# Patient Record
Sex: MALE | Race: WHITE | NOT HISPANIC OR LATINO | Employment: OTHER | URBAN - METROPOLITAN AREA
[De-identification: names, ages, dates, MRNs, and addresses within clinical notes are randomized per-mention and may not be internally consistent; named-entity substitution may affect disease eponyms.]

---

## 2017-11-02 ENCOUNTER — TRANSFERRED RECORDS (OUTPATIENT)
Dept: HEALTH INFORMATION MANAGEMENT | Facility: CLINIC | Age: 67
End: 2017-11-02

## 2023-11-09 ENCOUNTER — MEDICAL CORRESPONDENCE (OUTPATIENT)
Dept: HEALTH INFORMATION MANAGEMENT | Facility: CLINIC | Age: 73
End: 2023-11-09

## 2023-12-14 ENCOUNTER — TRANSFERRED RECORDS (OUTPATIENT)
Dept: HEALTH INFORMATION MANAGEMENT | Facility: CLINIC | Age: 73
End: 2023-12-14

## 2024-01-25 ENCOUNTER — REFERRAL (OUTPATIENT)
Dept: TRANSPLANT | Facility: CLINIC | Age: 74
End: 2024-01-25

## 2024-01-25 DIAGNOSIS — Z87.891 HISTORY OF TOBACCO USE: ICD-10-CM

## 2024-01-25 DIAGNOSIS — E11.9 DIABETES MELLITUS, TYPE 2 (H): ICD-10-CM

## 2024-01-25 DIAGNOSIS — Z01.818 PRE-TRANSPLANT EVALUATION FOR KIDNEY TRANSPLANT: ICD-10-CM

## 2024-01-25 DIAGNOSIS — Z76.82 ORGAN TRANSPLANT CANDIDATE: ICD-10-CM

## 2024-01-25 DIAGNOSIS — N18.5 CHRONIC KIDNEY DISEASE, STAGE 5, KIDNEY FAILURE (H): Primary | ICD-10-CM

## 2024-01-25 DIAGNOSIS — E78.5 HYPERLIPIDEMIA: ICD-10-CM

## 2024-01-25 DIAGNOSIS — I10 ESSENTIAL HYPERTENSION: ICD-10-CM

## 2024-01-25 DIAGNOSIS — N18.6 END STAGE RENAL DISEASE (H): ICD-10-CM

## 2024-01-25 NOTE — LETTER
2/2/24        Peter Carpio  [address] [address]          Dear Peter,    Thank you for your interest in the Transplant Center at [facility]. We look forward to being a part of your care team and assisting you through the transplant process.    As we discussed, your transplant coordinator is Tiffany Urbina (Kidney).  You may call your coordinator at any time with questions or concerns.  Your first scheduled call will be on 2/13/24 between 1:00 and 3:00.  If this needs to change, call [phone].    Please complete the following.    Fill out and return the enclosed forms  Authorization for Electronic Communication  Authorization to Discuss Protected Health Information  Authorization for Release of Protected Health Information  Authorization for Care Everywhere Release of Information    Sign up for:  AIMt, access to your electronic medical record (see enclosed pamphlet)  [email or web address], a transplant education website                                  My Transplant Place     You can use these tools to learn more about your transplant, communicate with your care team, and track your medical details      Sincerely,      Solid Organ Transplant  [facility]    cc: Care Team

## 2024-01-25 NOTE — LETTER
Peter Carpio  [address] [address]                February 13, 2024    Sanjuana Peter,     It was a pleasure to speak with you over the phone today in preparation of your pre-kidney transplant evaluation. I am sending this email to review the pre-transplant information we covered. I will also put this same content in a letter and send it to your My Chart for your convenience.     A  from our Office will send your schedule in your My Chart for your pre-kidney transplant evaluation on 5/16/24     Your appointments will be at the:  [facility]  [address] [place] [id number]  Please go to the third floor to check in by 7:30 am.      For parking options, please park in the open lot across the street from the front door of our Clinic.  Otherwise, enter the Clinic and Surgery Center /arrival plaza from [facility] and attendants can assist you based on your needs.  parking is available for those with limited mobility Monday-Friday from 7:00 am to 5:00 pm. Please bring one to two family members or friends to your appointment day to help listen to the patient education and to help ask questions that are important to you. You can eat and drink normally on this day. There is a coffee shop on street level for you to purchase food at. Also, please take all your prescribed medications as ordered on this day. Upon completion of your appointments, I will compile the outcomes and have your results reviewed at the Transplant Team Selection Committee on 5/22/24. This is a medical review meeting only and so you will not be asked to attend. I will call you within a few days after this meeting to inform you of the outcomes and to assist in making arrangements for completion of your evaluation. I will also send you a summary letter after our telephone conversation.     You will receive an email from our Transplant Office which contains a Receipt of  Information consent and patient educational materials. Please read/ electronically sign the Receipt of Information consent as well as read the educational materials prior to your evaluation appointments on 5/16/24     Please complete your pre kidney transplant education on the transplant education   [email or web address]           Please complete viewing of these videos prior to your evaluation appointments as this will give you a good knowledge base to then speak with the providers.      Additional transplant resources are as follows:  www.unos.org. UNOS, or United Network of Organ Sharing, is the national organization in our country that maintains all of the organ wait lists as well as is responsible for the rules and regulations for organ allocation. I recommend looking at the Transplant Living section as this area has content created for patients.     www.srtr.org SRTR, or the Scientific Registry for Transplant Recipients is a national data base that all Transplant Centers report their success and failure rate to for all organ types twice per year. The results are public knowledge and do provide a good perspective of organ transplant.     If the transplant providers tell you at your appointments that you should start to have live donors register with our Program to initiate their evaluations, please provide this registration website: [email or web address]   The donor will receive a detailed email response back with information and next steps specific to their situation. Donors can also call our Office and ask to speak with a live donor coordinator in the event of questions at [phone].     Please let me know of any questions or concerns,  Tiffany Urbina RN  Pre-Kidney/Pancreas Transplant Coordinator  Email: [email or web address]  Direct Phone Number: [phone]

## 2024-01-25 NOTE — LETTER
Peter Javierselenasherie  [address] [address]                February 2, 2024                                                                                          MEDICAL RECORDS REQUEST    [facility] Kidney, Kidney Pancreas Transplant Program Records Request                      [facility]    Thank you for referring your patient to the [facility] Kidney, Kidney Pancreas Program, in order to process the referral we will need the following information;    Demographics  2728 form   Immunizations records  Providers Progress notes, (last 3 note)      Please call our office at [phone] if you have any questions or concerns.                Please fax all paper records to [phone] within 3-5 business days.      Thank you,   The SOT Referral Intake Team     [facility]  Solid Organ Transplant Office  [address]  [place] [address]

## 2024-02-02 VITALS — WEIGHT: 185 LBS | HEIGHT: 66 IN | BODY MASS INDEX: 29.73 KG/M2

## 2024-02-02 NOTE — TELEPHONE ENCOUNTER
PCP: Yared Greene MD  Referring Organization: [facility]  Referring Provider: Smith Browning NP  Referring Diagnosis: CKD, stage 5    GFR/Date: 9 (12/29/23)    Is patient under the age of 65? no  Is patient diabetic? yes  Is patient on insulin? yes  Was patient offered a pancreas transplant referral? no    Previous transplants: no  Heart: no  Cancer: large intestine about 8 years ago ([facility])  Biopsy: no    Is patient in a group home/assisted living? no  Does patient have a guardian? No    Patient's sister would like to be a donor. She is 72 years of age.    Referral intake process completed.  Patient is aware that after financial approval is received, medical records will be requested.   Patient confirmed for a callback from transplant coordinator on 2/13/24. (within 2 weeks)  Tentative evaluation date 4/30/24 slot 1 (within 4 weeks) if appointment is virtual, does patient have capabilities of setting this up? N/A    Confirmed coordinator will discuss evaluation process in more detail at the time of their call.   Patient is aware of the need to arrange age appropriate cancer screening, vaccinations, and dental care.  Reminded patient to complete questionnaire, complete medical records release, and review packet prior to evaluation visit .  Assessed patient for special needs (ie-wheelchair, assistance, guardian, and ):  None needed.   Patient instructed to call [phone] with questions.     Patient gave verbal consent during intake call to obtain medical records and documents outside of [facility]:  Yes

## 2024-02-11 ENCOUNTER — HEALTH MAINTENANCE LETTER (OUTPATIENT)
Age: 74
End: 2024-02-11

## 2024-02-12 PROBLEM — N18.4 CHRONIC KIDNEY DISEASE, STAGE 4, SEVERELY DECREASED GFR (H): Status: ACTIVE | Noted: 2024-02-12

## 2024-02-12 PROBLEM — E11.21 DIABETIC NEPHROPATHY (H): Status: ACTIVE | Noted: 2024-02-12

## 2024-02-12 PROBLEM — I25.10 CORONARY ARTERY DISEASE: Status: ACTIVE | Noted: 2024-02-12

## 2024-02-12 PROBLEM — E11.9 DIABETES MELLITUS, TYPE 2 (H): Status: ACTIVE | Noted: 2024-02-12

## 2024-02-12 PROBLEM — I10 HYPERTENSION: Status: ACTIVE | Noted: 2024-02-12

## 2024-02-12 PROBLEM — M10.9 GOUT: Status: ACTIVE | Noted: 2024-02-12

## 2024-02-12 PROBLEM — N25.81 SECONDARY RENAL HYPERPARATHYROIDISM (H): Status: ACTIVE | Noted: 2024-02-12

## 2024-02-12 PROBLEM — N40.0 HYPERTROPHY OF PROSTATE WITHOUT URINARY OBSTRUCTION: Status: ACTIVE | Noted: 2024-02-12

## 2024-02-13 NOTE — TELEPHONE ENCOUNTER
Reviewed pt's chart for pre-kidney transplant evaluation planning. Coordinator first call on 2/13/24. PKE STD on 5/16/24 slot 2.      services required: no. Is pt able to attend virtual education class? 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 Pt is on dialysis, started on January 2024. Pt is a type 2 diabetic, on 64 units/average of insulin per day.     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 Does not follow with local cardiologist.  Lung hx: none. 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 Saw Dr. Pham cancer specialist place out of [facility]. Will be having a hernia repair on 2/28 at [facility]. Pt is a former smoker, quit about 15 years ago and smoked for 15 years 1.5ppd, does rarely consume alcohol, and does not use recreational drugs. Does not have current infection, no non-healing wounds, or active cancer.    Health maintenance items: BMI 30.  Colonoscopy: UTD but due May of 2024.  Dental: overdue. Vaccinations: UTD. Pt is independent w/ ADLs.  Pt lives in [place] w/ spouse Jo. Good support following transplant. Pt has a sister interested in a  living donor.     Imaging Available: CT abdomen/pelvis done at [facility] 1/30/24 pushed to PACS    Contacted patient and introduced myself as their Transplant Coordinator, also introduced the role of the Transplant Coordinator in the transplant process.  Explained the purpose of this call including reviewing next steps and answering questions.      Confirmed Referring Provider, Smith Browning NP; Dialysis Center, [facility]; and Primary Care  Physician, Yared Greene MD. Explained to the patient of the importance of continued communication with referring providers and primary care physicians.      Reviewed components of transplant evaluation process including necessary appointments, tests, and procedures.  Instructed pt to bring 1-2 people with them to eval and to eat and drink and normally on eval day    Answered questions for patient regarding evaluation, provided my name and contact information and requested they call/message with any additional questions or concerns.  Informed patient they will receive a letter with information discussed in referral call. Determined that patient would like information regarding transplant by:       Mail, Email, Company Data [employer], and/or Phone Call.  Encouraged the use of 1-4 Allt.    Informed pt about transplant educational website, asked to watch pre-kidney or sign up for education class prior to evaluation. Link for educational videos were provided.  Additional informational web sites about transplant were discussed. Links provided to www.unos.org and www.srtr.org in letter sent to patient.  Pt expressed good understanding of all and were in good agreement with the plan.    Confirmed STD 5/16/24. Informed pt they will hear from scheduling to arrange appointment times for evaluation day. As well as, receive an email from our Office prior to eval with a Receipt of Info and educational materials - instructed to read materials and sign consent prior to eval. Smartset orders entered.

## 2024-02-27 ENCOUNTER — DOCUMENTATION ONLY (OUTPATIENT)
Dept: TRANSPLANT | Facility: CLINIC | Age: 74
End: 2024-02-27
Payer: COMMERCIAL

## 2024-03-28 ENCOUNTER — TRANSFERRED RECORDS (OUTPATIENT)
Dept: HEALTH INFORMATION MANAGEMENT | Facility: CLINIC | Age: 74
End: 2024-03-28

## 2024-04-21 ENCOUNTER — HEALTH MAINTENANCE LETTER (OUTPATIENT)
Age: 74
End: 2024-04-21

## 2024-04-26 ENCOUNTER — DOCUMENTATION ONLY (OUTPATIENT)
Dept: TRANSPLANT | Facility: CLINIC | Age: 74
End: 2024-04-26
Payer: COMMERCIAL

## 2024-05-08 ENCOUNTER — TELEPHONE (OUTPATIENT)
Dept: TRANSPLANT | Facility: CLINIC | Age: 74
End: 2024-05-08
Payer: COMMERCIAL

## 2024-05-08 NOTE — TELEPHONE ENCOUNTER
VM message left reminding patient of upcoming PKE appointments at [facility] on 5/16/24 starting at 0730. Instructed patient may eat breakfast and take regularly scheduled medications.  Patient encouraged to bring family member/support person along to appointments if possible  Contact information given for any further questions/concerns/need to reschedule.

## 2024-05-14 NOTE — PROGRESS NOTES
[facility] SOLID ORGAN TRANSPLANT  OUTPATIENT MNT: KIDNEY TRANSPLANT EVALUATION    Current BMI: 29.6 (HT 66 in,  lbs/83 kg)  BMI guideline for kidney transplant up to a BMI of 40 / per surgeon discretion     Frailty Assessment-- Not Frail (2/5 points)- low , low activity   Handgrip Strength: 22    Reference:  Score of 0-2 = Not Frail  Score of 3-5 = Frail      TIME SPENT: 15 minutes  VISIT TYPE: Initial   REFERRING PHYSICIAN: Reece   PT ACCOMPANIED BY: his wife, Jo     History of previous txp: none   Dialysis: [facility] 1/2024-->PD a few weeks ago     NUTRITION ASSESSMENT  H/o DM II- checks BG via Freestyle [place] 3   Humulin 70/30-  takes 32 units BID   Notices PD has increased BG, but [facility]  A1c 6.5 (2/20/24)     - Meal prep & grocery shopping: pt's wife   - Previous RD education: yes  - Appetite: typically good appetite except for 5 teeth pulled lately  - Food allergies/intolerances: no  - Issues chewing or swallowing: no  - N/V/D/C: no  - Food access concerns: no    Vitamins, Supplements, Pertinent Meds: preservision, vit D, renvela (takes 1x/day instead of with every meal)  Herbal Medicines/Supplements: none   Protein Supplements: occasional use -protein drinks (30 g protein)    Weight hx // fluid retention:   - no GERI  - weight stable     PHYSICAL ACTIVITY   No routine activity; no physical limitations  ADLs go ok     DIET RECALL  Breakfast Frozen BF sausage w/ sausage & egg    Lunch [place] or used to have an apple before teeth were pulled    Dinner Fish/chicken with salad; tacos     Snacks No additional    Beverages 32-40 oz/day fluid restriction: 1 Diet 7up, orange juice (8 oz/day), water, 2 cups milk/week, coffee    Alcohol 3 drinks/week (wine)   Dining out 1x/week      LABS  5/3/24 K 4.3 Phos 6.0     NUTRITION DIAGNOSIS   No nutrition diagnosis identified at this time     NUTRITION INTERVENTION   Nutrition education provided:  Discussed  sodium intake (low sodium foods and drinks, seasoning food without salt and tips for low sodium diet).  Reviewed wnl K levels. Discussed elevated Phos levels; recommend taking binders more often to help lower this level. Reviewed anticipated increase in BG, yet pt already following for management of this.     Reviewed post txp diet guidelines in brief (will review in further detail post txp):  (1) Review of proper food safety measures d/t immunosuppressant therapy post-op and increased risk for food-borne illness    (2) Avoid the following post txp d/t risk for rejection, unknown effects on the organs, and/or potential interactions with immunosuppressants:  - Herbal, Chinese, holistic, chiropractic, natural, alternative medicines and supplements  - Detoxes and cleanses  - Weight loss pills  - Protein powders or other products with extracts or herbs (ie green tea extract)    (3) Med regimen and possible side effects    Patient Understanding: Pt verbalized understanding of education provided.  Expected Engagement: Good  Follow-Up Plans: PRN     NUTRITION GOALS   No nutrition goals identified at this time     Mariluz Hopkins, RD, LD, CCTD

## 2024-05-15 LAB
ABO/RH(D): NORMAL
ANTIBODY SCREEN: NEGATIVE
SPECIMEN EXPIRATION DATE: NORMAL

## 2024-05-16 ENCOUNTER — ANCILLARY PROCEDURE (OUTPATIENT)
Dept: GENERAL RADIOLOGY | Facility: CLINIC | Age: 74
End: 2024-05-16
Attending: NURSE PRACTITIONER
Payer: COMMERCIAL

## 2024-05-16 ENCOUNTER — ANCILLARY PROCEDURE (OUTPATIENT)
Dept: CARDIOLOGY | Facility: CLINIC | Age: 74
End: 2024-05-16
Attending: NURSE PRACTITIONER
Payer: COMMERCIAL

## 2024-05-16 ENCOUNTER — APPOINTMENT (OUTPATIENT)
Dept: TRANSPLANT | Facility: CLINIC | Age: 74
End: 2024-05-16
Attending: NURSE PRACTITIONER
Payer: COMMERCIAL

## 2024-05-16 ENCOUNTER — LAB (OUTPATIENT)
Dept: LAB | Facility: CLINIC | Age: 74
End: 2024-05-16
Attending: NURSE PRACTITIONER
Payer: COMMERCIAL

## 2024-05-16 VITALS
BODY MASS INDEX: 29.49 KG/M2 | DIASTOLIC BLOOD PRESSURE: 74 MMHG | HEIGHT: 66 IN | HEART RATE: 62 BPM | WEIGHT: 183.5 LBS | SYSTOLIC BLOOD PRESSURE: 127 MMHG | OXYGEN SATURATION: 97 %

## 2024-05-16 DIAGNOSIS — Z12.5 ENCOUNTER FOR SCREENING FOR MALIGNANT NEOPLASM OF PROSTATE: ICD-10-CM

## 2024-05-16 DIAGNOSIS — Z76.82 ORGAN TRANSPLANT CANDIDATE: Primary | ICD-10-CM

## 2024-05-16 DIAGNOSIS — E11.9 DIABETES MELLITUS, TYPE 2 (H): ICD-10-CM

## 2024-05-16 DIAGNOSIS — N18.5 CHRONIC KIDNEY DISEASE, STAGE 5, KIDNEY FAILURE (H): ICD-10-CM

## 2024-05-16 DIAGNOSIS — N18.6 END STAGE RENAL DISEASE (H): ICD-10-CM

## 2024-05-16 DIAGNOSIS — I25.10 CARDIOVASCULAR DISEASE: ICD-10-CM

## 2024-05-16 DIAGNOSIS — I10 ESSENTIAL HYPERTENSION: ICD-10-CM

## 2024-05-16 DIAGNOSIS — Z76.82 ORGAN TRANSPLANT CANDIDATE: ICD-10-CM

## 2024-05-16 DIAGNOSIS — E78.5 HYPERLIPIDEMIA: ICD-10-CM

## 2024-05-16 DIAGNOSIS — Z01.818 PRE-TRANSPLANT EVALUATION FOR KIDNEY TRANSPLANT: ICD-10-CM

## 2024-05-16 DIAGNOSIS — Z01.818 PRE-TRANSPLANT EVALUATION FOR KIDNEY TRANSPLANT: Primary | ICD-10-CM

## 2024-05-16 DIAGNOSIS — Z87.891 HISTORY OF TOBACCO USE: ICD-10-CM

## 2024-05-16 LAB
A1 AG RBC QL: POSITIVE
ABO/RH(D): NORMAL
ALBUMIN SERPL BCG-MCNC: 4.1 G/DL (ref 3.5–5.2)
ALBUMIN UR-MCNC: 200 MG/DL
ALP SERPL-CCNC: 58 U/L (ref 40–150)
ALT SERPL W P-5'-P-CCNC: 9 U/L (ref 0–70)
ANION GAP SERPL CALCULATED.3IONS-SCNC: 18 MMOL/L (ref 7–15)
ANTIBODY TITER IGM SCREEN: NEGATIVE
APPEARANCE UR: ABNORMAL
APTT PPP: 26 SECONDS (ref 22–38)
AST SERPL W P-5'-P-CCNC: 8 U/L (ref 0–45)
ATRIAL RATE - MUSE: 56 BPM
B IGG TITR SERPL: 8 {TITER}
B IGM TITR SERPL: 4 {TITER}
BASOPHILS # BLD AUTO: 0.1 10E3/UL (ref 0–0.2)
BASOPHILS NFR BLD AUTO: 1 %
BILIRUB SERPL-MCNC: 0.2 MG/DL
BILIRUB UR QL STRIP: NEGATIVE
BUN SERPL-MCNC: 56.5 MG/DL (ref 8–23)
CALCIUM SERPL-MCNC: 9.4 MG/DL (ref 8.8–10.2)
CHLORIDE SERPL-SCNC: 97 MMOL/L (ref 98–107)
COLOR UR AUTO: YELLOW
CREAT SERPL-MCNC: 6.42 MG/DL (ref 0.67–1.17)
DEPRECATED HCO3 PLAS-SCNC: 26 MMOL/L (ref 22–29)
DIASTOLIC BLOOD PRESSURE - MUSE: NORMAL MMHG
EGFRCR SERPLBLD CKD-EPI 2021: 8 ML/MIN/1.73M2
EOSINOPHIL # BLD AUTO: 0.3 10E3/UL (ref 0–0.7)
EOSINOPHIL NFR BLD AUTO: 4 %
ERYTHROCYTE [DISTWIDTH] IN BLOOD BY AUTOMATED COUNT: 13.9 % (ref 10–15)
FACTOR 2 INTERPRETATION: NORMAL
FACTOR V INTERPRETATION: NORMAL
GLUCOSE SERPL-MCNC: 232 MG/DL (ref 70–99)
GLUCOSE UR STRIP-MCNC: 300 MG/DL
HCT VFR BLD AUTO: 38 % (ref 40–53)
HGB BLD-MCNC: 12.3 G/DL (ref 13.3–17.7)
HGB UR QL STRIP: ABNORMAL
IMM GRANULOCYTES # BLD: 0.1 10E3/UL
IMM GRANULOCYTES NFR BLD: 1 %
INR PPP: 1.08 (ref 0.85–1.15)
INTERPRETATION ECG - MUSE: NORMAL
KETONES UR STRIP-MCNC: NEGATIVE MG/DL
LAB DIRECTOR COMMENTS: NORMAL
LAB DIRECTOR DISCLAIMER: NORMAL
LAB DIRECTOR INTERPRETATION: NORMAL
LAB DIRECTOR METHODOLOGY: NORMAL
LAB DIRECTOR RESULTS: NORMAL
LEUKOCYTE ESTERASE UR QL STRIP: ABNORMAL
LVEF ECHO: NORMAL
LYMPHOCYTES # BLD AUTO: 1.3 10E3/UL (ref 0.8–5.3)
LYMPHOCYTES NFR BLD AUTO: 17 %
MCH RBC QN AUTO: 31.5 PG (ref 26.5–33)
MCHC RBC AUTO-ENTMCNC: 32.4 G/DL (ref 31.5–36.5)
MCV RBC AUTO: 97 FL (ref 78–100)
MONOCYTES # BLD AUTO: 0.7 10E3/UL (ref 0–1.3)
MONOCYTES NFR BLD AUTO: 10 %
NEUTROPHILS # BLD AUTO: 5.1 10E3/UL (ref 1.6–8.3)
NEUTROPHILS NFR BLD AUTO: 67 %
NITRATE UR QL: NEGATIVE
NRBC # BLD AUTO: 0 10E3/UL
NRBC BLD AUTO-RTO: 0 /100
P AXIS - MUSE: 59 DEGREES
PH UR STRIP: 6 [PH] (ref 5–7)
PLATELET # BLD AUTO: 195 10E3/UL (ref 150–450)
POTASSIUM SERPL-SCNC: 4 MMOL/L (ref 3.4–5.3)
PR INTERVAL - MUSE: 194 MS
PROT SERPL-MCNC: 7 G/DL (ref 6.4–8.3)
PSA SERPL DL<=0.01 NG/ML-MCNC: 2.73 NG/ML (ref 0–6.5)
QRS DURATION - MUSE: 86 MS
QT - MUSE: 432 MS
QTC - MUSE: 416 MS
R AXIS - MUSE: 1 DEGREES
RBC # BLD AUTO: 3.91 10E6/UL (ref 4.4–5.9)
RBC URINE: 21 /HPF
SODIUM SERPL-SCNC: 141 MMOL/L (ref 135–145)
SP GR UR STRIP: 1.02 (ref 1–1.03)
SPECIMEN DESCRIPTION: NORMAL
SPECIMEN EXPIRATION DATE: NORMAL
SYSTOLIC BLOOD PRESSURE - MUSE: NORMAL MMHG
T AXIS - MUSE: 26 DEGREES
T PALLIDUM AB SER QL: NONREACTIVE
UROBILINOGEN UR STRIP-MCNC: NORMAL MG/DL
VENTRICULAR RATE- MUSE: 56 BPM
WBC # BLD AUTO: 7.5 10E3/UL (ref 4–11)
WBC CLUMPS #/AREA URNS HPF: PRESENT /HPF
WBC URINE: >182 /HPF

## 2024-05-16 PROCEDURE — 86850 RBC ANTIBODY SCREEN: CPT | Performed by: INTERNAL MEDICINE

## 2024-05-16 PROCEDURE — 80053 COMPREHEN METABOLIC PANEL: CPT | Performed by: PATHOLOGY

## 2024-05-16 PROCEDURE — 99205 OFFICE O/P NEW HI 60 MIN: CPT | Performed by: INTERNAL MEDICINE

## 2024-05-16 PROCEDURE — 87086 URINE CULTURE/COLONY COUNT: CPT | Performed by: INTERNAL MEDICINE

## 2024-05-16 PROCEDURE — 86832 HLA CLASS I HIGH DEFIN QUAL: CPT | Performed by: INTERNAL MEDICINE

## 2024-05-16 PROCEDURE — 36415 COLL VENOUS BLD VENIPUNCTURE: CPT | Performed by: PATHOLOGY

## 2024-05-16 PROCEDURE — 86787 VARICELLA-ZOSTER ANTIBODY: CPT | Performed by: INTERNAL MEDICINE

## 2024-05-16 PROCEDURE — 93000 ELECTROCARDIOGRAM COMPLETE: CPT | Performed by: INTERNAL MEDICINE

## 2024-05-16 PROCEDURE — 86665 EPSTEIN-BARR CAPSID VCA: CPT | Performed by: INTERNAL MEDICINE

## 2024-05-16 PROCEDURE — 99000 SPECIMEN HANDLING OFFICE-LAB: CPT | Performed by: PATHOLOGY

## 2024-05-16 PROCEDURE — 86803 HEPATITIS C AB TEST: CPT | Performed by: INTERNAL MEDICINE

## 2024-05-16 PROCEDURE — G0103 PSA SCREENING: HCPCS | Performed by: PATHOLOGY

## 2024-05-16 PROCEDURE — 86886 COOMBS TEST INDIRECT TITER: CPT | Performed by: INTERNAL MEDICINE

## 2024-05-16 PROCEDURE — 86900 BLOOD TYPING SEROLOGIC ABO: CPT | Performed by: INTERNAL MEDICINE

## 2024-05-16 PROCEDURE — 71046 X-RAY EXAM CHEST 2 VIEWS: CPT | Mod: GC | Performed by: STUDENT IN AN ORGANIZED HEALTH CARE EDUCATION/TRAINING PROGRAM

## 2024-05-16 PROCEDURE — 81240 F2 GENE: CPT | Performed by: INTERNAL MEDICINE

## 2024-05-16 PROCEDURE — 86905 BLOOD TYPING RBC ANTIGENS: CPT | Performed by: INTERNAL MEDICINE

## 2024-05-16 PROCEDURE — 93306 TTE W/DOPPLER COMPLETE: CPT | Mod: GC | Performed by: INTERNAL MEDICINE

## 2024-05-16 PROCEDURE — 86704 HEP B CORE ANTIBODY TOTAL: CPT | Performed by: INTERNAL MEDICINE

## 2024-05-16 PROCEDURE — 86706 HEP B SURFACE ANTIBODY: CPT | Performed by: INTERNAL MEDICINE

## 2024-05-16 PROCEDURE — 99204 OFFICE O/P NEW MOD 45 MIN: CPT | Performed by: SURGERY

## 2024-05-16 PROCEDURE — 87186 SC STD MICRODIL/AGAR DIL: CPT | Performed by: INTERNAL MEDICINE

## 2024-05-16 PROCEDURE — 85610 PROTHROMBIN TIME: CPT | Performed by: PATHOLOGY

## 2024-05-16 PROCEDURE — G0452 MOLECULAR PATHOLOGY INTERPR: HCPCS | Mod: 26 | Performed by: PATHOLOGY

## 2024-05-16 PROCEDURE — 85025 COMPLETE CBC W/AUTO DIFF WBC: CPT | Performed by: PATHOLOGY

## 2024-05-16 PROCEDURE — 86833 HLA CLASS II HIGH DEFIN QUAL: CPT | Performed by: INTERNAL MEDICINE

## 2024-05-16 PROCEDURE — 81378 HLA I & II TYPING HR: CPT | Performed by: INTERNAL MEDICINE

## 2024-05-16 PROCEDURE — 85670 THROMBIN TIME PLASMA: CPT | Performed by: INTERNAL MEDICINE

## 2024-05-16 PROCEDURE — 86644 CMV ANTIBODY: CPT | Performed by: INTERNAL MEDICINE

## 2024-05-16 PROCEDURE — 86780 TREPONEMA PALLIDUM: CPT | Performed by: INTERNAL MEDICINE

## 2024-05-16 PROCEDURE — 81001 URINALYSIS AUTO W/SCOPE: CPT | Performed by: PATHOLOGY

## 2024-05-16 PROCEDURE — G0463 HOSPITAL OUTPT CLINIC VISIT: HCPCS | Performed by: SURGERY

## 2024-05-16 PROCEDURE — 85613 RUSSELL VIPER VENOM DILUTED: CPT | Performed by: INTERNAL MEDICINE

## 2024-05-16 PROCEDURE — 87340 HEPATITIS B SURFACE AG IA: CPT | Performed by: INTERNAL MEDICINE

## 2024-05-16 PROCEDURE — 86481 TB AG RESPONSE T-CELL SUSP: CPT | Performed by: INTERNAL MEDICINE

## 2024-05-16 PROCEDURE — 86147 CARDIOLIPIN ANTIBODY EA IG: CPT | Performed by: INTERNAL MEDICINE

## 2024-05-16 PROCEDURE — 85730 THROMBOPLASTIN TIME PARTIAL: CPT | Performed by: PATHOLOGY

## 2024-05-16 PROCEDURE — 85390 FIBRINOLYSINS SCREEN I&R: CPT | Mod: 26 | Performed by: PATHOLOGY

## 2024-05-16 RX ORDER — ATORVASTATIN CALCIUM 20 MG/1
20 TABLET, FILM COATED ORAL DAILY
COMMUNITY
Start: 2023-07-02

## 2024-05-16 RX ORDER — INSULIN HUMAN 100 [IU]/ML
INJECTION, SUSPENSION SUBCUTANEOUS
COMMUNITY
Start: 2024-01-15

## 2024-05-16 RX ORDER — CHLORTHALIDONE 25 MG/1
25 TABLET ORAL
COMMUNITY
Start: 2023-07-25 | End: 2024-07-24

## 2024-05-16 RX ORDER — GABAPENTIN 100 MG/1
100 CAPSULE ORAL
COMMUNITY
Start: 2023-04-25

## 2024-05-16 RX ORDER — VITS A,C,E/LUTEIN/MINERALS 300MCG-200
1 TABLET ORAL DAILY
COMMUNITY

## 2024-05-16 RX ORDER — VITAMIN B COMPLEX
25 TABLET ORAL
COMMUNITY

## 2024-05-16 RX ORDER — ATENOLOL 50 MG/1
1 TABLET ORAL DAILY
COMMUNITY
Start: 2023-06-29

## 2024-05-16 RX ORDER — VIT A/VIT C/VIT E/ZINC/COPPER 4296-226
1 CAPSULE ORAL DAILY
COMMUNITY

## 2024-05-16 RX ORDER — ALLOPURINOL 100 MG/1
100 TABLET ORAL DAILY
COMMUNITY
Start: 2024-03-22

## 2024-05-16 RX ORDER — ASPIRIN 325 MG
325 TABLET ORAL
COMMUNITY

## 2024-05-16 RX ORDER — ISOSORBIDE MONONITRATE 30 MG/1
1 TABLET, EXTENDED RELEASE ORAL DAILY
COMMUNITY
Start: 2023-06-22

## 2024-05-16 RX ORDER — FLASH GLUCOSE SENSOR
KIT MISCELLANEOUS
COMMUNITY
Start: 2024-05-06

## 2024-05-16 RX ORDER — CALCIUM ACETATE 667 MG/1
667 CAPSULE ORAL
COMMUNITY
Start: 2022-12-31

## 2024-05-16 RX ORDER — FUROSEMIDE 20 MG
1 TABLET ORAL DAILY
COMMUNITY
Start: 2023-07-03

## 2024-05-16 RX ORDER — LIDOCAINE/PRILOCAINE 2.5 %-2.5%
CREAM (GRAM) TOPICAL
COMMUNITY
Start: 2024-01-07

## 2024-05-16 RX ORDER — FAMOTIDINE 20 MG/1
TABLET, FILM COATED ORAL
COMMUNITY
Start: 2023-04-17

## 2024-05-16 RX ORDER — GENTAMICIN SULFATE 3 MG/ML
SOLUTION/ DROPS OPHTHALMIC
COMMUNITY
Start: 2024-02-22

## 2024-05-16 NOTE — PROGRESS NOTES
TRANSPLANT NEPHROLOGY RECIPIENT EVALUATION NOTE    Assessment and Plan:  # Kidney Transplant Evaluation: Patient is a good candidate overall. Benefits of a living donor transplant were discussed.    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 But noted to have CKD since 2009 with creatinine of 1.9. likely he had slow CKD progression requiring to initiate dialysis in jan 2024.  Started with hemodialysis for 6 weeks and later had hernia repair then started on PD, now on PD since April 2024. Tolerated PD well. No known infections yet. Makes little urine and issues noted with voiding.    # DM Type 2: Per pt, he recall, it was diagnosed some time in 2010's may be in 2014 or 15. Was started with insulin because of burden of disease. Was taking only 16 units BID but now at 32 units of  Humulin mix 70/30 twice daily. No hypoglycemic unawareness endorsed.    # Cardiac Risk: yes, with given HTN, DM, CKD and remote smoking history. Have had NSEMI in 2021, but seems like he never had angiogram because of CKD and medically managed. In 2021 underwent stress ECHO with no inducible ischemia and normal EF. Repeat ECHO today without significant abnormality and normal EF.    # PAD Screening: Will obtain updated imaging for Transplant Surgery to review    # Gout : On allopurinol 100 mg daily. No recent gout episodes.     # H/o Smoking : 20 years was smoking 1 pack per day , quit 25 years ago.    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  Saw Dr. Pham cancer specialist place out of [facility] Undergoing surveillance colonoscopy. Last colonoscopy in march 2024 with out any new lesions. Recommended to repeat colonoscopy in 3-5 years.    # Health Maintenance: Colonoscopy: Up to date, last done in march 2024, repeat in 3-5 years and Dental: Up to date, couple broken teeth and currently being taking care of.    Recommendations   -  Need coronary angiogram   - Will get CT chest with given xray abnormalities with h/o smoking    Evaluation:  Pteer Carpio was seen in consultation at the request of Dr. Wali Newell for evaluation as a potential kidney transplant recipient.    Reason for Visit:  Peter Carpio is a 74 year old male with ESKD from diabetes mellitus type 2, who presents for kidney transplant evaluation.    History of Present Illness:  Pt presented with her wife for transplant evaluation and have her sister for living donor evaluation.  PMH significant for HTN, DM, CKD now progressed to ESKD and started on HD now on PD since April 2024. H/o carcinoid tumor now s/p resection and no recurrence. Remote history of smoking. Pt endorsed that he does not have any limitations for walking but endorsed having exertional dyspnea if he have groceries or some weights he need to carry. No associated chest pain or LE pain. Denied any other significant medical issues at this time.         Kidney Disease Hx:        Kidney Disease Dx: Diabetic nephropathy       Biopsy Proven: No         On Dialysis: Yes, Date initiated: Jan 2024, Dialysis Type: PD;, and Dialysis unit: [facility]       Primary Nephrologist: Dr. Gonzalez       H/o Kidney Stones: Yes; one time 40 years ago.        H/o Recurrent/Frequent UTI: No         Diabetic Hx: Type 2        Diagnosis Date: mid 2015's       Medication History: started on insulin because of disease burden and remained on insulin. Was started with insulin of 8 units BID now taking 32 units BID.        Diabetic Control: Controlled (HbA1c <7%)   Last HbA1c: 6.5% in feb 2024       Hypoglycemic Unawareness: No       End-Organ Damage due to DM: Peripheral neuropathy and Cardiovascular disease          Cardiac/Vascular Disease Risk Factors:        Cardiac Risk Factors: Diabetes, Hypertension, CKD, and Age (Male > 55, Female > 65)       Known CAD: Yes; NSTEMI in 2021 but never had angiogram, stress test at  that time was negative.       Known PAD/Caludication Symptoms: No       Known Heart Failure: No       Arrhythmia: No       Pulmonary Hypertension: No       Valvular Disease: No       Other: None         Viral Serology Status       CMV IgG Antibody: Unknown       EBV IgG Antibody: Unknown         Volume Status/Weight:        Volume status: Euvolemic       Weight:  Acceptable BMI       BMI: There is no height or weight on file to calculate BMI.         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 No chemoradiation at that time. Saw Dr. Pham cancer specialist place out [facility].    Other Significant Medical Issues: None    Possible Higher Risk Donor Option Preferences:   Donor with a high Kidney Donor Profile Index (KDPI) score: Not discussed    Allergy Testing Questions:   Medication that caused a reaction Patient doesn't remember     Antibiotics used that didn't give an allergic reaction?  Penicillin (Amoxicillin, Amoxicillin with clavulanic acid, Dicloxacillin)    COVID Vaccination Up To Date: Yes    Potential Living Kidney Donors: Yes    Review of Systems:  A comprehensive review of systems was obtained and negative, except as noted in the HPI or PMH.    Past Medical History:   Medical record was reviewed and PMH was discussed with patient and noted below.  No past medical history on file.    Past Social History:   No past surgical history on file.  Personal history of bleeding or anesthesia problems: No    Family History:  No family history on file.    Personal History:   Social History     Socioeconomic History    Marital status:       Spouse name: Not on file    Number of children: Not on file    Years of education: Not on file    Highest education level: Not on file   Occupational History    Not on file   Tobacco Use    Smoking status: Former     Current packs/day: 0.00     Types: Cigarettes     Quit date:      Years since quittin.3    Smokeless tobacco: Never   Substance and Sexual Activity    Alcohol use: Yes     Comment: rare    Drug use: Never    Sexual activity: Not on file   Other Topics Concern    Not on file   Social History Narrative    Not on file     Social Determinants of Health     Financial Resource Strain: High Risk (2022)    Received from [facility], [facility]    Financial Resource Strain     Difficulty of Paying Living Expenses: Not on file     Difficulty of Paying Living Expenses: Not on file   Food Insecurity: Not on file   Transportation Needs: Not on file   Physical Activity: Not on file   Stress: Not on file   Social Connections: Unknown (2022)    Received from [facility], [facility]    Social Connections     Frequency of Communication with Friends and Family: Not on file   Interpersonal Safety: Not on file   Housing Stability: Not on file       Allergies:  No Known Allergies    Medications:  Current Outpatient Medications   Medication Sig Dispense Refill    allopurinol (ZYLOPRIM) 100 MG tablet Take 100 mg by mouth daily      aspirin (ASA) 325 MG tablet Take 325 mg by mouth      atenolol (TENORMIN) 50 MG tablet Take 1 tablet by mouth daily      atorvastatin (LIPITOR) 20 MG tablet Take 20 mg by mouth daily      calcium acetate (PHOSLO) 667 MG CAPS capsule Take 667 mg by mouth      chlorthalidone (HYGROTON) 25 MG tablet Take 25 mg by mouth      Continuous Glucose Sensor (FREESTYLE ANTONIO 14 DAY SENSOR) MISC       famotidine (PEPCID) 20 MG tablet       furosemide (LASIX)  20 MG tablet Take 1 tablet by mouth daily      gabapentin (NEURONTIN) 100 MG capsule Take 100 mg by mouth      gentamicin (GARAMYCIN) 0.3 % ophthalmic solution       HUMULIN MIX 70/30 KWIKPEN (70-30) 100 UNIT/ML susp 30 IN AM, 32 IN PM      isosorbide mononitrate (IMDUR) 30 MG 24 hr tablet Take 1 tablet by mouth daily      lidocaine-prilocaine (EMLA) 2.5-2.5 % external cream Apply to skin.      Multiple Vitamins-Minerals (PRESERVISION AREDS) CAPS Take 1 capsule by mouth daily      multivitamin (OCUVITE) TABS tablet Take 1 tablet by mouth daily      Vitamin D3 (VITAMIN D-1000 MAX ST) 25 mcg (1000 units) tablet Take 25 mcg by mouth       No current facility-administered medications for this visit.       Vitals:  There were no vitals taken for this visit.    Exam:  GENERAL APPEARANCE: alert and no distress  EYES: eyes grossly normal to inspection  HENT: normal cephalic/atraumatic  RESP: lungs clear to auscultation - no rales, rhonchi or wheezes  CV: regular rhythm, normal rate, no rub, no murmur  EDEMA: no LE edema bilaterally  ABDOMEN: soft, nondistended, nontender, bowel sounds normal  MS: extremities normal - no gross deformities noted, no evidence of inflammation in joints, no muscle tenderness  SKIN: no rash  NEURO: mentation intact and speech normal  PSYCH: mentation appears normal and affect normal/bright  DIALYSIS ACCESS:  Peritoneal dialysis catheter    Results:   No results found for this or any previous visit (from the past 336 hour(s)).

## 2024-05-16 NOTE — PROGRESS NOTES
Transplant Surgery Consult Note     Medical record number: [id number]  YOB: 1950,   Consult requested  for evaluation of kidney transplant candidacy.    Assessment and Recommendations:Mr. Carpio appears to be a good candidate for kidney transplantation and has a good understanding of the risks and benefits of this approach to the management of renal failure. The following issues should be addressed prior to finalizing his transplant candidacy:     Transplant order: Mr. Carpio has End stage renal failure due to diabetes mellitus type 2 whose condition is not expected to resolve, is expected to progress, and is expected to continue to develop related comorbid conditions.  Recommend he be considered as a candidate for kidney alone.  Cardiology consult for cardiac risk stratification to be ordered: Yes- will need a cath  CT abdomen and pelvis without contrast to be ordered for assessment of vascular targets: Yes  Transplant listing labs ordered to include HLA, ABOx2, Cr, etc.  Dietician consult ordered: Yes  Social work consult ordered: Yes  Imaging reports reviewed:  n/a  Radiology images reviewed:n/a  Recipient suitable to move forward with work up of living donors:  Yes      The majority of our visit was spent in counselling, discussing the medical and surgical risks of kidney transplantation. We discussed approximate wait time and how that is influenced by issues such as blood type and sensitization (PRA) and access to a living donor. I contrasted potential waiting time for living vs  donor kidneys from  normal (0-85%) or higher (%) kidney donor profile index (KDPI) donors and their associated outcomes. I would not recommend this individual to consider kidneys from high KDPI donors. The reason for this decision is best summarized as: multiple potential living donors- if no LD options could give consideration to carefully selected DDKT including high KDPI in goal of transplanting faster.  Potential surgical complications of kidney transplantation include bleeding, superficial or deep wound complications (infection, hernia, lymphocele), ureteral anastomotic failure (leak or stenosis), graft thrombosis, need for reoperation and other issues such as cardiac complications, pneumonia, deep venous thrombosis, pulmonary embolism, post transplant diabetes and death. The potential for recurrent disease or need for retransplantation was also addressed. We discussed the possible need for ureteral stent (and subsequent removal), and the utility of protocol biopsy and laboratory studies to evaluate for rejection or recurrent disease. We discussed the risk of graft rejection, our center's average graft and patient survival rates, immunosuppression protocols, as well as the potential opportunity to participate in clinical trials.  We also discussed the average length of stay, recovery process, and posttransplant lab and monitoring protocol.  I emphasized the need for strict immunosuppression medication adherence and the potential for complications of immunosuppression such as skin cancer or lymphoma, as well as a very low but not zero risk of donor-derived disease transmission risks (infection, cancer). Mr. Carpio asked good questions and his candidacy will be reviewed at our Multidisciplinary Selection Committee. Thank you for the opportunity to participate in Mr. Carpio's care.      Total time: 45 minutes  Counselling time: 40 minutes      Wali Newell MD    ---------------------------------------------------------------------------------------------------    HPI: Mr. Carpio has End stage renal failure due to diabetes mellitus type 2. He does have IDDM without significant triopathy or lower extremity wound/ulcer issues    The patient is on dialysis.    Has potential kidney donors:  Yes .  Interested in participation in paired exchange if a donor is willing: Yes     The patient has the following  pertinent history:       No    Yes  Dialysis:    []      [] via:       Blood Transfusion                  []      []  Number of units:   Most recently:  Pregnancy:    []      [] Number:       Previous Transplant:  []      [] Details:    Cancer    []      [] Comment:   Kidney stones   []      [] Comment:      Recurrent infections  []      []  Type:                  Bladder dysfunction  []      [] Cause:    Claudication   []      [] Distance:    Previous Amputation  []      [] Cause:     Chronic anticoagulation  []      [] Indication:   Sikh  []      []      No past medical history on file.  No past surgical history on file.  No family history on file.  Social History     Socioeconomic History    Marital status:      Spouse name: Not on file    Number of children: Not on file    Years of education: Not on file    Highest education level: Not on file   Occupational History    Not on file   Tobacco Use    Smoking status: Former     Current packs/day: 0.00     Types: Cigarettes     Quit date:      Years since quittin.3    Smokeless tobacco: Never   Substance and Sexual Activity    Alcohol use: Yes     Comment: rare    Drug use: Never    Sexual activity: Not on file   Other Topics Concern    Not on file   Social History Narrative    Not on file     Social Determinants of Health     Financial Resource Strain: High Risk (2022)    Received from [facility], [facility]    Financial Resource Strain     Difficulty of Paying Living Expenses: Not on file     Difficulty of Paying Living Expenses: Not on file   Food Insecurity: Not on file   Transportation Needs: Not on file   Physical Activity: Not on file   Stress: Not on file   Social Connections: Unknown (2022)    Received from [facility], [facility]    Social Connections     Frequency of Communication with  Friends and Family: Not on file   Interpersonal Safety: Not on file   Housing Stability: Not on file       ROS:   CONSTITUTIONAL:  No fevers or chills  EYES: negative for icterus  ENT:  negative for hearing loss, tinnitus and sore throat  RESPIRATORY:  negative for cough, sputum, dyspnea  CARDIOVASCULAR:  negative for chest pain  negative for lower extremity wounds/ulcers  GASTROINTESTINAL:  negative for nausea, vomiting, diarrhea or constipation  GENITOURINARY:  negative for incontinence, dysuria, bladder emptying problems  HEME:  No easy bruising  INTEGUMENT:  negative for rash and pruritus  NEURO:  Negative for headache, seizure disorder  Allergies:   No Known Allergies  Medications:  Prescription Medications as of 5/16/2024         Rx Number Disp Refills Start End Last Dispensed Date Next Fill Date Owning Pharmacy    allopurinol (ZYLOPRIM) 100 MG tablet  -- -- 3/22/2024 --       Sig: Take 100 mg by mouth daily    Class: Historical    Route: Oral    aspirin (ASA) 325 MG tablet  -- --  --       Sig: Take 325 mg by mouth    Class: Historical    Route: Oral    atenolol (TENORMIN) 50 MG tablet  -- -- 6/29/2023 --       Sig: Take 1 tablet by mouth daily    Class: Historical    Route: Oral    atorvastatin (LIPITOR) 20 MG tablet  -- -- 7/2/2023 --       Sig: Take 20 mg by mouth daily    Class: Historical    Route: Oral    calcium acetate (PHOSLO) 667 MG CAPS capsule  -- -- 12/31/2022 --       Sig: Take 667 mg by mouth    Class: Historical    Route: Oral    chlorthalidone (HYGROTON) 25 MG tablet  -- -- 7/25/2023 7/24/2024       Sig: Take 25 mg by mouth    Class: Historical    Route: Oral    Continuous Glucose Sensor (FREESTYLE ANTONIO 14 DAY SENSOR) [facility]  -- -- 5/6/2024 --       Class: Historical    famotidine (PEPCID) 20 MG tablet  -- -- 4/17/2023 --       Class: Historical    furosemide (LASIX) 20 MG tablet  -- -- 7/3/2023 --       Sig: Take 1 tablet by mouth daily    Class: Historical    Route: Oral    gabapentin  "(NEURONTIN) 100 MG capsule  -- -- 2023 --       Sig: Take 100 mg by mouth    Class: Historical    Route: Oral    gentamicin (GARAMYCIN) 0.3 % ophthalmic solution  -- -- 2024 --       Class: Historical    HUMULIN MIX 70/30 KWIKPEN (70-30) 100 UNIT/ML susp  -- -- 1/15/2024 --       Si IN AM, 32 IN PM    Class: Historical    isosorbide mononitrate (IMDUR) 30 MG 24 hr tablet  -- -- 2023 --       Sig: Take 1 tablet by mouth daily    Class: Historical    Route: Oral    lidocaine-prilocaine (EMLA) 2.5-2.5 % external cream  -- -- 2024 --       Sig: Apply to skin.    Class: Historical    Route: Topical    Multiple Vitamins-Minerals (PRESERVISION AREDS) CAPS  -- --  --       Sig: Take 1 capsule by mouth daily    Class: Historical    Route: Oral    multivitamin (OCUVITE) TABS tablet  -- --  --       Sig: Take 1 tablet by mouth daily    Class: Historical    Route: Oral    Vitamin D3 (VITAMIN D-1000 MAX ST) 25 mcg (1000 units) tablet  -- --  --       Sig: Take 25 mcg by mouth    Class: Historical    Route: Oral          Exam:     /74   Pulse 62   Ht 1.676 m (5' 6\")   Wt 83.2 kg (183 lb 8 oz)   SpO2 97%   BMI 29.62 kg/m    Appearance: in no apparent distress.   Skin: normal, warm, dry  Eyes:  no redness or discharge.  Sclera anicteric  Head and Neck: Normal, no rashes or jaundice  Respiratory: easy respirations, no audible wheezing.  Abdomen: soft, slightly rounded but no overhanging pannus, no hernia or incision  Extremities: femoral 2+/2+, Edema, trace  Neuro: without deficit, cranial nerves intact   Psychiatric: Normal mood and affect    Diagnostics:   Recent Results (from the past 672 hour(s))   EKG 12-lead, tracing only [EKG1]    Collection Time: 24 12:46 PM   Result Value Ref Range    Systolic Blood Pressure  mmHg    Diastolic Blood Pressure  mmHg    Ventricular Rate 56 BPM    Atrial Rate 56 BPM    MA Interval 194 ms    QRS Duration 86 ms     ms    QTc 416 ms    P Axis 59 " "degrees    R AXIS 1 degrees    T Axis 26 degrees    Interpretation ECG       Sinus bradycardia  Otherwise normal ECG  No previous ECGs available     CBC with platelets and differential    Collection Time: 05/16/24  1:26 PM   Result Value Ref Range    WBC Count 7.5 4.0 - 11.0 10e3/uL    RBC Count 3.91 (L) 4.40 - 5.90 10e6/uL    Hemoglobin 12.3 (L) 13.3 - 17.7 g/dL    Hematocrit 38.0 (L) 40.0 - 53.0 %    MCV 97 78 - 100 fL    MCH 31.5 26.5 - 33.0 pg    MCHC 32.4 31.5 - 36.5 g/dL    RDW 13.9 10.0 - 15.0 %    Platelet Count 195 150 - 450 10e3/uL    % Neutrophils 67 %    % Lymphocytes 17 %    % Monocytes 10 %    % Eosinophils 4 %    % Basophils 1 %    % Immature Granulocytes 1 %    NRBCs per 100 WBC 0 <1 /100    Absolute Neutrophils 5.1 1.6 - 8.3 10e3/uL    Absolute Lymphocytes 1.3 0.8 - 5.3 10e3/uL    Absolute Monocytes 0.7 0.0 - 1.3 10e3/uL    Absolute Eosinophils 0.3 0.0 - 0.7 10e3/uL    Absolute Basophils 0.1 0.0 - 0.2 10e3/uL    Absolute Immature Granulocytes 0.1 <=0.4 10e3/uL    Absolute NRBCs 0.0 10e3/uL     No results found for: \"CPRA\"  "

## 2024-05-16 NOTE — LETTER
2024         RE: Peter Carpio  [address] [facility] [address]        Dear Colleague,    Thank you for referring your patient, Peter Carpio, to the [facility]. Please see a copy of my visit note below.    Transplant Surgery Consult Note     Medical record number: [id number]  YOB: 1950,   Consult requested  for evaluation of kidney transplant candidacy.    Assessment and Recommendations:Mr. Carpio appears to be a good candidate for kidney transplantation and has a good understanding of the risks and benefits of this approach to the management of renal failure. The following issues should be addressed prior to finalizing his transplant candidacy:     Transplant order: Mr. Carpio has End stage renal failure due to diabetes mellitus type 2 whose condition is not expected to resolve, is expected to progress, and is expected to continue to develop related comorbid conditions.  Recommend he be considered as a candidate for kidney alone.  Cardiology consult for cardiac risk stratification to be ordered: Yes- will need a cath  CT abdomen and pelvis without contrast to be ordered for assessment of vascular targets: Yes  Transplant listing labs ordered to include HLA, ABOx2, Cr, etc.  Dietician consult ordered: Yes  Social work consult ordered: Yes  Imaging reports reviewed:  n/a  Radiology images reviewed:n/a  Recipient suitable to move forward with work up of living donors:  Yes      The majority of our visit was spent in counselling, discussing the medical and surgical risks of kidney transplantation. We discussed approximate wait time and how that is influenced by issues such as blood type and sensitization (PRA) and access to a living donor. I contrasted potential waiting time for living vs  donor kidneys from  normal (0-85%) or higher (%) kidney donor profile index (KDPI) donors and their associated outcomes. I would not recommend  this individual to consider kidneys from high KDPI donors. The reason for this decision is best summarized as: multiple potential living donors- if no LD options could give consideration to carefully selected DDKT including high KDPI in goal of transplanting faster. Potential surgical complications of kidney transplantation include bleeding, superficial or deep wound complications (infection, hernia, lymphocele), ureteral anastomotic failure (leak or stenosis), graft thrombosis, need for reoperation and other issues such as cardiac complications, pneumonia, deep venous thrombosis, pulmonary embolism, post transplant diabetes and death. The potential for recurrent disease or need for retransplantation was also addressed. We discussed the possible need for ureteral stent (and subsequent removal), and the utility of protocol biopsy and laboratory studies to evaluate for rejection or recurrent disease. We discussed the risk of graft rejection, our center's average graft and patient survival rates, immunosuppression protocols, as well as the potential opportunity to participate in clinical trials.  We also discussed the average length of stay, recovery process, and posttransplant lab and monitoring protocol.  I emphasized the need for strict immunosuppression medication adherence and the potential for complications of immunosuppression such as skin cancer or lymphoma, as well as a very low but not zero risk of donor-derived disease transmission risks (infection, cancer). Mr. Carpio asked good questions and his candidacy will be reviewed at our Multidisciplinary Selection Committee. Thank you for the opportunity to participate in Mr. Carpio's care.      Total time: 45 minutes  Counselling time: 40 minutes      Wali Newell MD    ---------------------------------------------------------------------------------------------------    HPI: Mr. Carpio has End stage renal failure due to diabetes mellitus type 2. He  does have IDDM without significant triopathy or lower extremity wound/ulcer issues    The patient is on dialysis.    Has potential kidney donors:  Yes .  Interested in participation in paired exchange if a donor is willing: Yes     The patient has the following pertinent history:       No    Yes  Dialysis:    []      [] via:       Blood Transfusion                  []      []  Number of units:   Most recently:  Pregnancy:    []      [] Number:       Previous Transplant:  []      [] Details:    Cancer    []      [] Comment:   Kidney stones   []      [] Comment:      Recurrent infections  []      []  Type:                  Bladder dysfunction  []      [] Cause:    Claudication   []      [] Distance:    Previous Amputation  []      [] Cause:     Chronic anticoagulation  []      [] Indication:   Yazidi  []      []      No past medical history on file.  No past surgical history on file.  No family history on file.  Social History     Socioeconomic History     Marital status:      Spouse name: Not on file     Number of children: Not on file     Years of education: Not on file     Highest education level: Not on file   Occupational History     Not on file   Tobacco Use     Smoking status: Former     Current packs/day: 0.00     Types: Cigarettes     Quit date:      Years since quittin.3     Smokeless tobacco: Never   Substance and Sexual Activity     Alcohol use: Yes     Comment: rare     Drug use: Never     Sexual activity: Not on file   Other Topics Concern     Not on file   Social History Narrative     Not on file     Social Determinants of Health     Financial Resource Strain: High Risk (2022)    Received from [facility], [facility]    Financial Resource Strain      Difficulty of Paying Living Expenses: Not on file      Difficulty of Paying Living Expenses: Not on file   Food Insecurity: Not on file   Transportation Needs:  Not on file   Physical Activity: Not on file   Stress: Not on file   Social Connections: Unknown (1/1/2022)    Received [facility], [facility]    Social Connections      Frequency of Communication with Friends and Family: Not on file   Interpersonal Safety: Not on file   Housing Stability: Not on file       ROS:   CONSTITUTIONAL:  No fevers or chills  EYES: negative for icterus  ENT:  negative for hearing loss, tinnitus and sore throat  RESPIRATORY:  negative for cough, sputum, dyspnea  CARDIOVASCULAR:  negative for chest pain  negative for lower extremity wounds/ulcers  GASTROINTESTINAL:  negative for nausea, vomiting, diarrhea or constipation  GENITOURINARY:  negative for incontinence, dysuria, bladder emptying problems  HEME:  No easy bruising  INTEGUMENT:  negative for rash and pruritus  NEURO:  Negative for headache, seizure disorder  Allergies:   No Known Allergies  Medications:  Prescription Medications as of 5/16/2024         Rx Number Disp Refills Start End Last Dispensed Date Next Fill Date Owning Pharmacy    allopurinol (ZYLOPRIM) 100 MG tablet  -- -- 3/22/2024 --       Sig: Take 100 mg by mouth daily    Class: Historical    Route: Oral    aspirin (ASA) 325 MG tablet  -- --  --       Sig: Take 325 mg by mouth    Class: Historical    Route: Oral    atenolol (TENORMIN) 50 MG tablet  -- -- 6/29/2023 --       Sig: Take 1 tablet by mouth daily    Class: Historical    Route: Oral    atorvastatin (LIPITOR) 20 MG tablet  -- -- 7/2/2023 --       Sig: Take 20 mg by mouth daily    Class: Historical    Route: Oral    calcium acetate (PHOSLO) 667 MG CAPS capsule  -- -- 12/31/2022 --       Sig: Take 667 mg by mouth    Class: Historical    Route: Oral    chlorthalidone (HYGROTON) 25 MG tablet  -- -- 7/25/2023 7/24/2024       Sig: Take 25 mg by mouth    Class: Historical    Route: Oral    Continuous Glucose Sensor (FREESTYLE ANTONIO 14 DAY SENSOR)  "MISC  -- -- 2024 --       Class: Historical    famotidine (PEPCID) 20 MG tablet  -- -- 2023 --       Class: Historical    furosemide (LASIX) 20 MG tablet  -- -- 7/3/2023 --       Sig: Take 1 tablet by mouth daily    Class: Historical    Route: Oral    gabapentin (NEURONTIN) 100 MG capsule  -- -- 2023 --       Sig: Take 100 mg by mouth    Class: Historical    Route: Oral    gentamicin (GARAMYCIN) 0.3 % ophthalmic solution  -- -- 2024 --       Class: Historical    HUMULIN MIX 70/30 KWIKPEN (70-30) 100 UNIT/ML susp  -- -- 1/15/2024 --       Si IN AM, 32 IN PM    Class: Historical    isosorbide mononitrate (IMDUR) 30 MG 24 hr tablet  -- -- 2023 --       Sig: Take 1 tablet by mouth daily    Class: Historical    Route: Oral    lidocaine-prilocaine (EMLA) 2.5-2.5 % external cream  -- -- 2024 --       Sig: Apply to skin.    Class: Historical    Route: Topical    Multiple Vitamins-Minerals (PRESERVISION AREDS) CAPS  -- --  --       Sig: Take 1 capsule by mouth daily    Class: Historical    Route: Oral    multivitamin (OCUVITE) TABS tablet  -- --  --       Sig: Take 1 tablet by mouth daily    Class: Historical    Route: Oral    Vitamin D3 (VITAMIN D-1000 MAX ST) 25 mcg (1000 units) tablet  -- --  --       Sig: Take 25 mcg by mouth    Class: Historical    Route: Oral          Exam:     /74   Pulse 62   Ht 1.676 m (5' 6\")   Wt 83.2 kg (183 lb 8 oz)   SpO2 97%   BMI 29.62 kg/m    Appearance: in no apparent distress.   Skin: normal, warm, dry  Eyes:  no redness or discharge.  Sclera anicteric  Head and Neck: Normal, no rashes or jaundice  Respiratory: easy respirations, no audible wheezing.  Abdomen: soft, slightly rounded but no overhanging pannus, no hernia or incision  Extremities: femoral 2+/2+, Edema, trace  Neuro: without deficit, cranial nerves intact   Psychiatric: Normal mood and affect    Diagnostics:   Recent Results (from the past 672 hour(s))   EKG 12-lead, tracing only " "[EKG1]    Collection Time: 05/16/24 12:46 PM   Result Value Ref Range    Systolic Blood Pressure  mmHg    Diastolic Blood Pressure  mmHg    Ventricular Rate 56 BPM    Atrial Rate 56 BPM    NY Interval 194 ms    QRS Duration 86 ms     ms    QTc 416 ms    P Axis 59 degrees    R AXIS 1 degrees    T Axis 26 degrees    Interpretation ECG       Sinus bradycardia  Otherwise normal ECG  No previous ECGs available     CBC with platelets and differential    Collection Time: 05/16/24  1:26 PM   Result Value Ref Range    WBC Count 7.5 4.0 - 11.0 10e3/uL    RBC Count 3.91 (L) 4.40 - 5.90 10e6/uL    Hemoglobin 12.3 (L) 13.3 - 17.7 g/dL    Hematocrit 38.0 (L) 40.0 - 53.0 %    MCV 97 78 - 100 fL    MCH 31.5 26.5 - 33.0 pg    MCHC 32.4 31.5 - 36.5 g/dL    RDW 13.9 10.0 - 15.0 %    Platelet Count 195 150 - 450 10e3/uL    % Neutrophils 67 %    % Lymphocytes 17 %    % Monocytes 10 %    % Eosinophils 4 %    % Basophils 1 %    % Immature Granulocytes 1 %    NRBCs per 100 WBC 0 <1 /100    Absolute Neutrophils 5.1 1.6 - 8.3 10e3/uL    Absolute Lymphocytes 1.3 0.8 - 5.3 10e3/uL    Absolute Monocytes 0.7 0.0 - 1.3 10e3/uL    Absolute Eosinophils 0.3 0.0 - 0.7 10e3/uL    Absolute Basophils 0.1 0.0 - 0.2 10e3/uL    Absolute Immature Granulocytes 0.1 <=0.4 10e3/uL    Absolute NRBCs 0.0 10e3/uL     No results found for: \"CPRA\"      Again, thank you for allowing me to participate in the care of your patient.        Sincerely,        Wali Newell MD  "

## 2024-05-16 NOTE — PROGRESS NOTES
Psychosocial Assessment  Patient Name/ Age: Peter Carpio 74 year old   Medical Record #: [id number]  Duration of Interview:     40 min  Process:   Face-to-Face Interview                (counseling < 50%)   Present at Appointment: Jo        : GIOVANNA Carranza, [facility] Date:  May 16, 2024        Type of transplant: Kidney    Donor type:   Alexis identified his sister Shantelle as a potential donor.   Cadaver and brother/sister   Prior Transplants:    No Status of Transplant:       Current Living Situation    Location:   [address] [place], [place] [address]  With Whom: lives with their spouse Jo       Family/ Social Support:    Alexis identified his wife Jo has his primary support system. Alexis also identified his son (Naldo, age 46, lives in [place]) and his daughter (Juana, age 48, lives in [place], [place]).  Available, helpful   Committed relationship:  Alexis and Jo are .    Stable/supportive   Other supports:   Alexis identified his friend Lonny Dhillon ([place], [place]) and Mahrenetta ([place], [place]) as supports in his life.    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    At this time, patient can afford medication costs:  Yes  Private Insurance and Medicare       Medical Status    Current mode of treatment for ESRD Dialysis   Complications Alexis is a Type 2 Diabetic. Neuropathy       Behavioral    Tobacco Use No Chemical Dependency No    Alexis reports to drinking 2-3x/week     Psychiatric Impairment No  Alexis reports to experience some periods of lower energy/motivation though notes it is tied to fatigue from dialysis rather than mental health.    Reading ability Good  Education Level: High School Recent Legal History  No      Coping Style/Strategies:   Alexis reports to focusing on what he can and cannot control when he feels stressed.      Ability to Adhere to Complex Medical Regime: Yes     Adherence History: Alexis reports that he attends all medical appointments, takes his medication as his dr tells him to, etc.         Education  _X_ Medicare  _X_ Rehabilitation  _X_ Donor issues  _X_ Community resources  _X_ Post discharge housing  _X_ Financial resources  _X_ Medical insurance options  _X_ Psych adjustment  _X_ Family adjustment  _X_ Health Care Directive - Yes, Will Provide   Psychosocial Risks of Transplant Reviewed and Discussed:  _X_ Increased stress related to emotional,            family, social, employment or financial           situation  _X_ Affect on work and/or disability benefits  _X_ Affect on future life insurance  _X_ Transplant outcome expectations may           not be met  _X_ Mental Health Risks: anxiety,           depression, PTSD, guilt, grief and           chronic fatigue     Notable Items:   Family expressed some question on in home support for Alexis post transplant. Jo verbalized plan to look into in-home care giving specific to what his insurance would accept.      Final Evaluation/Assessment   Patient seemed to process information well. Appeared well informed, motivated and able to follow post transplant requirements. Behavior was appropriate during interview. Has adequate income and insurance coverage. Adequate social support. No major contraindications noted for transplant.  At this time patient appears to understand the risks and benefits of transplant.      Recommendation  Acceptable    Selection Criteria Met:  Plan for support Yes   Chemical Dependence Yes   Smoking Yes   Mental Health Yes   Adequate Finances Yes    Signature: GIOVANNA Carranza, LGSW   Title: Clinical

## 2024-05-16 NOTE — LETTER
5/16/2024         RE: Peter Carpio  [address] [address]        Dear Colleague,    Thank you for referring your patient, Peter Carpio, to the [facility] TRANSPLANT [facility]. Please see a copy of my visit note below.    TRANSPLANT NEPHROLOGY RECIPIENT EVALUATION NOTE    Assessment and Plan:  # Kidney Transplant Evaluation: Patient is a good candidate overall. Benefits of a living donor transplant were discussed.    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 But noted to have CKD since 2009 with creatinine of 1.9. likely he had slow CKD progression requiring to initiate dialysis in jan 2024.  Started with hemodialysis for 6 weeks and later had hernia repair then started on PD, now on PD since April 2024. Tolerated PD well. No known infections yet. Makes little urine and issues noted with voiding.    # DM Type 2: Per pt, he recall, it was diagnosed some time in 2010's may be in 2014 or 15. Was started with insulin because of burden of disease. Was taking only 16 units BID but now at 32 units of  Humulin mix 70/30 twice daily. No hypoglycemic unawareness endorsed.    # Cardiac Risk: yes, with given HTN, DM, CKD and remote smoking history. Have had NSEMI in 2021, but seems like he never had angiogram because of CKD and medically managed. In 2021 underwent stress ECHO with no inducible ischemia and normal EF. Repeat ECHO today without significant abnormality and normal EF.    # PAD Screening: Will obtain updated imaging for Transplant Surgery to review     . Saw Dr. Pham cancer specialist place out of [facility]. Will be having a hernia repair on 2/28 at [facility]. Pt is a former smoker, quit about 15 years ago and smoked for 15 years 1.5ppd, does rarely consume alcohol, and does not use recreational drugs. Does not have current infection, no non-healing wounds, or active cancer.    # Gout : On allopurinol 100 mg daily. No recent  gout episodes.     # H/o Smoking : 20 years was smoking 1 pack per day , quit 25 years ago.    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  Saw Dr. Pham cancer specialist place out of [facility] Undergoing surveillance colonoscopy. Last colonoscopy in march 2024 with out any new lesions. Recommended to repeat colonoscopy in 3-5 years.    # Health Maintenance: Colonoscopy: Up to date, last done in march 2024, repeat in 3-5 years and Dental: Up to date, couple broken teeth and currently being taking care of.    Recommendations   - Need coronary angiogram   - Will get CT chest with given xray abnormalities with h/o smoking    Evaluation:  Peter Carpio was seen in consultation at the request of Dr. Wali Newell for evaluation as a potential kidney transplant recipient.    Reason for Visit:  Peter Carpio is a 74 year old male with ESKD from diabetes mellitus type 2, who presents for kidney transplant evaluation.    History of Present Illness:  Pt presented with her wife for transplant evaluation and have her sister for living donor evaluation.  PMH significant for HTN, DM, CKD now progressed to ESKD and started on HD now on PD since April 2024. H/o carcinoid tumor now s/p resection and no recurrence. Remote history of smoking. Pt endorsed that he does not have any limitations for walking but endorsed having exertional dyspnea if he have groceries or some weights he need to carry. No associated chest pain or LE pain. Denied any other significant medical issues at this time.         Kidney Disease Hx:        Kidney Disease Dx: Diabetic nephropathy       Biopsy Proven: No         On Dialysis: Yes, Date initiated: Jan 2024, Dialysis Type: PD;, and Dialysis unit: [facility]       Primary Nephrologist: Dr. Gonzalez       H/o Kidney Stones: Yes; one time 40 years ago.        H/o Recurrent/Frequent UTI: No         Diabetic Hx: Type 2         Diagnosis Date: mid 2015's       Medication History: started on insulin because of disease burden and remained on insulin. Was started with insulin of 8 units BID now taking 32 units BID.        Diabetic Control: Controlled (HbA1c <7%)   Last HbA1c: 6.5% in feb 2024       Hypoglycemic Unawareness: No       End-Organ Damage due to DM: Peripheral neuropathy and Cardiovascular disease          Cardiac/Vascular Disease Risk Factors:        Cardiac Risk Factors: Diabetes, Hypertension, CKD, and Age (Male > 55, Female > 65)       Known CAD: Yes; NSTEMI in 2021 but never had angiogram, stress test at that time was negative.       Known PAD/Caludication Symptoms: No       Known Heart Failure: No       Arrhythmia: No       Pulmonary Hypertension: No       Valvular Disease: No       Other: None         Viral Serology Status       CMV IgG Antibody: Unknown       EBV IgG Antibody: Unknown         Volume Status/Weight:        Volume status: Euvolemic       Weight:  Acceptable BMI       BMI: There is no height or weight on file to calculate BMI.         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 No chemoradiation at that time. Saw Dr. Pham cancer specialist place out [facility].    Other Significant Medical Issues: None    Possible Higher Risk Donor Option Preferences:   Donor with a high Kidney Donor Profile Index (KDPI) score: Not discussed    Allergy Testing Questions:   Medication that caused a reaction Patient doesn't remember     Antibiotics used that didn't give an allergic reaction?  Penicillin (Amoxicillin, Amoxicillin with clavulanic acid,  Dicloxacillin)    COVID Vaccination Up To Date: Yes    Potential Living Kidney Donors: Yes    Review of Systems:  A comprehensive review of systems was obtained and negative, except as noted in the HPI or PMH.    Past Medical History:   Medical record was reviewed and PMH was discussed with patient and noted below.  No past medical history on file.    Past Social History:   No past surgical history on file.  Personal history of bleeding or anesthesia problems: No    Family History:  No family history on file.    Personal History:   Social History     Socioeconomic History     Marital status:      Spouse name: Not on file     Number of children: Not on file     Years of education: Not on file     Highest education level: Not on file   Occupational History     Not on file   Tobacco Use     Smoking status: Former     Current packs/day: 0.00     Types: Cigarettes     Quit date:      Years since quittin.3     Smokeless tobacco: Never   Substance and Sexual Activity     Alcohol use: Yes     Comment: rare     Drug use: Never     Sexual activity: Not on file   Other Topics Concern     Not on file   Social History Narrative     Not on file     Social Determinants of Health     Financial Resource Strain: High Risk (2022)    [facility]    Financial Resource Strain      Difficulty of Paying Living Expenses: Not on file      Difficulty of Paying Living Expenses: Not on file   Food Insecurity: Not on file   Transportation Needs: Not on file   Physical Activity: Not on file   Stress: Not on file   Social Connections: Unknown (2022)    [facility], [facility]    Social Connections      Frequency of Communication with Friends and Family: Not on file   Interpersonal Safety: Not on file   Housing Stability: Not on file       Allergies:  No  Known Allergies    Medications:  Current Outpatient Medications   Medication Sig Dispense Refill     allopurinol (ZYLOPRIM) 100 MG tablet Take 100 mg by mouth daily       aspirin (ASA) 325 MG tablet Take 325 mg by mouth       atenolol (TENORMIN) 50 MG tablet Take 1 tablet by mouth daily       atorvastatin (LIPITOR) 20 MG tablet Take 20 mg by mouth daily       calcium acetate (PHOSLO) 667 MG CAPS capsule Take 667 mg by mouth       chlorthalidone (HYGROTON) 25 MG tablet Take 25 mg by mouth       Continuous Glucose Sensor (FREESTYLE ANTONIO 14 DAY SENSOR) MISC        famotidine (PEPCID) 20 MG tablet        furosemide (LASIX) 20 MG tablet Take 1 tablet by mouth daily       gabapentin (NEURONTIN) 100 MG capsule Take 100 mg by mouth       gentamicin (GARAMYCIN) 0.3 % ophthalmic solution        HUMULIN MIX 70/30 KWIKPEN (70-30) 100 UNIT/ML susp 30 IN AM, 32 IN PM       isosorbide mononitrate (IMDUR) 30 MG 24 hr tablet Take 1 tablet by mouth daily       lidocaine-prilocaine (EMLA) 2.5-2.5 % external cream Apply to skin.       Multiple Vitamins-Minerals (PRESERVISION AREDS) CAPS Take 1 capsule by mouth daily       multivitamin (OCUVITE) TABS tablet Take 1 tablet by mouth daily       Vitamin D3 (VITAMIN D-1000 MAX ST) 25 mcg (1000 units) tablet Take 25 mcg by mouth       No current facility-administered medications for this visit.       Vitals:  There were no vitals taken for this visit.    Exam:  GENERAL APPEARANCE: alert and no distress  EYES: eyes grossly normal to inspection  HENT: normal cephalic/atraumatic  RESP: lungs clear to auscultation - no rales, rhonchi or wheezes  CV: regular rhythm, normal rate, no rub, no murmur  EDEMA: no LE edema bilaterally  ABDOMEN: soft, nondistended, nontender, bowel sounds normal  MS: extremities normal - no gross deformities noted, no evidence of inflammation in joints, no muscle tenderness  SKIN: no rash  NEURO: mentation intact and speech normal  PSYCH: mentation appears normal and  affect normal/bright  DIALYSIS ACCESS:  Peritoneal dialysis catheter    Results:   No results found for this or any previous visit (from the past 336 hour(s)).               Again, thank you for allowing me to participate in the care of your patient.        Sincerely,        Giovana Blackmon MD

## 2024-05-17 LAB
CMV IGG SERPL IA-ACNC: <0.2 U/ML
CMV IGG SERPL IA-ACNC: NORMAL
DRVVT CONFIRM NORMALIZED RATIO: 1.11
DRVVT SCREEN MIX RATIO: 1.02
DRVVT SCREEN RATIO: 1.15
EBV VCA IGG SER IA-ACNC: >750 U/ML
EBV VCA IGG SER IA-ACNC: POSITIVE
HBV CORE AB SERPL QL IA: NONREACTIVE
HBV SURFACE AB SERPL IA-ACNC: 177 M[IU]/ML
HBV SURFACE AB SERPL IA-ACNC: REACTIVE M[IU]/ML
HBV SURFACE AG SERPL QL IA: NONREACTIVE
HCV AB SERPL QL IA: NONREACTIVE
HIV 1+2 AB+HIV1 P24 AG SERPL QL IA: NONREACTIVE
LA PPP-IMP: NEGATIVE
LUPUS INTERPRETATION: ABNORMAL
PATH REPORT.COMMENTS IMP SPEC: ABNORMAL
PTT RATIO: 0.95
THROMBIN TIME: 18.4 SECONDS (ref 13–19)
VZV IGG SER QL IA: 2863 INDEX
VZV IGG SER QL IA: POSITIVE

## 2024-05-18 LAB
CARDIOLIPIN IGG SER IA-ACNC: 8.4 GPL-U/ML
CARDIOLIPIN IGG SER IA-ACNC: NEGATIVE
CARDIOLIPIN IGM SER IA-ACNC: 4 MPL-U/ML
CARDIOLIPIN IGM SER IA-ACNC: NEGATIVE
GAMMA INTERFERON BACKGROUND BLD IA-ACNC: 0 IU/ML
M TB IFN-G BLD-IMP: NEGATIVE
M TB IFN-G CD4+ BCKGRND COR BLD-ACNC: 10 IU/ML
MITOGEN IGNF BCKGRD COR BLD-ACNC: 0 IU/ML
MITOGEN IGNF BCKGRD COR BLD-ACNC: 0 IU/ML
QUANTIFERON MITOGEN: 10 IU/ML
QUANTIFERON NIL TUBE: 0 IU/ML
QUANTIFERON TB1 TUBE: 0 IU/ML
QUANTIFERON TB2 TUBE: 0

## 2024-05-19 LAB
BACTERIA UR CULT: ABNORMAL
BACTERIA UR CULT: ABNORMAL

## 2024-05-21 LAB
A*: NORMAL
A*LOCUS SEROLOGIC EQUIVALENT: 2
A*LOCUS: NORMAL
A*SEROLOGIC EQUIVALENT: 24
ABTEST METHOD: NORMAL
B*: NORMAL
B*LOCUS SEROLOGIC EQUIVALENT: 62
B*LOCUS: NORMAL
B*SEROLOGIC EQUIVALENT: 55
BW-1: NORMAL
C*LOCUS SEROLOGIC EQUIVALENT: 9
C*LOCUS: NORMAL
DPA1*: NORMAL
DPB1*: NORMAL
DQA1*: NORMAL
DQA1*LOCUS: NORMAL
DQB1*: NORMAL
DQB1*LOCUS SEROLOGIC EQUIVALENT: 2
DQB1*LOCUS: NORMAL
DQB1*SEROLOGIC EQUIVALENT: 8
DRB1*: NORMAL
DRB1*LOCUS SEROLOGIC EQUIVALENT: 17
DRB1*LOCUS: NORMAL
DRB1*SEROLOGIC EQUIVALENT: 13
DRB3*: NORMAL
DRB3*LOCUS SEROLOGIC EQUIVALENT: 52
DRB3*LOCUS: NORMAL
DRB3*SEROLOGIC EQUIVALENT: 52
DRSSO TEST METHOD: NORMAL

## 2024-05-22 ENCOUNTER — COMMITTEE REVIEW (OUTPATIENT)
Dept: TRANSPLANT | Facility: CLINIC | Age: 74
End: 2024-05-22
Payer: COMMERCIAL

## 2024-05-22 NOTE — COMMITTEE REVIEW
Kidney/Pancreas Committee Review Note     Evaluation Date: 2024  Committee Review Date: 2024    Organ being evaluated for: Kidney    Transplant Phase: Evaluation  Transplant Status: Active    Transplant Coordinator: Tiffany Urbina  Transplant Surgeon:        Referring Physician: Smith Browning    Primary Diagnosis: Diabetic Nephropathy  Secondary Diagnosis: Type 2 Diabetes Mellitus    Committee Review Members:  Nephrology Jerome Patel MD, Giovana Blackmon MD   Nurse Yulissa Jauregui, APRN CNP, Zee Petty, RN, Stephania Alatorre, APRN CNP, Ari Crouch, APRN CNP, Letitia Garza, NP, FERNY RAY, JANICE   Nutrition Mariluz Hopkins, RD   Pharmacist Lidia Mclean, [facility]    - Clinical Nancy Manjarrez, MSW, Mars Paz, MSW, Dheeraj Jimenez, MSW   Transplant LAINE MEDEROS, RN, Tiffany Urbina, RN, Leilani Barnett, RN, Zee Petty, RN, Isabella Childers, RN, Aditi Riggs, RN, Letitia Browning, RN, Violette Thomas, RN, Marina Nobles, RN, Barbara Mullne, JANICE, Marija Plata, RN   Transplant Surgery Leobardo Lloyd MD       Transplant Eligibility: Irreversible chronic kidney disease treated w/dialysis or expected need for dialysis    Committee Review Decision: Needs Re-presentation    Relative Contraindications: None    Absolute Contraindications: None     Committee Chair Leobardo Lloyd MD verbally attested to the committee's decision.    Committee Discussion Details: Reviewed pt's medical status and evaluation results to date with multidisciplinary committee. The committee determined the patient was a good candidant for kidney. The committee believes a living donor would be best, but would accept a  donor for the patient at this time due to his functional status. However, he needs to have functional status assessed annually and if has a decline, may determine patient to be living donor only.     Recommended the following evaluation items:  Cardiology Risk assessment  Coronary  Angiogram  Iliac US  Low dose CT chest  Repeat UA due to patient being asymptomatic but abnormal UA  CT of Abdomen/Pelvis to be reviewed at imaging review  C-Peptide drawn  Updated Colonoscopy (Due 05/19/204)  KDPI Consent signed  Dental Clearance      Patient will be called and summary letter will be sent.

## 2024-05-23 ENCOUNTER — TELEPHONE (OUTPATIENT)
Dept: TRANSPLANT | Facility: CLINIC | Age: 74
End: 2024-05-23
Payer: COMMERCIAL

## 2024-05-23 DIAGNOSIS — Z01.818 PRE-TRANSPLANT EVALUATION FOR KIDNEY TRANSPLANT: ICD-10-CM

## 2024-05-23 DIAGNOSIS — E11.9 DIABETES MELLITUS, TYPE 2 (H): ICD-10-CM

## 2024-05-23 DIAGNOSIS — N18.6 END STAGE RENAL DISEASE (H): ICD-10-CM

## 2024-05-23 DIAGNOSIS — I25.10 CARDIOVASCULAR DISEASE: ICD-10-CM

## 2024-05-23 DIAGNOSIS — Z76.82 ORGAN TRANSPLANT CANDIDATE: Primary | ICD-10-CM

## 2024-05-23 DIAGNOSIS — I10 ESSENTIAL HYPERTENSION: ICD-10-CM

## 2024-05-23 DIAGNOSIS — Z87.891 HISTORY OF TOBACCO USE: ICD-10-CM

## 2024-05-23 DIAGNOSIS — E78.5 HYPERLIPIDEMIA: ICD-10-CM

## 2024-05-23 NOTE — LETTER
05/23/24        Peter Carpio  [address] [address]        Dear Peter,    It was a pleasure to see you recently for consideration of kidney transplantation. Your pre-transplant evaluation results were reviewed at our Multidisciplinary Selection Committee. The committee is requesting the following items are completed before determining your candidacy:    Cardiologist appointment for assessment of your heart status and for a recommendation to proceed with a kidney transplant surgery. Our schedulers will reach out to you to schedule.  Coronary Angiogram to assess for any narrowed or blocked blood vessels in your heart. Our schedulers will call you to schedule.   Aorto Iliac Artery Ultrasound to assess for any narrowed areas or blockages. Our schedulers will reach out to you to schedule.  Low dose CT chest for lung cancer screening. Our schedulers will reach out to you to schedule.  CT of Abdomen/Pelvis from 1/30/2024 will be reviewed by a transplant surgeon at our imaging review meeting. You do not need to do anything for this item.   C-Peptide lab drawn. Our schedulers will reach out to you to schedule.  KDPI Consent signed, form will be emailed to you and you can sign via [employer]. This form was discussed at your pre-kidney evaluation appointments with our transplant surgeon, Dr. Newell. Please call if you have any additional questions prior to signing the form.   Dental Clearance, please let me know once you complete upcoming dental work and we will reach out to your dental office to send a dental clearance letter.   Complete the virtual pre-kidney transplant class. This is held on Monday at 8:00 am or Thursday at 9:30 a.m.. Please let me know what day will work best for you and I will schedule you for class.               For any questions, please contact the Transplant Office at [phone].  My direct phone number:       Sincerely,  Marija Plata RN Pre-Kidney Transplant  Coordinator    Solid Organ Transplant  [facility], [facility]    CC: Dr. Samir Gonzalez, Dr. Yared Greene

## 2024-05-23 NOTE — TELEPHONE ENCOUNTER
Spoke with patient regarding the results of the selection committee meeting. Went over all of the additional items the transplant team is requesting prior to considering the patient for active status. Asked him if he was having any symptoms of a UTI and he said not currently. Notified patient we will need to repeat a UA due to the previous results. Patient said he was not taking his flomax but started taking again within the last week and has improved urine flow and denies any current symptoms. Instructed the patient to reach out to his PCP if he develops any symptoms concerning of a UTI. Patient notified me he recently had his colonoscopy done at [facility] with Dr. Muniz, I will obtain a copy of the results. Patient also said he is completing all dental work within the next 3-4 weeks and will let me know when his dental work is up-to-date. I provided patient with my direct number and send the patient a letter containing all tasks required following the selection committee meeting. Patient had no further questions.    Smartset orders placed.

## 2024-05-24 LAB
PROTOCOL CUTOFF: NORMAL
SA 1  COMMENTS: NORMAL
SA 1 CELL: NORMAL
SA 1 TEST METHOD: NORMAL
SA 2 CELL: NORMAL
SA 2 COMMENTS: NORMAL
SA 2 TEST METHOD: NORMAL
SA1 HI RISK ABY: NORMAL
SA1 MOD RISK ABY: NORMAL
SA2 HI RISK ABY: NORMAL
SA2 MOD RISK ABY: NORMAL
UNACCEPTABLE ANTIGENS: NORMAL
UNOS CPRA: 0

## 2024-06-03 NOTE — PATIENT INSTRUCTIONS
You were seen in the cardiology clinic by: Yulissa Jauregui CNP    Plan:     Medication Changes:   - None today    Labs/Tests Needed:   - You will be contacted to schedule your coronary angiogram. This will look for evidence of blockages in your heart.     Follow Up Visit:   - Follow-up with me 2 weeks after your coronary angiogram.     Additional Instructions:   - Monitor your blood pressures at home. Please keep a log of the readings and bring to your future nephrology or cardiology appointments. Your bleed pressure goal is < 130/80. It is important to achieve adequate blood pressure control before transplant.   - Focus on a low sodium diet. You should aim to consume <2,000mg of sodium daily.   - I encourage you to achieve 150 minutes/week of moderate intensity exercise if able. If this is not achievable right away, you can gradually work yourself up to this starting with low intensity exercises (i.e. walking or swimming) a couple days a week.      Important Numbers:     Cardiology   Telephone Number     To schedule an appointment or leave a message for your care team:   [phone]      Press #1   To reach the billing department: [phone]      Press # 3     To obtain copies of your medical records: [phone]      Press # 4   To reach the on-call cardiologist for after hours or on weekends: [phone]     Option #4, and ask to speak to the [facility]     Cardiovascular Clinic:   [address] [place] [address] [phone]      Thank you for trusting us with your health care needs!

## 2024-06-03 NOTE — PROGRESS NOTES
[facility]   Cardiology Service  History and Physical      Peter Carpio MRN# [id number]   YOB: 1950 Age: 74 year old       HPI:   Peter Carpio is a 74 year old year old male with a medical history significant for ESRD 2/2 diabetic nephropathy (on PD since 4/2024), DM2, obesity, NSTEMI (2010), CAD, HTN, and HLD. He presents today for cardiovascular risk assessment as part of pre-kidney transplant evaluation.     He is a former smoker, quit 2003. He does not use alcohol or drugs. His activity level is sedentary. He denies symptoms of chest pain, dizziness, lightheadedness, palpitations, orthopnea, PND, or edema. If he carries something heavy up the stairs he becomes really fatigued. When not carrying something he can use the stairs without issue. He says he can walk a couple miles before having to stop and rest. He does not have family history of heart disease.       Cardiovascular Risk Factor Profile:  coronary artery disease, hypertension, diabetes, female age >55 , smoker/previous smoker, and sedentary lifestyle     Current Cardiovascular Symptoms:  -Dyspnea on exertion    ACC HF Stage:  Stage B    NYHA Class:  Class II    Functional Capacity:  Performs 4 METS exercise without symptoms (e.g., light housework, stairs, 4 mph walk, 7 mph bike, slow step dance)      No past medical history on file.    No past surgical history on file.    Current Outpatient Medications   Medication Sig Dispense Refill    allopurinol (ZYLOPRIM) 100 MG tablet Take 100 mg by mouth daily      aspirin (ASA) 325 MG tablet Take 325 mg by mouth      atenolol (TENORMIN) 50 MG tablet Take 1 tablet by mouth daily      atorvastatin (LIPITOR) 20 MG tablet Take 20 mg by mouth daily      calcium acetate (PHOSLO) 667 MG CAPS capsule Take 667 mg by mouth      chlorthalidone (HYGROTON) 25 MG tablet Take 25 mg by mouth      Continuous Glucose Sensor (FREESTYLE ANTONIO 14 DAY SENSOR) MISC        famotidine (PEPCID) 20 MG tablet       furosemide (LASIX) 20 MG tablet Take 1 tablet by mouth daily      gabapentin (NEURONTIN) 100 MG capsule Take 100 mg by mouth      gentamicin (GARAMYCIN) 0.3 % ophthalmic solution       HUMULIN MIX 70/30 KWIKPEN (70-30) 100 UNIT/ML susp 30 IN AM, 32 IN PM      isosorbide mononitrate (IMDUR) 30 MG 24 hr tablet Take 1 tablet by mouth daily      lidocaine-prilocaine (EMLA) 2.5-2.5 % external cream Apply to skin.      Multiple Vitamins-Minerals (PRESERVISION AREDS) CAPS Take 1 capsule by mouth daily      multivitamin (OCUVITE) TABS tablet Take 1 tablet by mouth daily      Vitamin D3 (VITAMIN D-1000 MAX ST) 25 mcg (1000 units) tablet Take 25 mcg by mouth       No current facility-administered medications for this visit.       No family history on file.    Social History     Tobacco Use    Smoking status: Former     Current packs/day: 0.00     Types: Cigarettes     Quit date:      Years since quittin.4    Smokeless tobacco: Never   Substance Use Topics    Alcohol use: Yes     Comment: rare       No Known Allergies      Review Of Systems:   CONSTITUTIONAL: No report of fevers or chills  RESPIRATORY: No cough, wheezing, SOB, or hemoptysis  CARDIOVASCULAR: see HPI  MUSCULO-SKELETAL: No joint pain or swelling, no muscle pain  NEURO: No paresthesias, syncope, pre-syncope, lightheadness, dizziness or vertigo  ENDOCRINE: No temperature intolerance, no skin/hair changes  PSYCHIATRIC: No change in mood, feeling down/anxious, no change in sleep or appetite  GI: No melena or hematochezia, no change in bowel habits  : No hematuria or dysuria, no hesitancy, dribbling or incontinence. Still making urine  HEME: No easy bruising or bleeding, no history of anemia, no history of blood clots  SKIN: No rashes or sores, no unusual itching      Physical Examination:  Vitals: /64   Pulse 60   Wt 83.2 kg (183 lb 8 oz)   SpO2 97%   BMI 29.62 kg/m    BMI= Body mass index is 29.62  kg/m .    GENERAL APPEARANCE: healthy, alert and no distress  HEENT: no icterus, no xanthelasmas, normal pupil size and reaction, normal palate, mucosa moist  NECK: JVP not elevated , brisk carotid upstroke bilaterally  CHEST: lungs clear to auscultation without rales, rhonchi or wheezes, no use of accessory muscles, no retractions  CARDIOVASCULAR: regular rhythm, normal S1 and S2, no S3 or S4 and no murmur, click or rub.  ABDOMEN: soft, non tender, without hepatosplenomegaly, no masses palpable, bowel sounds normal  EXTREMITIES: warm, trace LE edema, DP/PT pulses 2+ bilaterally, no clubbing or cyanosis   NEURO: alert and oriented to person/place/time, normal speech, gait and affect  SKIN: no ecchymoses, no rashes      Laboratory:  Last Comprehensive Metabolic Panel:  Lab Results   Component Value Date     2024    POTASSIUM 4.0 2024    CHLORIDE 97 (L) 2024    CO2 26 2024    ANIONGAP 18 (H) 2024     (H) 2024    BUN 56.5 (H) 2024    CR 6.42 (H) 2024    GFRESTIMATED 8 (L) 2024    GINA 9.4 2024       Last CBC:  CBC RESULTS:   Recent Labs   Lab Test 24  1326   WBC 7.5   RBC 3.91*   HGB 12.3*   HCT 38.0*   MCV 97   MCH 31.5   MCHC 32.4   RDW 13.9          24 EK/16/24  Echocardiogram:  Left ventricular function is normal.The ejection fraction is 55-60%. No  regional wall motion abnormalities are seen.  Global right ventricular function is normal.  No significant valvular abnormalities present.  Small inferior vena cava size consistent with hypovolemia.  No pericardial effusion is present.    21 Stress Test:  1. Myocardial perfusion imaging is NORMAL.   2. The stress ECG was negative for ischemia   3. Overall left ventricular systolic function was normal without wall   motion abnormalities. The Left Ventricle Ejection Fraction was visually   estimated to be 70% (>55% is considered normal).   4. There are no prior studies  available for comparison.   5. Calcium Score Total: 97.9       LMain: 11.0       LAD: 86.9       LCX:   0.0       RCA: 0.0       GURROLA: 43    (Reference norms based on patient   ages of 45-84: Minimal Calcium: 1-10; Mild Calcium: ; Moderate   Calcium: 101-400; High Calcium: 401 and above).   6. Please see radiology report for non-cardiac findings.       Assessment and Plan:     # Coronary Artery Disease   # Hyperlipidemia   History of NSTEMI January 2022; stress test showing very small defect. Intervention deferred due to patient's renal function and not being on dialysis. Most recent lipid panel 4/6/23 with total cholesterol 135, HDL 40, and LDL 35; he is on statin therapy. He denies chest pain, dizziness, lightheadedness, orthopnea, edema, or PND. He endorses shortness of breath if he is carrying something up the stairs and he has to stop and rest. Feels he can walk a mile without stopping. His risk factors for CAD include hypertension, diabetes, male age > 45 , previous smoker, and sedentary lifestyle.   - Continue Lipitor 20mg q HS   - Continue Aspirin 81mg daily   - Continue Imdur 30mg daily   - Due to history of prior NSTEMI without invasive evaluation or intervention plan for ischemic evaluation with coronary angiogram   - 2 week follow up with me after angiogram; phone or virtual ok     # Hypertension  Blood pressure in office today 112/64. Blood pressures on home average 110's/60's.   - Continue Chlorthalidone 25mg daily   - Continue Atenolol 50mg daily   - Monitor BP's at home. Keep a log of readings and bring to follow up appointments  - BP goal < 130/80    # Diabetes Mellitus Type 2  Most recent A1c per chart review 6.5% on 2/20/24. Diagnosed ~ 10 years ago. Home regimen includes 34 units of Humulin mix 70/30 BID. He has also been using another 20 units before bedtime. Blood sugars at home average  primarily. He denies hypoglycemic episodes.   - Educated on importance of diabetic control in  preventing progression of heart and kidney disease  - Continue current regimen   - Diabetic education referral     # Overweight (BMI 29)   Per patient statement weight has been stable over the past year. Has lost 30 pounds over the last 3 years.  - Educated patient on the importance of healthy diet and exercise in reducing risk for heart disease   - Encouraged 150 minutes/week of moderate intensity exercise   - Encouraged healthy weight loss; discussed Mediterranean diet     # ESRD on PD   He is currently on PD since April of 2024. He admits to being compliant with all medications and consistent with his PD runs.   - Additional follow-up / testing per SOT team  - Continue Lasix 20mg daily       CAD Risk Level:  High Risk: > 3 risk factors, OR diabetic kidney disease, OR DM > 10 years, known CAD or PAD, prior PCI or CABG, on dialysis 5 years or more       RCRI Score:   - High risk surgery (>5% cardiac complication risk) = 1 point   - Serum Creatinine >2.0 mg/dl = 1 point        I have reviewed and updated the patient's Past Medical History, Social History, Family History and Medication List.       ALEKSANDR Shoemaker, CNP  [facility] Cardiology      Review of prior external note(s) from - cardiology, care everywhere, SOT, nephrology  Review of the result(s) of each unique test - EKG, echocardiogram  Ordering of each unique test  Prescription drug management- medication reconciliation     35 minutes spent by me on the date of the encounter doing chart review, review of outside records, review of test results, patient visit, and documentation       ALEKSANDR Valdez CNP on 7/3/2024 at 9:51 AM

## 2024-06-27 ENCOUNTER — TELEPHONE (OUTPATIENT)
Dept: TRANSPLANT | Facility: CLINIC | Age: 74
End: 2024-06-27
Payer: COMMERCIAL

## 2024-06-27 NOTE — TELEPHONE ENCOUNTER
Returned Peter's phone call and instructed him not to eat, drink, chew gum or smoke for 8 hours prior to his iliac US. He understood and had no further questions.

## 2024-06-27 NOTE — TELEPHONE ENCOUNTER
Please fareed Green,  He has upcoming appt Wed., 07/03/2024 was wondering if he needs to do any prep or hold medications.

## 2024-07-03 ENCOUNTER — ANCILLARY PROCEDURE (OUTPATIENT)
Dept: CT IMAGING | Facility: CLINIC | Age: 74
End: 2024-07-03
Attending: NURSE PRACTITIONER
Payer: COMMERCIAL

## 2024-07-03 ENCOUNTER — ANCILLARY PROCEDURE (OUTPATIENT)
Dept: ULTRASOUND IMAGING | Facility: CLINIC | Age: 74
End: 2024-07-03
Attending: NURSE PRACTITIONER
Payer: COMMERCIAL

## 2024-07-03 ENCOUNTER — MYC MEDICAL ADVICE (OUTPATIENT)
Dept: CARDIOLOGY | Facility: CLINIC | Age: 74
End: 2024-07-03

## 2024-07-03 ENCOUNTER — TELEPHONE (OUTPATIENT)
Dept: TRANSPLANT | Facility: CLINIC | Age: 74
End: 2024-07-03

## 2024-07-03 ENCOUNTER — OFFICE VISIT (OUTPATIENT)
Dept: TRANSPLANT | Facility: CLINIC | Age: 74
End: 2024-07-03
Attending: NURSE PRACTITIONER
Payer: COMMERCIAL

## 2024-07-03 ENCOUNTER — LAB (OUTPATIENT)
Dept: LAB | Facility: CLINIC | Age: 74
End: 2024-07-03
Attending: NURSE PRACTITIONER
Payer: COMMERCIAL

## 2024-07-03 VITALS
DIASTOLIC BLOOD PRESSURE: 64 MMHG | BODY MASS INDEX: 29.62 KG/M2 | OXYGEN SATURATION: 97 % | WEIGHT: 183.5 LBS | SYSTOLIC BLOOD PRESSURE: 112 MMHG | HEART RATE: 60 BPM

## 2024-07-03 DIAGNOSIS — Z87.891 HISTORY OF TOBACCO USE: ICD-10-CM

## 2024-07-03 DIAGNOSIS — N18.6 END STAGE RENAL DISEASE (H): ICD-10-CM

## 2024-07-03 DIAGNOSIS — Z01.818 PRE-TRANSPLANT EVALUATION FOR KIDNEY TRANSPLANT: ICD-10-CM

## 2024-07-03 DIAGNOSIS — Z76.82 ORGAN TRANSPLANT CANDIDATE: ICD-10-CM

## 2024-07-03 DIAGNOSIS — I25.2 HX OF NON-ST ELEVATION MYOCARDIAL INFARCTION (NSTEMI): ICD-10-CM

## 2024-07-03 DIAGNOSIS — I10 ESSENTIAL HYPERTENSION: ICD-10-CM

## 2024-07-03 DIAGNOSIS — E11.9 DIABETES MELLITUS, TYPE 2 (H): ICD-10-CM

## 2024-07-03 DIAGNOSIS — I25.10 CARDIOVASCULAR DISEASE: ICD-10-CM

## 2024-07-03 DIAGNOSIS — Z76.82 ORGAN TRANSPLANT CANDIDATE: Primary | ICD-10-CM

## 2024-07-03 DIAGNOSIS — Z79.4 TYPE 2 DIABETES MELLITUS WITH DIABETIC NEPHROPATHY, WITH LONG-TERM CURRENT USE OF INSULIN (H): ICD-10-CM

## 2024-07-03 DIAGNOSIS — E78.5 HYPERLIPIDEMIA: ICD-10-CM

## 2024-07-03 DIAGNOSIS — E11.21 TYPE 2 DIABETES MELLITUS WITH DIABETIC NEPHROPATHY, WITH LONG-TERM CURRENT USE OF INSULIN (H): ICD-10-CM

## 2024-07-03 LAB
ALBUMIN UR-MCNC: 30 MG/DL
APPEARANCE UR: ABNORMAL
BILIRUB UR QL STRIP: NEGATIVE
C PEPTIDE SERPL-MCNC: 18.8 NG/ML (ref 0.9–6.9)
COLOR UR AUTO: YELLOW
GLUCOSE UR STRIP-MCNC: 100 MG/DL
HGB UR QL STRIP: ABNORMAL
KETONES UR STRIP-MCNC: NEGATIVE MG/DL
LEUKOCYTE ESTERASE UR QL STRIP: ABNORMAL
NITRATE UR QL: NEGATIVE
PH UR STRIP: 6 [PH] (ref 5–7)
RBC URINE: 12 /HPF
SP GR UR STRIP: 1.01 (ref 1–1.03)
UROBILINOGEN UR STRIP-MCNC: NORMAL MG/DL
WBC CLUMPS #/AREA URNS HPF: PRESENT /HPF
WBC URINE: >182 /HPF

## 2024-07-03 PROCEDURE — 99203 OFFICE O/P NEW LOW 30 MIN: CPT | Mod: 25

## 2024-07-03 PROCEDURE — 87086 URINE CULTURE/COLONY COUNT: CPT | Performed by: NURSE PRACTITIONER

## 2024-07-03 PROCEDURE — 93978 VASCULAR STUDY: CPT | Performed by: RADIOLOGY

## 2024-07-03 PROCEDURE — 36415 COLL VENOUS BLD VENIPUNCTURE: CPT | Performed by: PATHOLOGY

## 2024-07-03 PROCEDURE — 99000 SPECIMEN HANDLING OFFICE-LAB: CPT | Performed by: PATHOLOGY

## 2024-07-03 PROCEDURE — 81001 URINALYSIS AUTO W/SCOPE: CPT | Performed by: PATHOLOGY

## 2024-07-03 PROCEDURE — G0463 HOSPITAL OUTPT CLINIC VISIT: HCPCS

## 2024-07-03 PROCEDURE — 84681 ASSAY OF C-PEPTIDE: CPT | Performed by: NURSE PRACTITIONER

## 2024-07-03 PROCEDURE — 71250 CT THORAX DX C-: CPT | Mod: GC | Performed by: RADIOLOGY

## 2024-07-03 ASSESSMENT — PAIN SCALES - GENERAL: PAINLEVEL: NO PAIN (0)

## 2024-07-03 NOTE — TELEPHONE ENCOUNTER
Called Peter in regards to his sister being interested in donating. Peter asked me to call his sister, Michaelle to provide her the information.    I left a message for Michaelle with the link to register to be a donor and how the process works. A [employer] message with the link was also sent to the patient.

## 2024-07-03 NOTE — LETTER
7/3/2024      Peter Carpio  [address] [place] [address]      Dear Colleague,    Thank you for referring your patient, Peter Carpio, to the [facility]. Please see a copy of my visit note below.          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 He presents today for cardiovascular risk assessment as part of pre-kidney transplant evaluation.     He is a former smoker, quit 2003. He does not use alcohol or drugs. His activity level is sedentary. He denies symptoms of chest pain, dizziness, lightheadedness, palpitations, orthopnea, PND, or edema. If he carries something heavy up the stairs he becomes really fatigued. When not carrying something he can use the stairs without issue. He says he can walk a couple miles before having to stop and rest. He does not have family history of heart disease.       Cardiovascular Risk Factor Profile:  coronary artery disease, hypertension, diabetes, female age >55 , smoker/previous smoker, and sedentary lifestyle     Current Cardiovascular Symptoms:  -Dyspnea on exertion    ACC HF Stage:  Stage B    NYHA Class:  Class II    Functional Capacity:  Performs 4 METS exercise without symptoms (e.g., light housework, stairs, 4 mph walk, 7 mph bike, slow step dance)      No past medical history on file.    No past surgical history on file.    Current Outpatient Medications   Medication Sig Dispense Refill     allopurinol (ZYLOPRIM) 100 MG tablet Take 100 mg by mouth daily       aspirin (ASA) 325 MG tablet Take 325 mg by mouth       atenolol (TENORMIN) 50 MG tablet Take 1 tablet by mouth daily       atorvastatin (LIPITOR)  20 MG tablet Take 20 mg by mouth daily       calcium acetate (PHOSLO) 667 MG CAPS capsule Take 667 mg by mouth       chlorthalidone (HYGROTON) 25 MG tablet Take 25 mg by mouth       Continuous Glucose Sensor (FREESTYLE ANTONIO 14 DAY SENSOR) MISC        famotidine (PEPCID) 20 MG tablet        furosemide (LASIX) 20 MG tablet Take 1 tablet by mouth daily       gabapentin (NEURONTIN) 100 MG capsule Take 100 mg by mouth       gentamicin (GARAMYCIN) 0.3 % ophthalmic solution        HUMULIN MIX 70/30 KWIKPEN (70-30) 100 UNIT/ML susp 30 IN AM, 32 IN PM       isosorbide mononitrate (IMDUR) 30 MG 24 hr tablet Take 1 tablet by mouth daily       lidocaine-prilocaine (EMLA) 2.5-2.5 % external cream Apply to skin.       Multiple Vitamins-Minerals (PRESERVISION AREDS) CAPS Take 1 capsule by mouth daily       multivitamin (OCUVITE) TABS tablet Take 1 tablet by mouth daily       Vitamin D3 (VITAMIN D-1000 MAX ST) 25 mcg (1000 units) tablet Take 25 mcg by mouth       No current facility-administered medications for this visit.       No family history on file.    Social History     Tobacco Use     Smoking status: Former     Current packs/day: 0.00     Types: Cigarettes     Quit date:      Years since quittin.4     Smokeless tobacco: Never   Substance Use Topics     Alcohol use: Yes     Comment: rare       No Known Allergies      Review Of Systems:   CONSTITUTIONAL: No report of fevers or chills  RESPIRATORY: No cough, wheezing, SOB, or hemoptysis  CARDIOVASCULAR: see HPI  MUSCULO-SKELETAL: No joint pain or swelling, no muscle pain  NEURO: No paresthesias, syncope, pre-syncope, lightheadness, dizziness or vertigo  ENDOCRINE: No temperature intolerance, no skin/hair changes  PSYCHIATRIC: No change in mood, feeling down/anxious, no change in sleep or appetite  GI: No melena or hematochezia, no change in bowel habits  : No hematuria or dysuria, no hesitancy, dribbling or incontinence. Still making urine  HEME: No easy bruising  or bleeding, no history of anemia, no history of blood clots  SKIN: No rashes or sores, no unusual itching      Physical Examination:  Vitals: /64   Pulse 60   Wt 83.2 kg (183 lb 8 oz)   SpO2 97%   BMI 29.62 kg/m    BMI= Body mass index is 29.62 kg/m .    GENERAL APPEARANCE: healthy, alert and no distress  HEENT: no icterus, no xanthelasmas, normal pupil size and reaction, normal palate, mucosa moist  NECK: JVP not elevated , brisk carotid upstroke bilaterally  CHEST: lungs clear to auscultation without rales, rhonchi or wheezes, no use of accessory muscles, no retractions  CARDIOVASCULAR: regular rhythm, normal S1 and S2, no S3 or S4 and no murmur, click or rub.  ABDOMEN: soft, non tender, without hepatosplenomegaly, no masses palpable, bowel sounds normal  EXTREMITIES: warm, trace LE edema, DP/PT pulses 2+ bilaterally, no clubbing or cyanosis   NEURO: alert and oriented to person/place/time, normal speech, gait and affect  SKIN: no ecchymoses, no rashes      Laboratory:  Last Comprehensive Metabolic Panel:  Lab Results   Component Value Date     2024    POTASSIUM 4.0 2024    CHLORIDE 97 (L) 2024    CO2 26 2024    ANIONGAP 18 (H) 2024     (H) 2024    BUN 56.5 (H) 2024    CR 6.42 (H) 2024    GFRESTIMATED 8 (L) 2024    GINA 9.4 2024       Last CBC:  CBC RESULTS:   Recent Labs   Lab Test 24  1326   WBC 7.5   RBC 3.91*   HGB 12.3*   HCT 38.0*   MCV 97   MCH 31.5   MCHC 32.4   RDW 13.9          24 EK/16/24  Echocardiogram:  Left ventricular function is normal.The ejection fraction is 55-60%. No  regional wall motion abnormalities are seen.  Global right ventricular function is normal.  No significant valvular abnormalities present.  Small inferior vena cava size consistent with hypovolemia.  No pericardial effusion is present.    21 Stress Test:  1. Myocardial perfusion imaging is NORMAL.   2. The stress ECG  was negative for ischemia   3. Overall left ventricular systolic function was normal without wall   motion abnormalities. The Left Ventricle Ejection Fraction was visually   estimated to be 70% (>55% is considered normal).   4. There are no prior studies available for comparison.   5. Calcium Score Total: 97.9       LMain: 11.0       LAD: 86.9       LCX:   0.0       RCA: 0.0       GURROLA: 43    (Reference norms based on patient   ages of 45-84: Minimal Calcium: 1-10; Mild Calcium: ; Moderate   Calcium: 101-400; High Calcium: 401 and above).   6. Please see radiology report for non-cardiac findings.       Assessment and Plan:     # Coronary Artery Disease   # Hyperlipidemia   History of NSTEMI January 2022; stress test showing very small defect. Intervention deferred due to patient's renal function and not being on dialysis. Most recent lipid panel 4/6/23 with total cholesterol 135, HDL 40, and LDL 35; he is on statin therapy. He denies chest pain, dizziness, lightheadedness, orthopnea, edema, or PND. He endorses shortness of breath if he is carrying something up the stairs and he has to stop and rest. Feels he can walk a mile without stopping. His risk factors for CAD include hypertension, diabetes, male age > 45 , previous smoker, and sedentary lifestyle.   - Continue Lipitor 20mg q HS   - Continue Aspirin 81mg daily   - Continue Imdur 30mg daily   - Due to history of prior NSTEMI without invasive evaluation or intervention plan for ischemic evaluation with coronary angiogram   - 2 week follow up with me after angiogram; phone or virtual ok     # Hypertension  Blood pressure in office today 112/64. Blood pressures on home average 110's/60's.   - Continue Chlorthalidone 25mg daily   - Continue Atenolol 50mg daily   - Monitor BP's at home. Keep a log of readings and bring to follow up appointments  - BP goal < 130/80    # Diabetes Mellitus Type 2  Most recent A1c per chart review 6.5% on 2/20/24. Diagnosed ~ 10  years ago. Home regimen includes 34 units of Humulin mix 70/30 BID. He has also been using another 20 units before bedtime. Blood sugars at home average  primarily. He denies hypoglycemic episodes.   - Educated on importance of diabetic control in preventing progression of heart and kidney disease  - Continue current regimen   - Diabetic education referral     # Overweight (BMI 29)   Per patient statement weight has been stable over the past year. Has lost 30 pounds over the last 3 years.  - Educated patient on the importance of healthy diet and exercise in reducing risk for heart disease   - Encouraged 150 minutes/week of moderate intensity exercise   - Encouraged healthy weight loss; discussed Mediterranean diet     # ESRD on PD   He is currently on PD since April of 2024. He admits to being compliant with all medications and consistent with his PD runs.   - Additional follow-up / testing per SOT team  - Continue Lasix 20mg daily       CAD Risk Level:  High Risk: > 3 risk factors, OR diabetic kidney disease, OR DM > 10 years, known CAD or PAD, prior PCI or CABG, on dialysis 5 years or more       RCRI Score:   - High risk surgery (>5% cardiac complication risk) = 1 point   - Serum Creatinine >2.0 mg/dl = 1 point        I have reviewed and updated the patient's Past Medical History, Social History, Family History and Medication List.       ALEKSANDR Shoemaker, CNP  [facility] Cardiology      Review of prior external note(s) from - cardiology, care everywhere, SOT, nephrology  Review of the result(s) of each unique test - EKG, echocardiogram  Ordering of each unique test  Prescription drug management- medication reconciliation     35 minutes spent by me on the date of the encounter doing chart review, review of outside records, review of test results, patient visit, and documentation       ALEKSANDR Valdez CNP on 7/3/2024 at 9:51 AM        Again, thank you for allowing me to participate in the care of  your patient.        Sincerely,        ALEKSANDR Valdez CNP

## 2024-07-04 LAB — BACTERIA UR CULT: NORMAL

## 2024-07-08 ENCOUNTER — TELEPHONE (OUTPATIENT)
Dept: TRANSPLANT | Facility: CLINIC | Age: 74
End: 2024-07-08
Payer: COMMERCIAL

## 2024-07-08 ENCOUNTER — TELEPHONE (OUTPATIENT)
Dept: CARDIOLOGY | Facility: CLINIC | Age: 74
End: 2024-07-08
Payer: COMMERCIAL

## 2024-07-08 DIAGNOSIS — N18.6 END STAGE RENAL DISEASE (H): ICD-10-CM

## 2024-07-08 DIAGNOSIS — I10 ESSENTIAL HYPERTENSION: ICD-10-CM

## 2024-07-08 DIAGNOSIS — Z79.4 TYPE 2 DIABETES MELLITUS WITH DIABETIC NEPHROPATHY, WITH LONG-TERM CURRENT USE OF INSULIN (H): ICD-10-CM

## 2024-07-08 DIAGNOSIS — E11.21 TYPE 2 DIABETES MELLITUS WITH DIABETIC NEPHROPATHY, WITH LONG-TERM CURRENT USE OF INSULIN (H): ICD-10-CM

## 2024-07-08 DIAGNOSIS — Z76.82 ORGAN TRANSPLANT CANDIDATE: Primary | ICD-10-CM

## 2024-07-08 NOTE — TELEPHONE ENCOUNTER
Called Peter to go over angiogram instructions.     Pre-procedure instructions - Coronary Angiogram  Patient Education    Your arrival time is 07/11 at 11:30.  Location is [address] Waiting Room  Please plan on being at the hospital all day.  At any time, emergencies and/or urgent cases may come up which could delay the start of your procedure.    Pre-procedure instructions - Coronary Angiogram  Shower in the evening before or the morning of the procedure  No solid food for 8 hours prior and nothing to drink 2 hours prior to arrival time  You can take your morning medications (except for diabetic and blood thinners) with sips of water.  Take 325 mg of Aspirin the night before your procedure and 81mg the morning of procedure.  You will need to arrange a ride to drop you off and , as you will be unable to drive home. Prior to discharge you may be required to lay flat for approximately 2-6 hours in the recovery unit to ensure proper clotting of the artery. Please note: You cannot take an Uber/Taxi/etc unless you are accompanied by someone.              Diabetic Medication Instructions  Hold oral diabetic medication in morning of your procedure and for 48 hours after IV contrast is given  Typical instructions for insulin diabetic medication holding are below. However, please reach out to your Primary Care Provider or Endocrinologist for specific instructions  DO NOT take any oral diabetic medication, short-acting diabetes medications/insulin, humalog or regular insulin the morning of your test  Take   dose of long-acting insulin (Lantus, Levemir) the day of your test  Remember to bring your glucometer and insulin with you to take after your test if needed  GLP-1 Agonists Instructions  DO NOT take injectable GLP-1 agonists semaglutide (Ozempic, Wegovy), dulaglutide (Trulicity), exenatide ER (Bydureon), tirzepatide  (Mounjaro), or oral semaglutide (Rybelsus) for 7 days prior your procedure  Hold once daily injectable GLP-1 agonists exenatide (Byetta), liraglutide (Saxenda, Victoza), lixisenatide (Soligua) the day before and day of your procedure                  Anticoagulation Medication Instructions   NA

## 2024-07-08 NOTE — TELEPHONE ENCOUNTER
Health Call Center    Phone Message    May a detailed message be left on voicemail: yes     Reason for Call: Appointment Intake    Patient has concerns about CT with contrast that is scheduled for 08/07/2024.  Per his nephrologist, he should avoid having contrast dye due to kidney problems.  Please call patient to discuss.  Thank you!    Action Taken: Message routed to:  [facility] TY BENSON    Travel Screening: Not Applicable     Date of Service:

## 2024-07-08 NOTE — TELEPHONE ENCOUNTER
Called Peter to let him know the schedulers will be reaching out to him to schedule a 2 week follow-up after his coronary angiogram. Additionally, that the transplant team is requesting a CT abdomen w/ contrast to follow-up his CT chest from 7/3/24. He had no further questions.

## 2024-07-09 NOTE — TELEPHONE ENCOUNTER
Returned patient's call regarding CT with contrast. I let him know I did send his nephrologist, Dr. Gonzalez a message and called his office to confirm he is okay with him having the abdominal CT with contrast. I told Min I will call him back once I hear back from his nephrologist for a final plan. He had no further questions.

## 2024-07-10 ENCOUNTER — TEAM CONFERENCE (OUTPATIENT)
Dept: TRANSPLANT | Facility: CLINIC | Age: 74
End: 2024-07-10
Payer: COMMERCIAL

## 2024-07-10 NOTE — TELEPHONE ENCOUNTER
Image Review Meeting    ATTENDEES: Danielle Mcgraw    IMAGES REVIEWED: Chest CT (7/3/24), CT Abd/Pelvis (1/30/2024)    DECISION: Vessels suitable for transplant.     INCIDENTALS: Yes: Right renal cyst and right hepatic lobe hypodensity. Pt needs a CT abdomen with contrast, this is scheduled 8/7/24.

## 2024-07-10 NOTE — PROGRESS NOTES
[facility]   Cardiology Service  History and Physical      Peter Carpio MRN# [id number]   YOB: 1950 Age: 74 year old       HPI:   Peter Carpio is a 74 year old year old male with a medical history significant for ESRD 2/2 diabetic nephropathy (on PD since 4/2024), DM2, obesity, NSTEMI (2010), CAD, HTN, and HLD. He presents today for cardiovascular risk assessment as part of pre-kidney transplant evaluation.      He is a former smoker, quit 2003. He does not use alcohol or drugs. His activity level is sedentary. He denies symptoms of chest pain, dizziness, lightheadedness, palpitations, orthopnea, PND, or edema. If he carries something heavy up the stairs he becomes really fatigued. When not carrying something he can use the stairs without issue. He says he can walk a couple miles before having to stop and rest. He does not have family history of heart disease.    He most recently underwent coronary angiogram with Dr. Lamb on 7/17/24. This revealed pLAD to mLAD 70% stenosis and mLAD 40% stenosis. He is now s/p PCI with EDWIN x 1 to LAD. The procedure was uncomplicated. His access site is soft, flat, non-tender but ecchymotic.    Cardiovascular Risk Factor Profile:  coronary artery disease , hypertension , diabetes, male age >50, previous smoker, and sedentary lifestyle     Current Cardiovascular Symptoms:  dyspnea    ACC HF Stage:  Stage B    NYHA Class:  Class II    Functional Capacity:  Performs 4 METS exercise without symptoms (e.g., light housework, stairs, 4 mph walk, 7 mph bike, slow step dance)        No past medical history on file.    No past surgical history on file.    Current Outpatient Medications   Medication Sig Dispense Refill    allopurinol (ZYLOPRIM) 100 MG tablet Take 100 mg by mouth daily      aspirin (ASA) 325 MG tablet Take 325 mg by mouth      atenolol (TENORMIN) 50 MG tablet Take 1 tablet by mouth daily      atorvastatin (LIPITOR)  20 MG tablet Take 20 mg by mouth daily      calcium acetate (PHOSLO) 667 MG CAPS capsule Take 667 mg by mouth      chlorthalidone (HYGROTON) 25 MG tablet Take 25 mg by mouth      Continuous Glucose Sensor (FREESTYLE ANTONIO 14 DAY SENSOR) MISC       famotidine (PEPCID) 20 MG tablet       furosemide (LASIX) 20 MG tablet Take 1 tablet by mouth daily      gabapentin (NEURONTIN) 100 MG capsule Take 100 mg by mouth      gentamicin (GARAMYCIN) 0.3 % ophthalmic solution       HUMULIN MIX 70/30 KWIKPEN (70-30) 100 UNIT/ML susp 30 IN AM, 32 IN PM      isosorbide mononitrate (IMDUR) 30 MG 24 hr tablet Take 1 tablet by mouth daily      lidocaine-prilocaine (EMLA) 2.5-2.5 % external cream Apply to skin.      Multiple Vitamins-Minerals (PRESERVISION AREDS) CAPS Take 1 capsule by mouth daily      multivitamin (OCUVITE) TABS tablet Take 1 tablet by mouth daily      Vitamin D3 (VITAMIN D-1000 MAX ST) 25 mcg (1000 units) tablet Take 25 mcg by mouth       No current facility-administered medications for this visit.       No family history on file.    Social History     Tobacco Use    Smoking status: Former     Current packs/day: 0.00     Types: Cigarettes     Quit date:      Years since quittin.5    Smokeless tobacco: Never   Substance Use Topics    Alcohol use: Yes     Comment: rare       No Known Allergies      Review Of Systems:   CONSTITUTIONAL: No report of fevers or chills  RESPIRATORY: No cough, wheezing, SOB, or hemoptysis  CARDIOVASCULAR: see HPI  MUSCULO-SKELETAL: No joint pain or swelling, no muscle pain  NEURO: No paresthesias, syncope, pre-syncope, lightheadness, dizziness or vertigo  ENDOCRINE: No temperature intolerance, no skin/hair changes  PSYCHIATRIC: No change in mood, feeling down/anxious, no change in sleep or appetite  GI: No melena or hematochezia, no change in bowel habits  : No hematuria or dysuria, no hesitancy, dribbling or incontinence.   HEME: No easy bruising or bleeding, no history of anemia,  no history of blood clots  SKIN: No rashes or sores, no unusual itching      Physical Examination:  Vitals: /71   Pulse 69   Wt 83.2 kg (183 lb 6.8 oz)   SpO2 97%   BMI 29.61 kg/m    BMI= Body mass index is 29.61 kg/m .    GENERAL APPEARANCE: healthy, alert and no distress  HEENT: no icterus, no xanthelasmas, normal pupil size and reaction, normal palate, mucosa moist  NECK: JVP not elevated , brisk carotid upstroke bilaterally  CHEST: lungs clear to auscultation without rales, rhonchi or wheezes, no use of accessory muscles, no retractions  CARDIOVASCULAR: regular rhythm, normal S1 and S2, no S3 or S4 and no murmur, click or rub.  ABDOMEN: soft, non tender, without hepatosplenomegaly, no masses palpable, bowel sounds normal  EXTREMITIES: warm,  no LE edema, DP/PT pulses 2+ bilaterally, no clubbing or cyanosis   NEURO: alert and oriented to person/place/time, normal speech, gait and affect  SKIN: no ecchymoses, no rashes      Laboratory:  Last Comprehensive Metabolic Panel:  Lab Results   Component Value Date     2024    POTASSIUM 4.0 2024    CHLORIDE 97 (L) 2024    CO2 26 2024    ANIONGAP 18 (H) 2024     (H) 2024    BUN 56.5 (H) 2024    CR 6.42 (H) 2024    GFRESTIMATED 8 (L) 2024    GINA 9.4 2024       Last CBC:  CBC RESULTS:   Recent Labs   Lab Test 24  1326   WBC 7.5   RBC 3.91*   HGB 12.3*   HCT 38.0*   MCV 97   MCH 31.5   MCHC 32.4   RDW 13.9          24 EK/16/24 Echocardiogram:  Left ventricular function is normal.The ejection fraction is 55-60%. No  regional wall motion abnormalities are seen.  Global right ventricular function is normal.  No significant valvular abnormalities present.  Small inferior vena cava size consistent with hypovolemia.  No pericardial effusion is present.     There is no prior study for direct comparison.     24 Coronary Angiogram:    Prox LAD to Mid LAD lesion is 70%  stenosed.    Mid LAD lesion is 40% stenosed.     - LAD 80% ulcerative stenosis s/p PCI with EDWIN.        Assessment and Plan:     # Coronary Artery Disease   # s/p EDWIN x 1 to LAD (7/17/24)  # Hyperlipidemia   History of NSTEMI January 2022; stress test showing very small defect. Intervention deferred due to patient's renal function and not being on dialysis. Most recent lipid panel 4/6/23 with total cholesterol 135, HDL 40, and LDL 35; he is on statin therapy. He underwent coronary angiogram on 7/17/24 which revealed proximal to mLAD 70% stenosis; he is now s/p PCI x 1 to LAD.  He denies chest pain, dizziness, lightheadedness, orthopnea, edema, or PND. He continues to endorse shortness of rest when he carries heavy items, but also with minimal exertion. This is new since his procedure; likely 2/2 to Brilinta.   - Continue Lipitor 20mg q HS   - Continue Aspirin 81mg daily   - Continue Imdur 30mg daily   - Stop Brilinta due to shortness of breath; transition to Plavix with 300mg loading dose then 75mg daily thereafter; OK to discontinue after 3 months as needed for transplant    - Continue outpatient cardiac rehab  - No additional cardiac testing needed at this time prior to transplant  - Follow-up with cardiology in 12-18 months; sooner with symptoms      # Hypertension  Blood pressure in office today 116/71. Blood pressures on home average 110's/60's.   - Continue Chlorthalidone 25mg daily   - Continue Atenolol 50mg daily   - Monitor BP's at home. Keep a log of readings and bring to follow up appointments  - BP goal < 130/80     # Diabetes Mellitus Type 2  Most recent A1c per chart review 6.5% on 2/20/24. Diagnosed ~ 10 years ago. Home regimen includes 32 units of Humulin mix 70/30 BID. He uses Lantus at night. Previously 20 at bedtime but now down to 14. Blood sugars at home average  primarily. Currently having hypoglycemic episodes and decreased insulin requirements.   - Recommend follow up with PCP for  insulin management due to decreased insulin requirements and new hypoglycemic episodes       # ESRD on PD   He is currently on PD since April of 2024. He admits to being compliant with all medications and consistent with his PD runs.   - Additional follow-up / testing per SOT team  - Continue Lasix 20mg daily         CAD Risk Level:  High Risk: > 3 risk factors, OR diabetic kidney disease, OR DM > 10 years, known CAD or PAD, prior PCI or CABG, on dialysis 5 years or more         RCRI Score:   - High risk surgery (>5% cardiac complication risk) = 1 point   - Serum Creatinine >2.0 mg/dl = 1 point           I have reviewed and updated the patient's Past Medical History, Social History, Family History and Medication List.           ALEKSANDR Shoemaker, CNP  [facility] Cardiology      Review of prior external note(s) from - Care everywhere, SOT, nephrology  Review of the result(s) of each unique test - angiogram, echocardiogram, EKG  Ordering of each unique test  Prescription drug management- medication reconciliation       20 minutes spent by me on the date of the encounter doing chart review, review of outside records, review of test results, patient visit, and documentation       ALEKSANDR Valdez CNP on 8/7/2024 at 10:43 AM

## 2024-07-16 ENCOUNTER — TELEPHONE (OUTPATIENT)
Dept: TRANSPLANT | Facility: CLINIC | Age: 74
End: 2024-07-16
Payer: COMMERCIAL

## 2024-07-16 NOTE — TELEPHONE ENCOUNTER
JANICE Avina called from Dr. Gonzalez' office responding to my message to inform them the transplant team is requesting pt to have a CT abdomen w/ contrast to further evaluate liver lesion and renal cyst.     Per Kailyn, Dr. Gonzalez would prefer patient not to receive contrast as he is currently doing PD and is worried he may lose more renal function and need to do HD. However, Kailyn said if it is needed for patient to receive a kidney transplant, he may have contrast with CT. I will reach out to the transplant team to confirm patient will need CT w/ contrast and then update the patient.

## 2024-07-17 ENCOUNTER — TELEPHONE (OUTPATIENT)
Dept: CARDIOLOGY | Facility: CLINIC | Age: 74
End: 2024-07-17

## 2024-07-17 ENCOUNTER — APPOINTMENT (OUTPATIENT)
Dept: LAB | Facility: CLINIC | Age: 74
End: 2024-07-17
Attending: INTERNAL MEDICINE
Payer: COMMERCIAL

## 2024-07-17 ENCOUNTER — APPOINTMENT (OUTPATIENT)
Dept: MEDSURG UNIT | Facility: CLINIC | Age: 74
End: 2024-07-17
Attending: INTERNAL MEDICINE
Payer: COMMERCIAL

## 2024-07-17 ENCOUNTER — HOSPITAL ENCOUNTER (OUTPATIENT)
Facility: CLINIC | Age: 74
Discharge: HOME | End: 2024-07-17
Attending: INTERNAL MEDICINE | Admitting: INTERNAL MEDICINE
Payer: COMMERCIAL

## 2024-07-17 VITALS
HEIGHT: 66 IN | DIASTOLIC BLOOD PRESSURE: 64 MMHG | OXYGEN SATURATION: 100 % | TEMPERATURE: 97.5 F | BODY MASS INDEX: 28.34 KG/M2 | HEART RATE: 61 BPM | RESPIRATION RATE: 16 BRPM | SYSTOLIC BLOOD PRESSURE: 101 MMHG | WEIGHT: 176.37 LBS

## 2024-07-17 DIAGNOSIS — Z98.890 STATUS POST CORONARY ANGIOGRAM: ICD-10-CM

## 2024-07-17 DIAGNOSIS — I10 HYPERTENSION: ICD-10-CM

## 2024-07-17 DIAGNOSIS — N18.4 CHRONIC KIDNEY DISEASE, STAGE 4, SEVERELY DECREASED GFR (H): ICD-10-CM

## 2024-07-17 DIAGNOSIS — I25.10 CARDIOVASCULAR DISEASE: ICD-10-CM

## 2024-07-17 DIAGNOSIS — Z01.818 PRE-TRANSPLANT EVALUATION FOR KIDNEY TRANSPLANT: ICD-10-CM

## 2024-07-17 DIAGNOSIS — E11.9 DIABETES MELLITUS, TYPE 2 (H): ICD-10-CM

## 2024-07-17 DIAGNOSIS — I25.10 CORONARY ARTERY DISEASE: ICD-10-CM

## 2024-07-17 LAB
ACT BLD: 194 SECONDS (ref 74–150)
ACT BLD: 203 SECONDS (ref 74–150)
ACT BLD: 316 SECONDS (ref 74–150)
ANION GAP SERPL CALCULATED.3IONS-SCNC: 19 MMOL/L (ref 7–15)
APTT PPP: 25 SECONDS (ref 22–38)
BUN SERPL-MCNC: 65.3 MG/DL (ref 8–23)
CALCIUM SERPL-MCNC: 9.8 MG/DL (ref 8.8–10.4)
CHLORIDE SERPL-SCNC: 98 MMOL/L (ref 98–107)
CREAT SERPL-MCNC: 7.23 MG/DL (ref 0.67–1.17)
EGFRCR SERPLBLD CKD-EPI 2021: 7 ML/MIN/1.73M2
ERYTHROCYTE [DISTWIDTH] IN BLOOD BY AUTOMATED COUNT: 13.5 % (ref 10–15)
GLUCOSE BLDC GLUCOMTR-MCNC: 86 MG/DL (ref 70–99)
GLUCOSE BLDC GLUCOMTR-MCNC: 91 MG/DL (ref 70–99)
GLUCOSE SERPL-MCNC: 62 MG/DL (ref 70–99)
HCO3 SERPL-SCNC: 26 MMOL/L (ref 22–29)
HCT VFR BLD AUTO: 38.9 % (ref 40–53)
HGB BLD-MCNC: 13.1 G/DL (ref 13.3–17.7)
INR PPP: 1.01 (ref 0.85–1.15)
MCH RBC QN AUTO: 32.8 PG (ref 26.5–33)
MCHC RBC AUTO-ENTMCNC: 33.7 G/DL (ref 31.5–36.5)
MCV RBC AUTO: 97 FL (ref 78–100)
PLATELET # BLD AUTO: 194 10E3/UL (ref 150–450)
POTASSIUM SERPL-SCNC: 3.7 MMOL/L (ref 3.4–5.3)
RBC # BLD AUTO: 4 10E6/UL (ref 4.4–5.9)
SODIUM SERPL-SCNC: 143 MMOL/L (ref 135–145)
WBC # BLD AUTO: 9.6 10E3/UL (ref 4–11)

## 2024-07-17 PROCEDURE — 250N000011 HC RX IP 250 OP 636: Performed by: INTERNAL MEDICINE

## 2024-07-17 PROCEDURE — 99153 MOD SED SAME PHYS/QHP EA: CPT | Performed by: INTERNAL MEDICINE

## 2024-07-17 PROCEDURE — C1874 STENT, COATED/COV W/DEL SYS: HCPCS | Performed by: INTERNAL MEDICINE

## 2024-07-17 PROCEDURE — 92928 PRQ TCAT PLMT NTRAC ST 1 LES: CPT | Mod: LD | Performed by: INTERNAL MEDICINE

## 2024-07-17 PROCEDURE — 272N000001 HC OR GENERAL SUPPLY STERILE: Performed by: INTERNAL MEDICINE

## 2024-07-17 PROCEDURE — 99152 MOD SED SAME PHYS/QHP 5/>YRS: CPT | Mod: GC | Performed by: INTERNAL MEDICINE

## 2024-07-17 PROCEDURE — 258N000003 HC RX IP 258 OP 636

## 2024-07-17 PROCEDURE — 999N000134 HC STATISTIC PP CARE STAGE 3

## 2024-07-17 PROCEDURE — 80048 BASIC METABOLIC PNL TOTAL CA: CPT

## 2024-07-17 PROCEDURE — 93005 ELECTROCARDIOGRAM TRACING: CPT

## 2024-07-17 PROCEDURE — C1769 GUIDE WIRE: HCPCS | Performed by: INTERNAL MEDICINE

## 2024-07-17 PROCEDURE — 85027 COMPLETE CBC AUTOMATED: CPT

## 2024-07-17 PROCEDURE — 999N000142 HC STATISTIC PROCEDURE PREP ONLY

## 2024-07-17 PROCEDURE — 36415 COLL VENOUS BLD VENIPUNCTURE: CPT

## 2024-07-17 PROCEDURE — 82962 GLUCOSE BLOOD TEST: CPT

## 2024-07-17 PROCEDURE — 85347 COAGULATION TIME ACTIVATED: CPT

## 2024-07-17 PROCEDURE — 85610 PROTHROMBIN TIME: CPT

## 2024-07-17 PROCEDURE — 250N000013 HC RX MED GY IP 250 OP 250 PS 637

## 2024-07-17 PROCEDURE — C1894 INTRO/SHEATH, NON-LASER: HCPCS | Performed by: INTERNAL MEDICINE

## 2024-07-17 PROCEDURE — 93454 CORONARY ARTERY ANGIO S&I: CPT | Mod: 26 | Performed by: INTERNAL MEDICINE

## 2024-07-17 PROCEDURE — 250N000013 HC RX MED GY IP 250 OP 250 PS 637: Performed by: INTERNAL MEDICINE

## 2024-07-17 PROCEDURE — 93454 CORONARY ARTERY ANGIO S&I: CPT | Performed by: INTERNAL MEDICINE

## 2024-07-17 PROCEDURE — C1887 CATHETER, GUIDING: HCPCS | Performed by: INTERNAL MEDICINE

## 2024-07-17 PROCEDURE — 999N000054 HC STATISTIC EKG NON-CHARGEABLE

## 2024-07-17 PROCEDURE — 85730 THROMBOPLASTIN TIME PARTIAL: CPT

## 2024-07-17 PROCEDURE — 250N000009 HC RX 250: Performed by: INTERNAL MEDICINE

## 2024-07-17 PROCEDURE — C9600 PERC DRUG-EL COR STENT SING: HCPCS | Performed by: INTERNAL MEDICINE

## 2024-07-17 PROCEDURE — 93010 ELECTROCARDIOGRAM REPORT: CPT | Mod: XU | Performed by: INTERNAL MEDICINE

## 2024-07-17 PROCEDURE — 99152 MOD SED SAME PHYS/QHP 5/>YRS: CPT | Performed by: INTERNAL MEDICINE

## 2024-07-17 PROCEDURE — C1760 CLOSURE DEV, VASC: HCPCS | Performed by: INTERNAL MEDICINE

## 2024-07-17 DEVICE — STENT CORONARY DES SYNERGY XD MR US 3.00X20MM H7493941820300: Type: IMPLANTABLE DEVICE | Status: FUNCTIONAL

## 2024-07-17 DEVICE — CLOSURE ANGIOSEAL 6FR 610130: Type: IMPLANTABLE DEVICE | Status: FUNCTIONAL

## 2024-07-17 RX ORDER — ONDANSETRON 4 MG/1
4 TABLET, ORALLY DISINTEGRATING ORAL EVERY 6 HOURS PRN
Status: DISCONTINUED | OUTPATIENT
Start: 2024-07-17 | End: 2024-07-17 | Stop reason: HOSPADM

## 2024-07-17 RX ORDER — ACETAMINOPHEN 325 MG/1
650 TABLET ORAL EVERY 4 HOURS PRN
Status: DISCONTINUED | OUTPATIENT
Start: 2024-07-17 | End: 2024-07-17 | Stop reason: HOSPADM

## 2024-07-17 RX ORDER — NITROGLYCERIN 0.4 MG/1
0.4 TABLET SUBLINGUAL EVERY 5 MIN PRN
Status: DISCONTINUED | OUTPATIENT
Start: 2024-07-17 | End: 2024-07-17 | Stop reason: HOSPADM

## 2024-07-17 RX ORDER — POTASSIUM CHLORIDE 750 MG/1
40 TABLET, EXTENDED RELEASE ORAL
Status: DISCONTINUED | OUTPATIENT
Start: 2024-07-17 | End: 2024-07-17 | Stop reason: HOSPADM

## 2024-07-17 RX ORDER — FLUMAZENIL 0.1 MG/ML
0.2 INJECTION, SOLUTION INTRAVENOUS
Status: DISCONTINUED | OUTPATIENT
Start: 2024-07-17 | End: 2024-07-17 | Stop reason: HOSPADM

## 2024-07-17 RX ORDER — NOREPINEPHRINE BITARTRATE 0.06 MG/ML
INJECTION, SOLUTION INTRAVENOUS CONTINUOUS PRN
Status: DISCONTINUED | OUTPATIENT
Start: 2024-07-17 | End: 2024-07-17 | Stop reason: HOSPADM

## 2024-07-17 RX ORDER — NALOXONE HYDROCHLORIDE 0.4 MG/ML
0.4 INJECTION, SOLUTION INTRAMUSCULAR; INTRAVENOUS; SUBCUTANEOUS
Status: DISCONTINUED | OUTPATIENT
Start: 2024-07-17 | End: 2024-07-17 | Stop reason: HOSPADM

## 2024-07-17 RX ORDER — POTASSIUM CHLORIDE 750 MG/1
20 TABLET, EXTENDED RELEASE ORAL
Status: COMPLETED | OUTPATIENT
Start: 2024-07-17 | End: 2024-07-17

## 2024-07-17 RX ORDER — ASPIRIN 81 MG/1
243 TABLET, CHEWABLE ORAL ONCE
Status: COMPLETED | OUTPATIENT
Start: 2024-07-17 | End: 2024-07-17

## 2024-07-17 RX ORDER — NALOXONE HYDROCHLORIDE 0.4 MG/ML
0.2 INJECTION, SOLUTION INTRAMUSCULAR; INTRAVENOUS; SUBCUTANEOUS
Status: DISCONTINUED | OUTPATIENT
Start: 2024-07-17 | End: 2024-07-17 | Stop reason: HOSPADM

## 2024-07-17 RX ORDER — HYDRALAZINE HYDROCHLORIDE 20 MG/ML
10 INJECTION INTRAMUSCULAR; INTRAVENOUS EVERY 4 HOURS PRN
Status: DISCONTINUED | OUTPATIENT
Start: 2024-07-17 | End: 2024-07-17 | Stop reason: HOSPADM

## 2024-07-17 RX ORDER — FENTANYL CITRATE-0.9 % NACL/PF 10 MCG/ML
PLASTIC BAG, INJECTION (ML) INTRAVENOUS
Status: DISCONTINUED | OUTPATIENT
Start: 2024-07-17 | End: 2024-07-17 | Stop reason: HOSPADM

## 2024-07-17 RX ORDER — LIDOCAINE 40 MG/G
CREAM TOPICAL
Status: DISCONTINUED | OUTPATIENT
Start: 2024-07-17 | End: 2024-07-17 | Stop reason: HOSPADM

## 2024-07-17 RX ORDER — HEPARIN SODIUM 1000 [USP'U]/ML
INJECTION, SOLUTION INTRAVENOUS; SUBCUTANEOUS
Status: DISCONTINUED | OUTPATIENT
Start: 2024-07-17 | End: 2024-07-17 | Stop reason: HOSPADM

## 2024-07-17 RX ORDER — ASPIRIN 325 MG
325 TABLET ORAL ONCE
Status: COMPLETED | OUTPATIENT
Start: 2024-07-17 | End: 2024-07-17

## 2024-07-17 RX ORDER — FENTANYL CITRATE 50 UG/ML
INJECTION, SOLUTION INTRAMUSCULAR; INTRAVENOUS
Status: DISCONTINUED | OUTPATIENT
Start: 2024-07-17 | End: 2024-07-17 | Stop reason: HOSPADM

## 2024-07-17 RX ORDER — METOPROLOL TARTRATE 1 MG/ML
5 INJECTION, SOLUTION INTRAVENOUS
Status: DISCONTINUED | OUTPATIENT
Start: 2024-07-17 | End: 2024-07-17 | Stop reason: HOSPADM

## 2024-07-17 RX ORDER — SODIUM CHLORIDE 9 MG/ML
INJECTION, SOLUTION INTRAVENOUS CONTINUOUS
Status: DISCONTINUED | OUTPATIENT
Start: 2024-07-17 | End: 2024-07-17 | Stop reason: HOSPADM

## 2024-07-17 RX ORDER — ONDANSETRON 2 MG/ML
4 INJECTION INTRAMUSCULAR; INTRAVENOUS EVERY 6 HOURS PRN
Status: DISCONTINUED | OUTPATIENT
Start: 2024-07-17 | End: 2024-07-17 | Stop reason: HOSPADM

## 2024-07-17 RX ORDER — IOPAMIDOL 755 MG/ML
INJECTION, SOLUTION INTRAVASCULAR
Status: DISCONTINUED | OUTPATIENT
Start: 2024-07-17 | End: 2024-07-17 | Stop reason: HOSPADM

## 2024-07-17 RX ADMIN — POTASSIUM CHLORIDE 20 MEQ: 750 TABLET, EXTENDED RELEASE ORAL at 09:42

## 2024-07-17 RX ADMIN — SODIUM CHLORIDE: 9 INJECTION, SOLUTION INTRAVENOUS at 09:34

## 2024-07-17 RX ADMIN — ASPIRIN 81 MG CHEWABLE TABLET 243 MG: 81 TABLET CHEWABLE at 09:25

## 2024-07-17 ASSESSMENT — ACTIVITIES OF DAILY LIVING (ADL)
ADLS_ACUITY_SCORE: 35

## 2024-07-17 NOTE — DISCHARGE INSTRUCTIONS
If you have any questions or concerns regarding your procedure site please call [phone] at anytime and ask for Cardiology Fellow on call.  They are available 24 hours a day.  You may also contact the Cardiology Clinic after hours number at [phone].    Going Home after a Coronary Angiogram with Stent Placement  ______________________________________________  After you go home:  Have an adult stay with you for 24 hours.  Drink plenty of fluids. You may eat your normal diet.  Please see med list for new medication ticagrelor (brilinta). It is very important that you do not miss any doses of this medication.  You are to resume all home medications including anticoagulation therapy unless otherwise advised by your primary cardiologist or nurse coordinator.  For 24 hours:  Relax and take it easy.  Do NOT smoke.  Do NOT make any important or legal decisions.  Do NOT drive or operate machines at home or at work.  Do NOT drink alcohol.  You may shower tomorrow. Do NOT take a bath, or use a hot tub or pool for at least 3 days.  Remove the dressings tomorrow. If there is minor oozing, apply another Band-aid and remove it after 12 hours. Never leave a wet dressing in place.    Care of groin site  It is normal to have a small bruise or lump at the site.  Do not scrub the site. Do not use lotion or powder near the puncture site for 3 days.    For the first 2 days: Do not stoop or squat. When you cough, sneeze or move your bowels, hold your hand over the puncture site and press gently.  Do not lift more than 10 pounds for at least 3 to 5 days.  For 2 days, do NOT have sex or do any heavy exercise.    If you start bleeding from the site in your groin, lie down flat and press firmly  on the site. Call your doctor as soon as you can.    Call 911 right away if you have bleeding that is heavy or does not stop.    Call your doctor if:  You have a large or growing hard lump around the site.  The site is red, swollen, hot or  tender.  Blood or fluid is draining from the site.  You have chills or a fever greater than 101 F (38 C).  Your leg or arm feels numb or cool.  You have hives, a rash or unusual itching.                  [facility] [place]:  [phone] (7 days a week)                                                       Telephone Numbers [phone] Monday-Friday 8:00 am to 4:30 pm    [phone] After 4:30 pm Monday-Friday, Weekends & Holidays  Ask for [facility] Cardiology Fellow on call. Someone is on call 24 hours/day   [facility] toll free number [phone] Monday-Friday 8:00 am to 4:30 pm   [facility] Emergency Dept [phone]

## 2024-07-17 NOTE — PROGRESS NOTES
D/I/A: Pt roomed on 3C in bay 33.  Arrived via litter and accompanied by cath lab RN On/Off: On monitor.  VSSA.  Rhythm upon arrival Sinus rhythm on monitor.  Denies pain or sob.  Reviewed activity restrictions and when to notify RN, ie-changes to breathing or increased chest pressure or chest pain.  CCL access:  right groin site, site cdi pt on bedrest until 1315.  P: Continue to monitor status.  Discharge to home once meeting criteria.

## 2024-07-17 NOTE — PRE-PROCEDURE
Consenting/Education for Cardiology Procedure: coronary angiogram with possible intervention     Patient understands we would like to perform the listed procedure(s) due to work up for renal transplant     The patient understands the following:      The procedure was described to the patient in detail.     Moderate sedation is planned for this procedure. Patient understands risks and complications of the procedure which include but are not limited to bruising/swelling around the incision site, infection, bleeding, allergic reaction to local anesthetic, air embolism, arterial puncture, stroke, heart attack, need for emergency heart surgery, death.        Patient verbalized understanding of risks and benefits and has elected to proceed with the procedure or procedures listed above.    Emily BRANDON CNP  Cardiology ICU  Send message Securely via LoveThis with the Vocera Web Console

## 2024-07-17 NOTE — Clinical Note
The first balloon was inserted into the left anterior descending and middle left anterior descending.Max pressure = 11 tessa. Total duration = 4 seconds.     Max pressure = 11 tessa. Total duration = 2 seconds.

## 2024-07-17 NOTE — TELEPHONE ENCOUNTER
Called by patient to discuss access site bleeding. Patient had a diagnostic coronary angiogram earlier today. No intervention. Access was Rt femoral 4Fr. No closure device. Patient completed the required post procedure monitoring and discharge home. Patient noted that he stood up and had pain in the groin and small bulge in his R groin. He laid down and held pressure and the pain resolved, bleeding regressed, and pain resolved. His bulge resolved as well. I advised him to monitor his symptoms and lay flat as much as possible and to avoid bending over, picking thing up, etc. I advised him to come to the ED if the bulge worsens/returns, any pain, numbness, tingling in the leg, or worsening bleeding. The patient preferred to remain home and monitor for these symptoms. All questions answered and the patient and wife on the phone acknowledged understanding of these recommendations.     Gal Duncan, PGY-6  Cardiovascular Disease Fellow

## 2024-07-17 NOTE — Clinical Note
Stent deployed in the middle left anterior descending. Max pressure = 11 tessa. Total duration = 6 seconds.

## 2024-07-17 NOTE — PRE-PROCEDURE
GENERAL PRE-PROCEDURE:   Procedure:  Coronary angiogram with possible intervention  Date/Time:  7/17/2024 9:18 AM    Written consent obtained?: Yes    Risks and benefits: Risks, benefits and alternatives were discussed    DC Plan: Appropriate discharge home plan in place for patients who are going home after procedure   Consent given by:  Patient  Patient states understanding of procedure being performed: Yes    Patient's understanding of procedure matches consent: Yes    Procedure consent matches procedure scheduled: Yes    Expected level of sedation:  Moderate  Appropriately NPO:  Yes  ASA Class:  3  Mallampati  :  Grade 3- soft palate visible, posterior pharyngeal wall not visible  Lungs:  Lungs clear with good breath sounds bilaterally  Heart:  Normal heart sounds and rate  History & Physical reviewed:  History and physical reviewed and no updates needed  Statement of review:  I have reviewed the lab findings, diagnostic data, medications, and the plan for sedation

## 2024-07-18 ENCOUNTER — TELEPHONE (OUTPATIENT)
Dept: TRANSPLANT | Facility: CLINIC | Age: 74
End: 2024-07-18
Payer: COMMERCIAL

## 2024-07-18 ENCOUNTER — TELEPHONE (OUTPATIENT)
Dept: CARDIOLOGY | Facility: CLINIC | Age: 74
End: 2024-07-18
Payer: COMMERCIAL

## 2024-07-18 LAB
ATRIAL RATE - MUSE: 59 BPM
ATRIAL RATE - MUSE: 60 BPM
DIASTOLIC BLOOD PRESSURE - MUSE: NORMAL MMHG
DIASTOLIC BLOOD PRESSURE - MUSE: NORMAL MMHG
INTERPRETATION ECG - MUSE: NORMAL
INTERPRETATION ECG - MUSE: NORMAL
P AXIS - MUSE: 25 DEGREES
P AXIS - MUSE: 47 DEGREES
PR INTERVAL - MUSE: 196 MS
PR INTERVAL - MUSE: 198 MS
QRS DURATION - MUSE: 86 MS
QRS DURATION - MUSE: 88 MS
QT - MUSE: 416 MS
QT - MUSE: 444 MS
QTC - MUSE: 411 MS
QTC - MUSE: 444 MS
R AXIS - MUSE: -17 DEGREES
R AXIS - MUSE: -18 DEGREES
SYSTOLIC BLOOD PRESSURE - MUSE: NORMAL MMHG
SYSTOLIC BLOOD PRESSURE - MUSE: NORMAL MMHG
T AXIS - MUSE: 20 DEGREES
T AXIS - MUSE: 27 DEGREES
VENTRICULAR RATE- MUSE: 59 BPM
VENTRICULAR RATE- MUSE: 60 BPM

## 2024-07-18 NOTE — TELEPHONE ENCOUNTER
Spoke to Peter regarding upcoming CT abdomen w/ contrast. I let him know I did reach out to Dr. Gonzalez and his nurse returned my message and did say they would prefer him not to have contrast. However, if needed for transplant then it is ok to proceed. I did reach out to the transplant team again to confirm contrast is needed and they do need the CT to be with contrast. Patient verbalized good understanding and will call me with any other questions/updates.    Patient did verbalize having some hypotension with systolic BP in the 90s. He had a coronary angiogram yesterday. He said he is no longer lightheaded and feeling better. He is not bleeding and does not have swelling at groin site. I advised him to go to the emergency room if he develops hypotension again or is symptomatic. He understood and said he has been checking his BP and will go to the ED or call his doctor if BP is low.

## 2024-07-18 NOTE — TELEPHONE ENCOUNTER
Post-Angiogram Discharge Call    How are you feeling since you got home? Do you understand your results?       Prox LAD to Mid LAD lesion is 80% stenosed.     - LAD 80% ulcerative stenosis s/p PCI with EDWIN.  Yes - Results discussed    How is your groin/wrist/incision site? Can you please describe it?  Right femoral site, flat and dry now    Do you have any questions regarding your restrictions and when to resume normal activity?  Yes - AVS reviewed    Do you have the anti-platelet medication you were prescribed?  Yes - Medication prescribed and patient is taking it, Brilinta and ASA    Any questions about the other medications you were prescribed?  No - each medication change reviewed, including purpose and side effects    Has cardiac rehab reached out to you?  No - If you have not heard from the scheduling office within 2 business days, please call [phone] for all locations, with the exception of [place], please call [phone] and [facility], please call [phone]    Do you have any other questions about the discharge instructions?  No    Has a follow up appointment been made within 1-2 weeks?  YES - With Yulissa Jauregui NP on August / 07 / 2024

## 2024-07-30 ENCOUNTER — VIRTUAL VISIT (OUTPATIENT)
Dept: EDUCATION SERVICES | Facility: CLINIC | Age: 74
End: 2024-07-30
Payer: COMMERCIAL

## 2024-07-30 DIAGNOSIS — E11.9 DIABETES MELLITUS, TYPE 2 (H): Primary | ICD-10-CM

## 2024-07-30 PROCEDURE — G0108 DIAB MANAGE TRN  PER INDIV: HCPCS | Mod: 93 | Performed by: DIETITIAN, REGISTERED

## 2024-07-30 NOTE — PATIENT INSTRUCTIONS
Glucose monitoring   -continue to use the Christiano 3   contact the  at [phone]  (7 days a week 8am-8pm ET excluding holidays) or [email or web address]-en/[email or web address]/sensor-support-form-questions.[email or web address] IF the sensor does not last the full 14 days  Turn your low glucose alarm back on    -fingerstick monitoring   You can get a free Arkray/Glucocard meter at any [facility] pharmacy.  50 test strips will be $9-13 out of pocket   Complete a few fingerstick checks to confirm sensor readings in the setting of peritoneal dialysis   Other reasons to complete a fingerstick glucose check:    you feel different than the sensor indicates    you see this symbol:     2. Humulin 70/30 dosing   Remember to roll the insulin between your hands to mix before each dose.  It should look like skim milk when it has been mixed.   Take each dose at least 20 minutes before the meal.  30 minutes before the meal is better   A Humulin 70/30 pen is good at room temperature for 10 days (you will use it up before then)   Rotate, rotate, rotate your injection sites.  Avoid areas that are bruised or hardened    3. Keep something with you for treating a low blood sugar (70 mg/dL or below).  If your blood sugar is low, eat/drink ONE of the following:   -1/2 cup juice  -1/2 cup regular soda  -1 package fruit snacks  -4 glucose tablets  Then, wait 15 minutes and re-check blood sugar.  If it is not above 70 mg/dL, repeat the above.    4.  Follow up with Laura in person at [facility] on Oct 29th at 9:45 AM    5. Please call or send a [employer] message with any questions or concerns or low blood sugars.    Laura Mariee, MPH, RD, CDCES, LD  Diabetes Education Triage Line: [phone]  Diabetes Education Appointment [phone]

## 2024-07-30 NOTE — LETTER
7/30/2024         RE: Peter Carpio  [address] [facility] [address]        Dear Colleague,    Thank you for referring your patient, Peter Carpio, to the [facility]. Please see a copy of my visit note below.    Diabetes Self-Management Education & Support    Presents for: Individual review    Type of Service: Telephone Visit    Originating Location (Patient Location): Home  Distant Location (Provider Location): [place] [facility]  Mode of Communication:  Telephone    Telephone Visit Start Time:  7:36 AM  Telephone Visit End Time (telephone visit stop time): 8:38 AM    How would patient like to obtain AVS? MyChart      REPORTS:      Pt verbalized understanding of concepts discussed and recommendations provided today.       Continue education with the following diabetes management concepts: Monitoring, Taking Medication, Problem Solving, and Reducing Risks    ASSESSMENT:  Most recent CGM data as above which shows patient IS meeting glucose goals for:    -time in  mg/dL target of above 70%   -time above 180 mg/dL of less than 25%   -time above 250 mg/dL of less than 5%   -time below 70 mg/dL of less than 4%   -time below 54 mg/dL of less than 1%  Patient denies any questions/concerns at present. Referred to writer by cardiology. Despite patient's reported non-traditional insulin administration schedule: Humulin 70/30 as 32 units just before breakfast, 32 units after dinner, and 15-20 units before bed IF HS glucose is above 125-130 mg/dL; patient has great glycemic control with minimal hypoglycemia.  Patient reports needing Humulin 70/30 at HS to prevent further hypoglycemia from his peritoneal dialysis dialysate (dialyzes for 9 hrs and 20 min overnight every night).  Reviewed with patient the action of action of Humulin 70/30 and instructed patient to take before each breakfast and evening meals.  Reviewed appropriate injection sites and importance of avoiding  bruised areas.     Appears false sensor lows on 7/24-7/25 which appear to be due to a sensor issue.  Encouraged patient to seek replacement sensor from  so he does not run into future insurance issues.  Discussed when to complete fingerstick glucose checks. Does not have a glucometer.  Discussed likely least expensive option for obtaining one.  [facility]    Writer to follow up with referring provider regarding who will long term manage patient's insulin given he will likely require changes as dialysate changes and if/when he receives a kidney transplant.     PLAN  Glucose monitoring   -continue to use the Christiano 3   contact the  at [phone]  (7 days a week 8am-8pm ET excluding holidays) or [email or web address]/us-en/support/sensor-support-form-[email or web address] IF the sensor does not last the full 14 days  Turn your low glucose alarm back on    -fingerstick monitoring   You can get a free Arkray/Glucocard meter at any [place] pharmacy.  50 test strips will be $9-13 out of pocket   Complete a few fingerstick checks to confirm sensor readings in the setting of peritoneal dialysis   Other reasons to complete a fingerstick glucose check:    you feel different than the sensor indicates    you see this symbol:     2. Humulin 70/30 dosing   Remember to roll the insulin between your hands to mix before each dose.  It should look like skim milk when it has been mixed.   Take each dose at least 20 minutes before the meal.  30 minutes before the meal is better   A Humulin 70/30 pen is good at room temperature for 10 days (you will use it up before then)   Rotate, rotate, rotate your injection sites.  Avoid areas that are bruised or hardened    3. Keep something with you for treating a low blood sugar (70 mg/dL or below).  If your blood sugar is low, eat/drink ONE of the following:   -1/2 cup juice  -1/2 cup regular soda  -1 package  "fruit snacks  -4 glucose tablets  Then, wait 15 minutes and re-check blood sugar.  If it is not above 70 mg/dL, repeat the above.    4. Please call or send a [employer] message with any questions or concerns or low blood sugars.    Topics to cover at upcoming visits: Monitoring, Taking Medication, Problem Solving, and Reducing Risks    Follow-up: 10/29/24    See Care Plan for co-developed, patient-state behavior change goals.  AVS provided for patient today.    Education Materials Provided:  No new materials provided today      SUBJECTIVE/OBJECTIVE:  Presents for: Individual review  Accompanied by: Self, Spouse  Diabetes education in the past 24mo: No  Focus of Visit: Patient Unsure  Diabetes type: Type 2  Date of diagnosis: 2009  How confident are you filling out medical forms by yourself:: Not Assessed  Diabetes management related comments/concerns: started PD in 4/24  Transportation concerns: No  Difficulty affording diabetes medication?: No ([facility]  Difficulty affording diabetes testing supplies?: No ([facility] ([facility]  Other concerns:: Glasses  Cultural Influences/Ethnic Background:  Not  or     Diabetes Symptoms & Complications:  Diabetes Related Symptoms: Neuropathy  Weight trend: Stable  Complications assessed today?: Yes  Heart disease: Yes  Nephropathy: Yes  Peripheral neuropathy: Yes    Patient Problem List and Family Medical History reviewed for relevant medical history, current medical status, and diabetes risk factors.    Vitals:  There were no vitals taken for this visit.  Estimated body mass index is 28.47 kg/m  as calculated from the following:    Height as of 7/17/24: 1.676 m (5' 6\").    Weight as of 7/17/24: 80 kg (176 lb 5.9 oz).   Last 3 BP:   BP Readings from Last 3 Encounters:   07/17/24 101/64   07/03/24 112/64   05/16/24 127/74       History   Smoking Status     Former     Types: Cigarettes   Smokeless Tobacco     Never       Labs:  No results found for: " "\"A1C\", \"HEMOGLOBINA1\"  Lab Results   Component Value Date    GLC 86 07/17/2024    GLC 62 07/17/2024     No results found for: \"LDL\"  No results found for: \"HDL\"]  GFR Estimate   Date Value Ref Range Status   07/17/2024 7 (L) >60 mL/min/1.73m2 Final     Comment:     eGFR calculated using 2021 CKD-EPI equation.     No results found for: \"GFRESTBLACK\"  Lab Results   Component Value Date    CR 7.23 07/17/2024     No results found for: \"MICROALBUMIN\"    Healthy Eating:  Healthy Eating Assessed Today: Yes  Cultural/Church diet restrictions?: No  Do you have any food allergies or intolerances?: No  Who cooks/prepares meals for you?: Spouse  Who purchases food in  your home?: Spouse  How many times a week on average do you eat food made away from home (restaurant/take-out)?: 3  Breakfast: 8 AM: Joni Fabrizio egg/sausage croissant, 2 cups of coffee  Lunch: 12-1 PM: apple, occasionally sandwich  Dinner: last night = chicken, rice, vegetables from [employer], diet soda  Snacks: none  Beverages: Coffee, Diet soda, Juice, Alcohol (1 beer each of 2 days per week (Fri and Sat))  Has patient met with a dietitian in the past?: Yes    Being Active:  Being Active Assessed Today: Yes  Exercise:: Yes (PT 2x/week, walking every other day)    Monitoring:  Monitoring Assessed Today: Yes  Blood Glucose Meter: CGM (Christiano 3)                Taking Medications:  Diabetes Medication(s)       Insulin       HUMULIN MIX 70/30 KWIKPEN (70-30) 100 UNIT/ML susp 30 IN AM, 32 IN PM Patient reports taking 32 units before breakfast, 32 units after dinner, and 15-20 units before bed IF HS glucose above 125-130 mg/dL            Taking Medication Assessed Today: Yes  Current Treatments: Insulin Injections, Diet  Dose schedule: Pre-breakfast, At bedtime (after dinner)  Given by: Patient  Injection/Infusion sites: Abdomen  Problems taking diabetes medications regularly?: No  Diabetes medication side effects?: Yes    Problem Solving:  Problem Solving Assessed Today: " "Yes  Is the patient at risk for hypoglycemia?: Yes  Hypoglycemia Frequency:  (3x in past month)  Hypoglycemia Treatment: Juice, Glucose (tablets or gel)    Hypoglycemia Symptoms  Hypoglycemia: Dizziness/Lightheadedness (\"my belly will itch\")    Reducing Risks:  Reducing Risks Assessed Today: Yes  Diabetes Risks: Age over 45 years, Family History  CAD Risks: Diabetes Mellitus, Hypertension, Male sex  Has dilated eye exam at least once a year?: Yes  Sees dentist every 6 months?: No (most recently a couple months ago though was first in many years)  Feet checked by healthcare provider in the last year?: Yes    Healthy Coping:  Healthy Coping Assessed Today: Yes  Emotional response to diabetes: Ready to learn  Informal Support system:: Spouse  Stage of change: ACTION (Actively working towards change)  Patient Activation Measure Survey Score:      6/27/2024     2:13 PM   MARIAJOSE Score (Last Two)   MARIAJOSE Raw Score 33   Activation Score 65.8   MARIAJOSE Level 3         Care Plan and Education Provided:  Monitoring: CGM instruction: Patient was instructed on Freestyle Christiano System: low glucose alarm and Taking Medication: Action of prescribed medication(s) and When to take medication(s)    Laura Mariee, MPH, RD, CDCES, LD 7/30/2024      Time Spent: 62 minutes  Encounter Type: Individual    Any diabetes medication dose changes were made via the CDE Protocol per the patient's referring provider. A copy of this encounter was shared with the provider.        "

## 2024-07-30 NOTE — PROGRESS NOTES
Diabetes Self-Management Education & Support    Presents for: Individual review    Type of Service: Telephone Visit    Originating Location (Patient Location): Home  Distant Location (Provider Location): [place] [facility]  Mode of Communication:  Telephone    Telephone Visit Start Time:  7:36 AM  Telephone Visit End Time (telephone visit stop time): 8:38 AM    How would patient like to obtain AVS? Mimi      REPORTS:      Pt verbalized understanding of concepts discussed and recommendations provided today.       Continue education with the following diabetes management concepts: Monitoring, Taking Medication, Problem Solving, and Reducing Risks    ASSESSMENT:  Most recent CGM data as above which shows patient IS meeting glucose goals for:    -time in  mg/dL target of above 70%   -time above 180 mg/dL of less than 25%   -time above 250 mg/dL of less than 5%   -time below 70 mg/dL of less than 4%   -time below 54 mg/dL of less than 1%  Patient denies any questions/concerns at present. Referred to writer by cardiology. Despite patient's reported non-traditional insulin administration schedule: Humulin 70/30 as 32 units just before breakfast, 32 units after dinner, and 15-20 units before bed IF HS glucose is above 125-130 mg/dL; patient has great glycemic control with minimal hypoglycemia.  Patient reports needing Humulin 70/30 at HS to prevent further hypoglycemia from his peritoneal dialysis dialysate (dialyzes for 9 hrs and 20 min overnight every night).  Reviewed with patient the action of action of Humulin 70/30 and instructed patient to take before each breakfast and evening meals.  Reviewed appropriate injection sites and importance of avoiding bruised areas.     Appears false sensor lows on 7/24-7/25 which appear to be due to a sensor issue.  Encouraged patient to seek replacement sensor from  so he does not run into future insurance issues.  Discussed when to complete fingerstick glucose  checks. Does not have a glucometer.  Discussed likely least expensive option for obtaining one.  [facility]    Writer to follow up with referring provider regarding who will long term manage patient's insulin given he will likely require changes as dialysate changes and if/when he receives a kidney transplant.     PLAN  Glucose monitoring   -continue to use the Christiano 3   contact the  at [phone]  (7 days a week 8am-8pm ET excluding holidays) or [email or web address].abbott/us-en/support/sensor-support-form-questions.html IF the sensor does not last the full 14 days  Turn your low glucose alarm back on    -fingerstick monitoring   You can get a free Arkray/Glucocard meter at any [place] pharmacy.  50 test strips will be $9-13 out of pocket   Complete a few fingerstick checks to confirm sensor readings in the setting of peritoneal dialysis   Other reasons to complete a fingerstick glucose check:    you feel different than the sensor indicates    you see this symbol:     2. Humulin 70/30 dosing   Remember to roll the insulin between your hands to mix before each dose.  It should look like skim milk when it has been mixed.   Take each dose at least 20 minutes before the meal.  30 minutes before the meal is better   A Humulin 70/30 pen is good at room temperature for 10 days (you will use it up before then)   Rotate, rotate, rotate your injection sites.  Avoid areas that are bruised or hardened    3. Keep something with you for treating a low blood sugar (70 mg/dL or below).  If your blood sugar is low, eat/drink ONE of the following:   -1/2 cup juice  -1/2 cup regular soda  -1 package fruit snacks  -4 glucose tablets  Then, wait 15 minutes and re-check blood sugar.  If it is not above 70 mg/dL, repeat the above.    4. Please call or send a [employer] message with any questions or concerns or low blood sugars.    Topics to cover at upcoming visits:  "Monitoring, Taking Medication, Problem Solving, and Reducing Risks    Follow-up: 10/29/24    See Care Plan for co-developed, patient-state behavior change goals.  AVS provided for patient today.    Education Materials Provided:  No new materials provided today      SUBJECTIVE/OBJECTIVE:  Presents for: Individual review  Accompanied by: Self, Spouse  Diabetes education in the past 24mo: No  Focus of Visit: Patient Unsure  Diabetes type: Type 2  Date of diagnosis: 2009  How confident are you filling out medical forms by yourself:: Not Assessed  Diabetes management related comments/concerns: started PD in 4/24  Transportation concerns: No  Difficulty affording diabetes medication?: No ([facility]  Difficulty affording diabetes testing supplies?: No ([facility]  Other concerns:: Glasses  Cultural Influences/Ethnic Background:  Not  or     Diabetes Symptoms & Complications:  Diabetes Related Symptoms: Neuropathy  Weight trend: Stable  Complications assessed today?: Yes  Heart disease: Yes  Nephropathy: Yes  Peripheral neuropathy: Yes    Patient Problem List and Family Medical History reviewed for relevant medical history, current medical status, and diabetes risk factors.    Vitals:  There were no vitals taken for this visit.  Estimated body mass index is 28.47 kg/m  as calculated from the following:    Height as of 7/17/24: 1.676 m (5' 6\").    Weight as of 7/17/24: 80 kg (176 lb 5.9 oz).   Last 3 BP:   BP Readings from Last 3 Encounters:   07/17/24 101/64   07/03/24 112/64   05/16/24 127/74       History   Smoking Status    Former    Types: Cigarettes   Smokeless Tobacco    Never       Labs:  No results found for: \"A1C\", \"HEMOGLOBINA1\"  Lab Results   Component Value Date    GLC 86 07/17/2024    GLC 62 07/17/2024     No results found for: \"LDL\"  No results found for: \"HDL\"]  GFR Estimate   Date Value Ref Range Status   07/17/2024 7 (L) >60 mL/min/1.73m2 Final     Comment:     " "eGFR calculated using 2021 CKD-EPI equation.     No results found for: \"GFRESTBLACK\"  Lab Results   Component Value Date    CR 7.23 07/17/2024     No results found for: \"MICROALBUMIN\"    Healthy Eating:  Healthy Eating Assessed Today: Yes  Cultural/[employer] diet restrictions?: No  Do you have any food allergies or intolerances?: No  Who cooks/prepares meals for you?: Spouse  Who purchases food in  your home?: Spouse  How many times a week on average do you eat food made away from home (restaurant/take-out)?: 3  Breakfast: 8 AM: Joni Dinh egg/sausage croissant, 2 cups of coffee  Lunch: 12-1 PM: apple, occasionally sandwich  Dinner: last night = chicken, rice, vegetables from [employer], diet soda  Snacks: none  Beverages: Coffee, Diet soda, Juice, Alcohol (1 beer each of 2 days per week (Fri and Sat))  Has patient met with a dietitian in the past?: Yes    Being Active:  Being Active Assessed Today: Yes  Exercise:: Yes (PT 2x/week, walking every other day)    Monitoring:  Monitoring Assessed Today: Yes  Blood Glucose Meter: CGM (Christiano 3)                Taking Medications:  Diabetes Medication(s)       Insulin       HUMULIN MIX 70/30 KWIKPEN (70-30) 100 UNIT/ML susp 30 IN AM, 32 IN PM Patient reports taking 32 units before breakfast, 32 units after dinner, and 15-20 units before bed IF HS glucose above 125-130 mg/dL            Taking Medication Assessed Today: Yes  Current Treatments: Insulin Injections, Diet  Dose schedule: Pre-breakfast, At bedtime (after dinner)  Given by: Patient  Injection/Infusion sites: Abdomen  Problems taking diabetes medications regularly?: No  Diabetes medication side effects?: Yes    Problem Solving:  Problem Solving Assessed Today: Yes  Is the patient at risk for hypoglycemia?: Yes  Hypoglycemia Frequency:  (3x in past month)  Hypoglycemia Treatment: Juice, Glucose (tablets or gel)    Hypoglycemia Symptoms  Hypoglycemia: Dizziness/Lightheadedness (\"my belly will itch\")    Reducing " Risks:  Reducing Risks Assessed Today: Yes  Diabetes Risks: Age over 45 years, Family History  CAD Risks: Diabetes Mellitus, Hypertension, Male sex  Has dilated eye exam at least once a year?: Yes  Sees dentist every 6 months?: No (most recently a couple months ago though was first in many years)  Feet checked by healthcare provider in the last year?: Yes    Healthy Coping:  Healthy Coping Assessed Today: Yes  Emotional response to diabetes: Ready to learn  Informal Support system:: Spouse  Stage of change: ACTION (Actively working towards change)  Patient Activation Measure Survey Score:      6/27/2024     2:13 PM   MARIAJOSE Score (Last Two)   MARIAJOSE Raw Score 33   Activation Score 65.8   MARIAJOSE Level 3         Care Plan and Education Provided:  Monitoring: CGM instruction: Patient was instructed on Freestyle Christiano System: low glucose alarm and Taking Medication: Action of prescribed medication(s) and When to take medication(s)    Laura Mariee, MPH, RD, CDCES, LD 7/30/2024      Time Spent: 62 minutes  Encounter Type: Individual    Any diabetes medication dose changes were made via the CDE Protocol per the patient's referring provider. A copy of this encounter was shared with the provider.

## 2024-07-30 NOTE — Clinical Note
Anton Duenas,  I met with Peter today per your referral to diabetes education.  Are you planning to manage his insulin long term?  I ask because I am in need of an order for what he currently reports taking for insulin (it is different than what is on his medication list). I also anticipate he is going to need changes in insulin as his dialysate changes and if/when he has a kidney transplant.  I am also a little concerned about having so many providers from different systems involved. [facility], [facility]CP is with , and his nephrologist is with [facility]! I appreciate your thoughts and recommendations!  Please let me know what questions you have for me. Thank you,  Laura Mariee, MPH, RD, CDCES, LD 7/30/2024

## 2024-07-31 ENCOUNTER — TELEPHONE (OUTPATIENT)
Dept: CARDIOLOGY | Facility: CLINIC | Age: 74
End: 2024-07-31

## 2024-07-31 DIAGNOSIS — I25.118 CORONARY ARTERY DISEASE INVOLVING NATIVE CORONARY ARTERY OF NATIVE HEART WITH OTHER FORM OF ANGINA PECTORIS (H): Primary | ICD-10-CM

## 2024-07-31 NOTE — CONFIDENTIAL NOTE
S-(situation): patient calling in b/c his prescription for Brilinta is cost prohibitive through "[facility]. He is a vet and [facility]. He is asking that a paper script be written along with a note as to why Brilinta is being used. This [facility] then [facility] at [phone]     B-(background):    7/17/2024:   73 yo M hx of NSTEMIO and ESRD present with abnormal stress test for cath.    Prox LAD to Mid LAD lesion is 70% stenosed.    Mid LAD lesion is 40% stenosed.     - LAD 80% ulcerative stenosis s/p PCI with EDWIN.    A-(assessment): recent EDWIN placement    R-(recommendations): discuss with Yulissa Jauregui NP. Patient has follow up post cath visit on August 7.

## 2024-08-01 NOTE — TELEPHONE ENCOUNTER
OK for patient to transition to Plavix. He should finish whatever supply of Brilinta he has and the following day he should start Plavix and take 300mg (4 tablets) that day followed by just 75mg (1 tablet) thereafter. I have teed up the prescription I just need to know which pharmacy to send it too. Yulissa Jauregui, ALEKSANDR CNP on 8/1/2024 at 7:35 AM

## 2024-08-02 RX ORDER — CLOPIDOGREL BISULFATE 75 MG/1
TABLET ORAL
Qty: 90 TABLET | Refills: 1 | Status: SHIPPED | OUTPATIENT
Start: 2024-08-02

## 2024-08-02 NOTE — TELEPHONE ENCOUNTER
Spoke with Peter and he is agreeable to changing over to plavix. [facility]

## 2024-08-07 ENCOUNTER — OFFICE VISIT (OUTPATIENT)
Dept: TRANSPLANT | Facility: CLINIC | Age: 74
End: 2024-08-07
Attending: NURSE PRACTITIONER
Payer: COMMERCIAL

## 2024-08-07 ENCOUNTER — ANCILLARY PROCEDURE (OUTPATIENT)
Dept: CT IMAGING | Facility: CLINIC | Age: 74
End: 2024-08-07
Attending: NURSE PRACTITIONER
Payer: COMMERCIAL

## 2024-08-07 ENCOUNTER — ANCILLARY ORDERS (OUTPATIENT)
Dept: TRANSPLANT | Facility: CLINIC | Age: 74
End: 2024-08-07

## 2024-08-07 VITALS
WEIGHT: 183.42 LBS | OXYGEN SATURATION: 97 % | BODY MASS INDEX: 29.61 KG/M2 | DIASTOLIC BLOOD PRESSURE: 71 MMHG | HEART RATE: 69 BPM | SYSTOLIC BLOOD PRESSURE: 116 MMHG

## 2024-08-07 DIAGNOSIS — Z76.82 ORGAN TRANSPLANT CANDIDATE: Primary | ICD-10-CM

## 2024-08-07 DIAGNOSIS — E11.21 TYPE 2 DIABETES MELLITUS WITH DIABETIC NEPHROPATHY, WITH LONG-TERM CURRENT USE OF INSULIN (H): ICD-10-CM

## 2024-08-07 DIAGNOSIS — I10 ESSENTIAL HYPERTENSION: ICD-10-CM

## 2024-08-07 DIAGNOSIS — Z79.4 TYPE 2 DIABETES MELLITUS WITH DIABETIC NEPHROPATHY, WITH LONG-TERM CURRENT USE OF INSULIN (H): ICD-10-CM

## 2024-08-07 DIAGNOSIS — N18.6 END STAGE RENAL DISEASE (H): ICD-10-CM

## 2024-08-07 DIAGNOSIS — Z76.82 ORGAN TRANSPLANT CANDIDATE: ICD-10-CM

## 2024-08-07 PROCEDURE — 99213 OFFICE O/P EST LOW 20 MIN: CPT | Mod: 25

## 2024-08-07 PROCEDURE — G0463 HOSPITAL OUTPT CLINIC VISIT: HCPCS

## 2024-08-07 PROCEDURE — 74178 CT ABD&PLV WO CNTR FLWD CNTR: CPT | Performed by: RADIOLOGY

## 2024-08-07 RX ORDER — IOPAMIDOL 755 MG/ML
91 INJECTION, SOLUTION INTRAVASCULAR ONCE
Status: COMPLETED | OUTPATIENT
Start: 2024-08-07 | End: 2024-08-07

## 2024-08-07 RX ADMIN — IOPAMIDOL 91 ML: 755 INJECTION, SOLUTION INTRAVASCULAR at 11:16

## 2024-08-07 ASSESSMENT — PAIN SCALES - GENERAL: PAINLEVEL: NO PAIN (0)

## 2024-08-07 NOTE — PATIENT INSTRUCTIONS
You were seen in the cardiology clinic by: Yulissa Jauregui CNP    Plan:     Medication Changes:   - STOP Brilinta  - START Plavix --> take 300mg (4 tablets) for the first dose. Then take 75mg (one tablet) daily thereafter    Labs/Tests Needed:   - None    Follow Up Visit:   - Cardiology follow up in 12-18 months   - Follow-up with PCP for changes to insulin requirements     Additional Instructions:   - Monitor your blood pressures at home. Please keep a log of the readings and bring to your future nephrology or cardiology appointments. Your blood pressure goal is < 130/80. It is important to achieve adequate blood pressure control before transplant.   - Focus on a low sodium diet. You should aim to consume <2,000mg of sodium daily.   - I encourage you to achieve 150 minutes/week of moderate intensity exercise if able. If this is not achievable right away, you can gradually work yourself up to this starting with low intensity exercises (i.e. walking or swimming) a couple days a week.      Important Numbers:     Cardiology   Telephone Number     To schedule an appointment or leave a message for your care team:   [phone]      Press #1   To reach the billing department: [phone]      Press # 3     To obtain copies of your medical records: [phone]      Press # 4   To reach the on-call cardiologist for after hours or on weekends: [phone]     Option #4, and ask to speak to the      Cardiovascular Clinic:   [address] [place] [address]. [place] [address] [phone]      Thank you for trusting us with your health care needs!

## 2024-08-07 NOTE — DISCHARGE INSTRUCTIONS

## 2024-08-07 NOTE — LETTER
8/7/2024      Peter Carpio  [address] [address]      Dear Colleague,    Thank you for referring your patient, Peter Carpio, to the [facility]PL[facility]. Please see a copy of my visit note below.          [facility]   Cardiology Service  History and Physical      Peter Carpio MRN# [id number]   YOB: 1950 Age: 74 year old       HPI:   Peter Carpio is a 74 year old year old male with a medical history significant for ESRD 2/2 diabetic nephropathy (on PD since 4/2024), DM2, obesity, NSTEMI (2010), CAD, HTN, and HLD. He presents today for cardiovascular risk assessment as part of pre-kidney transplant evaluation.      He is a former smoker, quit 2003. He does not use alcohol or drugs. His activity level is sedentary. He denies symptoms of chest pain, dizziness, lightheadedness, palpitations, orthopnea, PND, or edema. If he carries something heavy up the stairs he becomes really fatigued. When not carrying something he can use the stairs without issue. He says he can walk a couple miles before having to stop and rest. He does not have family history of heart disease.    He most recently underwent coronary angiogram with Dr. Lamb on 7/17/24. This revealed pLAD to mLAD 70% stenosis and mLAD 40% stenosis. He is now s/p PCI with EDWIN x 1 to LAD. The procedure was uncomplicated. His access site is soft, flat, non-tender but ecchymotic.    Cardiovascular Risk Factor Profile:  coronary artery disease , hypertension , diabetes, male age >50, previous smoker, and sedentary lifestyle     Current Cardiovascular Symptoms:  dyspnea    ACC HF Stage:  Stage B    NYHA Class:  Class II    Functional Capacity:  Performs 4 METS exercise without symptoms (e.g., light housework, stairs, 4 mph walk, 7 mph bike, slow step dance)        No past medical history on file.    No past surgical history on file.    Current Outpatient Medications    Medication Sig Dispense Refill     allopurinol (ZYLOPRIM) 100 MG tablet Take 100 mg by mouth daily       aspirin (ASA) 325 MG tablet Take 325 mg by mouth       atenolol (TENORMIN) 50 MG tablet Take 1 tablet by mouth daily       atorvastatin (LIPITOR) 20 MG tablet Take 20 mg by mouth daily       calcium acetate (PHOSLO) 667 MG CAPS capsule Take 667 mg by mouth       chlorthalidone (HYGROTON) 25 MG tablet Take 25 mg by mouth       Continuous Glucose Sensor (FREESTYLE ANTONIO 14 DAY SENSOR) MISC        famotidine (PEPCID) 20 MG tablet        furosemide (LASIX) 20 MG tablet Take 1 tablet by mouth daily       gabapentin (NEURONTIN) 100 MG capsule Take 100 mg by mouth       gentamicin (GARAMYCIN) 0.3 % ophthalmic solution        HUMULIN MIX 70/30 KWIKPEN (70-30) 100 UNIT/ML susp 30 IN AM, 32 IN PM       isosorbide mononitrate (IMDUR) 30 MG 24 hr tablet Take 1 tablet by mouth daily       lidocaine-prilocaine (EMLA) 2.5-2.5 % external cream Apply to skin.       Multiple Vitamins-Minerals (PRESERVISION AREDS) CAPS Take 1 capsule by mouth daily       multivitamin (OCUVITE) TABS tablet Take 1 tablet by mouth daily       Vitamin D3 (VITAMIN D-1000 MAX ST) 25 mcg (1000 units) tablet Take 25 mcg by mouth       No current facility-administered medications for this visit.       No family history on file.    Social History     Tobacco Use     Smoking status: Former     Current packs/day: 0.00     Types: Cigarettes     Quit date:      Years since quittin.5     Smokeless tobacco: Never   Substance Use Topics     Alcohol use: Yes     Comment: rare       No Known Allergies      Review Of Systems:   CONSTITUTIONAL: No report of fevers or chills  RESPIRATORY: No cough, wheezing, SOB, or hemoptysis  CARDIOVASCULAR: see HPI  MUSCULO-SKELETAL: No joint pain or swelling, no muscle pain  NEURO: No paresthesias, syncope, pre-syncope, lightheadness, dizziness or vertigo  ENDOCRINE: No temperature intolerance, no skin/hair  changes  PSYCHIATRIC: No change in mood, feeling down/anxious, no change in sleep or appetite  GI: No melena or hematochezia, no change in bowel habits  : No hematuria or dysuria, no hesitancy, dribbling or incontinence.   HEME: No easy bruising or bleeding, no history of anemia, no history of blood clots  SKIN: No rashes or sores, no unusual itching      Physical Examination:  Vitals: /71   Pulse 69   Wt 83.2 kg (183 lb 6.8 oz)   SpO2 97%   BMI 29.61 kg/m    BMI= Body mass index is 29.61 kg/m .    GENERAL APPEARANCE: healthy, alert and no distress  HEENT: no icterus, no xanthelasmas, normal pupil size and reaction, normal palate, mucosa moist  NECK: JVP not elevated , brisk carotid upstroke bilaterally  CHEST: lungs clear to auscultation without rales, rhonchi or wheezes, no use of accessory muscles, no retractions  CARDIOVASCULAR: regular rhythm, normal S1 and S2, no S3 or S4 and no murmur, click or rub.  ABDOMEN: soft, non tender, without hepatosplenomegaly, no masses palpable, bowel sounds normal  EXTREMITIES: warm,  no LE edema, DP/PT pulses 2+ bilaterally, no clubbing or cyanosis   NEURO: alert and oriented to person/place/time, normal speech, gait and affect  SKIN: no ecchymoses, no rashes      Laboratory:  Last Comprehensive Metabolic Panel:  Lab Results   Component Value Date     2024    POTASSIUM 4.0 2024    CHLORIDE 97 (L) 2024    CO2 26 2024    ANIONGAP 18 (H) 2024     (H) 2024    BUN 56.5 (H) 2024    CR 6.42 (H) 2024    GFRESTIMATED 8 (L) 2024    GINA 9.4 2024       Last CBC:  CBC RESULTS:   Recent Labs   Lab Test 24  1326   WBC 7.5   RBC 3.91*   HGB 12.3*   HCT 38.0*   MCV 97   MCH 31.5   MCHC 32.4   RDW 13.9          24 EK/16/24 Echocardiogram:  Left ventricular function is normal.The ejection fraction is 55-60%. No  regional wall motion abnormalities are seen.  Global right ventricular  function is normal.  No significant valvular abnormalities present.  Small inferior vena cava size consistent with hypovolemia.  No pericardial effusion is present.     There is no prior study for direct comparison.     7/17/24 Coronary Angiogram:     Prox LAD to Mid LAD lesion is 70% stenosed.     Mid LAD lesion is 40% stenosed.     - LAD 80% ulcerative stenosis s/p PCI with EDWIN.        Assessment and Plan:     # Coronary Artery Disease   # s/p EDWIN x 1 to LAD (7/17/24)  # Hyperlipidemia   History of NSTEMI January 2022; stress test showing very small defect. Intervention deferred due to patient's renal function and not being on dialysis. Most recent lipid panel 4/6/23 with total cholesterol 135, HDL 40, and LDL 35; he is on statin therapy. He underwent coronary angiogram on 7/17/24 which revealed proximal to mLAD 70% stenosis; he is now s/p PCI x 1 to LAD.  He denies chest pain, dizziness, lightheadedness, orthopnea, edema, or PND. He continues to endorse shortness of rest when he carries heavy items, but also with minimal exertion. This is new since his procedure; likely 2/2 to Brilinta.   - Continue Lipitor 20mg q HS   - Continue Aspirin 81mg daily   - Continue Imdur 30mg daily   - Stop Brilinta due to shortness of breath; transition to Plavix with 300mg loading dose then 75mg daily thereafter; OK to discontinue after 3 months as needed for transplant    - Continue outpatient cardiac rehab  - No additional cardiac testing needed at this time prior to transplant  - Follow-up with cardiology in 12-18 months; sooner with symptoms      # Hypertension  Blood pressure in office today 116/71. Blood pressures on home average 110's/60's.   - Continue Chlorthalidone 25mg daily   - Continue Atenolol 50mg daily   - Monitor BP's at home. Keep a log of readings and bring to follow up appointments  - BP goal < 130/80     # Diabetes Mellitus Type 2  Most recent A1c per chart review 6.5% on 2/20/24. Diagnosed ~ 10 years ago. Home  regimen includes 32 units of Humulin mix 70/30 BID. He uses Lantus at night. Previously 20 at bedtime but now down to 14. Blood sugars at home average  primarily. Currently having hypoglycemic episodes and decreased insulin requirements.   - Recommend follow up with PCP for insulin management due to decreased insulin requirements and new hypoglycemic episodes       # ESRD on PD   He is currently on PD since April of 2024. He admits to being compliant with all medications and consistent with his PD runs.   - Additional follow-up / testing per SOT team  - Continue Lasix 20mg daily         CAD Risk Level:  High Risk: > 3 risk factors, OR diabetic kidney disease, OR DM > 10 years, known CAD or PAD, prior PCI or CABG, on dialysis 5 years or more         RCRI Score:   - High risk surgery (>5% cardiac complication risk) = 1 point   - Serum Creatinine >2.0 mg/dl = 1 point           I have reviewed and updated the patient's Past Medical History, Social History, Family History and Medication List.           ALEKSANDR Shoemaker, CNP  [facility]      Review of prior external note(s) from - Care everywhere, SOT, nephrology  Review of the result(s) of each unique test - angiogram, echocardiogram, EKG  Ordering of each unique test  Prescription drug management- medication reconciliation       20 minutes spent by me on the date of the encounter doing chart review, review of outside records, review of test results, patient visit, and documentation       ALEKSANDR Valdez CNP on 8/7/2024 at 10:43 AM      Again, thank you for allowing me to participate in the care of your patient.        Sincerely,        ALEKSANDR Vladez CNP

## 2024-08-14 ENCOUNTER — TELEPHONE (OUTPATIENT)
Dept: TRANSPLANT | Facility: CLINIC | Age: 74
End: 2024-08-14
Payer: COMMERCIAL

## 2024-08-14 DIAGNOSIS — N18.6 END STAGE RENAL DISEASE (H): ICD-10-CM

## 2024-08-14 DIAGNOSIS — I25.10 CARDIOVASCULAR DISEASE: ICD-10-CM

## 2024-08-14 DIAGNOSIS — I10 ESSENTIAL HYPERTENSION: ICD-10-CM

## 2024-08-14 DIAGNOSIS — Z79.4 TYPE 2 DIABETES MELLITUS WITH DIABETIC NEPHROPATHY, WITH LONG-TERM CURRENT USE OF INSULIN (H): ICD-10-CM

## 2024-08-14 DIAGNOSIS — E11.21 TYPE 2 DIABETES MELLITUS WITH DIABETIC NEPHROPATHY, WITH LONG-TERM CURRENT USE OF INSULIN (H): ICD-10-CM

## 2024-08-14 DIAGNOSIS — Z76.82 ORGAN TRANSPLANT CANDIDATE: Primary | ICD-10-CM

## 2024-08-14 NOTE — TELEPHONE ENCOUNTER
Called patient to review the outcome of the imaging review meeting. I explained he needs to have follow-up visits with urology, endocrinology and a fibrosis scan. Patient also let me know he saw a dentist at [facility] in [place], [place]. A dental clearance letter will be sent to his dental office.    I provided my direct number to patient if he has any additional questions or updates. He was in good agreement with the plan. Of note, he has not been able to switch to Plavix as [facility]. He is still taking Brillinta.

## 2024-08-14 NOTE — LETTER
[place] [address] [address] [address]            August 14, 2024      Dental Clearance Form       Patient Name: Peter Carpio    YOB: 1950    Dentist Name:  _______________________________________________     Dental Office: [facility]    Patient's Last Dental Exam: _____________________________________    ___________________________________________________________       I confirm that this patient has had a dental exam in the last 2 years and this patient does not have a medical condition that requires dental treatment.     Today's Date _______________________________________________     Dentist Signature _______________________________________________      Please fax completed form to out Transplant Office at [phone].     For any questions, please call Marija Plata RN; Pre-Kidney Transplant Coordinator at [phone].

## 2024-08-16 ENCOUNTER — TELEPHONE (OUTPATIENT)
Dept: GASTROENTEROLOGY | Facility: CLINIC | Age: 74
End: 2024-08-16
Payer: COMMERCIAL

## 2024-09-03 DIAGNOSIS — Z79.4 TYPE 2 DIABETES MELLITUS WITH DIABETIC NEPHROPATHY, WITH LONG-TERM CURRENT USE OF INSULIN (H): ICD-10-CM

## 2024-09-03 DIAGNOSIS — I25.10 CARDIOVASCULAR DISEASE: ICD-10-CM

## 2024-09-03 DIAGNOSIS — N18.6 END STAGE RENAL DISEASE (H): ICD-10-CM

## 2024-09-03 DIAGNOSIS — K76.0 FATTY LIVER: Primary | ICD-10-CM

## 2024-09-03 DIAGNOSIS — E11.21 TYPE 2 DIABETES MELLITUS WITH DIABETIC NEPHROPATHY, WITH LONG-TERM CURRENT USE OF INSULIN (H): ICD-10-CM

## 2024-09-03 DIAGNOSIS — Z76.82 ORGAN TRANSPLANT CANDIDATE: ICD-10-CM

## 2024-09-03 DIAGNOSIS — I10 ESSENTIAL HYPERTENSION: ICD-10-CM

## 2024-09-03 PROCEDURE — 91200 LIVER ELASTOGRAPHY: CPT | Mod: 26 | Performed by: PHYSICIAN ASSISTANT

## 2024-09-08 ENCOUNTER — HEALTH MAINTENANCE LETTER (OUTPATIENT)
Age: 74
End: 2024-09-08

## 2024-09-18 ENCOUNTER — TELEPHONE (OUTPATIENT)
Dept: TRANSPLANT | Facility: CLINIC | Age: 74
End: 2024-09-18
Payer: COMMERCIAL

## 2024-09-18 NOTE — TELEPHONE ENCOUNTER
Spoke with Peter after his Fibrosis Scan was reviewed and explained that he needs to stop drinking alcohol. Patient verbalized understanding and said he already rarely drinks beer but will stop drinking. I also told the patient he needs to follow-up with his PCP to let them know he has a fatty liver and may need to establish care with hepatology. Patient verbalized a good understanding and said he will let him PCP know and have them place a referral to hepatology if they recommend he establishes care with hepatology. He had no further questions at this time.

## 2024-10-01 ENCOUNTER — TRANSFERRED RECORDS (OUTPATIENT)
Dept: MULTI SPECIALTY CLINIC | Facility: CLINIC | Age: 74
End: 2024-10-01

## 2024-10-01 LAB — RETINOPATHY: NORMAL

## 2024-10-29 ENCOUNTER — ALLIED HEALTH/NURSE VISIT (OUTPATIENT)
Dept: EDUCATION SERVICES | Facility: CLINIC | Age: 74
End: 2024-10-29
Payer: COMMERCIAL

## 2024-10-29 DIAGNOSIS — E11.9 TYPE 2 DIABETES MELLITUS WITHOUT COMPLICATION, WITH LONG-TERM CURRENT USE OF INSULIN (H): Primary | ICD-10-CM

## 2024-10-29 DIAGNOSIS — Z79.4 TYPE 2 DIABETES MELLITUS WITHOUT COMPLICATION, WITH LONG-TERM CURRENT USE OF INSULIN (H): Primary | ICD-10-CM

## 2024-10-29 PROCEDURE — G0108 DIAB MANAGE TRN  PER INDIV: HCPCS | Performed by: DIETITIAN, REGISTERED

## 2024-10-29 NOTE — LETTER
10/29/2024         RE: Peter Carpio  [address] [address]        Dear Colleague,    Thank you for referring your patient, Peter Carpio, to the [facility]. Please see a copy of my visit note below.    Diabetes Self-Management Education & Support    Presents for: Follow-up    Type of Service: In Person Visit      REPORTS:      Pt verbalized understanding of concepts discussed and recommendations provided today.       Continue education with the following diabetes management concepts: Healthy Eating, Being Active, Taking Medication, Problem Solving, and Reducing Risks    ASSESSMENT:  Most recent CGM data as above which shows patient IS meeting glucose goals for:    -time in  mg/dL target of above 70%   -time above 180 mg/dL of less than 25%   -time above 250 mg/dL of less than 5%   -time below 70 mg/dL of less than 4%   -time below 54 mg/dL of less than 1%  Patient denies any questions/concerns at present. Referred to writer by cardiology.  Reports [facility] and with his primary who are monitoring his glucose.  Patient unable to identify which provider is managing insulin.  Does have appointment in December 2024 with [facility].  Despite patient's reported non-traditional insulin administration schedule: Humulin 70/30 as 32 units usually after breakfast, 34 units usually after dinner, and 10 units before bed IF HS glucose is above 175 mg/dL as well as sometimes an injection mid-day; patient has great glycemic control with minimal hypoglycemia.  Reports he does at times self-adjust doses based on glucoses.  Reviewed with patient the action of Humulin 70/30 and asked patient to work to take Humulin 70/30 before each breakfast and evening meals with no insulin mid-day.  He agreed to work on this.  Regarding food/meal planning, discussed the Plate method for overall healthy eating as patient reports no current  "guidelines/restrictions from Dr. Gonzalez (nephrology).  Identified opportunity to increase consumption of non-starchy vegetables which patient is not yet ready to address.  Patient reports opportunities to increase physical activity: treadmill and stationary bike at home as well as membership to [employer].  Together we looked into class opportunities at [employer] and identified the ones below as of interest to patient. Discussed may need to decrease AM insulin dose to prevent hypoglycemia on fitness class days.  Reviewed preferred treatment of hypoglycemia.     Continues on peritoneal dialysis.  Per patient, plan is that he will be listed for renal transplant after his appointment with urology in November.     PLAN  Glucose monitoring              -continue to use the Christiano 3   contact the  at [phone]  (7 days a week 8am-8pm ET excluding holidays) or [email or web address]-[email or web address]-[email or web address] IF the sensor does not last the full 14 days  Turn your low glucose alarm back on     -fingerstick monitoring              You can get a free Arkray/Glucocard meter at any [facility] pharmacy.  50 test strips will be $9-13 out of pocket              Complete a few fingerstick checks to confirm sensor readings in the setting of peritoneal dialysis              Other reasons to complete a fingerstick glucose check:                          you feel different than the sensor indicates                          you see this symbol:      2. Humulin 70/30 dosing              Remember to roll the insulin between your hands to mix before each dose.  It should look like skim milk when it has been mixed.              Take dose before breakfast and before supper.  Think: \"When the Plate Is Hot, Take the Shot\".  Ideally at least 20 minutes before the meal.  30 minutes before the meal is better   No insulin between breakfast and supper.               A Humulin 70/30 pen is good at room " temperature for 10 days (you will use it up before then)              Rotate, rotate, rotate your injection sites.  Avoid areas that are bruised or hardened     3. Keep something with you for treating a low blood sugar (70 mg/dL or below).  If your blood sugar is low, eat/drink ONE of the following:   -1/2 cup juice  -1/2 cup regular soda  -1 package fruit snacks  -4 glucose tablets  Then, wait 15 minutes and re-check blood sugar.  If it is not above 70 mg/dL, repeat the above.    4.  Set a goal related to physical activity.    We discussed checking out a class at [employer] in [place]:    [employer]: Mondays and Wednesdays from 10-10:30 AM   [employer]: Fridays: 9:45 -10:30 AM     5. Please call or send a TipTap message with any questions or concerns or low blood sugars.    Topics to cover at upcoming visits: Healthy Eating, Being Active, Taking Medication, Problem Solving, and Reducing Risks    Follow-up: prn per patient    See Care Plan for co-developed, patient-state behavior change goals.  AVS provided for patient today.    Education Materials Provided:  My Plate Planner      SUBJECTIVE/OBJECTIVE:  Presents for: Follow-up  Accompanied by: Self  Diabetes education in the past 24mo: Yes  Focus of Visit: Patient Unsure  Diabetes type: (Patient-Rptd) Type 2  Disease course: (Patient-Rptd) Stable  How confident are you filling out medical forms by yourself:: (Patient-Rptd) Extremely  Diabetes management related comments/concerns: (Patient-Rptd) no  Cultural Influences/Ethnic Background:  Not  or     Diabetes Symptoms & Complications:  Diabetes Related Symptoms: (Patient-Rptd) Neuropathy  Weight trend: (Patient-Rptd) Stable  Symptom course: (Patient-Rptd) Stable  Disease course: (Patient-Rptd) Stable  Complications assessed today?: Yes  Heart disease: Yes  Nephropathy: Yes  Peripheral neuropathy: Yes    Patient Problem List and Family Medical History reviewed for relevant medical history, current  "medical status, and diabetes risk factors.    Vitals:  There were no vitals taken for this visit.  Estimated body mass index is 29.61 kg/m  as calculated from the following:    Height as of 7/17/24: 1.676 m (5' 6\").    Weight as of 8/7/24: 83.2 kg (183 lb 6.8 oz).   Last 3 BP:   BP Readings from Last 3 Encounters:   08/07/24 116/71   07/17/24 101/64   07/03/24 112/64       History   Smoking Status     Former     Types: Cigarettes   Smokeless Tobacco     Never       Labs:  No results found for: \"A1C\", \"HEMOGLOBINA1\"  Lab Results   Component Value Date    GLC 86 07/17/2024    GLC 62 07/17/2024     No results found for: \"LDL\"  No results found for: \"HDL\"]  GFR Estimate   Date Value Ref Range Status   07/17/2024 7 (L) >60 mL/min/1.73m2 Final     Comment:     eGFR calculated using 2021 CKD-EPI equation.     No results found for: \"GFRESTBLACK\"  Lab Results   Component Value Date    CR 7.23 07/17/2024     No results found for: \"MICROALBUMIN\"    Healthy Eating:  Healthy Eating Assessed Today: Yes  Cultural/Zoroastrian diet restrictions?: (Patient-Rptd) No  Who cooks/prepares meals for you?: Spouse  How many times a week on average do you eat food made away from home (restaurant/take-out)?: (Patient-Rptd) 2  Meals include: (Patient-Rptd) Breakfast, Dinner, Afternoon Snack  Breakfast: Joni Fabrizio sausage, egg, cheese breakfast sandwich  Lunch: English muffin with butter (jelly if low BG), sometimes apple  Dinner: last night = spaghetti and meatballs; meal includes salad 5x/week, if still hungry may have apple or cherri  Snacks: AM: banana about 3x/week  Other: has juice if notices glucoses are dropping overnight  Beverages: (Patient-Rptd) Coffee, Juice, Diet soda  Has patient met with a dietitian in the past?: Yes    Being Active:  Being Active Assessed Today: Yes  Exercise:: Yes  Days per week of moderate to strenuous exercise (like a brisk walk): 3  On average, minutes per day of exercise at this level: 30  How intense was " your typical exercise? : Light (like stretching or slow walking)  Exercise Minutes per Week: 90  Barrier to exercise: (Patient-Rptd) None    Monitoring:  Monitoring Assessed Today: Yes  Blood Glucose Meter: CGM  Times checking blood sugar at home (number): (Patient-Rptd) 5+  Times checking blood sugar at home (per): (Patient-Rptd) Day                Taking Medications:  Diabetes Medication(s)       Insulin       HUMULIN MIX 70/30 KWIKPEN (70-30) 100 UNIT/ML susp 30 IN AM, 32 IN PM Reports taking 32 units/day (AM), 34 units/day (PM), and 10 units/day (HS) IF glucose above 175 mg/dL            Taking Medication Assessed Today: Yes  Current Treatments: (Patient-Rptd) Diet, Insulin Injections  Dose schedule:  (mostly post breakfast and post dinner meals, sometimes mid-day and sometimes HS)  Given by: Patient  Diabetes medication side effects?: Yes    Problem Solving:  Problem Solving Assessed Today: Yes  Is the patient at risk for hypoglycemia?: Yes  Hypoglycemia Frequency:  (3x in the past 2 weeks per CGM)  Hypoglycemia Treatment: Juice    Reducing Risks:  Reducing Risks Assessed Today: Yes  Diabetes Risks: Age over 45 years  CAD Risks: Diabetes Mellitus, Male sex, Hypertension  Has dilated eye exam at least once a year?: (Patient-Rptd) Yes  Sees dentist every 6 months?: (Patient-Rptd) Yes  Feet checked by healthcare provider in the last year?: (Patient-Rptd) Yes    Healthy Coping:  Healthy Coping Assessed Today: Yes  Emotional response to diabetes: Ready to learn  Informal Support system:: (Patient-Rptd) Family, Spouse  Stage of change: CONTEMPLATION (Considering change and yet undecided)  Patient Activation Measure Survey Score:      6/27/2024     2:13 PM   MARIAJOSE Score (Last Two)   MARIAJOSE Raw Score 33   Activation Score 65.8   MARIAJOSE Level 3         Care Plan and Education Provided:  Healthy Eating: Balanced meals and Plate planning method, Being Active: Finding a physical activity routine that works for you and Precautions  to take with exercise, Taking Medication: When to take medication(s), and Problem Solving: Low glucose - treatment     Laura Mariee, MPH, RD, CDCES, LD 10/29/2024    Time Spent: 60 minutes  Encounter Type: Individual    Any diabetes medication dose changes were made via the CDE Protocol per the patient's referring provider. A copy of this encounter was shared with the provider.

## 2024-10-29 NOTE — PROGRESS NOTES
Diabetes Self-Management Education & Support    Presents for: Follow-up    Type of Service: In Person Visit      REPORTS:      Pt verbalized understanding of concepts discussed and recommendations provided today.       Continue education with the following diabetes management concepts: Healthy Eating, Being Active, Taking Medication, Problem Solving, and Reducing Risks    ASSESSMENT:  Most recent CGM data as above which shows patient IS meeting glucose goals for:    -time in  mg/dL target of above 70%   -time above 180 mg/dL of less than 25%   -time above 250 mg/dL of less than 5%   -time below 70 mg/dL of less than 4%   -time below 54 mg/dL of less than 1%  Patient denies any questions/concerns at present. Referred to writer by cardiology.  Reports [facility] and with [facility] who are monitoring his glucose.  Patient unable to identify which provider is managing insulin.  Does have appointment in December 2024 with [facility].  Despite patient's reported non-traditional insulin administration schedule: Humulin 70/30 as 32 units usually after breakfast, 34 units usually after dinner, and 10 units before bed IF HS glucose is above 175 mg/dL as well as sometimes an injection mid-day; patient has great glycemic control with minimal hypoglycemia.  Reports he does at times self-adjust doses based on glucoses.  Reviewed with patient the action of Humulin 70/30 and asked patient to work to take Humulin 70/30 before each breakfast and evening meals with no insulin mid-day.  He agreed to work on this.  Regarding food/meal planning, discussed the Plate method for overall healthy eating as patient reports no current guidelines/restrictions from Dr. Gonzalez (nephrology).  Identified opportunity to increase consumption of non-starchy vegetables which patient is not yet ready to address.  Patient reports opportunities to increase physical activity: treadmill and stationary bike at home as  "well as membership to [employer].  Together we looked into class opportunities at [employer] and identified the ones below as of interest to patient. Discussed may need to decrease AM insulin dose to prevent hypoglycemia on fitness class days.  Reviewed preferred treatment of hypoglycemia.     Continues on peritoneal dialysis.  Per patient, plan is that he will be listed for renal transplant after his appointment with urology in November.     PLAN  Glucose monitoring              -continue to use the Christiano 3   contact the  at [phone]  (7 days a week 8am-8pm ET excluding holidays) or [email or web address]/us-en/support/sensor-support-form-questions.html IF the sensor does not last the full 14 days  Turn your low glucose alarm back on     -fingerstick monitoring              You can get a free Arkray/Glucocard meter at any [facility] pharmacy.  50 test strips will be $9-13 out of pocket              Complete a few fingerstick checks to confirm sensor readings in the setting of peritoneal dialysis              Other reasons to complete a fingerstick glucose check:                          you feel different than the sensor indicates                          you see this symbol:      2. Humulin 70/30 dosing              Remember to roll the insulin between your hands to mix before each dose.  It should look like skim milk when it has been mixed.              Take dose before breakfast and before supper.  Think: \"When the Plate Is Hot, Take the Shot\".  Ideally at least 20 minutes before the meal.  30 minutes before the meal is better   No insulin between breakfast and supper.               A Humulin 70/30 pen is good at room temperature for 10 days (you will use it up before then)              Rotate, rotate, rotate your injection sites.  Avoid areas that are bruised or hardened     3. Keep something with you for treating a low blood sugar (70 mg/dL or below).  If your blood sugar is low, " "eat/drink ONE of the following:   -1/2 cup juice  -1/2 cup regular soda  -1 package fruit snacks  -4 glucose tablets  Then, wait 15 minutes and re-check blood sugar.  If it is not above 70 mg/dL, repeat the above.    4.  Set a goal related to physical activity.    We discussed checking out a class at [employer] in [place]:    [place] [employer]: Mondays and Wednesdays from 10-10:30 AM   [employer]: Fridays: 9:45 -10:30 AM     5. Please call or send a Anita Margarita message with any questions or concerns or low blood sugars.    Topics to cover at upcoming visits: Healthy Eating, Being Active, Taking Medication, Problem Solving, and Reducing Risks    Follow-up: prn per patient    See Care Plan for co-developed, patient-state behavior change goals.  AVS provided for patient today.    Education Materials Provided:  My Plate Planner      SUBJECTIVE/OBJECTIVE:  Presents for: Follow-up  Accompanied by: Self  Diabetes education in the past 24mo: Yes  Focus of Visit: Patient Unsure  Diabetes type: (Patient-Rptd) Type 2  Disease course: (Patient-Rptd) Stable  How confident are you filling out medical forms by yourself:: (Patient-Rptd) Extremely  Diabetes management related comments/concerns: (Patient-Rptd) no  Cultural Influences/Ethnic Background:  Not  or     Diabetes Symptoms & Complications:  Diabetes Related Symptoms: (Patient-Rptd) Neuropathy  Weight trend: (Patient-Rptd) Stable  Symptom course: (Patient-Rptd) Stable  Disease course: (Patient-Rptd) Stable  Complications assessed today?: Yes  Heart disease: Yes  Nephropathy: Yes  Peripheral neuropathy: Yes    Patient Problem List and Family Medical History reviewed for relevant medical history, current medical status, and diabetes risk factors.    Vitals:  There were no vitals taken for this visit.  Estimated body mass index is 29.61 kg/m  as calculated from the following:    Height as of 7/17/24: 1.676 m (5' 6\").    Weight as of 8/7/24: 83.2 kg (183 lb 6.8 oz). " "  Last 3 BP:   BP Readings from Last 3 Encounters:   08/07/24 116/71   07/17/24 101/64   07/03/24 112/64       History   Smoking Status    Former    Types: Cigarettes   Smokeless Tobacco    Never       Labs:  No results found for: \"A1C\", \"HEMOGLOBINA1\"  Lab Results   Component Value Date    GLC 86 07/17/2024    GLC 62 07/17/2024     No results found for: \"LDL\"  No results found for: \"HDL\"]  GFR Estimate   Date Value Ref Range Status   07/17/2024 7 (L) >60 mL/min/1.73m2 Final     Comment:     eGFR calculated using 2021 CKD-EPI equation.     No results found for: \"GFRESTBLACK\"  Lab Results   Component Value Date    CR 7.23 07/17/2024     No results found for: \"MICROALBUMIN\"    Healthy Eating:  Healthy Eating Assessed Today: Yes  Cultural/Mormonism diet restrictions?: (Patient-Rptd) No  Who cooks/prepares meals for you?: Spouse  How many times a week on average do you eat food made away from home (restaurant/take-out)?: (Patient-Rptd) 2  Meals include: (Patient-Rptd) Breakfast, Dinner, Afternoon Snack  Breakfast: Joni Fabrizio sausage, egg, cheese breakfast sandwich  Lunch: English muffin with butter (jelly if low BG), sometimes apple  Dinner: last night = spaghetti and meatballs; meal includes salad 5x/week, if still hungry may have apple or cherri  Snacks: AM: banana about 3x/week  Other: has juice if notices glucoses are dropping overnight  Beverages: (Patient-Rptd) Coffee, Juice, Diet soda  Has patient met with a dietitian in the past?: Yes    Being Active:  Being Active Assessed Today: Yes  Exercise:: Yes  Days per week of moderate to strenuous exercise (like a brisk walk): 3  On average, minutes per day of exercise at this level: 30  How intense was your typical exercise? : Light (like stretching or slow walking)  Exercise Minutes per Week: 90  Barrier to exercise: (Patient-Rptd) None    Monitoring:  Monitoring Assessed Today: Yes  Blood Glucose Meter: CGM  Times checking blood sugar at home (number): " (Patient-Rptd) 5+  Times checking blood sugar at home (per): (Patient-Rptd) Day                Taking Medications:  Diabetes Medication(s)       Insulin       HUMULIN MIX 70/30 KWIKPEN (70-30) 100 UNIT/ML susp 30 IN AM, 32 IN PM Reports taking 32 units/day (AM), 34 units/day (PM), and 10 units/day (HS) IF glucose above 175 mg/dL            Taking Medication Assessed Today: Yes  Current Treatments: (Patient-Rptd) Diet, Insulin Injections  Dose schedule:  (mostly post breakfast and post dinner meals, sometimes mid-day and sometimes HS)  Given by: Patient  Diabetes medication side effects?: Yes    Problem Solving:  Problem Solving Assessed Today: Yes  Is the patient at risk for hypoglycemia?: Yes  Hypoglycemia Frequency:  (3x in the past 2 weeks per CGM)  Hypoglycemia Treatment: Juice    Reducing Risks:  Reducing Risks Assessed Today: Yes  Diabetes Risks: Age over 45 years  CAD Risks: Diabetes Mellitus, Male sex, Hypertension  Has dilated eye exam at least once a year?: (Patient-Rptd) Yes  Sees dentist every 6 months?: (Patient-Rptd) Yes  Feet checked by healthcare provider in the last year?: (Patient-Rptd) Yes    Healthy Coping:  Healthy Coping Assessed Today: Yes  Emotional response to diabetes: Ready to learn  Informal Support system:: (Patient-Rptd) Family, Spouse  Stage of change: CONTEMPLATION (Considering change and yet undecided)  Patient Activation Measure Survey Score:      6/27/2024     2:13 PM   MARIAJOSE Score (Last Two)   MARIAJOSE Raw Score 33   Activation Score 65.8   MARIAJOSE Level 3         Care Plan and Education Provided:  Healthy Eating: Balanced meals and Plate planning method, Being Active: Finding a physical activity routine that works for you and Precautions to take with exercise, Taking Medication: When to take medication(s), and Problem Solving: Low glucose - treatment     Laura Mariee, MPH, RD, CDCES, LD 10/29/2024    Time Spent: 60 minutes  Encounter Type: Individual    Any diabetes medication dose changes  were made via the CDE Protocol per the patient's referring provider. A copy of this encounter was shared with the provider.

## 2024-10-29 NOTE — PATIENT INSTRUCTIONS
"Glucose monitoring              -continue to use the Christiano 3   contact the  at [phone]  (7 days a week 8am-8pm ET excluding holidays) or [email or web address]/us-en/support/sensor-support-form-questions.html IF the sensor does not last the full 14 days  Turn your low glucose alarm back on     -fingerstick monitoring              You can get a free Arkray/Glucocard meter at any [facility] pharmacy.  50 test strips will be $9-13 out of pocket              Complete a few fingerstick checks to confirm sensor readings in the setting of peritoneal dialysis              Other reasons to complete a fingerstick glucose check:                          you feel different than the sensor indicates                          you see this symbol:      2. Humulin 70/30 dosing              Remember to roll the insulin between your hands to mix before each dose.  It should look like skim milk when it has been mixed.              Take dose before breakfast and before supper.  Think: \"When the Plate Is Hot, Take the Shot\".  Ideally at least 20 minutes before the meal.  30 minutes before the meal is better   No insulin between breakfast and supper.               A Humulin 70/30 pen is good at room temperature for 10 days (you will use it up before then)              Rotate, rotate, rotate your injection sites.  Avoid areas that are bruised or hardened     3. Keep something with you for treating a low blood sugar (70 mg/dL or below).  If your blood sugar is low, eat/drink ONE of the following:   -1/2 cup juice  -1/2 cup regular soda  -1 package fruit snacks  -4 glucose tablets  Then, wait 15 minutes and re-check blood sugar.  If it is not above 70 mg/dL, repeat the above.    4.  Set a goal related to physical activity.    We discussed checking out a class at [employer] in [place]:    [place] [employer]: Mondays and Wednesdays from 10-10:30 AM   [employer]: Fridays: 9:45 -10:30 AM     5. Please call or send a MyChart " message with any questions or concerns or low blood sugars.    Laura Mariee, MPH, RD, TRISH LD  Diabetes Education Triage Line: [phone]  Diabetes Education Appointment [phone]

## 2024-10-29 NOTE — Clinical Note
JAREK as patient has appt with you in December.  I do not have follow up with him scheduled at this time though happy to see him again!

## 2024-11-19 ENCOUNTER — OFFICE VISIT (OUTPATIENT)
Dept: UROLOGY | Facility: CLINIC | Age: 74
End: 2024-11-19
Attending: NURSE PRACTITIONER
Payer: COMMERCIAL

## 2024-11-19 DIAGNOSIS — N18.6 END STAGE RENAL DISEASE (H): ICD-10-CM

## 2024-11-19 DIAGNOSIS — I25.10 CARDIOVASCULAR DISEASE: ICD-10-CM

## 2024-11-19 DIAGNOSIS — Z76.82 ORGAN TRANSPLANT CANDIDATE: ICD-10-CM

## 2024-11-19 DIAGNOSIS — N40.1 BPH WITH OBSTRUCTION/LOWER URINARY TRACT SYMPTOMS: Primary | ICD-10-CM

## 2024-11-19 DIAGNOSIS — I10 ESSENTIAL HYPERTENSION: ICD-10-CM

## 2024-11-19 DIAGNOSIS — Z79.4 TYPE 2 DIABETES MELLITUS WITH DIABETIC NEPHROPATHY, WITH LONG-TERM CURRENT USE OF INSULIN (H): ICD-10-CM

## 2024-11-19 DIAGNOSIS — E11.21 TYPE 2 DIABETES MELLITUS WITH DIABETIC NEPHROPATHY, WITH LONG-TERM CURRENT USE OF INSULIN (H): ICD-10-CM

## 2024-11-19 DIAGNOSIS — N13.8 BPH WITH OBSTRUCTION/LOWER URINARY TRACT SYMPTOMS: Primary | ICD-10-CM

## 2024-11-19 RX ORDER — TAMSULOSIN HYDROCHLORIDE 0.4 MG/1
0.4 CAPSULE ORAL DAILY
COMMUNITY

## 2024-11-19 RX ORDER — FINASTERIDE 5 MG/1
5 TABLET, FILM COATED ORAL DAILY
Qty: 90 TABLET | Refills: 3 | Status: SHIPPED | OUTPATIENT
Start: 2024-11-19

## 2024-11-19 NOTE — NURSING NOTE
Peter Carpio's goals for this visit include:   Chief Complaint   Patient presents with    Consult     Transplant Clearance, referred by Ari Crouch APRN CNP in  [facility], per patient, records in epic - imaging in PACS       He requests these members of his care team be copied on today's visit information:     PCP: Yared Greene    Referring Provider:  ALEKSANDR Simeon CNP  [address] [place] [address]    There were no vitals taken for this visit.    Do you need any medication refills at today's visit?     Tiff Andre MA on 11/19/2024 at 2:32 PM

## 2024-11-19 NOTE — PROGRESS NOTES
Urology Consult History and Physical  JULES ESCALANTE   Name: Peter Carpio    MRN: [id number]   YOB: 1950       We were asked to see Peter Carpio at the request of transplant team for evaluation and treatment of BPH with LUTS.        Chief Complaint:   BPH with LUTS    History is obtained from the patient            History of Present Illness:   Peter Carpio is a 74 year old male who is being seen for evaluation of BPH with LUTS and possible kidney transplant recipient    Being worked up for kidney transplant    He has been on tamsulosin 0.4mg (Flomax)   On HD  Still makes 24-32 oz urine daily  No prior episodes of acute urinary retention  UTI in May with Enterococcus   He denies any significant LUTS and the Flomax is managing this well           Past Medical History:   No past medical history on file.         Past Surgical History:     Past Surgical History:   Procedure Laterality Date    CV CORONARY ANGIOGRAM N/A 2024    Procedure: Coronary Angiogram;  Surgeon: Duglas Lamb MD;  [facility]    CV PCI N/A 2024    Procedure: Percutaneous Coronary Intervention;  Surgeon: Duglas Lamb MD;  Location:  [facility]            Social History:     Social History     Tobacco Use    Smoking status: Former     Current packs/day: 0.00     Types: Cigarettes     Quit date:      Years since quittin.8    Smokeless tobacco: Never   Substance Use Topics    Alcohol use: Yes     Comment: rare       History   Smoking Status    Former    Types: Cigarettes   Smokeless Tobacco    Never            Family History:   No family history on file.           Allergies:   No Known Allergies         Medications:     Current Outpatient Medications   Medication Sig Dispense Refill    allopurinol (ZYLOPRIM) 100 MG tablet Take 100 mg by mouth daily      aspirin (ASA) 325 MG tablet Take 325 mg by mouth      atenolol (TENORMIN) 50 MG tablet Take 1 tablet by  mouth daily      atorvastatin (LIPITOR) 20 MG tablet Take 20 mg by mouth daily      calcium acetate (PHOSLO) 667 MG CAPS capsule Take 667 mg by mouth      clopidogrel (PLAVIX) 75 MG tablet For the first dose take 300mg (4 tablets) then take 75mg (1 tablet) daily thereafter. 90 tablet 1    Continuous Glucose Sensor (FREESTYLE ANTONIO 14 DAY SENSOR) MISC       famotidine (PEPCID) 20 MG tablet       furosemide (LASIX) 20 MG tablet Take 1 tablet by mouth daily      gabapentin (NEURONTIN) 100 MG capsule Take 100 mg by mouth      gentamicin (GARAMYCIN) 0.3 % ophthalmic solution       HUMULIN MIX 70/30 KWIKPEN (70-30) 100 UNIT/ML susp 30 IN AM, 32 IN PM      isosorbide mononitrate (IMDUR) 30 MG 24 hr tablet Take 1 tablet by mouth daily      lidocaine-prilocaine (EMLA) 2.5-2.5 % external cream Apply to skin.      Multiple Vitamins-Minerals (PRESERVISION AREDS) CAPS Take 1 capsule by mouth daily      multivitamin (OCUVITE) TABS tablet Take 1 tablet by mouth daily      Vitamin D3 (VITAMIN D-1000 MAX ST) 25 mcg (1000 units) tablet Take 25 mcg by mouth      chlorthalidone (HYGROTON) 25 MG tablet Take 25 mg by mouth       No current facility-administered medications for this visit.             Review of Systems:     Reviewed and noted above          Physical Exam:   No data found.  There is no height or weight on file to calculate BMI.     General: age-appropriate appearing male in NAD  HEENT: Head AT/NC, EOMI, CN Grossly intact  Lungs: no respiratory distress, or pursed lip breathing  Heart: No obvious jugular venous distension present  Neuro: Alert, oriented, speech and mentation normal;   moving all 4 extremities equally.  Psych: affect and mood normal          Data:   All laboratory data reviewed:    UA RESULTS:  Recent Labs   Lab Test 07/03/24  0958   COLOR Yellow   APPEARANCE Slightly Cloudy*   URINEGLC 100*   URINEBILI Negative   URINEKETONE Negative   SG 1.014   UBLD Small*   URINEPH 6.0   PROTEIN 30*   NITRITE Negative    LEUKEST Large*   RBCU 12*   WBCU >182*      Prostate Specific Antigen Screen   Date Value Ref Range Status   05/16/2024 2.73 0.00 - 6.50 ng/mL Final      Lab Results   Component Value Date    CR 7.23 07/17/2024      IMAGING:  All pertinent imaging reviewed:    All imaging studies reviewed by me.  I personally reviewed these imaging films.  A formal report from radiology will follow.    CT ABD/PEL 8/7/2024:  FINDINGS:     Lower thorax: Calcified granuloma in the right lower lobe.     Liver: Diffuse hepatic steatosis. There are areas of sparing adjacent  to gallbladder fossa. In segment 4A there is an area of intense fatty  which corresponds to the abnormality seen on recent CT.  No suspicious  liver lesions. No arterially enhancing foci.      Biliary System: Normal gallbladder. No extrahepatic biliary ductal  dilation.     Pancreas: Mild atrophy. Few scattered pancreatic calcifications.     Adrenal glands: 1 cm right adrenal nodule is stable and compatible  with benign adenoma.     Spleen: Normal.     Kidneys: Cortical atrophy. Right lower pole 3 mm stone. Numerous  benign renal cysts bilaterally. No highly suspicious renal masses.     Gastrointestinal tract : Changes of prior partial right  hemicolectomy.. Normal caliber small bowel.  Colonic diverticulosis  with no diverticulitis.     Mesentery/peritoneum/retroperitoneum: Changes of ventral hernia  repair. Peritoneal dialysis catheter. Small intra-abdominal air and  fluid is likely related to peritoneal dialysis.     Lymph nodes: No significant lymphadenopathy.     Vasculature: Patent major abdominal vasculature.  Scattered  atherosclerotic calcification of abdominal aorta with no aneurysmal  dilation.      Pelvis: Diffuse wall thickening of the urinary bladder. Prostate  enlargement with prostatic calcifications.     Osseous structures: No aggressive bone lesions. Degenerative changes  in spine.      Soft tissues: Soft tissue density in the right inguinal  region  suggestive of prior surgery or trauma.                                                                      IMPRESSION:   1. Diffuse hepatic steatosis with areas of more focal steatosis in  segment 4A, corresponding to CT findings previously identified. No  suspicious liver lesions.  2. Atrophic native kidneys with multiple cysts without suspicious  lesion.  3. 1 cm right adrenal adenoma.  4. Mild pancreatic atrophy and pancreatic calcifications suggestive of  chronic pancreatitis.  5. Ascites and intra-abdominal air related to peritoneal dialysis.  6. Diffuse bladder wall thickening and enlarged prostate.  7. Diverticulosis without diverticulitis.         Impression and Plan:   Impression:   74-year-old man with complex medical history including ESRD on dialysis being evaluated for possible kidney transplant with evidence of BPH and LUTS      Plan:   BPH and LUTS  - We discussed the pathophysiology of the bladder and the prostate and normal changes associated with the development of BPH and LUTS  - He has been on tamsulosin 0.4 mg daily with fairly good symptom control  - I reviewed his recent CT scan and reviewed these images personally.  He does have evidence of BPH and we discussed the benefit of the addition of finasteride 5 mg daily to help try and shrink the prostate size  - We discussed that his BPH and LUTS should not preclude him from receiving a kidney transplant and that should his urinary symptoms worsen after a transplant, we could consider additional treatment options at that time if dual agent medical management is not sufficient.  - He may follow-up with me on a as needed basis after a transplant depending on his urinary symptoms     Thank you for the kind consultation.    Time spent: 20 minutes spent on the date of the encounter doing chart review, history and exam, documentation and further activities as noted above.    Curt Langston MD   Urology  [facility]  Physicians  [facility] Phone: [phone]  [facility] Phone: [phone]

## 2024-12-17 ENCOUNTER — TELEPHONE (OUTPATIENT)
Dept: TRANSPLANT | Facility: CLINIC | Age: 74
End: 2024-12-17
Payer: COMMERCIAL

## 2024-12-17 NOTE — TELEPHONE ENCOUNTER
JANICE Ahmadi Case Manager called to get an update on pt. Provided her with an update on patient's evaluation status. She said she will reach back out in 3 months.

## 2024-12-20 ENCOUNTER — TELEPHONE (OUTPATIENT)
Dept: ENDOCRINOLOGY | Facility: CLINIC | Age: 74
End: 2024-12-20
Payer: COMMERCIAL

## 2024-12-20 NOTE — TELEPHONE ENCOUNTER
[facility] Call Center    Phone Message    May a detailed message be left on voicemail: yes     Reason for Call: Other: patient is unsure how to upload his janeen.     Action Taken: Message routed to:  [facility] ([facility] endo    Travel Screening: Not Applicable     Date of Service:

## 2024-12-20 NOTE — PROGRESS NOTES
Outcome for 12/20/24 11:02 AM: Device upload instructions sent to patient via Dany An CMA  Adult Endocrinology  [facility], [place]

## 2024-12-23 NOTE — TELEPHONE ENCOUNTER
Patient uses the janeen reader, he will bring his reader to his visit and will have it uploaded in clinic.    Viola An [facility]  Adult Endocrinology  [facility], [place]

## 2024-12-26 ENCOUNTER — OFFICE VISIT (OUTPATIENT)
Dept: ENDOCRINOLOGY | Facility: CLINIC | Age: 74
End: 2024-12-26
Attending: NURSE PRACTITIONER
Payer: COMMERCIAL

## 2024-12-26 VITALS
HEART RATE: 100 BPM | OXYGEN SATURATION: 91 % | BODY MASS INDEX: 30.02 KG/M2 | DIASTOLIC BLOOD PRESSURE: 86 MMHG | SYSTOLIC BLOOD PRESSURE: 136 MMHG | RESPIRATION RATE: 16 BRPM | WEIGHT: 186 LBS

## 2024-12-26 DIAGNOSIS — Z79.4 TYPE 2 DIABETES MELLITUS WITH DIABETIC NEPHROPATHY, WITH LONG-TERM CURRENT USE OF INSULIN (H): ICD-10-CM

## 2024-12-26 DIAGNOSIS — E11.21 TYPE 2 DIABETES MELLITUS WITH DIABETIC NEPHROPATHY, WITH LONG-TERM CURRENT USE OF INSULIN (H): ICD-10-CM

## 2024-12-26 DIAGNOSIS — Z76.82 ORGAN TRANSPLANT CANDIDATE: ICD-10-CM

## 2024-12-26 DIAGNOSIS — N18.6 END STAGE RENAL DISEASE (H): ICD-10-CM

## 2024-12-26 DIAGNOSIS — I25.10 CARDIOVASCULAR DISEASE: ICD-10-CM

## 2024-12-26 DIAGNOSIS — I10 ESSENTIAL HYPERTENSION: ICD-10-CM

## 2024-12-26 PROBLEM — I21.4 NSTEMI (NON-ST ELEVATED MYOCARDIAL INFARCTION) (H): Status: ACTIVE | Noted: 2021-01-06

## 2024-12-26 PROBLEM — M1A.30X0 CHRONIC GOUT DUE TO RENAL IMPAIRMENT WITHOUT TOPHUS: Status: ACTIVE | Noted: 2019-02-18

## 2024-12-26 PROBLEM — Z99.2 ESRD (END STAGE RENAL DISEASE) ON DIALYSIS (H): Status: ACTIVE | Noted: 2024-02-20

## 2024-12-26 PROBLEM — N18.9 ANEMIA IN CHRONIC KIDNEY DISEASE: Chronic | Status: ACTIVE | Noted: 2020-02-14

## 2024-12-26 PROBLEM — D63.1 ANEMIA IN CHRONIC KIDNEY DISEASE: Chronic | Status: ACTIVE | Noted: 2020-02-14

## 2024-12-26 PROBLEM — Z99.2 DEPENDENCE ON RENAL DIALYSIS (H): Status: ACTIVE | Noted: 2024-10-02

## 2024-12-26 LAB
ANION GAP SERPL CALCULATED.3IONS-SCNC: 17 MMOL/L (ref 7–15)
BUN SERPL-MCNC: 55.4 MG/DL (ref 8–23)
CALCIUM SERPL-MCNC: 10.2 MG/DL (ref 8.8–10.4)
CHLORIDE SERPL-SCNC: 101 MMOL/L (ref 98–107)
CREAT SERPL-MCNC: 6.39 MG/DL (ref 0.67–1.17)
EGFRCR SERPLBLD CKD-EPI 2021: 9 ML/MIN/1.73M2
EST. AVERAGE GLUCOSE BLD GHB EST-MCNC: 146 MG/DL
GLUCOSE SERPL-MCNC: 112 MG/DL (ref 70–99)
HBA1C MFR BLD: 6.7 %
HCO3 SERPL-SCNC: 26 MMOL/L (ref 22–29)
POTASSIUM SERPL-SCNC: 4.6 MMOL/L (ref 3.4–5.3)
SODIUM SERPL-SCNC: 144 MMOL/L (ref 135–145)

## 2024-12-26 NOTE — PATIENT INSTRUCTIONS
Welcome to the [facility] Endocrinology and Diabetes Clinics     Our Endocrinology Clinics are here to provide you with a team-based, collaborative approach in the diagnosis and treatment of patients with diabetes and endocrine disorders. The team is made up of Physicians, Physician Assistants, Certified Diabetes Educators, Registered Nurses, Medical Assistants, Emergency Medical Technicians, and many others, all of whom have the unified goal of providing our patients with high quality care.     Please see below for some helpful tips to best navigate and use the [facility] Endocrinology clinic:     [place] Respect: At [facility], we are committed to a respectful and safe space for all patients, visitors, and staff.  We believe that mutual respect between patients and their care team is the foundation of quality care.  It is our expectation that you will be treated with respect by your care team.  In turn, we ask that all communication with the care team (written and verbal) be respectful and free from profanity, threatening, or abusive language.  Disrespectful communication undermines our therapeutic relationship with you and may result in us being unable to continue to provide your care.    Refills: A provider must see you at least annually to prescribe and refill medications. This is to ensure your safety as well as meet insurance and compliance regulations.    Scheduling: Many of our Providers offer both in-person or video visits. Please call to schedule any needed follow ups as soon as possible because our provider schedules fill up very quickly. Our care team has the right to require an in-person visit when they believe that it is medically necessary. Please remember that for any virtual visits, you must be in the [facility] at the time of the visit, otherwise we are unable to see you and you will need to be rescheduled.    Missed Appointments: If you need to cancel or miss your  scheduled appointment, please call the clinic at [phone] to reschedule.  Please note if you repeatedly miss appointments or repeatedly miss appointments without calling to inform us ahead of time (no-show), the clinic may elect to not allow you to reschedule without speaking to a manager, may require a Partnership In Care Agreement prior to rescheduling, or could result in you no longer being able to receive care from the clinic. Providing the clinic with timely notification if you have to miss an appointment, allows us to better serve the needs of all of our patients.    Primary Care Provider: Our Endocrinologists are Specialists in their field. We expect you to have a Primary Care Provider established to handle any needs outside of your diabetes and endocrine care.  We would be happy to assist you find a Primary Care Provider, if you do not have one.    [employer]: [employer] is a wonderful resource that allows you access to your Care Team via online or the reanna. Please ask a member of the team if you would like help creating an account. Please note that it may take up to 2 business days for a response. [employer] messages are not reviewed on weekends or after business hours.  Emergent or urgent care needs should never be communicated via [employer].  If you experience a medical emergency call 911 or go to the nearest emergency room.    Labs: It is recommended that you stay within the [facility] for labs but you are welcome to obtain ordered labs (with some exceptions) from any location of your choice as long as they are able to complete and process the needed labs. If you need us to fax orders to your preferred lab, please provide us the name and fax number of the lab you would like to go to so we can fax the orders. If your labs are drawn outside of the [facility], please have them fax the results to [phone] ([place]) or [phone] ([place]) or via [facility]. It is your  responsibility to ensure that outside lab results are sent to us.    We look forward to working with you. Please do not hesitate to reach out with any questions.    Thank you,    The Endocrine Team    [facility] Address:   [place] Address:     [address] [place] [address]    Phone: [phone]  Fax: [phone] [address] [place] [address]    Phone: [phone]  Fax: [phone]     [facility] Cost Estimate Phone Number: [phone]    General Lab and Imaging Scheduling Phone Number: [phone]

## 2024-12-26 NOTE — PROGRESS NOTES
Assessment/Plan :   Type 2 DM. Alexis is doing well. We reviewed his recent CGMS report and his numbers look good. He has had a couple days over the last week with post-dinner spikes. He attributes this to eating more than typical due to the holidays. Even with the couple post-prandial spikes, I do not see any reason to make adjustments to his current Humulin Mix dosing. We did discuss the difference between Humulin Mix and MDI therapy. If he starts to experience more fluctuation throughout the day, we may want to switch to MDI therapy. This gives us more ability to adjust insulin dosing, as needed, during the day. However, for now, he is doing well. I am going to place an order for a c peptide to see how much insulin he is making on his own. We will plan on a follow-up appt in 6 mos. If he has any problems before that visit, he will reach out to our office.       I have independently reviewed and interpreted labs, imaging as indicated.      Chief complaint:  Peter is a 74 year old male who is here to establish care for the management of Type 2 DM.    I have reviewed Care Everywhere including [facility], [facility], [facility], [place], [facility], [facility] , [facility], [place] lab reports, imaging reports and provider notes as indicated.      HISTORY OF PRESENT ILLNESS  Alexis was diagnosed with diabetes about 10 yrs ago. He is not sure of the exact date. He knows that he was started on insulin around the time of diagnosis and he has been on insulin ever since. He currently takes 32 unit(s) of Humalin Mix 70/30 in the morning and 34 unit(s) in the evening. He has been on this dosing for some time and he feels like it works well. He has had a few days when his blood sugars are higher after dinner. He was taking another 5-10 unit(s) of the Humulin Mix when this would occur but then he started to experience significant overnight lows. He stopped doing that a few months ago.     Alexis monitors his blood  sugars closely with the FreeStyle Christiano 3 sensor. He has not had any problems with severe hyperglycemia and/or hypoglycemia. He does have the occasional post-dinner spike but this does not occur on a regular basis. Alexis is currently on nightly peritoneal dialysis. He started dialysis about a year ago. He is currently going through the process of being placed on the transplant list. His end stage renal disease is thought to be due to diabetic nephropathy. He also has a history of diabetic neuropathy that he manages with gabapentin, nightly. He states that the gabapentin seems to work well. He still has some numbness and tingling at night but it is manageable. He has no history of retinopathy. 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    His diabetes is complicated by hyperlipidemia, CAD, ESRD on PD, history of NSTEMI, history of malignant neoplasm of the colon and HTN.    Endocrine relevant labs are as follows:   Latest Reference Range & Units 12/26/24 13:20   Afinion Hemoglobin A1c POCT <=5.7 % 6.7 (H)   (H): Data is abnormally high    Component  Ref Range & Units 10 mo ago Comments   Hemoglobin A1C  <=5.6 % 6.5 High     Estimated Average Glucose (Calc)  < 117 mg/dL 140 Estimated average glucose (eAG) converts A1c into glucose units (mg/dL) and estimates average glucose over the past approximately 3 months.  The eAG reference interval (<117 mg/dL) corresponds to an A1c of <5.7%.   Resulting Agency [facility]    Narrative  Performed by [facility]  For patients not previously diagnosed with diabetes:  5.7-6.4%: Increased risk for diabetes  6.5% and greater: Diagnostic for diabetes    For patients diagnosed with diabetes:  <8.0%: Goal of therapy for ages 18-75  Clinicians may recommend a higher or lower goal for specific individuals.  Specimen Collected: 02/20/24  4:57 PM    Performed by: [facility] Last Resulted: 02/20/24  9:39 PM   Received From:  [facility]  Result Received: 07/17/24  4:34 PM     REVIEW OF SYSTEMS    Endocrine: positive for diabetes  Skin: negative  Eyes: positive for vision loss in right eye, negative for, visual blurring, redness, tearing  Ears/Nose/Throat: negative  Respiratory: No shortness of breath, dyspnea on exertion, cough, or hemoptysis  Cardiovascular: negative for, chest pain, dyspnea on exertion, lower extremity edema, and exercise intolerance  Gastrointestinal: negative for, nausea, vomiting, constipation, and diarrhea  Genitourinary: negative for, nocturia, dysuria, frequency, and urgency  Musculoskeletal: negative for, muscular weakness, nocturnal cramping, and foot pain  Neurologic: negative for, local weakness, numbness or tingling of hands, and numbness or tingling of feet  Psychiatric: negative  Hematologic/Lymphatic/Immunologic: positive for anemia of chronic disease    Past Medical History  Past Medical History:   Diagnosis Date    CAD (coronary artery disease)     Diabetes mellitus, type 2 (H)     ESRD (end stage renal disease) on dialysis (H)     Essential hypertension     Hyperlipidemia LDL goal <70     NSTEMI (non-ST elevated myocardial infarction) (H)        Medications  Current Outpatient Medications   Medication Sig Dispense Refill    allopurinol (ZYLOPRIM) 100 MG tablet Take 100 mg by mouth daily      aspirin (ASA) 325 MG tablet Take 325 mg by mouth      atenolol (TENORMIN) 50 MG tablet Take 1 tablet by mouth daily      atorvastatin (LIPITOR) 20 MG tablet Take 20 mg by mouth daily      calcium acetate (PHOSLO) 667 MG CAPS capsule Take 667 mg by mouth      clopidogrel (PLAVIX) 75 MG tablet For the first dose take 300mg (4 tablets) then take 75mg (1 tablet) daily thereafter. 90 tablet 1    Continuous Glucose Sensor (FREESTYLE ANTONIO 14 DAY SENSOR) [facility]       famotidine (PEPCID) 20 MG tablet       finasteride (PROSCAR) 5 MG tablet Take 1 tablet (5 mg) by mouth daily. 90 tablet 3    furosemide (LASIX) 20 MG  tablet Take 1 tablet by mouth daily      gabapentin (NEURONTIN) 100 MG capsule Take 100 mg by mouth      gentamicin (GARAMYCIN) 0.3 % ophthalmic solution       HUMULIN MIX 70/30 KWIKPEN (70-30) 100 UNIT/ML susp 30 IN AM, 32 IN PM      isosorbide mononitrate (IMDUR) 30 MG 24 hr tablet Take 1 tablet by mouth daily      lidocaine-prilocaine (EMLA) 2.5-2.5 % external cream Apply to skin.      Multiple Vitamins-Minerals (PRESERVISION AREDS) CAPS Take 1 capsule by mouth daily      multivitamin (OCUVITE) TABS tablet Take 1 tablet by mouth daily      tamsulosin (FLOMAX) 0.4 MG capsule Take 0.4 mg by mouth daily.      Vitamin D3 (VITAMIN D-1000 MAX ST) 25 mcg (1000 units) tablet Take 25 mcg by mouth      chlorthalidone (HYGROTON) 25 MG tablet Take 25 mg by mouth         Allergies  No Known Allergies      Family History  family history is not on file.    Social History  Social History     Tobacco Use    Smoking status: Former     Current packs/day: 0.00     Types: Cigarettes     Quit date:      Years since quittin.0    Smokeless tobacco: Never   Substance Use Topics    Alcohol use: Yes     Comment: rare    Drug use: Never       Physical Exam  /86   Pulse 100   Resp 16   Wt 84.4 kg (186 lb)   SpO2 91%   BMI 30.02 kg/m    Body mass index is 30.02 kg/m .  GENERAL :  In no apparent distress  SKIN: Normal color, normal temperature, texture.  No hirsutism, alopecia or purple striae.     EYES: PERRL, EOMI, No scleral icterus,  No proptosis, conjunctival redness, stare, retraction  RESP: Lungs clear to auscultation bilaterally  CARDIAC: Regular rate and rhythm, normal S1 S2, without murmurs, rubs or gallops    NEURO: awake, alert, responds appropriately to questions.  Cranial nerves intact.   Moves all extremities; Gait normal.  No tremor of the outstretched hand.  EXTREMITIES: No clubbing, cyanosis or edema.    DATA REVIEW  FreeStyle LibreView  Time in target range 73%  High 23%, Very High 4%  Current Ave   mg/dl

## 2024-12-26 NOTE — NURSING NOTE
Peter Carpio's goals for this visit include:   Chief Complaint   Patient presents with    Follow Up    Diabetes       He requests these members of his care team be copied on today's visit information: yes    PCP: Yared Greene    Referring Provider:  ALEKSANDR Simeon CNP  [address] [place] [address]    /86   Pulse 100   Resp 16   Wt 84.4 kg (186 lb)   SpO2 91%   BMI 30.02 kg/m      Do you need any medication refills at today's visit? None    Shaw Lucas, EMT

## 2024-12-26 NOTE — LETTER
12/26/2024      Peter Carpio  [address] [address]      Dear Colleague,    Thank you for referring your patient, Peter Carpio, to the [facility]. Please see a copy of my visit note below.    Assessment/Plan :   Type 2 DM. Alexis is doing well. We reviewed his recent CGMS report and his numbers look good. He has had a couple days over the last week with post-dinner spikes. He attributes this to eating more than typical due to the holidays. Even with the couple post-prandial spikes, I do not see any reason to make adjustments to his current Humulin Mix dosing. We did discuss the difference between Humulin Mix and MDI therapy. If he starts to experience more fluctuation throughout the day, we may want to switch to MDI therapy. This gives us more ability to adjust insulin dosing, as needed, during the day. However, for now, he is doing well. I am going to place an order for a c peptide to see how much insulin he is making on his own. We will plan on a follow-up appt in 6 mos. If he has any problems before that visit, he will reach out to our office.       I have independently reviewed and interpreted labs, imaging as indicated.      Chief complaint:  Peter is a 74 year old male who is here to establish care for the management of Type 2 DM.    I have reviewed Care Everywhere including [facility], [facility], [facility], [place], [facility], [facility] , [facility], [place] lab reports, imaging reports and provider notes as indicated.      HISTORY OF PRESENT ILLNESS  Alexis was diagnosed with diabetes about 10 yrs ago. He is not sure of the exact date. He knows that he was started on insulin around the time of diagnosis and he has been on insulin ever since. He currently takes 32 unit(s) of Humalin Mix 70/30 in the morning and 34 unit(s) in the evening. He has been on this dosing for some time and he feels like it works well. He has had a few days when  his blood sugars are higher after dinner. He was taking another 5-10 unit(s) of the Humulin Mix when this would occur but then he started to experience significant overnight lows. He stopped doing that a few months ago.     Alexis monitors his blood sugars closely with the FreeStyle Christiano 3 sensor. He has not had any problems with severe hyperglycemia and/or hypoglycemia. He does have the occasional post-dinner spike but this does not occur on a regular basis. Alexis is currently on nightly peritoneal dialysis. He started dialysis about a year ago. He is currently going through the process of being placed on the transplant list. His end stage renal disease is thought to be due to diabetic nephropathy. He also has a history of diabetic neuropathy that he manages with gabapentin, nightly. He states that the gabapentin seems to work well. He still has some numbness and tingling at night but it is manageable. He has no history of retinopathy. 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    His diabetes is complicated by hyperlipidemia, CAD, ESRD on PD, history of NSTEMI, history of malignant neoplasm of the colon and HTN.    Endocrine relevant labs are as follows:   Latest Reference Range & Units 12/26/24 13:20   Afinion Hemoglobin A1c POCT <=5.7 % 6.7 (H)   (H): Data is abnormally high    Component  Ref Range & Units 10 mo ago Comments   Hemoglobin A1C  <=5.6 % 6.5 High     Estimated Average Glucose (Calc)  < 117 mg/dL 140 Estimated average glucose (eAG) converts A1c into glucose units (mg/dL) and estimates average glucose over the past approximately 3 months.  The eAG reference interval (<117 mg/dL) corresponds to an A1c of <5.7%.   Resulting Agency [facility]    Narrative  Performed by [facility]  For patients not previously diagnosed with diabetes:  5.7-6.4%: Increased risk for diabetes  6.5% and greater: Diagnostic for diabetes    For patients diagnosed with diabetes:  <8.0%:  Goal of therapy for ages 18-75  Clinicians may recommend a higher or lower goal for specific individuals.  Specimen Collected: 02/20/24  4:57 PM    Performed by: [facility] Last Resulted: 02/20/24  9:39 PM   Received From: [email or web address]  Result Received: 07/17/24  4:34 PM     REVIEW OF SYSTEMS    Endocrine: positive for diabetes  Skin: negative  Eyes: positive for vision loss in right eye, negative for, visual blurring, redness, tearing  Ears/Nose/Throat: negative  Respiratory: No shortness of breath, dyspnea on exertion, cough, or hemoptysis  Cardiovascular: negative for, chest pain, dyspnea on exertion, lower extremity edema, and exercise intolerance  Gastrointestinal: negative for, nausea, vomiting, constipation, and diarrhea  Genitourinary: negative for, nocturia, dysuria, frequency, and urgency  Musculoskeletal: negative for, muscular weakness, nocturnal cramping, and foot pain  Neurologic: negative for, local weakness, numbness or tingling of hands, and numbness or tingling of feet  Psychiatric: negative  Hematologic/Lymphatic/Immunologic: positive for anemia of chronic disease    Past Medical History  Past Medical History:   Diagnosis Date     CAD (coronary artery disease)      Diabetes mellitus, type 2 (H)      ESRD (end stage renal disease) on dialysis (H)      Essential hypertension      Hyperlipidemia LDL goal <70      NSTEMI (non-ST elevated myocardial infarction) (H)        Medications  Current Outpatient Medications   Medication Sig Dispense Refill     allopurinol (ZYLOPRIM) 100 MG tablet Take 100 mg by mouth daily       aspirin (ASA) 325 MG tablet Take 325 mg by mouth       atenolol (TENORMIN) 50 MG tablet Take 1 tablet by mouth daily       atorvastatin (LIPITOR) 20 MG tablet Take 20 mg by mouth daily       calcium acetate (PHOSLO) 667 MG CAPS capsule Take 667 mg by mouth       clopidogrel (PLAVIX) 75 MG tablet For the first dose take 300mg (4 tablets) then take 75mg (1 tablet) daily  thereafter. 90 tablet 1     Continuous Glucose Sensor (FREESTYLE ANTONIO 14 DAY SENSOR) MISC        famotidine (PEPCID) 20 MG tablet        finasteride (PROSCAR) 5 MG tablet Take 1 tablet (5 mg) by mouth daily. 90 tablet 3     furosemide (LASIX) 20 MG tablet Take 1 tablet by mouth daily       gabapentin (NEURONTIN) 100 MG capsule Take 100 mg by mouth       gentamicin (GARAMYCIN) 0.3 % ophthalmic solution        HUMULIN MIX 70/30 KWIKPEN (70-30) 100 UNIT/ML susp 30 IN AM, 32 IN PM       isosorbide mononitrate (IMDUR) 30 MG 24 hr tablet Take 1 tablet by mouth daily       lidocaine-prilocaine (EMLA) 2.5-2.5 % external cream Apply to skin.       Multiple Vitamins-Minerals (PRESERVISION AREDS) CAPS Take 1 capsule by mouth daily       multivitamin (OCUVITE) TABS tablet Take 1 tablet by mouth daily       tamsulosin (FLOMAX) 0.4 MG capsule Take 0.4 mg by mouth daily.       Vitamin D3 (VITAMIN D-1000 MAX ST) 25 mcg (1000 units) tablet Take 25 mcg by mouth       chlorthalidone (HYGROTON) 25 MG tablet Take 25 mg by mouth         Allergies  No Known Allergies      Family History  family history is not on file.    Social History  Social History     Tobacco Use     Smoking status: Former     Current packs/day: 0.00     Types: Cigarettes     Quit date:      Years since quittin.0     Smokeless tobacco: Never   Substance Use Topics     Alcohol use: Yes     Comment: rare     Drug use: Never       Physical Exam  /86   Pulse 100   Resp 16   Wt 84.4 kg (186 lb)   SpO2 91%   BMI 30.02 kg/m    Body mass index is 30.02 kg/m .  GENERAL :  In no apparent distress  SKIN: Normal color, normal temperature, texture.  No hirsutism, alopecia or purple striae.     EYES: PERRL, EOMI, No scleral icterus,  No proptosis, conjunctival redness, stare, retraction  RESP: Lungs clear to auscultation bilaterally  CARDIAC: Regular rate and rhythm, normal S1 S2, without murmurs, rubs or gallops    NEURO: awake, alert, responds appropriately  to questions.  Cranial nerves intact.   Moves all extremities; Gait normal.  No tremor of the outstretched hand.  EXTREMITIES: No clubbing, cyanosis or edema.    DATA REVIEW  FreeStyle LibreView  Time in target range 73%  High 23%, Very High 4%  Current Ave  mg/dl              Outcome for 12/20/24 11:02 AM: Device upload instructions sent to patient via [employer] - janeen An CMA  Adult Endocrinology  [facility] [place]      Again, thank you for allowing me to participate in the care of your patient.        Sincerely,        Fawn Wheeler PA-C    Electronically signed

## 2024-12-30 ENCOUNTER — TELEPHONE (OUTPATIENT)
Dept: TRANSPLANT | Facility: CLINIC | Age: 74
End: 2024-12-30
Payer: COMMERCIAL

## 2024-12-30 NOTE — TELEPHONE ENCOUNTER
Called to check in on patient's transplant evaluation status. Peter said he is almost done with his prescription for plavix. We reviewed the transplant education requirement and he requested to watch the videos vs taking the class. Video link sent via email to pt. Additionally, [employer] consent form reviewed and [employer] requested to be sent to patient. Peter had no further questions at this time.

## 2025-01-21 ENCOUNTER — TELEPHONE (OUTPATIENT)
Dept: TRANSPLANT | Facility: CLINIC | Age: 75
End: 2025-01-21
Payer: COMMERCIAL

## 2025-01-21 NOTE — TELEPHONE ENCOUNTER
Called patient to see if he was able to have his PRA drawn yet. Pt said his dialysis unit will draw it next week and pt will call me once his dialysis unit has drawn his PRA.

## 2025-01-22 ENCOUNTER — DOCUMENTATION ONLY (OUTPATIENT)
Dept: TRANSPLANT | Facility: CLINIC | Age: 75
End: 2025-01-22
Payer: COMMERCIAL

## 2025-02-03 ENCOUNTER — TELEPHONE (OUTPATIENT)
Dept: TRANSPLANT | Facility: CLINIC | Age: 75
End: 2025-02-03
Payer: COMMERCIAL

## 2025-02-03 NOTE — TELEPHONE ENCOUNTER
Called and spoke with Peter to check if he has had his PRA drawn yet. Pt said it will be drawn this Thursday 2/6/25. Pt had no further questions at this time.

## 2025-02-06 ENCOUNTER — LAB (OUTPATIENT)
Dept: LAB | Facility: CLINIC | Age: 75
End: 2025-02-06
Payer: COMMERCIAL

## 2025-02-06 DIAGNOSIS — N18.6 END STAGE RENAL DISEASE (H): ICD-10-CM

## 2025-02-06 DIAGNOSIS — Z76.82 ORGAN TRANSPLANT CANDIDATE: ICD-10-CM

## 2025-02-06 DIAGNOSIS — I10 ESSENTIAL HYPERTENSION: ICD-10-CM

## 2025-02-06 DIAGNOSIS — Z79.4 TYPE 2 DIABETES MELLITUS WITH DIABETIC NEPHROPATHY, WITH LONG-TERM CURRENT USE OF INSULIN (H): ICD-10-CM

## 2025-02-06 DIAGNOSIS — E11.21 TYPE 2 DIABETES MELLITUS WITH DIABETIC NEPHROPATHY, WITH LONG-TERM CURRENT USE OF INSULIN (H): ICD-10-CM

## 2025-02-11 ENCOUNTER — TELEPHONE (OUTPATIENT)
Dept: TRANSPLANT | Facility: CLINIC | Age: 75
End: 2025-02-11
Payer: COMMERCIAL

## 2025-02-11 NOTE — TELEPHONE ENCOUNTER
Called pt to introduce myself as [facility] coordinator and encouraged pt to stay up on health maintenance and to let us know if insurance changes, contact info updates. Explained [facility] protocol for pt's status and when follow up is needed. Gave pt direct information and encouraged to reach out with any questions/concerns. Reviewed pt is no longer taking plavix, removed from med list.

## 2025-02-11 NOTE — TELEPHONE ENCOUNTER
Called patient to inform them of status change to ACTIVE on the Kidney alone list today 2025. Status change letter and PRA orders to be mailed. Patient is in agreement with listing ACTIVE status.      Discussed:   - Pt is eligible for  donor offers and pt can receive calls 24 hours a day, 7 days a week, and on call staff need to be able to reach pt or one of emergency contacts within 30 minutes or they might skip over to next recipient on list.   -Please make sure your phone is charged at all times and your voicemail is not full   -Your transplant on call coordinator will give you directions about when and where you will need to come to the hospital for transplant  -The transplant coordinator will call you to discuss your current health status, the offer, and plans to move forward.  Some organ offer calls will require you to come to the hospital immediately; some may not be as time sensitive.  It may be possible for you to come to the hospital and, for various reasons, the transplant could be cancelled.  This is often called a  dry run .  - Reviewed the right to accept or decline offer, without penalty  - Expected length of surgical procedure is about 4-6 hours, expected inpatient length of stay post transplant is 3-4 days, and potential to stay locally post transplant. Offered resources for lodging in the area  - Verified contact information as documented in Epic. Confirmed emergency contact information  -Instructed patient about importance of having PRAs drawn every 3 months via: (Mailers/[facility]). Encouraged them to set reminder in their preferred calendar to stay on top of their quarterly labs  -Reminded pt to stay up on health maintenance and to call with any updates on their health status, insurance or contact information.   -Reminded pt to inform RNCC as soon as possible if they test positive for COVID.  -Donor website was provided     -Last PRA was 0 on 24. PRA in process from 25.       -Provided name and contact information for additional questions or concerns.  Pt expressed excellent understanding of all and was in good agreement with the plan.

## 2025-02-11 NOTE — LETTER
2025    Peter Carpio  [address] [address]      Dear Mr. Carpio,    This letter is sent to confirm that you have completed your transplant work-up and you are a candidate in the kidney transplant program at the [facility].  You were placed on the kidney ACTIVE waitlist on 2025 with a chronic dialysis start date of 01/15/2024.      When you are active on the waitlist and an organ becomes available, a coordinator will need to speak to you immediately.  You could be contacted at any time during the day or night as an organ could become available at any time.  Please make certain our office always has your current telephone numbers and address.      Items we will need from you:    We have received approval from your insurance company for the transplant procedure.  It is critical that you notify us if there is any change in your insurance.  It is also important that you familiarize yourself with the details of your specific insurance policy.  Our patient  is available to assist you if you should have any questions regarding your coverage.    An ALA or PRA blood sample needs to be sent here every 3 months to match you with  donors or any potential living donors.  If you need this testing, special mailing boxes (called mailers) will be sent to you directly from the Outreach Department.  You should take the physician order form and the  to your home laboratory when it is time to for this testing to be done.  Additional mailers can be obtained by calling the Transplant Office and asking to speak to a kidney .  Your PRA is due next 2025.     During this waiting period, we may request additional periodic laboratory tests with your primary physician.  It will be your responsibility to remind your physician to forward your results to the Transplant Office.    We need  to be kept informed of any changes in your medical condition such as:    changes in your medications,   significant changes in your health  significant infections (such as pneumonia or abscesses)  blood transfusions  any condition which requires hospitalization  any surgery    Remember to complete any routine cancer screening tests required before your transplant.  This includes colonoscopy; prostate screening for men, and mammogram and gynecologic testing for women, as well as dental work.  Your primary care clinic can assist you with arranging for these exams.  Remind your caregivers to forward copies of the records and final reports.      We want you to know that the [facility] Transplant Program has physician and surgeon coverage 24 hours a day, 365 days a year, and is readily available to facilitate organ acceptance, procurement, and transplantation in a timely manner. Our additional transplant surgeons and physicians are credentialed by the hospital to provide transplant services and can independently manage the care of transplant patients. Our additional transplant surgeons are also able to independently perform transplant operations and organ procurement procedures. Additionally, our transplant surgeons and physicians will not be on call for two or more transplant programs more than 30 miles apart unless the circumstances have been reviewed and approved by the [employer] Network for Organ Sharing (UNOS) Membership and Professional Standards Committee (MPSC). Finally, our primary physicians and primary surgeons are not designated as the primary surgeon or primary physician at more than 1 transplant hospital. If this coverage changes or there are substantial program changes, you will be notified in writing by letter.    Attached is a letter from UNOS that describes the services and information offered to patients by UNOS and the Organ Procurement and Transplantation Network (OPTN).    We appreciate having  had the opportunity to participate in your care.  If you have questions, please feel free to call the Transplant Office at [phone] or [phone].      Sincerely,  Marija Plata RN; Pre Kidney Transplant Coordinator   Direct Number: [phone]    Solid Organ Transplant  [facility]      Enclosures: Telephone Contact List, Travel Resources, UNOS Letter, Waitlist Information Update, and While You Are Waiting  cc: Care Team                  The Organ Procurement and Transplantation Network Toll-free patient services line:  [phone]  Your resource for organ transplant information    Staffed 8:30 am - 5:00 pm ET Monday - Friday Leave a message 24/7 to receive a call back    The Organ Procurement and Transplantation Network (OPTN) is the national transplant system. It makes the policies that decide how donated organs are matched to patients waiting for a transplant. The OPTN:    Makes sure donated organs get matched to people on the transplant waiting list  Tells people about the donation and transplant processes  Makes sure that the public knows about the need for more organ and tissue donations    The OPTN has a free patient services line that you can call to:  Get more information about:  Organ donation and organ transplants  Donation and transplant policies  Get an information kit with:  A list of transplant hospitals  Waiting list information  Talk about any questions you may have about your transplant hospital or organ procurement organization. The staff will do their best to help you or point you to others who may help.  Find out how you can volunteer with the OPTN and help shape transplant policy     The patient services line number is: [phone]    Patient services line staff CANNOT answer questions about your own medical care, including:  Waiting list status  Test results  Medical records  You will need to call your transplant hospital for this  information.    The following websites have more information about transplantation and donation:    OPTN: https://optn.transplant.hrsa.gov/    For potential living donors and transplant recipients:    Living with transplant: https://www.transplantliving.org/    Living donation process: https://optn.transplant.hrsa.gov/living-donation/    Financial assistance: https://www.livingdonorassistance.org/    Transplantation data: https://www.srtr.org/    Organ donation: https://www.organdonor.gov/    Volunteer with the OPTN: https://optn.transplant.hrsa.gov/get-involved/

## 2025-02-12 ENCOUNTER — APPOINTMENT (OUTPATIENT)
Dept: GENERAL RADIOLOGY | Facility: CLINIC | Age: 75
DRG: 652 | End: 2025-02-12
Payer: COMMERCIAL

## 2025-02-12 ENCOUNTER — HOSPITAL ENCOUNTER (INPATIENT)
Facility: CLINIC | Age: 75
Setting detail: SURGERY ADMIT
End: 2025-02-12
Attending: SURGERY | Admitting: SURGERY
Payer: COMMERCIAL

## 2025-02-12 ENCOUNTER — HOSPITAL ENCOUNTER (INPATIENT)
Facility: CLINIC | Age: 75
DRG: 652 | End: 2025-02-12
Attending: SURGERY | Admitting: SURGERY
Payer: COMMERCIAL

## 2025-02-12 ENCOUNTER — ANESTHESIA EVENT (OUTPATIENT)
Dept: SURGERY | Facility: CLINIC | Age: 75
End: 2025-02-12
Payer: COMMERCIAL

## 2025-02-12 DIAGNOSIS — Z94.0 KIDNEY TRANSPLANT RECIPIENT: Primary | ICD-10-CM

## 2025-02-12 DIAGNOSIS — R73.9 DRUG-INDUCED HYPERGLYCEMIA: ICD-10-CM

## 2025-02-12 DIAGNOSIS — Z94.0 STATUS POST KIDNEY TRANSPLANT: ICD-10-CM

## 2025-02-12 DIAGNOSIS — T50.905A DRUG-INDUCED HYPERGLYCEMIA: ICD-10-CM

## 2025-02-12 PROBLEM — Z76.82 AWAITING TRANSPLANTATION OF KIDNEY: Status: ACTIVE | Noted: 2025-02-12

## 2025-02-12 LAB
ABO + RH BLD: NORMAL
ALBUMIN SERPL BCG-MCNC: 3.9 G/DL (ref 3.5–5.2)
ALBUMIN UR-MCNC: 100 MG/DL
ALP SERPL-CCNC: 59 U/L (ref 40–150)
ALT SERPL W P-5'-P-CCNC: 15 U/L (ref 0–70)
ANION GAP SERPL CALCULATED.3IONS-SCNC: 20 MMOL/L (ref 7–15)
APPEARANCE UR: ABNORMAL
APTT PPP: <20 SECONDS (ref 22–38)
AST SERPL W P-5'-P-CCNC: 17 U/L (ref 0–45)
BACTERIA #/AREA URNS HPF: ABNORMAL /HPF
BASOPHILS # BLD AUTO: 0.1 10E3/UL (ref 0–0.2)
BASOPHILS NFR BLD AUTO: 1 %
BILIRUB SERPL-MCNC: 0.2 MG/DL
BILIRUB UR QL STRIP: NEGATIVE
BLD GP AB SCN SERPL QL: NEGATIVE
BUN SERPL-MCNC: 63.4 MG/DL (ref 8–23)
CALCIUM SERPL-MCNC: 9.3 MG/DL (ref 8.8–10.4)
CHLORIDE SERPL-SCNC: 98 MMOL/L (ref 98–107)
CHOLEST SERPL-MCNC: 141 MG/DL
COLOR UR AUTO: ABNORMAL
CREAT SERPL-MCNC: 5.85 MG/DL (ref 0.67–1.17)
EGFRCR SERPLBLD CKD-EPI 2021: 9 ML/MIN/1.73M2
EOSINOPHIL # BLD AUTO: 0.3 10E3/UL (ref 0–0.7)
EOSINOPHIL NFR BLD AUTO: 4 %
ERYTHROCYTE [DISTWIDTH] IN BLOOD BY AUTOMATED COUNT: 13.4 % (ref 10–15)
EST. AVERAGE GLUCOSE BLD GHB EST-MCNC: 157 MG/DL
FASTING STATUS PATIENT QL REPORTED: NO
FASTING STATUS PATIENT QL REPORTED: NO
GLUCOSE SERPL-MCNC: 239 MG/DL (ref 70–99)
GLUCOSE UR STRIP-MCNC: >=1000 MG/DL
HBA1C MFR BLD: 7.1 %
HBV CORE AB SERPL QL IA: NONREACTIVE
HBV SURFACE AB SERPL IA-ACNC: 34.6 M[IU]/ML
HBV SURFACE AB SERPL IA-ACNC: REACTIVE M[IU]/ML
HBV SURFACE AG SERPL QL IA: NONREACTIVE
HCO3 SERPL-SCNC: 21 MMOL/L (ref 22–29)
HCT VFR BLD AUTO: 39.2 % (ref 40–53)
HCV AB SERPL QL IA: NONREACTIVE
HDLC SERPL-MCNC: 41 MG/DL
HGB BLD-MCNC: 13.5 G/DL (ref 13.3–17.7)
HGB UR QL STRIP: ABNORMAL
HIV 1+2 AB+HIV1 P24 AG SERPL QL IA: NONREACTIVE
IMM GRANULOCYTES # BLD: 0.1 10E3/UL
IMM GRANULOCYTES NFR BLD: 1 %
INR PPP: 0.81 (ref 0.85–1.15)
KETONES UR STRIP-MCNC: NEGATIVE MG/DL
LACTATE SERPL-SCNC: 2 MMOL/L (ref 0.7–2)
LACTATE SERPL-SCNC: 3.2 MMOL/L (ref 0.7–2)
LDLC SERPL CALC-MCNC: ABNORMAL MG/DL
LEUKOCYTE ESTERASE UR QL STRIP: ABNORMAL
LYMPHOCYTES # BLD AUTO: 1.5 10E3/UL (ref 0.8–5.3)
LYMPHOCYTES NFR BLD AUTO: 20 %
MCH RBC QN AUTO: 32.5 PG (ref 26.5–33)
MCHC RBC AUTO-ENTMCNC: 34.4 G/DL (ref 31.5–36.5)
MCV RBC AUTO: 94 FL (ref 78–100)
MONOCYTES # BLD AUTO: 0.9 10E3/UL (ref 0–1.3)
MONOCYTES NFR BLD AUTO: 11 %
MUCOUS THREADS #/AREA URNS LPF: PRESENT /LPF
NEUTROPHILS # BLD AUTO: 4.7 10E3/UL (ref 1.6–8.3)
NEUTROPHILS NFR BLD AUTO: 63 %
NITRATE UR QL: NEGATIVE
NONHDLC SERPL-MCNC: 100 MG/DL
NRBC # BLD AUTO: 0 10E3/UL
NRBC BLD AUTO-RTO: 0 /100
PH UR STRIP: 6.5 [PH] (ref 5–7)
PLATELET # BLD AUTO: 182 10E3/UL (ref 150–450)
POTASSIUM SERPL-SCNC: 3.9 MMOL/L (ref 3.4–5.3)
PROT SERPL-MCNC: 6.5 G/DL (ref 6.4–8.3)
RBC # BLD AUTO: 4.16 10E6/UL (ref 4.4–5.9)
RBC URINE: 8 /HPF
SARS-COV-2 RNA RESP QL NAA+PROBE: NEGATIVE
SODIUM SERPL-SCNC: 139 MMOL/L (ref 135–145)
SP GR UR STRIP: 1.01 (ref 1–1.03)
SPECIMEN EXP DATE BLD: NORMAL
SQUAMOUS EPITHELIAL: <1 /HPF
TRANSITIONAL EPI: 4 /HPF
TRIGL SERPL-MCNC: 420 MG/DL
UROBILINOGEN UR STRIP-MCNC: NORMAL MG/DL
WBC # BLD AUTO: 7.5 10E3/UL (ref 4–11)
WBC URINE: >182 /HPF
YEAST #/AREA URNS HPF: ABNORMAL /HPF

## 2025-02-12 PROCEDURE — 36415 COLL VENOUS BLD VENIPUNCTURE: CPT

## 2025-02-12 PROCEDURE — 82040 ASSAY OF SERUM ALBUMIN: CPT

## 2025-02-12 PROCEDURE — 87340 HEPATITIS B SURFACE AG IA: CPT

## 2025-02-12 PROCEDURE — 87521 HEPATITIS C PROBE&RVRS TRNSC: CPT

## 2025-02-12 PROCEDURE — 86706 HEP B SURFACE ANTIBODY: CPT

## 2025-02-12 PROCEDURE — 80061 LIPID PANEL: CPT

## 2025-02-12 PROCEDURE — 86825 HLA X-MATH NON-CYTOTOXIC: CPT

## 2025-02-12 PROCEDURE — 86665 EPSTEIN-BARR CAPSID VCA: CPT

## 2025-02-12 PROCEDURE — 83605 ASSAY OF LACTIC ACID: CPT | Performed by: SURGERY

## 2025-02-12 PROCEDURE — 71046 X-RAY EXAM CHEST 2 VIEWS: CPT

## 2025-02-12 PROCEDURE — 87389 HIV-1 AG W/HIV-1&-2 AB AG IA: CPT

## 2025-02-12 PROCEDURE — 86833 HLA CLASS II HIGH DEFIN QUAL: CPT

## 2025-02-12 PROCEDURE — 83945 ASSAY OF OXALATE: CPT

## 2025-02-12 PROCEDURE — 999N000248 HC STATISTIC IV INSERT WITH US BY RN

## 2025-02-12 PROCEDURE — 83036 HEMOGLOBIN GLYCOSYLATED A1C: CPT

## 2025-02-12 PROCEDURE — 81001 URINALYSIS AUTO W/SCOPE: CPT

## 2025-02-12 PROCEDURE — 99213 OFFICE O/P EST LOW 20 MIN: CPT | Performed by: PHYSICIAN ASSISTANT

## 2025-02-12 PROCEDURE — 85610 PROTHROMBIN TIME: CPT

## 2025-02-12 PROCEDURE — 86644 CMV ANTIBODY: CPT

## 2025-02-12 PROCEDURE — 84156 ASSAY OF PROTEIN URINE: CPT

## 2025-02-12 PROCEDURE — 93005 ELECTROCARDIOGRAM TRACING: CPT

## 2025-02-12 PROCEDURE — 99207 PR PREOP VISIT IN GLOBAL PKG: CPT | Mod: GC | Performed by: SURGERY

## 2025-02-12 PROCEDURE — 86832 HLA CLASS I HIGH DEFIN QUAL: CPT

## 2025-02-12 PROCEDURE — 85730 THROMBOPLASTIN TIME PARTIAL: CPT

## 2025-02-12 PROCEDURE — 86803 HEPATITIS C AB TEST: CPT

## 2025-02-12 PROCEDURE — 86901 BLOOD TYPING SEROLOGIC RH(D): CPT

## 2025-02-12 PROCEDURE — 36415 COLL VENOUS BLD VENIPUNCTURE: CPT | Performed by: SURGERY

## 2025-02-12 PROCEDURE — 87635 SARS-COV-2 COVID-19 AMP PRB: CPT

## 2025-02-12 PROCEDURE — 71046 X-RAY EXAM CHEST 2 VIEWS: CPT | Mod: 26 | Performed by: RADIOLOGY

## 2025-02-12 PROCEDURE — 86923 COMPATIBILITY TEST ELECTRIC: CPT

## 2025-02-12 PROCEDURE — 85004 AUTOMATED DIFF WBC COUNT: CPT

## 2025-02-12 PROCEDURE — 86790 VIRUS ANTIBODY NOS: CPT

## 2025-02-12 PROCEDURE — 86704 HEP B CORE ANTIBODY TOTAL: CPT

## 2025-02-12 RX ORDER — FENTANYL CITRATE 50 UG/ML
25 INJECTION, SOLUTION INTRAMUSCULAR; INTRAVENOUS EVERY 5 MIN PRN
Status: CANCELLED | OUTPATIENT
Start: 2025-02-12

## 2025-02-12 RX ORDER — SEVELAMER CARBONATE 800 MG/1
2400 TABLET, FILM COATED ORAL
Status: ON HOLD | COMMUNITY
End: 2025-02-17

## 2025-02-12 RX ORDER — HYDROMORPHONE HYDROCHLORIDE 1 MG/ML
0.2 INJECTION, SOLUTION INTRAMUSCULAR; INTRAVENOUS; SUBCUTANEOUS EVERY 5 MIN PRN
Status: CANCELLED | OUTPATIENT
Start: 2025-02-12

## 2025-02-12 RX ORDER — LIDOCAINE 40 MG/G
CREAM TOPICAL
Status: CANCELLED | OUTPATIENT
Start: 2025-02-12

## 2025-02-12 RX ORDER — FENTANYL CITRATE 50 UG/ML
50 INJECTION, SOLUTION INTRAMUSCULAR; INTRAVENOUS EVERY 5 MIN PRN
Status: CANCELLED | OUTPATIENT
Start: 2025-02-12

## 2025-02-12 RX ORDER — DEXAMETHASONE SODIUM PHOSPHATE 4 MG/ML
4 INJECTION, SOLUTION INTRA-ARTICULAR; INTRALESIONAL; INTRAMUSCULAR; INTRAVENOUS; SOFT TISSUE
Status: CANCELLED | OUTPATIENT
Start: 2025-02-12

## 2025-02-12 RX ORDER — CEFUROXIME SODIUM 1.5 G/16ML
1.5 INJECTION, POWDER, FOR SOLUTION INTRAVENOUS ONCE
Status: COMPLETED | OUTPATIENT
Start: 2025-02-13 | End: 2025-02-13

## 2025-02-12 RX ORDER — ACETAMINOPHEN 325 MG/1
975 TABLET ORAL ONCE
Status: DISCONTINUED | OUTPATIENT
Start: 2025-02-13 | End: 2025-02-13

## 2025-02-12 RX ORDER — CEFUROXIME SODIUM 1.5 G/16ML
1.5 INJECTION, POWDER, FOR SOLUTION INTRAVENOUS SEE ADMIN INSTRUCTIONS
Status: DISCONTINUED | OUTPATIENT
Start: 2025-02-13 | End: 2025-02-13 | Stop reason: HOSPADM

## 2025-02-12 RX ORDER — ACETAMINOPHEN 650 MG/1
650 SUPPOSITORY RECTAL ONCE
Status: DISCONTINUED | OUTPATIENT
Start: 2025-02-13 | End: 2025-02-13

## 2025-02-12 RX ORDER — CALCITRIOL 0.25 UG/1
0.25 CAPSULE, LIQUID FILLED ORAL
Status: ON HOLD | COMMUNITY
End: 2025-02-17

## 2025-02-12 RX ORDER — ACETAMINOPHEN 325 MG/1
975 TABLET ORAL ONCE
Status: CANCELLED | OUTPATIENT
Start: 2025-02-12 | End: 2025-02-12

## 2025-02-12 RX ORDER — ACETAMINOPHEN 325 MG/1
975 TABLET ORAL ONCE
Status: COMPLETED | OUTPATIENT
Start: 2025-02-13 | End: 2025-02-13

## 2025-02-12 RX ORDER — ONDANSETRON 4 MG/1
4 TABLET, ORALLY DISINTEGRATING ORAL EVERY 30 MIN PRN
Status: CANCELLED | OUTPATIENT
Start: 2025-02-12

## 2025-02-12 RX ORDER — ASPIRIN 81 MG/1
81 TABLET ORAL DAILY
COMMUNITY

## 2025-02-12 RX ORDER — NALOXONE HYDROCHLORIDE 0.4 MG/ML
0.1 INJECTION, SOLUTION INTRAMUSCULAR; INTRAVENOUS; SUBCUTANEOUS
Status: CANCELLED | OUTPATIENT
Start: 2025-02-12

## 2025-02-12 RX ORDER — HYDROMORPHONE HYDROCHLORIDE 1 MG/ML
0.4 INJECTION, SOLUTION INTRAMUSCULAR; INTRAVENOUS; SUBCUTANEOUS EVERY 5 MIN PRN
Status: CANCELLED | OUTPATIENT
Start: 2025-02-12

## 2025-02-12 RX ORDER — SODIUM CHLORIDE, SODIUM LACTATE, POTASSIUM CHLORIDE, CALCIUM CHLORIDE 600; 310; 30; 20 MG/100ML; MG/100ML; MG/100ML; MG/100ML
INJECTION, SOLUTION INTRAVENOUS CONTINUOUS
Status: DISCONTINUED | OUTPATIENT
Start: 2025-02-13 | End: 2025-02-13 | Stop reason: HOSPADM

## 2025-02-12 RX ORDER — ONDANSETRON 2 MG/ML
4 INJECTION INTRAMUSCULAR; INTRAVENOUS EVERY 30 MIN PRN
Status: CANCELLED | OUTPATIENT
Start: 2025-02-12

## 2025-02-12 RX ORDER — SODIUM CHLORIDE, SODIUM LACTATE, POTASSIUM CHLORIDE, CALCIUM CHLORIDE 600; 310; 30; 20 MG/100ML; MG/100ML; MG/100ML; MG/100ML
INJECTION, SOLUTION INTRAVENOUS CONTINUOUS
Status: CANCELLED | OUTPATIENT
Start: 2025-02-12

## 2025-02-12 RX ORDER — LIDOCAINE 40 MG/G
CREAM TOPICAL
Status: DISCONTINUED | OUTPATIENT
Start: 2025-02-13 | End: 2025-02-13

## 2025-02-12 RX ORDER — LIDOCAINE 40 MG/G
CREAM TOPICAL
Status: DISCONTINUED | OUTPATIENT
Start: 2025-02-12 | End: 2025-02-13 | Stop reason: HOSPADM

## 2025-02-12 ASSESSMENT — ACTIVITIES OF DAILY LIVING (ADL)
ADLS_ACUITY_SCORE: 32
ADLS_ACUITY_SCORE: 41

## 2025-02-12 ASSESSMENT — LIFESTYLE VARIABLES: TOBACCO_USE: 1

## 2025-02-13 ENCOUNTER — DOCUMENTATION ONLY (OUTPATIENT)
Dept: TRANSPLANT | Facility: CLINIC | Age: 75
End: 2025-02-13
Payer: COMMERCIAL

## 2025-02-13 ENCOUNTER — APPOINTMENT (OUTPATIENT)
Dept: GENERAL RADIOLOGY | Facility: CLINIC | Age: 75
DRG: 652 | End: 2025-02-13
Attending: STUDENT IN AN ORGANIZED HEALTH CARE EDUCATION/TRAINING PROGRAM
Payer: COMMERCIAL

## 2025-02-13 ENCOUNTER — APPOINTMENT (OUTPATIENT)
Dept: ULTRASOUND IMAGING | Facility: CLINIC | Age: 75
DRG: 652 | End: 2025-02-13
Attending: STUDENT IN AN ORGANIZED HEALTH CARE EDUCATION/TRAINING PROGRAM
Payer: COMMERCIAL

## 2025-02-13 ENCOUNTER — ANESTHESIA (OUTPATIENT)
Dept: SURGERY | Facility: CLINIC | Age: 75
End: 2025-02-13
Payer: COMMERCIAL

## 2025-02-13 VITALS
DIASTOLIC BLOOD PRESSURE: 71 MMHG | BODY MASS INDEX: 30.28 KG/M2 | HEART RATE: 87 BPM | SYSTOLIC BLOOD PRESSURE: 130 MMHG | WEIGHT: 188.4 LBS | TEMPERATURE: 98 F | HEIGHT: 66 IN | RESPIRATION RATE: 12 BRPM | OXYGEN SATURATION: 96 %

## 2025-02-13 DIAGNOSIS — Z76.82 ORGAN TRANSPLANT CANDIDATE: ICD-10-CM

## 2025-02-13 DIAGNOSIS — D35.00 ADRENAL ADENOMA: Primary | ICD-10-CM

## 2025-02-13 DIAGNOSIS — Z94.0 HISTORY OF KIDNEY TRANSPLANT: ICD-10-CM

## 2025-02-13 LAB
ALBUMIN MFR UR ELPH: 125 MG/DL
ANION GAP SERPL CALCULATED.3IONS-SCNC: 20 MMOL/L (ref 7–15)
ATRIAL RATE - MUSE: 64 BPM
BASE EXCESS BLDA CALC-SCNC: -2.1 MMOL/L (ref -3–3)
BASE EXCESS BLDA CALC-SCNC: -4.8 MMOL/L (ref -3–3)
BASOPHILS # BLD AUTO: 0 10E3/UL (ref 0–0.2)
BASOPHILS NFR BLD AUTO: 0 %
BLD PROD TYP BPU: NORMAL
BLD PROD TYP BPU: NORMAL
BLOOD COMPONENT TYPE: NORMAL
BLOOD COMPONENT TYPE: NORMAL
BUN SERPL-MCNC: 61.3 MG/DL (ref 8–23)
CA-I BLD-MCNC: 4.5 MG/DL (ref 4.4–5.2)
CA-I BLD-MCNC: 4.6 MG/DL (ref 4.4–5.2)
CALCIUM SERPL-MCNC: 9 MG/DL (ref 8.8–10.4)
CHLORIDE SERPL-SCNC: 99 MMOL/L (ref 98–107)
CMV DNA SPEC NAA+PROBE-ACNC: NOT DETECTED IU/ML
CMV IGG SERPL IA-ACNC: <0.2 U/ML
CMV IGG SERPL IA-ACNC: NORMAL
CODING SYSTEM: NORMAL
CODING SYSTEM: NORMAL
CREAT SERPL-MCNC: 5.49 MG/DL (ref 0.67–1.17)
CREAT UR-MCNC: 88.7 MG/DL
CROSSMATCH: NORMAL
CROSSMATCH: NORMAL
DIASTOLIC BLOOD PRESSURE - MUSE: NORMAL MMHG
EBV VCA IGG SER IA-ACNC: >750 U/ML
EBV VCA IGG SER IA-ACNC: POSITIVE
EGFRCR SERPLBLD CKD-EPI 2021: 10 ML/MIN/1.73M2
EOSINOPHIL # BLD AUTO: 0 10E3/UL (ref 0–0.7)
EOSINOPHIL NFR BLD AUTO: 0 %
ERYTHROCYTE [DISTWIDTH] IN BLOOD BY AUTOMATED COUNT: 13.3 % (ref 10–15)
GLUCOSE BLD-MCNC: 179 MG/DL (ref 70–99)
GLUCOSE BLD-MCNC: 210 MG/DL (ref 70–99)
GLUCOSE BLDC GLUCOMTR-MCNC: 102 MG/DL (ref 70–99)
GLUCOSE BLDC GLUCOMTR-MCNC: 131 MG/DL (ref 70–99)
GLUCOSE BLDC GLUCOMTR-MCNC: 149 MG/DL (ref 70–99)
GLUCOSE BLDC GLUCOMTR-MCNC: 257 MG/DL (ref 70–99)
GLUCOSE BLDC GLUCOMTR-MCNC: 275 MG/DL (ref 70–99)
GLUCOSE BLDC GLUCOMTR-MCNC: 280 MG/DL (ref 70–99)
GLUCOSE BLDC GLUCOMTR-MCNC: 304 MG/DL (ref 70–99)
GLUCOSE BLDC GLUCOMTR-MCNC: 318 MG/DL (ref 70–99)
GLUCOSE BLDC GLUCOMTR-MCNC: 324 MG/DL (ref 70–99)
GLUCOSE BLDC GLUCOMTR-MCNC: 91 MG/DL (ref 70–99)
GLUCOSE SERPL-MCNC: 169 MG/DL (ref 70–99)
HCO3 BLDA-SCNC: 21 MMOL/L (ref 21–28)
HCO3 BLDA-SCNC: 22 MMOL/L (ref 21–28)
HCO3 SERPL-SCNC: 17 MMOL/L (ref 22–29)
HCT VFR BLD AUTO: 35.3 % (ref 40–53)
HGB BLD-MCNC: 11.6 G/DL (ref 13.3–17.7)
HGB BLD-MCNC: 11.9 G/DL (ref 13.3–17.7)
HGB BLD-MCNC: 11.9 G/DL (ref 13.3–17.7)
HGB BLD-MCNC: 12.1 G/DL (ref 13.3–17.7)
HGB BLD-MCNC: 12.1 G/DL (ref 13.3–17.7)
HGB BLD-MCNC: 12.2 G/DL (ref 13.3–17.7)
HOLD SPECIMEN: NORMAL
HOLD SPECIMEN: NORMAL
IMM GRANULOCYTES # BLD: 0.1 10E3/UL
IMM GRANULOCYTES NFR BLD: 1 %
INTERPRETATION ECG - MUSE: NORMAL
LACTATE BLD-SCNC: 2.2 MMOL/L (ref 0.7–2)
LACTATE BLD-SCNC: 2.4 MMOL/L (ref 0.7–2)
LYMPHOCYTES # BLD AUTO: 0.5 10E3/UL (ref 0.8–5.3)
LYMPHOCYTES NFR BLD AUTO: 6 %
MAGNESIUM SERPL-MCNC: 2.3 MG/DL (ref 1.7–2.3)
MCH RBC QN AUTO: 32.2 PG (ref 26.5–33)
MCHC RBC AUTO-ENTMCNC: 33.7 G/DL (ref 31.5–36.5)
MCV RBC AUTO: 95 FL (ref 78–100)
MONOCYTES # BLD AUTO: 0.1 10E3/UL (ref 0–1.3)
MONOCYTES NFR BLD AUTO: 2 %
NEUTROPHILS # BLD AUTO: 7.4 10E3/UL (ref 1.6–8.3)
NEUTROPHILS NFR BLD AUTO: 91 %
NRBC # BLD AUTO: 0 10E3/UL
NRBC BLD AUTO-RTO: 0 /100
O2/TOTAL GAS SETTING VFR VENT: 28 %
O2/TOTAL GAS SETTING VFR VENT: 36 %
OXYHGB MFR BLDA: 97 % (ref 92–100)
OXYHGB MFR BLDA: 98 % (ref 92–100)
P AXIS - MUSE: 38 DEGREES
PCO2 BLDA: 36 MM HG (ref 35–45)
PCO2 BLDA: 38 MM HG (ref 35–45)
PH BLDA: 7.34 [PH] (ref 7.35–7.45)
PH BLDA: 7.4 [PH] (ref 7.35–7.45)
PHOSPHATE SERPL-MCNC: 4.8 MG/DL (ref 2.5–4.5)
PLATELET # BLD AUTO: 146 10E3/UL (ref 150–450)
PO2 BLDA: 139 MM HG (ref 80–105)
PO2 BLDA: 98 MM HG (ref 80–105)
POTASSIUM BLD-SCNC: 4.4 MMOL/L (ref 3.4–5.3)
POTASSIUM BLD-SCNC: 4.7 MMOL/L (ref 3.4–5.3)
POTASSIUM SERPL-SCNC: 4.1 MMOL/L (ref 3.4–5.3)
POTASSIUM SERPL-SCNC: 4.3 MMOL/L (ref 3.4–5.3)
POTASSIUM SERPL-SCNC: 4.3 MMOL/L (ref 3.4–5.3)
POTASSIUM SERPL-SCNC: 4.8 MMOL/L (ref 3.4–5.3)
POTASSIUM SERPL-SCNC: 5.3 MMOL/L (ref 3.4–5.3)
PR INTERVAL - MUSE: 202 MS
PROT/CREAT 24H UR: 1.41 MG/MG CR (ref 0–0.2)
QRS DURATION - MUSE: 88 MS
QT - MUSE: 404 MS
QTC - MUSE: 416 MS
R AXIS - MUSE: -14 DEGREES
RBC # BLD AUTO: 3.7 10E6/UL (ref 4.4–5.9)
SAO2 % BLDA: 100 % (ref 95–96)
SAO2 % BLDA: 98 % (ref 95–96)
SODIUM BLD-SCNC: 133 MMOL/L (ref 135–145)
SODIUM BLD-SCNC: 138 MMOL/L (ref 135–145)
SODIUM SERPL-SCNC: 136 MMOL/L (ref 135–145)
SPECIMEN TYPE: NORMAL
SYSTOLIC BLOOD PRESSURE - MUSE: NORMAL MMHG
T AXIS - MUSE: 45 DEGREES
UNIT ABO/RH: NORMAL
UNIT ABO/RH: NORMAL
UNIT NUMBER: NORMAL
UNIT NUMBER: NORMAL
UNIT STATUS: NORMAL
UNIT STATUS: NORMAL
UNIT TYPE ISBT: 6200
UNIT TYPE ISBT: 6200
VENTRICULAR RATE- MUSE: 64 BPM
WBC # BLD AUTO: 8.1 10E3/UL (ref 4–11)

## 2025-02-13 PROCEDURE — 0T770DZ DILATION OF LEFT URETER WITH INTRALUMINAL DEVICE, OPEN APPROACH: ICD-10-PCS | Performed by: SURGERY

## 2025-02-13 PROCEDURE — 360N000077 HC SURGERY LEVEL 4, PER MIN: Performed by: SURGERY

## 2025-02-13 PROCEDURE — 87086 URINE CULTURE/COLONY COUNT: CPT

## 2025-02-13 PROCEDURE — 258N000003 HC RX IP 258 OP 636: Performed by: STUDENT IN AN ORGANIZED HEALTH CARE EDUCATION/TRAINING PROGRAM

## 2025-02-13 PROCEDURE — 82330 ASSAY OF CALCIUM: CPT

## 2025-02-13 PROCEDURE — 250N000009 HC RX 250: Performed by: ANESTHESIOLOGY

## 2025-02-13 PROCEDURE — 250N000011 HC RX IP 250 OP 636: Performed by: SURGERY

## 2025-02-13 PROCEDURE — 250N000012 HC RX MED GY IP 250 OP 636 PS 637

## 2025-02-13 PROCEDURE — 82962 GLUCOSE BLOOD TEST: CPT

## 2025-02-13 PROCEDURE — 76776 US EXAM K TRANSPL W/DOPPLER: CPT

## 2025-02-13 PROCEDURE — 83735 ASSAY OF MAGNESIUM: CPT | Performed by: STUDENT IN AN ORGANIZED HEALTH CARE EDUCATION/TRAINING PROGRAM

## 2025-02-13 PROCEDURE — 250N000011 HC RX IP 250 OP 636: Mod: JZ | Performed by: NURSE ANESTHETIST, CERTIFIED REGISTERED

## 2025-02-13 PROCEDURE — 250N000011 HC RX IP 250 OP 636

## 2025-02-13 PROCEDURE — 99223 1ST HOSP IP/OBS HIGH 75: CPT | Mod: 24 | Performed by: NURSE PRACTITIONER

## 2025-02-13 PROCEDURE — 812N000003 HC ACQUISITION KIDNEY CADAVER

## 2025-02-13 PROCEDURE — 250N000013 HC RX MED GY IP 250 OP 250 PS 637: Performed by: STUDENT IN AN ORGANIZED HEALTH CARE EDUCATION/TRAINING PROGRAM

## 2025-02-13 PROCEDURE — 71045 X-RAY EXAM CHEST 1 VIEW: CPT | Mod: 26 | Performed by: RADIOLOGY

## 2025-02-13 PROCEDURE — 999N000141 HC STATISTIC PRE-PROCEDURE NURSING ASSESSMENT: Performed by: SURGERY

## 2025-02-13 PROCEDURE — 272N000001 HC OR GENERAL SUPPLY STERILE: Performed by: SURGERY

## 2025-02-13 PROCEDURE — 85004 AUTOMATED DIFF WBC COUNT: CPT | Performed by: STUDENT IN AN ORGANIZED HEALTH CARE EDUCATION/TRAINING PROGRAM

## 2025-02-13 PROCEDURE — 250N000011 HC RX IP 250 OP 636: Performed by: STUDENT IN AN ORGANIZED HEALTH CARE EDUCATION/TRAINING PROGRAM

## 2025-02-13 PROCEDURE — 258N000003 HC RX IP 258 OP 636: Performed by: SURGERY

## 2025-02-13 PROCEDURE — 85018 HEMOGLOBIN: CPT

## 2025-02-13 PROCEDURE — 84132 ASSAY OF SERUM POTASSIUM: CPT

## 2025-02-13 PROCEDURE — 49422 REMOVE TUNNELED IP CATH: CPT | Mod: GC | Performed by: SURGERY

## 2025-02-13 PROCEDURE — 36592 COLLECT BLOOD FROM PICC: CPT

## 2025-02-13 PROCEDURE — 50360 RNL ALTRNSPLJ W/O RCP NFRCT: CPT | Mod: LT | Performed by: SURGERY

## 2025-02-13 PROCEDURE — 250N000009 HC RX 250: Performed by: NURSE ANESTHETIST, CERTIFIED REGISTERED

## 2025-02-13 PROCEDURE — 258N000003 HC RX IP 258 OP 636

## 2025-02-13 PROCEDURE — 250N000011 HC RX IP 250 OP 636: Performed by: ANESTHESIOLOGY

## 2025-02-13 PROCEDURE — 250N000009 HC RX 250

## 2025-02-13 PROCEDURE — 0WPG03Z REMOVAL OF INFUSION DEVICE FROM PERITONEAL CAVITY, OPEN APPROACH: ICD-10-PCS | Performed by: SURGERY

## 2025-02-13 PROCEDURE — 258N000003 HC RX IP 258 OP 636: Performed by: ANESTHESIOLOGY

## 2025-02-13 PROCEDURE — C2617 STENT, NON-COR, TEM W/O DEL: HCPCS | Performed by: SURGERY

## 2025-02-13 PROCEDURE — 82310 ASSAY OF CALCIUM: CPT | Performed by: STUDENT IN AN ORGANIZED HEALTH CARE EDUCATION/TRAINING PROGRAM

## 2025-02-13 PROCEDURE — 84132 ASSAY OF SERUM POTASSIUM: CPT | Performed by: STUDENT IN AN ORGANIZED HEALTH CARE EDUCATION/TRAINING PROGRAM

## 2025-02-13 PROCEDURE — 36592 COLLECT BLOOD FROM PICC: CPT | Performed by: STUDENT IN AN ORGANIZED HEALTH CARE EDUCATION/TRAINING PROGRAM

## 2025-02-13 PROCEDURE — 250N000025 HC SEVOFLURANE, PER MIN: Performed by: SURGERY

## 2025-02-13 PROCEDURE — 84100 ASSAY OF PHOSPHORUS: CPT | Performed by: STUDENT IN AN ORGANIZED HEALTH CARE EDUCATION/TRAINING PROGRAM

## 2025-02-13 PROCEDURE — 999N000065 XR CHEST PORT 1 VIEW

## 2025-02-13 PROCEDURE — 710N000010 HC RECOVERY PHASE 1, LEVEL 2, PER MIN: Performed by: SURGERY

## 2025-02-13 PROCEDURE — 250N000012 HC RX MED GY IP 250 OP 636 PS 637: Performed by: STUDENT IN AN ORGANIZED HEALTH CARE EDUCATION/TRAINING PROGRAM

## 2025-02-13 PROCEDURE — 85018 HEMOGLOBIN: CPT | Performed by: STUDENT IN AN ORGANIZED HEALTH CARE EDUCATION/TRAINING PROGRAM

## 2025-02-13 PROCEDURE — 250N000013 HC RX MED GY IP 250 OP 250 PS 637: Performed by: CHIROPRACTOR

## 2025-02-13 PROCEDURE — 76776 US EXAM K TRANSPL W/DOPPLER: CPT | Mod: 26 | Performed by: RADIOLOGY

## 2025-02-13 PROCEDURE — 370N000017 HC ANESTHESIA TECHNICAL FEE, PER MIN: Performed by: SURGERY

## 2025-02-13 PROCEDURE — 0TY10Z0 TRANSPLANTATION OF LEFT KIDNEY, ALLOGENEIC, OPEN APPROACH: ICD-10-PCS | Performed by: SURGERY

## 2025-02-13 PROCEDURE — 120N000011 HC R&B TRANSPLANT UMMC

## 2025-02-13 DEVICE — STENT URETERAL DBL PIGTAIL SOF-FLEX CVD 6FRX12CM G14867
Type: IMPLANTABLE DEVICE | Site: ABDOMEN | Status: NON-FUNCTIONAL
Removed: 2025-03-26

## 2025-02-13 RX ORDER — VALGANCICLOVIR 450 MG/1
450 TABLET, FILM COATED ORAL
Status: DISCONTINUED | OUTPATIENT
Start: 2025-02-15 | End: 2025-02-17

## 2025-02-13 RX ORDER — HYDROMORPHONE HCL IN WATER/PF 6 MG/30 ML
0.4 PATIENT CONTROLLED ANALGESIA SYRINGE INTRAVENOUS EVERY 5 MIN PRN
Status: DISCONTINUED | OUTPATIENT
Start: 2025-02-13 | End: 2025-02-13 | Stop reason: HOSPADM

## 2025-02-13 RX ORDER — NALOXONE HYDROCHLORIDE 0.4 MG/ML
0.4 INJECTION, SOLUTION INTRAMUSCULAR; INTRAVENOUS; SUBCUTANEOUS
Status: DISCONTINUED | OUTPATIENT
Start: 2025-02-13 | End: 2025-02-17 | Stop reason: HOSPADM

## 2025-02-13 RX ORDER — POLYETHYLENE GLYCOL 3350 17 G/17G
17 POWDER, FOR SOLUTION ORAL DAILY
Status: DISCONTINUED | OUTPATIENT
Start: 2025-02-14 | End: 2025-02-17 | Stop reason: HOSPADM

## 2025-02-13 RX ORDER — DEXTROSE MONOHYDRATE 25 G/50ML
25-50 INJECTION, SOLUTION INTRAVENOUS
Status: DISCONTINUED | OUTPATIENT
Start: 2025-02-13 | End: 2025-02-13

## 2025-02-13 RX ORDER — FUROSEMIDE 10 MG/ML
80 INJECTION INTRAMUSCULAR; INTRAVENOUS
Status: DISCONTINUED | OUTPATIENT
Start: 2025-02-13 | End: 2025-02-15

## 2025-02-13 RX ORDER — ONDANSETRON 2 MG/ML
4 INJECTION INTRAMUSCULAR; INTRAVENOUS EVERY 6 HOURS PRN
Status: DISCONTINUED | OUTPATIENT
Start: 2025-02-13 | End: 2025-02-17 | Stop reason: HOSPADM

## 2025-02-13 RX ORDER — FENTANYL CITRATE 50 UG/ML
INJECTION, SOLUTION INTRAMUSCULAR; INTRAVENOUS PRN
Status: DISCONTINUED | OUTPATIENT
Start: 2025-02-13 | End: 2025-02-13

## 2025-02-13 RX ORDER — FENTANYL CITRATE 50 UG/ML
25 INJECTION, SOLUTION INTRAMUSCULAR; INTRAVENOUS EVERY 5 MIN PRN
Status: DISCONTINUED | OUTPATIENT
Start: 2025-02-13 | End: 2025-02-13 | Stop reason: HOSPADM

## 2025-02-13 RX ORDER — BISACODYL 10 MG
10 SUPPOSITORY, RECTAL RECTAL DAILY PRN
Status: DISCONTINUED | OUTPATIENT
Start: 2025-02-16 | End: 2025-02-17 | Stop reason: HOSPADM

## 2025-02-13 RX ORDER — SODIUM CHLORIDE 450 MG/100ML
INJECTION, SOLUTION INTRAVENOUS CONTINUOUS PRN
Status: DISCONTINUED | OUTPATIENT
Start: 2025-02-13 | End: 2025-02-14

## 2025-02-13 RX ORDER — HYDROMORPHONE HCL IN WATER/PF 6 MG/30 ML
0.2 PATIENT CONTROLLED ANALGESIA SYRINGE INTRAVENOUS EVERY 4 HOURS PRN
Status: DISCONTINUED | OUTPATIENT
Start: 2025-02-13 | End: 2025-02-14

## 2025-02-13 RX ORDER — SULFAMETHOXAZOLE AND TRIMETHOPRIM 400; 80 MG/1; MG/1
1 TABLET ORAL
Status: DISCONTINUED | OUTPATIENT
Start: 2025-02-14 | End: 2025-02-17

## 2025-02-13 RX ORDER — OXYCODONE HYDROCHLORIDE 5 MG/1
5 TABLET ORAL EVERY 4 HOURS PRN
Status: DISCONTINUED | OUTPATIENT
Start: 2025-02-13 | End: 2025-02-16

## 2025-02-13 RX ORDER — NALOXONE HYDROCHLORIDE 0.4 MG/ML
0.2 INJECTION, SOLUTION INTRAMUSCULAR; INTRAVENOUS; SUBCUTANEOUS
Status: DISCONTINUED | OUTPATIENT
Start: 2025-02-13 | End: 2025-02-17 | Stop reason: HOSPADM

## 2025-02-13 RX ORDER — ATORVASTATIN CALCIUM 20 MG/1
20 TABLET, FILM COATED ORAL DAILY
Status: DISCONTINUED | OUTPATIENT
Start: 2025-02-14 | End: 2025-02-14

## 2025-02-13 RX ORDER — PROPOFOL 10 MG/ML
INJECTION, EMULSION INTRAVENOUS PRN
Status: DISCONTINUED | OUTPATIENT
Start: 2025-02-13 | End: 2025-02-13

## 2025-02-13 RX ORDER — SODIUM CHLORIDE, SODIUM GLUCONATE, SODIUM ACETATE, POTASSIUM CHLORIDE AND MAGNESIUM CHLORIDE 526; 502; 368; 37; 30 MG/100ML; MG/100ML; MG/100ML; MG/100ML; MG/100ML
INJECTION, SOLUTION INTRAVENOUS CONTINUOUS PRN
Status: DISCONTINUED | OUTPATIENT
Start: 2025-02-13 | End: 2025-02-13

## 2025-02-13 RX ORDER — DEXTROSE MONOHYDRATE 100 MG/ML
INJECTION, SOLUTION INTRAVENOUS CONTINUOUS PRN
Status: DISCONTINUED | OUTPATIENT
Start: 2025-02-13 | End: 2025-02-17 | Stop reason: HOSPADM

## 2025-02-13 RX ORDER — ONDANSETRON 4 MG/1
4 TABLET, ORALLY DISINTEGRATING ORAL EVERY 30 MIN PRN
Status: DISCONTINUED | OUTPATIENT
Start: 2025-02-13 | End: 2025-02-13 | Stop reason: HOSPADM

## 2025-02-13 RX ORDER — ESMOLOL HYDROCHLORIDE 10 MG/ML
INJECTION INTRAVENOUS PRN
Status: DISCONTINUED | OUTPATIENT
Start: 2025-02-13 | End: 2025-02-13

## 2025-02-13 RX ORDER — DEXAMETHASONE SODIUM PHOSPHATE 4 MG/ML
4 INJECTION, SOLUTION INTRA-ARTICULAR; INTRALESIONAL; INTRAMUSCULAR; INTRAVENOUS; SOFT TISSUE
Status: DISCONTINUED | OUTPATIENT
Start: 2025-02-13 | End: 2025-02-13 | Stop reason: HOSPADM

## 2025-02-13 RX ORDER — AMOXICILLIN 250 MG
1 CAPSULE ORAL 2 TIMES DAILY
Status: DISCONTINUED | OUTPATIENT
Start: 2025-02-13 | End: 2025-02-17 | Stop reason: HOSPADM

## 2025-02-13 RX ORDER — SODIUM CHLORIDE, SODIUM LACTATE, POTASSIUM CHLORIDE, CALCIUM CHLORIDE 600; 310; 30; 20 MG/100ML; MG/100ML; MG/100ML; MG/100ML
INJECTION, SOLUTION INTRAVENOUS CONTINUOUS PRN
Status: DISCONTINUED | OUTPATIENT
Start: 2025-02-13 | End: 2025-02-13

## 2025-02-13 RX ORDER — NALOXONE HYDROCHLORIDE 0.4 MG/ML
0.1 INJECTION, SOLUTION INTRAMUSCULAR; INTRAVENOUS; SUBCUTANEOUS
Status: DISCONTINUED | OUTPATIENT
Start: 2025-02-13 | End: 2025-02-13 | Stop reason: HOSPADM

## 2025-02-13 RX ORDER — HYDROMORPHONE HCL IN WATER/PF 6 MG/30 ML
0.1 PATIENT CONTROLLED ANALGESIA SYRINGE INTRAVENOUS EVERY 4 HOURS PRN
Status: DISCONTINUED | OUTPATIENT
Start: 2025-02-13 | End: 2025-02-14

## 2025-02-13 RX ORDER — PROCHLORPERAZINE MALEATE 5 MG/1
5 TABLET ORAL EVERY 6 HOURS PRN
Status: DISCONTINUED | OUTPATIENT
Start: 2025-02-13 | End: 2025-02-17 | Stop reason: HOSPADM

## 2025-02-13 RX ORDER — FENTANYL CITRATE 50 UG/ML
50 INJECTION, SOLUTION INTRAMUSCULAR; INTRAVENOUS EVERY 5 MIN PRN
Status: DISCONTINUED | OUTPATIENT
Start: 2025-02-13 | End: 2025-02-13 | Stop reason: HOSPADM

## 2025-02-13 RX ORDER — ONDANSETRON 4 MG/1
4 TABLET, ORALLY DISINTEGRATING ORAL EVERY 6 HOURS PRN
Status: DISCONTINUED | OUTPATIENT
Start: 2025-02-13 | End: 2025-02-17 | Stop reason: HOSPADM

## 2025-02-13 RX ORDER — HEPARIN SODIUM 1000 [USP'U]/ML
INJECTION, SOLUTION INTRAVENOUS; SUBCUTANEOUS PRN
Status: DISCONTINUED | OUTPATIENT
Start: 2025-02-13 | End: 2025-02-13

## 2025-02-13 RX ORDER — DEXTROSE MONOHYDRATE 25 G/50ML
25-50 INJECTION, SOLUTION INTRAVENOUS
Status: DISCONTINUED | OUTPATIENT
Start: 2025-02-13 | End: 2025-02-17 | Stop reason: HOSPADM

## 2025-02-13 RX ORDER — MANNITOL 20 G/100ML
INJECTION, SOLUTION INTRAVENOUS PRN
Status: DISCONTINUED | OUTPATIENT
Start: 2025-02-13 | End: 2025-02-13

## 2025-02-13 RX ORDER — ATORVASTATIN CALCIUM 10 MG/1
10 TABLET, FILM COATED ORAL DAILY
Status: DISCONTINUED | OUTPATIENT
Start: 2025-02-14 | End: 2025-02-13

## 2025-02-13 RX ORDER — NICOTINE POLACRILEX 4 MG
15-30 LOZENGE BUCCAL
Status: DISCONTINUED | OUTPATIENT
Start: 2025-02-13 | End: 2025-02-13

## 2025-02-13 RX ORDER — NICOTINE POLACRILEX 4 MG
15-30 LOZENGE BUCCAL
Status: DISCONTINUED | OUTPATIENT
Start: 2025-02-13 | End: 2025-02-17 | Stop reason: HOSPADM

## 2025-02-13 RX ORDER — ONDANSETRON 2 MG/ML
INJECTION INTRAMUSCULAR; INTRAVENOUS PRN
Status: DISCONTINUED | OUTPATIENT
Start: 2025-02-13 | End: 2025-02-13

## 2025-02-13 RX ORDER — ONDANSETRON 2 MG/ML
4 INJECTION INTRAMUSCULAR; INTRAVENOUS EVERY 30 MIN PRN
Status: DISCONTINUED | OUTPATIENT
Start: 2025-02-13 | End: 2025-02-13 | Stop reason: HOSPADM

## 2025-02-13 RX ORDER — DEXTROSE, SODIUM CHLORIDE, SODIUM LACTATE, POTASSIUM CHLORIDE, AND CALCIUM CHLORIDE 5; .6; .31; .03; .02 G/100ML; G/100ML; G/100ML; G/100ML; G/100ML
INJECTION, SOLUTION INTRAVENOUS CONTINUOUS
Status: DISCONTINUED | OUTPATIENT
Start: 2025-02-13 | End: 2025-02-14

## 2025-02-13 RX ORDER — EPHEDRINE SULFATE 50 MG/ML
INJECTION, SOLUTION INTRAMUSCULAR; INTRAVENOUS; SUBCUTANEOUS PRN
Status: DISCONTINUED | OUTPATIENT
Start: 2025-02-13 | End: 2025-02-13

## 2025-02-13 RX ORDER — LIDOCAINE HYDROCHLORIDE 20 MG/ML
INJECTION, SOLUTION INFILTRATION; PERINEURAL PRN
Status: DISCONTINUED | OUTPATIENT
Start: 2025-02-13 | End: 2025-02-13

## 2025-02-13 RX ORDER — MYCOPHENOLATE MOFETIL 250 MG/1
750 CAPSULE ORAL
Status: DISCONTINUED | OUTPATIENT
Start: 2025-02-13 | End: 2025-02-17 | Stop reason: HOSPADM

## 2025-02-13 RX ORDER — SODIUM CHLORIDE 9 MG/ML
1000 INJECTION, SOLUTION INTRAVENOUS CONTINUOUS PRN
Status: DISCONTINUED | OUTPATIENT
Start: 2025-02-13 | End: 2025-02-14

## 2025-02-13 RX ORDER — MAGNESIUM OXIDE 400 MG/1
400 TABLET ORAL
Status: DISCONTINUED | OUTPATIENT
Start: 2025-02-15 | End: 2025-02-17 | Stop reason: HOSPADM

## 2025-02-13 RX ORDER — CALCIUM CHLORIDE 100 MG/ML
INJECTION INTRAVENOUS; INTRAVENTRICULAR PRN
Status: DISCONTINUED | OUTPATIENT
Start: 2025-02-13 | End: 2025-02-13

## 2025-02-13 RX ORDER — FUROSEMIDE 10 MG/ML
INJECTION INTRAMUSCULAR; INTRAVENOUS PRN
Status: DISCONTINUED | OUTPATIENT
Start: 2025-02-13 | End: 2025-02-13

## 2025-02-13 RX ORDER — HYDROMORPHONE HCL IN WATER/PF 6 MG/30 ML
0.2 PATIENT CONTROLLED ANALGESIA SYRINGE INTRAVENOUS EVERY 5 MIN PRN
Status: DISCONTINUED | OUTPATIENT
Start: 2025-02-13 | End: 2025-02-13 | Stop reason: HOSPADM

## 2025-02-13 RX ORDER — ACETAMINOPHEN 325 MG/1
975 TABLET ORAL EVERY 8 HOURS
Status: DISCONTINUED | OUTPATIENT
Start: 2025-02-13 | End: 2025-02-16

## 2025-02-13 RX ADMIN — Medication 100 MG: at 09:39

## 2025-02-13 RX ADMIN — SODIUM CHLORIDE, SODIUM GLUCONATE, SODIUM ACETATE, POTASSIUM CHLORIDE AND MAGNESIUM CHLORIDE: 526; 502; 368; 37; 30 INJECTION, SOLUTION INTRAVENOUS at 08:10

## 2025-02-13 RX ADMIN — Medication 100 MG: at 06:10

## 2025-02-13 RX ADMIN — SODIUM CHLORIDE, POTASSIUM CHLORIDE, SODIUM LACTATE AND CALCIUM CHLORIDE: 600; 310; 30; 20 INJECTION, SOLUTION INTRAVENOUS at 06:01

## 2025-02-13 RX ADMIN — CEFUROXIME 1.5 G: 1.5 INJECTION, POWDER, FOR SOLUTION INTRAVENOUS at 06:22

## 2025-02-13 RX ADMIN — CALCIUM CHLORIDE INJECTION 250 MG: 100 INJECTION, SOLUTION INTRAVENOUS at 09:38

## 2025-02-13 RX ADMIN — PHENYLEPHRINE HYDROCHLORIDE 100 MCG: 10 INJECTION INTRAVENOUS at 06:27

## 2025-02-13 RX ADMIN — EPHEDRINE SULFATE 10 MG: 5 INJECTION INTRAVENOUS at 06:27

## 2025-02-13 RX ADMIN — OXYCODONE 2.5 MG: 5 TABLET ORAL at 16:03

## 2025-02-13 RX ADMIN — MANNITOL 25 G: 20 INJECTION, SOLUTION INTRAVENOUS at 08:20

## 2025-02-13 RX ADMIN — ESMOLOL HYDROCHLORIDE 20 MG: 10 INJECTION, SOLUTION INTRAVENOUS at 08:51

## 2025-02-13 RX ADMIN — PROPOFOL 150 MG: 10 INJECTION, EMULSION INTRAVENOUS at 06:09

## 2025-02-13 RX ADMIN — FENTANYL CITRATE 50 MCG: 50 INJECTION INTRAMUSCULAR; INTRAVENOUS at 07:09

## 2025-02-13 RX ADMIN — SODIUM CHLORIDE, SODIUM LACTATE, POTASSIUM CHLORIDE, CALCIUM CHLORIDE AND DEXTROSE MONOHYDRATE: 5; 600; 310; 30; 20 INJECTION, SOLUTION INTRAVENOUS at 19:09

## 2025-02-13 RX ADMIN — FUROSEMIDE 80 MG: 10 INJECTION, SOLUTION INTRAMUSCULAR; INTRAVENOUS at 13:33

## 2025-02-13 RX ADMIN — FENTANYL CITRATE 25 MCG: 50 INJECTION INTRAMUSCULAR; INTRAVENOUS at 08:35

## 2025-02-13 RX ADMIN — SODIUM CHLORIDE 1000 ML: 9 INJECTION, SOLUTION INTRAVENOUS at 10:35

## 2025-02-13 RX ADMIN — CALCIUM CHLORIDE INJECTION 250 MG: 100 INJECTION, SOLUTION INTRAVENOUS at 08:23

## 2025-02-13 RX ADMIN — FENTANYL CITRATE 25 MCG: 50 INJECTION INTRAMUSCULAR; INTRAVENOUS at 08:45

## 2025-02-13 RX ADMIN — FENTANYL CITRATE 25 MCG: 50 INJECTION INTRAMUSCULAR; INTRAVENOUS at 08:51

## 2025-02-13 RX ADMIN — ONDANSETRON 4 MG: 2 INJECTION INTRAMUSCULAR; INTRAVENOUS at 09:39

## 2025-02-13 RX ADMIN — PHENYLEPHRINE HYDROCHLORIDE 200 MCG: 10 INJECTION INTRAVENOUS at 06:19

## 2025-02-13 RX ADMIN — INSULIN HUMAN 4 UNITS/HR: 1 INJECTION, SOLUTION INTRAVENOUS at 20:00

## 2025-02-13 RX ADMIN — ESMOLOL HYDROCHLORIDE 10 MG: 10 INJECTION, SOLUTION INTRAVENOUS at 08:41

## 2025-02-13 RX ADMIN — ACETAMINOPHEN 975 MG: 325 TABLET, FILM COATED ORAL at 13:31

## 2025-02-13 RX ADMIN — ESMOLOL HYDROCHLORIDE 10 MG: 10 INJECTION, SOLUTION INTRAVENOUS at 08:43

## 2025-02-13 RX ADMIN — PHENYLEPHRINE HYDROCHLORIDE 100 MCG: 10 INJECTION INTRAVENOUS at 06:49

## 2025-02-13 RX ADMIN — SODIUM CHLORIDE 500 MG: 9 INJECTION, SOLUTION INTRAVENOUS at 06:19

## 2025-02-13 RX ADMIN — ACETAMINOPHEN 975 MG: 325 TABLET, FILM COATED ORAL at 05:15

## 2025-02-13 RX ADMIN — INSULIN ASPART 4 UNITS: 100 INJECTION, SOLUTION INTRAVENOUS; SUBCUTANEOUS at 17:12

## 2025-02-13 RX ADMIN — Medication 100 MG: at 09:46

## 2025-02-13 RX ADMIN — INSULIN HUMAN 1.5 UNITS/HR: 1 INJECTION, SOLUTION INTRAVENOUS at 08:13

## 2025-02-13 RX ADMIN — SENNOSIDES AND DOCUSATE SODIUM 1 TABLET: 50; 8.6 TABLET ORAL at 13:31

## 2025-02-13 RX ADMIN — PHENYLEPHRINE HYDROCHLORIDE 200 MCG: 10 INJECTION INTRAVENOUS at 06:16

## 2025-02-13 RX ADMIN — SODIUM CHLORIDE 20 MG: 9 INJECTION, SOLUTION INTRAVENOUS at 07:00

## 2025-02-13 RX ADMIN — ACETAMINOPHEN 975 MG: 325 TABLET, FILM COATED ORAL at 22:12

## 2025-02-13 RX ADMIN — Medication 10 MG: at 08:15

## 2025-02-13 RX ADMIN — ESMOLOL HYDROCHLORIDE 10 MG: 10 INJECTION, SOLUTION INTRAVENOUS at 08:40

## 2025-02-13 RX ADMIN — SODIUM CHLORIDE, SODIUM LACTATE, POTASSIUM CHLORIDE, CALCIUM CHLORIDE AND DEXTROSE MONOHYDRATE: 5; 600; 310; 30; 20 INJECTION, SOLUTION INTRAVENOUS at 10:15

## 2025-02-13 RX ADMIN — OXYCODONE 2.5 MG: 5 TABLET ORAL at 20:05

## 2025-02-13 RX ADMIN — FENTANYL CITRATE 25 MCG: 50 INJECTION INTRAMUSCULAR; INTRAVENOUS at 08:39

## 2025-02-13 RX ADMIN — EPHEDRINE SULFATE 10 MG: 5 INJECTION INTRAVENOUS at 06:16

## 2025-02-13 RX ADMIN — FENTANYL CITRATE 100 MCG: 50 INJECTION INTRAMUSCULAR; INTRAVENOUS at 06:09

## 2025-02-13 RX ADMIN — MYCOPHENOLATE MOFETIL 750 MG: 250 CAPSULE ORAL at 18:04

## 2025-02-13 RX ADMIN — Medication 10 MG: at 07:46

## 2025-02-13 RX ADMIN — FUROSEMIDE 100 MG: 10 INJECTION, SOLUTION INTRAVENOUS at 08:20

## 2025-02-13 RX ADMIN — CALCIUM CHLORIDE INJECTION 250 MG: 100 INJECTION, SOLUTION INTRAVENOUS at 06:37

## 2025-02-13 RX ADMIN — LIDOCAINE HYDROCHLORIDE 100 MG: 20 INJECTION, SOLUTION INFILTRATION; PERINEURAL at 06:09

## 2025-02-13 RX ADMIN — HYDROMORPHONE HYDROCHLORIDE 0.5 MG: 1 INJECTION, SOLUTION INTRAMUSCULAR; INTRAVENOUS; SUBCUTANEOUS at 09:35

## 2025-02-13 RX ADMIN — SODIUM CHLORIDE, SODIUM GLUCONATE, SODIUM ACETATE, POTASSIUM CHLORIDE AND MAGNESIUM CHLORIDE: 526; 502; 368; 37; 30 INJECTION, SOLUTION INTRAVENOUS at 07:03

## 2025-02-13 RX ADMIN — Medication 10 MG: at 08:33

## 2025-02-13 RX ADMIN — HEPARIN SODIUM 2000 UNITS: 1000 INJECTION INTRAVENOUS; SUBCUTANEOUS at 07:39

## 2025-02-13 RX ADMIN — ESMOLOL HYDROCHLORIDE 10 MG: 10 INJECTION, SOLUTION INTRAVENOUS at 08:44

## 2025-02-13 RX ADMIN — MYCOPHENOLATE MOFETIL 1000 MG: 500 INJECTION, POWDER, LYOPHILIZED, FOR SOLUTION INTRAVENOUS at 07:00

## 2025-02-13 RX ADMIN — HYDROMORPHONE HYDROCHLORIDE 0.5 MG: 1 INJECTION, SOLUTION INTRAMUSCULAR; INTRAVENOUS; SUBCUTANEOUS at 09:58

## 2025-02-13 RX ADMIN — EPHEDRINE SULFATE 5 MG: 5 INJECTION INTRAVENOUS at 06:35

## 2025-02-13 ASSESSMENT — ACTIVITIES OF DAILY LIVING (ADL)
ADLS_ACUITY_SCORE: 36
ADLS_ACUITY_SCORE: 32
ADLS_ACUITY_SCORE: 32
ADLS_ACUITY_SCORE: 34
ADLS_ACUITY_SCORE: 36
ADLS_ACUITY_SCORE: 32
ADLS_ACUITY_SCORE: 36
ADLS_ACUITY_SCORE: 36
ADLS_ACUITY_SCORE: 32
ADLS_ACUITY_SCORE: 32
ADLS_ACUITY_SCORE: 36
ADLS_ACUITY_SCORE: 34
ADLS_ACUITY_SCORE: 34
ADLS_ACUITY_SCORE: 36
ADLS_ACUITY_SCORE: 32
ADLS_ACUITY_SCORE: 36
ADLS_ACUITY_SCORE: 32

## 2025-02-13 NOTE — CONSULTS
[facility]  Transplant Nephrology Consult Note  Date of Admission:  2/12/2025  Today's Date: 02/13/2025  Requesting physician: Wali Newell*    Reason for Consult:  DDKT    Recommendations:   - Continue current immunosuppression.  - No acute indications for dialysis.     Assessment & Plan   # DDKT: Stable. Good urine output.    - Baseline Creatinine: ~ TBD   - Proteinuria: Not checked post transplant   - DSA Hx: Not checked recently due to time from transplant   - Last cPRA: 0%   - BK Viremia: Not checked recently due to time from transplant   - Kidney Tx Biopsy Hx: No biopsy history.   - Transplant Ureteral Stent: Yes    # Immunosuppression: Tacrolimus immediate release (goal 8-10), Mycophenolate mofetil (dose 750 mg every 12 hours), and Methylprednisolone (dose taper)   - Induction with Recent Transplant:  Low Intensity Protocol   - Continue with intensive monitoring of immunosuppression for efficacy and toxicity.   - Historical Changes in Immunosuppression: None   - Changes: Not at this time    # Infection Prevention:     - PJP: Sulfa/TMP (Bactrim)  - CMV: Valganciclovir (Valcyte)      - CMV IgG Ab High Risk Discordance (D+/R-) at time of transplant: No  Present CMV Serostatus: Negative  - EBV IgG Ab High Risk Discordance (D+/R-) at time of transplant: No  Present EBV Serostatus: Positive    # Hypertension: Controlled;  Goal BP: < 150/90   - PTA medications: atenolol 50 mg daily and furosemide 20 mg daily.   - Changes: Not at this time    # Diabetes: Borderline control (HbA1c 7-9%) Last HbA1c: 7.1%   - Management per Surgery.     # Anemia in Chronic Renal Disease: Hgb: Stable, low      BARBER: No   - Iron studies:  normal iron panel with high ferritin     # Mineral Bone Disorder:    - Secondary renal hyperparathyroidism; PTH level: Moderately elevated (301-600 pg/ml)        On treatment: None  - Vitamin D; level: Not checked recently        On supplement:  No  - Calcium; level: Normal        On supplement: No  - Phosphorus; level: High        On supplement: No    # Electrolytes:   - Potassium; level: Normal        On supplement: No  - Magnesium; level: Normal        On supplement: Yes; magnesium oxide 400 mg daily  - Bicarbonate; level: Low        On supplement: No  - Sodium; level: Normal    # Other Significant PMH:   - Gout: on allopurinol.  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 Saw Dr. Pham cancer specialist [facility] out of [facility] Undergoing surveillance colonoscopy. Last colonoscopy in march 2024 with out any new lesions.   - CAD: cardiac catheterization 7/24: pLAD to mLAD 70% stenosis and mLAD 40% stenosis. He is now s/p PCI with EDWIN x 1 to LAD.   - NSTEMI: in 2022. Stress test showed very small defect. No intervention pursued due to kidney function at that time. On statin, ASA 81, and Imdur.     # Transplant History:  Etiology of Kidney Failure: Diabetes mellitus type 2  Tx: DDKT  Transplant: 2/13/2025 (Kidney)  Significant transplant-related complications: None    Recommendations were communicated to the primary team verbally.    Seen and discussed with Dr. Hall.    ALEKSANDR Elliott CNP  Transplant Nephrology  Contact information via [email or web address] Web Console       History of Present Illness  Peter Carpio is a 74 year old male with past medical history significant for ESRD secondary to DM type II on PD, CAD, gout, hypertension, and hyperlipidemia who presented to the hospital for possible kidney transplant.     Patient was on HD starting 1/24 and then transitioned to PD starting 4/24. Continued to make some urine on PD. He underwent DDKT (DCD) on 2/13 with Dr. Newell. No intraoperative complications. EBL 50 mL.     He endorses minimal abdominal pain at the incision site. He denies nausea, vomiting, shortness of breath, chest pain, chills, sweats. Trace leg swelling. No fevers.  Not yet passing gas. No bowel movement since surgery.     Review of Systems   The 10 point Review of Systems is negative other than noted in the HPI or here.      MEDICATIONS:  Current Facility-Administered Medications   Medication Dose Route Frequency Provider Last Rate Last Admin    acetaminophen (TYLENOL) tablet 975 mg  975 mg Oral Q8H Micky Bass MD        [START ON 2/14/2025] atorvastatin (LIPITOR) tablet 10 mg  10 mg Oral Daily Micky Bass MD        furosemide (LASIX) injection 80 mg  80 mg Intravenous BID Micky Bass MD        [Auto Hold] insulin aspart (NovoLOG) injection (RAPID ACTING)  1-7 Units Subcutaneous Q4H Roscoe Brown MD        [START ON 2/15/2025] magnesium oxide (MAG-OX) tablet 400 mg  400 mg Oral Daily with lunch Micky Bass MD        mycophenolate (GENERIC EQUIVALENT) capsule 750 mg  750 mg Oral BID IS Micky Bass MD        [START ON 2/14/2025] polyethylene glycol (MIRALAX) Packet 17 g  17 g Oral Daily Micky Bass MD        senna-docusate (SENOKOT-S/PERICOLACE) 8.6-50 MG per tablet 1 tablet  1 tablet Oral BID Micky Bass MD        sodium chloride (PF) 0.9% PF flush 10 mL  10 mL Intracatheter Q8H Micky Bass MD        sulfamethoxazole-trimethoprim (BACTRIM) 400-80 MG per tablet 1 tablet  1 tablet Oral Daily Micky Bass MD        tacrolimus (GENERIC EQUIVALENT) capsule 2.5 mg  0.03 mg/kg Oral BID IS Micky Bass MD        valGANciclovir (VALCYTE) tablet 450 mg  450 mg Oral Once per day on Monday Thursday Micky Bass MD         Current Facility-Administered Medications   Medication Dose Route Frequency Provider Last Rate Last Admin    dextrose 5% in lactated ringers infusion   Intravenous Continuous Micky Bass  mL/hr at 02/13/25 1015 New Bag at 02/13/25 1015    sodium chloride 0.45% infusion   Intravenous Continuous PRN Micky Bass MD        sodium chloride 0.9 % infusion  1,000 mL Intravenous Continuous PRN Micky Bass MD 75 mL/hr  "at 25 1058 Rate Change at 25 1058       Physical Exam   Temp  Av.9  F (36.6  C)  Min: 97.6  F (36.4  C)  Max: 98.6  F (37  C)  Arterial Line BP  Min: 141/70  Max: 141/70  Arterial Line MAP (mmHg)  Av mmHg  Min: 90 mmHg  Max: 90 mmHg      Pulse  Av.2  Min: 66  Max: 86 Resp  Avg: 15.2  Min: 12  Max: 18  SpO2  Av %  Min: 95 %  Max: 100 %    CVP (mmHg): 9 mmHgBP 120/65   Pulse 86   Temp 98.6  F (37  C) (Oral)   Resp 12   Ht 1.675 m (5' 5.95\")   Wt 85.5 kg (188 lb 6.4 oz)   SpO2 95%   BMI 30.46 kg/m     Date 25 0700 - 25 0659   Shift 4199-6454 5380-6301 6086-3174 24 Hour Total   INTAKE   I.V. 2600   2600   Shift Total(mL/kg) 2600(30.42)   2600(30.42)   OUTPUT   Urine 340   340   Blood 50   50   Shift Total(mL/kg) 390(4.56)   390(4.56)   Weight (kg) 85.46 85.46 85.46 85.46      Admit Weight: 85.5 kg (188 lb 6.4 oz)     GENERAL APPEARANCE: alert and no distress  HENT: mouth without ulcers or lesions  RESP: lungs clear to auscultation - no rales, rhonchi or wheezes  CV: regular rhythm, normal rate, no rub, no murmur  EDEMA: no LE edema bilaterally  ABDOMEN: soft, nondistended, nontender, bowel sounds normal  MS: extremities normal - no gross deformities noted, no evidence of inflammation in joints, no muscle tenderness  SKIN: no rash  TX KIDNEY: mild TTP  DIALYSIS ACCESS: none    Data   All labs reviewed by me.  CMP  Recent Labs   Lab 25  1027 25  1017 25  0902 25  0743 25  0057 257   NA  --  136 133* 138  --  139   POTASSIUM  --  4.3  4.3 4.7 4.4  --  3.9   CHLORIDE  --  99  --   --   --  98   CO2  --  17*  --   --   --  21*   ANIONGAP  --  20*  --   --   --  20*   * 169* 210* 179*   < > 239*   BUN  --  61.3*  --   --   --  63.4*   CR  --  5.49*  --   --   --  5.85*   GFRESTIMATED  --  10*  --   --   --  9*   GINA  --  9.0  --   --   --  9.3   MAG  --  2.3  --   --   --   --    PHOS  --  4.8*  --   --   --   --    PROTTOTAL  " --   --   --   --   --  6.5   ALBUMIN  --   --   --   --   --  3.9   BILITOTAL  --   --   --   --   --  0.2   ALKPHOS  --   --   --   --   --  59   AST  --   --   --   --   --  17   ALT  --   --   --   --   --  15    < > = values in this interval not displayed.     CBC  Recent Labs   Lab 02/13/25  1017 02/13/25  0902 02/13/25  0743 02/12/25 2046   HGB 11.9* 11.9* 12.1* 13.5   WBC 8.1  --   --  7.5   RBC 3.70*  --   --  4.16*   HCT 35.3*  --   --  39.2*   MCV 95  --   --  94   MCH 32.2  --   --  32.5   MCHC 33.7  --   --  34.4   RDW 13.3  --   --  13.4   *  --   --  182     INR  Recent Labs   Lab 02/12/25 2047   INR 0.81*   PTT <20*     ABG  Recent Labs   Lab 02/13/25  0902 02/13/25  0743   PH 7.34* 7.40   PCO2 38 36   PO2 139* 98   HCO3 21 22   O2PER 36.0 28.0      Urine Studies  Recent Labs   Lab Test 02/12/25  2132 07/03/24  0958 05/16/24  1327   COLOR Light Yellow Yellow Yellow   APPEARANCE Slightly Cloudy* Slightly Cloudy* Cloudy*   URINEGLC >=1000* 100* 300*   URINEBILI Negative Negative Negative   URINEKETONE Negative Negative Negative   SG 1.013 1.014 1.017   UBLD Trace* Small* Small*   URINEPH 6.5 6.0 6.0   PROTEIN 100* 30* 200*   NITRITE Negative Negative Negative   LEUKEST Large* Large* Large*   RBCU 8* 12* 21*   WBCU >182* >182* >182*     No lab results found.  PTH  No lab results found.  Iron Studies  No lab results found.    IMAGING:  All imaging studies reviewed by me.    Past Medical History    I have reviewed this patient's medical history and updated it with pertinent information if needed.   Past Medical History:   Diagnosis Date    CAD (coronary artery disease)     Diabetes mellitus, type 2 (H)     ESRD (end stage renal disease) on dialysis (H)     Essential hypertension     Hyperlipidemia LDL goal <70     NSTEMI (non-ST elevated myocardial infarction) (H)        Past Surgical History   I have reviewed this patient's surgical history and updated it with pertinent information if  needed.  Past Surgical History:   Procedure Laterality Date    CV CORONARY ANGIOGRAM N/A 7/17/2024    Procedure: Coronary Angiogram;  Surgeon: Duglas aLmb MD;  Location: [facility] CARDIAC CATH LAB    CV PCI N/A 7/17/2024    Procedure: Percutaneous Coronary Intervention;  Surgeon: Duglas Lamb MD;  Location: [facility] CARDIAC CATH LAB       Family History   I have reviewed this patient's family history and updated it with pertinent information if needed.   History reviewed. No pertinent family history.    Social History   I have reviewed this patient's social history and updated it with pertinent information if needed. Peter Carpio  reports that he quit smoking about 22 years ago. His smoking use included cigarettes. He has never used smokeless tobacco. He reports current alcohol use. He reports that he does not use drugs.    Prior to Admission Medications   Medications Prior to Admission   Medication Sig Dispense Refill Last Dose/Taking    allopurinol (ZYLOPRIM) 100 MG tablet Take 100 mg by mouth daily   2/12/2025 Morning    aspirin 81 MG EC tablet Take 81 mg by mouth daily.   2/12/2025 Morning    atenolol (TENORMIN) 50 MG tablet Take 1 tablet by mouth daily   2/12/2025 Morning    atorvastatin (LIPITOR) 20 MG tablet Take 20 mg by mouth daily   2/12/2025 Morning    calcitRIOL (ROCALTROL) 0.25 MCG capsule Take 0.25 mcg by mouth three times a week.   2/12/2025    famotidine (PEPCID) 20 MG tablet Take 20 mg by mouth daily.   Past Week    finasteride (PROSCAR) 5 MG tablet Take 1 tablet (5 mg) by mouth daily. 90 tablet 3 2/12/2025 Morning    furosemide (LASIX) 20 MG tablet Take 1 tablet by mouth daily   Past Week    gabapentin (NEURONTIN) 100 MG capsule Take 200 mg by mouth at bedtime.   2/11/2025 Bedtime    gentamicin (GARAMYCIN) 0.3 % ophthalmic solution Apply 2 drops to the PD catheter exit site daily with bandage change   Past Week    insulin NPH-Regular (HUMULIN 70/30;NOVOLIN 70/30) susp Inject 35  Units subcutaneously daily (with dinner).   2/12/2025 at  6:00 PM    insulin NPH-Regular (HUMULIN 70/30;NOVOLIN 70/30) susp Inject 32 Units subcutaneously daily (with breakfast).   2/12/2025 Morning    isosorbide mononitrate (IMDUR) 30 MG 24 hr tablet Take 1 tablet by mouth daily   Past Week    Multiple Vitamins-Minerals (PRESERVISION AREDS) CAPS Take 1 capsule by mouth daily   2/12/2025 Morning    Multiple Vitamins-Minerals (SENIOR MULTIVITAMIN PLUS PO) Take 1 tablet by mouth daily.   2/12/2025 Morning    sevelamer carbonate (RENVELA) 800 MG tablet Take 2,400 mg by mouth 3 times daily (with meals).   2/12/2025 at  6:00 PM    tamsulosin (FLOMAX) 0.4 MG capsule Take 0.4 mg by mouth daily.   2/12/2025 at  6:00 PM    Vitamin D3 (VITAMIN D-1000 MAX ST) 25 mcg (1000 units) tablet Take 25 mcg by mouth   2/11/2025    Continuous Glucose Sensor (FREESTYLE ANTONIO 14 DAY SENSOR) MISC

## 2025-02-13 NOTE — BRIEF OP NOTE
[facility]    Brief Operative Note    Pre-operative diagnosis: End stage renal disease (H) [N18.6]  Post-operative diagnosis Same as pre-operative diagnosis    Procedure: Transplant kidney recipient  donor with ureteral stent placement, Left - Abdomen    Surgeon: Surgeons and Role:     * Wali Newell MD - Primary     * Anupama Arevalo MD - Resident - Assisting     * Minh Lopez MD - Resident - Assisting     * Micky Bass MD - Fellow - Assisting  Anesthesia: General   Estimated Blood Loss: Less than 100 ml    PD cath removal  DCD kidney transplant, in house  Left kidney   Single vessels  Pump 105, resistance 0.23  Clean biopsies  Ureteral stent placed    Drains: None  Specimens: * No specimens in log *  Findings:   None.  Complications: None.  Implants:   Implant Name Type Inv. Item Serial No.  Lot No. LRB No. Used Action   STENT URETERAL DBL PIGTAIL SOF-FLEX CVD [id number] - [id number] Stent STENT URETERAL DBL PIGTAIL SOF-FLEX CVD [id number]  [facility] [employer] [id number] Left 1 Implanted           Michael Bass MD  Transplant Surgery Fellow

## 2025-02-13 NOTE — ANESTHESIA PROCEDURE NOTES
Airway       Patient location during procedure: OR       Procedure Start/Stop Times: 2/13/2025 6:12 AM  Staff -        CRNA: Kathryn Zhao APRN CRNA       Performed By: CRNA  Consent for Airway        Urgency: elective  Indications and Patient Condition       Indications for airway management: jacinda-procedural       Induction type:intravenous       Mask difficulty assessment: 1 - vent by mask    Final Airway Details       Final airway type: endotracheal airway       Successful airway: ETT - single  Endotracheal Airway Details        ETT size (mm): 7.5       Cuffed: yes       Successful intubation technique: video laryngoscopy       VL Blade Size: Glidescope 4 (LoPro S4)       Grade View of Cords: 1       Adjucts: stylet       Position: Right       Measured from: lips       Secured at (cm): 23       Bite block used: None    Post intubation assessment        Placement verified by: capnometry, equal breath sounds and chest rise        Number of attempts at approach: 1       Secured with: pink tape       Ease of procedure: easy       Dentition: Intact and Unchanged    Medication(s) Administered   Medication Administration Time: 2/13/2025 6:12 AM

## 2025-02-13 NOTE — PROGRESS NOTES
"  Transplant Surgery Progress Note  Post Op Check    2025    Peter Carpio is a 74 year old male POD#0 s/p Procedure(s):  Transplant kidney recipient  donor with ureteral stent placement and peritoneal dialysis catheter removal for Pre-Op Diagnosis Codes:      * End stage renal disease (H) [N18.6]    Pt reports their pain is controlled with current regimen. Denies nausea, SOB, chest pain, or dizziness. Patient is not passing flatus or having bowel movements and Is voiding via urinary catheter. Urine is clear-yellow without sediment.    /67   Pulse 77   Temp 98.5  F (36.9  C) (Oral)   Resp 11   Ht 1.675 m (5' 5.95\")   Wt 85.5 kg (188 lb 6.4 oz)   SpO2 96%   BMI 30.46 kg/m      Gen: A&O x4, NAD   Chest: breathing non-labored on nasal cannula at 2 liters per minute   Abdomen: soft, mildly tender at incision site, non-distended  Incision: clean, dry, intact covered by dressings, PD catheter site with iodoform packing in place  Extremities: warm and well perfused    A/P: 74-year-old male s/p kidney transplant with ureteral stent placement and peritoneal dialysis catheter removal. No acute post-op issues. Transfer to floor, q4hr hemoglobin and potassium checks.    Minh Lopez MD, PGY-1  General Surgery Resident    "

## 2025-02-13 NOTE — PROGRESS NOTES
Admitted/transferred from: home  Time of arrival on unit ~ 1900  2 RN full  skin assessment completed by Courtney RICK and Shirley DUMONT RNs  Skin assessment finding: issues found L AVF, L arm FreeStyle Christiano, RLQ PD catheter, R elbow bruise from labs    Interventions/actions: other encourage activity OOB      Will continue to monitor.

## 2025-02-13 NOTE — ANESTHESIA PREPROCEDURE EVALUATION
Anesthesia Pre-Procedure Evaluation    Patient: Peter Carpio   MRN: [id number] : 1950        Procedure : Procedure(s):  Transplant kidney recipient  donor          Past Medical History:   Diagnosis Date     CAD (coronary artery disease)      Diabetes mellitus, type 2 (H)      ESRD (end stage renal disease) on dialysis (H)      Essential hypertension      Hyperlipidemia LDL goal <70      NSTEMI (non-ST elevated myocardial infarction) (H)       Past Surgical History:   Procedure Laterality Date     CV CORONARY ANGIOGRAM N/A 2024    Procedure: Coronary Angiogram;  Surgeon: Duglas Lamb MD;  [facility]     CV PCI N/A 2024    Procedure: Percutaneous Coronary Intervention;  Surgeon: Duglas Lamb MD;  Location:  [facility]      No Known Allergies   Social History     Tobacco Use     Smoking status: Former     Current packs/day: 0.00     Types: Cigarettes     Quit date:      Years since quittin.1     Smokeless tobacco: Never   Substance Use Topics     Alcohol use: Yes     Comment: rare      Wt Readings from Last 1 Encounters:   25 85.5 kg (188 lb 6.4 oz)        Anesthesia Evaluation   Pt has had prior anesthetic. Type: General.        ROS/MED HX  ENT/Pulmonary:     (+)                tobacco use, Past use,                       Neurologic:  - neg neurologic ROS     Cardiovascular: Comment: Cardiac Visit 2025  - No additional cardiac testing needed at this time prior to transplant  - Follow-up with cardiology in 12-18 months; sooner with symptoms       -NSTEMI ()    -coronary angiogram with Dr. Lamb on 24.   24 Coronary Angiogram:     Prox LAD to Mid LAD lesion is 70% stenosed.     Mid LAD lesion is 40% stenosed.  - LAD 80% ulcerative stenosis s/p PCI with EDWIN.      -7/3/24   1. Aorta to femoral arterial ultrasound.  2. Inferior vena cava to femoral venous ultrasound.    IMPRESSION:  1. Negative aorta to  femoral arterial ultrasound. Measurements as in  the report.  2. Negative caval-femoral venous ultrasound. Measurements as in the  report.      (+) Dyslipidemia hypertension- -  CAD -  - -                                 Previous cardiac testing   Echo: Date: 5/16/24 Results:  Interpretation Summary  Left ventricular function is normal.The ejection fraction is 55-60%. No  regional wall motion abnormalities are seen.  Global right ventricular function is normal.  No significant valvular abnormalities present.  Small inferior vena cava size consistent with hypovolemia.  No pericardial effusion is present.     There is no prior study for direct comparison.    Stress Test:  Date: Results:    ECG Reviewed:  Date: 7/24/24 Results:    Sinus rhythm with Premature atrial complexes  Septal infarct (cited on or before 17-JUL-2024)  Inferior infarct , age undetermined  Abnormal ECG  When compared with ECG of 17-JUL-2024 09:05, (unconfirmed)  Premature atrial complexes are now Present  Confirmed by fellow Remy Brantley ([id number]) on 7/18/2024 10:29:10 AM  Confirmed by MD KAHN HENRI ([id number]) on 7/18/2024 10:40:32 PM      Cath:  Date: Results:      METS/Exercise Tolerance: 4 - Raking leaves, gardening    Hematologic: Comments: Hb 13.5, normal platelets - neg hematologic  ROS  Anemia: hx of anemia of chronic disease.   Musculoskeletal:       GI/Hepatic: Comment: Carcinoid tumor of ascending colon, s/p right hemicolectomy        Renal/Genitourinary: Comment: End stage renal failure due to diabetes mellitus type 2 whose    PD since April, 2024    (+) renal disease, type: ESRD, Pt requires dialysis, type: Peritoneal dialysis,    BPH,      Endo: Comment: -Secondary hyperparathyroidism    -32 units of Humulin mix 70/30 BID      (+)  type II DM, Last HgA1c: 7.1, date: 2/12/25,     Diabetic complications: nephropathy cardiac problems.      Obesity,       Psychiatric/Substance Use:  - neg psychiatric ROS     Infectious Disease:  - neg  "infectious disease ROS     Malignancy: Comment: Carcinoid tumor of ascending colon, s/p right hemicolectomy  (+) Malignancy, History of GI.GI CA  Remission status post Surgery.      Other:            Physical Exam    Airway        Mallampati: II   TM distance: > 3 FB   Neck ROM: full   Mouth opening: > 3 cm    Respiratory Devices and Support         Dental     Comment: Partial upper which is removed    (+) Modest Abnormalities - crowns, retainers, 1 or 2 missing teeth and Removable bridges or other hardware      Cardiovascular   cardiovascular exam normal       Rhythm and rate: regular     Pulmonary   pulmonary exam normal        breath sounds clear to auscultation           OUTSIDE LABS:  CBC:   Lab Results   Component Value Date    WBC 9.6 07/17/2024    WBC 7.5 05/16/2024    HGB 13.1 (L) 07/17/2024    HGB 12.3 (L) 05/16/2024    HCT 38.9 (L) 07/17/2024    HCT 38.0 (L) 05/16/2024     07/17/2024     05/16/2024     BMP:   Lab Results   Component Value Date     12/26/2024     07/17/2024    POTASSIUM 4.6 12/26/2024    POTASSIUM 3.7 07/17/2024    CHLORIDE 101 12/26/2024    CHLORIDE 98 07/17/2024    CO2 26 12/26/2024    CO2 26 07/17/2024    BUN 55.4 (H) 12/26/2024    BUN 65.3 (H) 07/17/2024    CR 6.39 (H) 12/26/2024    CR 7.23 (H) 07/17/2024     (H) 12/26/2024    GLC 86 07/17/2024     COAGS:   Lab Results   Component Value Date    PTT 25 07/17/2024    INR 1.01 07/17/2024     POC: No results found for: \"BGM\", \"HCG\", \"HCGS\"  HEPATIC:   Lab Results   Component Value Date    ALBUMIN 4.1 05/16/2024    PROTTOTAL 7.0 05/16/2024    ALT 9 05/16/2024    AST 8 05/16/2024    ALKPHOS 58 05/16/2024    BILITOTAL 0.2 05/16/2024     OTHER:   Lab Results   Component Value Date    A1C 6.7 (H) 12/26/2024    GINA 10.2 12/26/2024       Anesthesia Plan    ASA Status:  4    NPO Status:  NPO Appropriate    Anesthesia Type: General.     - Airway: ETT   Induction: Intravenous, Propofol.   Maintenance: Balanced. " "  Techniques and Equipment:     - Airway: Video-Laryngoscope     - Lines/Monitors: 2nd IV, BIS, Arterial Line, Central Line, CVP     Consents    Anesthesia Plan(s) and associated risks, benefits, and realistic alternatives discussed. Questions answered and patient/representative(s) expressed understanding.     - Discussed:     - Discussed with:  Patient      - Extended Intubation/Ventilatory Support Discussed: Yes.      - Patient is DNR/DNI Status: No     Use of blood products discussed: Yes.     - Discussed with: Patient.     - Consented: consented to blood products     Postoperative Care    Pain management: IV analgesics, Oral pain medications, Multi-modal analgesia.   PONV prophylaxis: Ondansetron (or other 5HT-3), Dexamethasone or Solumedrol     Comments:               Yaw Pool MD    I have reviewed the pertinent notes and labs in the chart from the past 30 days and (re)examined the patient.  Any updates or changes from those notes are reflected in this note.    Clinically Significant Risk Factors Present on Admission                   # Hypertension: Noted on problem list          # DMII: A1C = 6.7 % (Ref range: <=5.7 %) within past 6 months    # Obesity: Estimated body mass index is 30.46 kg/m  as calculated from the following:    Height as of 2/12/25: 1.675 m (5' 5.95\").    Weight as of 2/12/25: 85.5 kg (188 lb 6.4 oz).                "

## 2025-02-13 NOTE — PHARMACY-TRANSPLANT NOTE
Adult Kidney Transplant Post Operative Note    74 year old male s/p  donor kidney transplant on 2025 for diabetes mellitus type 2.      Planned immunosuppression regimen per low intensity kidney transplant protocol:  INDUCTION:  2025 (POD 0): basiliximab 20 mg IV x1, methylprednisolone 500 mg IV x1  2025 (POD 1): methylprednisolone 250 mg IV x1  2/15/2025 (POD 2): methylprednisolone 100 mg IV x1  2025 (POD 3): steroid taper  2025 (POD 4): basiliximab 20 mg IV x1     MAINTENANCE:   - Mycophenolate 750 mg BID  - Tacrolimus with goal trough levels of 8-10 mcg/L for first 6 months post-transplant     Opportunistic pathogen prophylaxis includes:  - PJP: trimethoprim/sulfamethoxazole  - CMV D (unknown at time of note)/R negative: valganciclovir for 3-6 months pending donor CMV status    CAD s/p PCI 2024 with EDWIN x 1 to LAD  - Resume aspirin 81 mg daily when appropriate post surgery  - Continue atorvastatin 20 mg daily  - Resume beta-blocker as able (PTA was on atenolol 50 mg daily)    Patient is not enrolled in medication study.    Pharmacy will monitor for medication interactions and immunosuppression levels in conjunction with the team. Medication therapy needs for discharge planning will continue to be addressed throughout the current admission via multidisciplinary rounds and order review.  Pharmacy will make recommendations as appropriate.  Lucia Sanchez, Pharm.D., BCPS, CHARLESP  [facility] pharmacist

## 2025-02-13 NOTE — PLAN OF CARE
"Goal Outcome Evaluation:      Plan of Care Reviewed With: patient, spouse    Overall Patient Progress: no changeOverall Patient Progress: no change    Outcome Evaluation: Pre-op complete, OR time scheduled for 0600 for kidney transplant.    /82 (BP Location: Right arm)   Pulse 66   Temp 98  F (36.7  C) (Oral)   Resp 18   Ht 1.675 m (5' 5.95\")   Wt 85.5 kg (188 lb 6.4 oz)   SpO2 98%   BMI 30.46 kg/m      Shift: [id number]  Isolation Status: standard  VS: stable on RA, afebrile  Neuro: A&O x4  Behaviors: receptive to cares  B, 102  Labs/Imaging: Lactic 3.2, 2.0  Respiratory: WDL  Cardiac: WDL  Pain/Nausea: denies  PRN: N/A  Diet: NPO  LDA: R PIV, L AVF  Infusion(s): N/A  GI/: LBM 2/12. Voiding spontaneously, makes urine on PD.  Skin: RLQ PD catheter, L arm FreeStyle Christiano  Mobility: UAL  Plan: OR time of 0600 for kidney transplant. Notify MD of any significant changes, continue POC.   "

## 2025-02-13 NOTE — ANESTHESIA CARE TRANSFER NOTE
Patient: Peter Carpio    Procedure: Procedure(s):  Transplant kidney recipient  donor with ureteral stent placement and peritoneal dialysis catheter removal       Diagnosis: End stage renal disease (H) [N18.6]  Diagnosis Additional Information: No value filed.    Anesthesia Type:   General     Note:    Oropharynx: oropharynx clear of all foreign objects and spontaneously breathing  Level of Consciousness: awake  Oxygen Supplementation: room air    Independent Airway: airway patency satisfactory and stable  Dentition: dentition unchanged  Vital Signs Stable: post-procedure vital signs reviewed and stable  Report to RN Given: handoff report given  Patient transferred to: PACU    Handoff Report: Identifed the Patient, Identified the Reponsible Provider, Reviewed the pertinent medical history, Discussed the surgical course, Reviewed Intra-OP anesthesia mangement and issues during anesthesia, Set expectations for post-procedure period and Allowed opportunity for questions and acknowledgement of understanding      Vitals:  Vitals Value Taken Time   /66    Temp     Pulse 86 25 1015   Resp 13 25 1015   SpO2 95 % 25 1015   Vitals shown include unfiled device data.    Electronically Signed By: ALEKSANDR Begum CRNA  2025  10:16 AM

## 2025-02-13 NOTE — ANESTHESIA PROCEDURE NOTES
Central Line/PA Catheter Placement    Pre-Procedure   Staff -        Anesthesiologist:  Bertha Mar MD       Performed By: anesthesiologist       Location: OR  Timeout:       Correct Patient: Yes        Correct Procedure: Yes        Correct Site: Yes        Correct Position: Yes        Correct Laterality: Yes   Line Placement:   This line was placed Post Induction    Procedure   Procedure: central line       Laterality: right       Insertion Site: internal jugular.       Patient Position: Trendelenburg  Sterile Prep        All elements of maximal sterile barrier technique followed       Patient Prep/Sterile Barriers: draped, hand hygiene, gloves , hat , mask , draped, gown, sterile gel and probe cover       Skin prep: Chloraprep  Insertion/Injection        Technique: ultrasound guided        1. Ultrasound was used to evaluate the access site.       2. Vein evaluated via ultrasound for patency/adequacy.       3. Using real-time ultrasound the needle/catheter was observed entering the artery/vein.       Type: CVC       Number of Lumens: triple lumen  Narrative         Secured by: suture       Tegaderm and Biopatch dressing used.       Complications: None apparent,        blood aspirated from all lumens,        All lumens flushed: Yes       Verification method: Placement to be verified post-op

## 2025-02-13 NOTE — PLAN OF CARE
"Goal Outcome Evaluation:      Plan of Care Reviewed With: patient, spouse    Overall Patient Progress: no changeOverall Patient Progress: no change    Outcome Evaluation: patient arrived on 7A; pain well controlled, good urine output, participating in cares    /68   Pulse 75   Temp 97.5  F (36.4  C) (Oral)   Resp 16   Ht 1.675 m (5' 5.95\")   Wt 85.5 kg (188 lb 6.4 oz)   SpO2 99%   BMI 30.46 kg/m      Assumed cares [id number]  Neuro: A/Ox4 pt had some slurring of speech that resolved coming off of anesthesia   Pain/Nausea: denies pain, scheduled Tylenol given; denies nausea  Cardiac: rate and rhythm regular  Resp: lung sounds clear, equal bilaterally; using IS, educated about risk of PNA  GI/: Hdez w/ 120-160 / hour, clear urine w/ some muccous-y clots; IV Lasix given x1; no BM, not passing gas  Diet/Appetite: Regular diet, ate applesauce and drank some juice  BG: Q4 - 235  Skin: midline incision w/ packing - UTV  Access: I.J. - D5LR @125; R#1  Drains: Hdez - cath cares done  Activity: pt has not been OOB yet  Plan: pt needs med card and lab book  Will continue with plan of care and notify team of any changes.?    MIGUEL ANGEL COX RN on 2/13/2025 at 2:52 PM          "

## 2025-02-13 NOTE — PHARMACY-ADMISSION MEDICATION HISTORY
Pharmacist Admission Medication History    Admission medication history is complete. The information provided in this note is only as accurate as the sources available at the time of the update.    Information Source(s): Patient, Family member, Prescription bottles, and CareEverywhere/[employer] via in-person    Pertinent Information:   - Patient seems like a reliable medication historian and brought many of his pill bottles from home  - Furosemide and Imdur were not included in the patient's provided pill bottles, but he is confident he still takes these and says his hurriedness getting out the door to come to the hospital is likely why they are not with his other pill bottles  - There is a recent fill in [facility] for amlodipine 5 mg for 2/4/25, but Alexis states confidently that he is no longer on this medication and that it was discontinued for hypotensive side effects   - He reports having taking both his morning and evening meds today before coming to the hospital, with the evening meds taken around 18:00.     Changes made to PTA medication list:  Added: calcitriol, Renvela, and senior multivatimin w/ minerals  Deleted: Ocuvite, EMLA cream, and Phoslo  Changed:   Gabapentin 100 mg -> 200 mg daily at bedtime  Aspirin 325 mg -> 81 mg  Humulin 70/30 from 34 units in the evening to 35 units in the evening (uses the KwikPen)    Allergies reviewed with patient and updates made in EHR: yes (NKDA)    Medication History Completed By: Pedro Pablo Ybarra RPH 2/12/2025 9:26 PM    PTA Med List   Medication Sig Last Dose/Taking    allopurinol (ZYLOPRIM) 100 MG tablet Take 100 mg by mouth daily 2/12/2025 Morning    aspirin 81 MG EC tablet Take 81 mg by mouth daily. 2/12/2025 Morning    atenolol (TENORMIN) 50 MG tablet Take 1 tablet by mouth daily 2/12/2025 Morning    atorvastatin (LIPITOR) 20 MG tablet Take 20 mg by mouth daily 2/12/2025 Morning    calcitRIOL (ROCALTROL) 0.25 MCG capsule Take 0.25 mcg by mouth three times a week.  2/12/2025    famotidine (PEPCID) 20 MG tablet Take 20 mg by mouth daily. Past Week    finasteride (PROSCAR) 5 MG tablet Take 1 tablet (5 mg) by mouth daily. 2/12/2025 Morning    furosemide (LASIX) 20 MG tablet Take 1 tablet by mouth daily Past Week    gabapentin (NEURONTIN) 100 MG capsule Take 200 mg by mouth at bedtime. 2/11/2025 Bedtime    gentamicin (GARAMYCIN) 0.3 % ophthalmic solution Apply 2 drops to the PD catheter exit site daily with bandage change Past Week    insulin NPH-Regular (HUMULIN 70/30;NOVOLIN 70/30) susp Inject 35 Units subcutaneously daily (with dinner). 2/12/2025 at  6:00 PM    insulin NPH-Regular (HUMULIN 70/30;NOVOLIN 70/30) susp Inject 32 Units subcutaneously daily (with breakfast). 2/12/2025 Morning    isosorbide mononitrate (IMDUR) 30 MG 24 hr tablet Take 1 tablet by mouth daily Past Week    Multiple Vitamins-Minerals (PRESERVISION AREDS) CAPS Take 1 capsule by mouth daily 2/12/2025 Morning    Multiple Vitamins-Minerals (SENIOR MULTIVITAMIN PLUS PO) Take 1 tablet by mouth daily. 2/12/2025 Morning    sevelamer carbonate (RENVELA) 800 MG tablet Take 2,400 mg by mouth 3 times daily (with meals). 2/12/2025 at  6:00 PM    tamsulosin (FLOMAX) 0.4 MG capsule Take 0.4 mg by mouth daily. 2/12/2025 at  6:00 PM    Vitamin D3 (VITAMIN D-1000 MAX ST) 25 mcg (1000 units) tablet Take 25 mcg by mouth 2/11/2025

## 2025-02-13 NOTE — OP NOTE
Transplant Surgery  Operative Note     Procedure date:  02/13/25    Preoperative diagnosis:  End Stage renal failure due to diabetes mellitus type 2    Postoperative diagnosis:  Same    Procedure:  1. Left kidney transplant,  Donation after Circulatory Death, Right iliac fossa, without vascular reconstruction. A J-J ureteral stent was placed.  2. Kidney allograft preparation on Back Table  3. Peritoneal dialysis catheter removal    Surgeon:  UNRULY SOLIS    Fellow/Assistant:  Micky Bass MD- Fellow. Dr. Bass was the primary assistant for the procedure and participated in all aspects of the case under my supervision. His presence was necessary as there was no qualified resident to assist with this procedure.  Anupama Arevalo MD- Resident. Dr Arevalo was a secondary assistant for the procedure and participated in all aspects of the case under my supervision  Minh Lopez MD- Resident. Dr. Lopez was a secondary assistant for the procedure and participated in all aspects of the case under my supervision    Anesthesia:  General    Specimen:  None    Drains:  no drain    Urine output:  340 mls    Estimated blood loss:  50    Fluids administered:        Indication: The patient has End Stage renal failure due to diabetes mellitus type 2 and received an organ offer for a Donation after Circulatory Death kidney allograft. After discussing the risks and benefits of proceeding, the patient agreed to proceed with surgery and provided informed consent.  Findings: Integrity of recipient artery: Mild atherosclerosis.  Graft: 48F DCD WIT 14 mins, KDPI 54%. Terminal Cr 0.72. Biopsy unremarkable, pump numbers flow 105, R 0.23. No DSA on VXM, 121mm. Single artery/vein/ureter    Placed end to side to right external iliac vessels. Good reperfusion. URETERAL STENT PLACED. PD catheter removed in standard open fashion  Intraoperative Events: None,     Final ABO/Crossmatch verification: After the donor organ  arrived to the operating room and prior to anastomosis, I participated in the transplant pre-verification upon organ receipt timeout by visually verifying the donor ID, organ and laterality, donor blood type, recipient unique identifier, recipient blood type, and that the donor and recipient are blood type compatible. The crossmatch was done prospectively; the T cell flow crossmatch result was negative and B cell flow crossmatch result was negative prior to anastomosis.  The patient received Basiliximab on induction.    Donor Organ Information:   Donor UNOS ID:  [id number]    Donor arterial clamp on:  2/12/2025  1:25 PM    Total ischemic time:  1146 min    Cold ischemic time:  1,146 min    Warm ischemic time:  39 min    Preservation fluid:  UW      Back Table Details:   Procedure:  Bench preparation of the kidney allograft for transplantation without vascular reconstruction    Surgeon:  UNRULY SOLIS    Faculty Co-Surgeon:  UNRULY SOLIS    Fellow/Assistant:  As above    Donor arrival to recipient room:  2/13/2025  6:26 AM    Graft injury:  No    Graft biopsy:  yes    Organ received on:  Pump    Pump resistance:  0.23    Pump flow:  105    Arterial anatomy:  Single    Donor arterial quality:  Normal    Venous anatomy:  Single    Ureteral anatomy:  Single    Any reconstruction:  No    Artery:  no    Vein:  no     Complications: None.    Findings: Normal as above      None.    Back Table Preparation:  The donor kidney was received and inspected. It had been flushed with UW. The graft was prepared on the back table by removing perinephric fat and ligating venous tributaries and lymphatics. The ureter was also cleaned of excess tissue. If required, reconstruction was performed as detailed above. The kidney was stored in iced cold preservation solution until ready for transplantation. Faculty was present for the critical portions of the procedure.    Operative Procedure:   Arterial  anastomosis start:  2/13/2025  7:52 AM    Arterial unclamp:  2/13/2025  8:31 AM    Extra vessels used:    N/a      The patient was brought to the operating room, placed in a supine position, and a time out was performed. Sequential compression devices were placed on both lower extremities and general endotracheal anesthesia was induced.  The patient was given IV antibiotics and a Hdez catheter. A central line was placed by Anesthesia service. The abdomen was then shaved, prepped, and draped in the usual sterile fashion.  An incision was made in the right lower quadrant and carried down through the subcutaneous tissue and the abdominal wall fascia. If encountered, the epigastric vessels were ligated in continuity, divided and secured with surgical clips. The right iliac artery and vein were exposed. The retractor system was placed and the lymphatics overlying the vessels were serially ligated and divided.     The patient was heparinized. We applied atraumatic vascular clamps and the donor kidney was brought to the operative field. We made a venotomy and the renal vein was anastomosed to the recipient right External Iliac vein in an end-to-side fashion. An arteriotomy was made and the donor renal artery was anastomosed to the recipient right external iliac artery  in an end to side fashion. The patient was simultaneously loaded with IV mannitol, Lasix and volume. The renal artery was protected and the clamps were removed. After several cardiac cycles, we opened the renal artery and the kidney had Good reperfusion and was firm and pink .    The transplant ureter was managed by creating a Liche (anterior multistitch) anastomosis with absorbable suture. A stent was placed across the anastomosis. The kidney made Yes urine prior to implantation.    Hemostasis was obtained, the anastomoses inspected, and the kidney placed in the iliac fossa. After placement, the vessel lay was inspected and found to be acceptable. The  kidney position was Retroperitoneal. The field was irrigated with antibiotic solution. No drain was placed. The retractor was removed and the abdominal wall fascia reapproximated. Subcutaneous tissues were irrigated and hemostasis obtained.  The skin was reapproximated with staples and a dry dressing was applied.  We then turned attention to removal of the peritoneal dialysis catheter. The prior fascial cuff incision was reopened and, using sharp dissection and cautery, the cuff was exposed and dissected free of the fascia. The internal piece of the catheter was removed and the fascial opening closed with a single PDS suture. The external cuff was dissected free of the subcutaneous tissue as well. The catheter was divided and the two cuffs and internal segment were passed off of the field. The wound was irrigated and closed in layers. The external piece of the catheter was removed through the exit site and the site packed open. All needle, sponge and instrument counts were correct x 2. The patient was awakened, extubated, and transferred to PACU for post-op monitoring. Faculty was present for key portions of the procedure.

## 2025-02-13 NOTE — H&P
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 He presented to [facility] on 02/12/25 for evaluation and potential kidney transplant. The risks and benefits of the procedure were discussed with the patient and he is in agreement to move forward with surgery as planned.    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 He presents today for evaluation and potential kidney transplant     He is a former smoker, quit 2003. He does not use alcohol or drugs. His activity level is sedentary. He denies symptoms of chest pain, dizziness, lightheadedness, palpitations, orthopnea, PND, or edema. He does not have family history of heart disease. He most recently underwent coronary angiogram with Dr. Lamb on 7/17/24. This revealed pLAD to mLAD 70% stenosis and mLAD 40% stenosis. He is now s/p PCI with EDWIN x 1 to LAD. The procedure was uncomplicated. His access site is soft, flat, non-tender but ecchymotic.    Patient is feeling well, no recent hospitalizations or illness. cPRA was 0, started on low intensity immunosuppression. PD nightly (last run 2/11), dry weight is ~186. Patient has previous AV fistula in left arm. Still making urine. On insulin for DM2. Not on any anticoagulation. Previous abdominal surgeries include sigmoidectomy, incisional hernia  "repair, and     Clinically Significant Risk Factors Present on Admission                 # Drug Induced Platelet Defect: home medication list includes an antiplatelet medication   # Hypertension: Noted on problem list          # DMII: A1C = 6.7 % (Ref range: <=5.7 %) within past 6 months    # Obesity: Estimated body mass index is 30.46 kg/m  as calculated from the following:    Height as of this encounter: 1.675 m (5' 5.95\").    Weight as of this encounter: 85.5 kg (188 lb 6.4 oz).                 Past Medical History    I have reviewed this patient's medical history and updated it with pertinent information if needed.   Past Medical History:   Diagnosis Date    CAD (coronary artery disease)     Diabetes mellitus, type 2 (H)     ESRD (end stage renal disease) on dialysis (H)     Essential hypertension     Hyperlipidemia LDL goal <70     NSTEMI (non-ST elevated myocardial infarction) (H)        Past Surgical History   I have reviewed this patient's surgical history and updated it with pertinent information if needed.  Past Surgical History:   Procedure Laterality Date    CV CORONARY ANGIOGRAM N/A 2024    Procedure: Coronary Angiogram;  Surgeon: Duglas Lamb MD;  Location:  [facility]    CV PCI N/A 2024    Procedure: Percutaneous Coronary Intervention;  Surgeon: Duglas Lamb MD;  Location:  [facility]       Prior to Admission Medications   Prior to Admission Medications   Prescriptions Last Dose Informant Patient Reported? Taking?   Continuous Glucose Sensor (FREESTYLE ANTONIO 14 DAY SENSOR) MISC   Yes No   HUMULIN MIX 70/30 KWIKPEN (70-30) 100 UNIT/ML susp   Yes No   Si IN AM, 32 IN PM   Multiple Vitamins-Minerals (PRESERVISION AREDS) CAPS   Yes No   Sig: Take 1 capsule by mouth daily   Vitamin D3 (VITAMIN D-1000 MAX ST) 25 mcg (1000 units) tablet   Yes No   Sig: Take 25 mcg by mouth   allopurinol (ZYLOPRIM) 100 MG tablet   Yes No   Sig: Take 100 mg by mouth " daily   aspirin (ASA) 325 MG tablet   Yes No   Sig: Take 325 mg by mouth   atenolol (TENORMIN) 50 MG tablet   Yes No   Sig: Take 1 tablet by mouth daily   atorvastatin (LIPITOR) 20 MG tablet   Yes No   Sig: Take 20 mg by mouth daily   calcium acetate (PHOSLO) 667 MG CAPS capsule   Yes No   Sig: Take 667 mg by mouth   chlorthalidone (HYGROTON) 25 MG tablet   Yes No   Sig: Take 25 mg by mouth   famotidine (PEPCID) 20 MG tablet   Yes No   finasteride (PROSCAR) 5 MG tablet   No No   Sig: Take 1 tablet (5 mg) by mouth daily.   furosemide (LASIX) 20 MG tablet   Yes No   Sig: Take 1 tablet by mouth daily   gabapentin (NEURONTIN) 100 MG capsule   Yes No   Sig: Take 100 mg by mouth   gentamicin (GARAMYCIN) 0.3 % ophthalmic solution   Yes No   isosorbide mononitrate (IMDUR) 30 MG 24 hr tablet   Yes No   Sig: Take 1 tablet by mouth daily   lidocaine-prilocaine (EMLA) 2.5-2.5 % external cream   Yes No   Sig: Apply to skin.   multivitamin (OCUVITE) TABS tablet   Yes No   Sig: Take 1 tablet by mouth daily   tamsulosin (FLOMAX) 0.4 MG capsule   Yes No   Sig: Take 0.4 mg by mouth daily.      Facility-Administered Medications: None     Allergies   No Known Allergies    Social History   I have reviewed this patient's social history and updated it with pertinent information if needed. Peter Carpoi  reports that he quit smoking about 22 years ago. His smoking use included cigarettes. He has never used smokeless tobacco. He reports current alcohol use. He reports that he does not use drugs.    Family History   I have reviewed this patient's family history and updated it with pertinent information if needed.   No family history on file.    Review of Systems   The 10 point Review of Systems is negative other than noted in the HPI or here.     Physical Exam   Temp: 97.6  F (36.4  C) Temp src: Oral BP: (!) 143/80 Pulse: 70   Resp: 16        Vital Signs with Ranges  Temp:  [97.6  F (36.4  C)] 97.6  F (36.4  C)  Pulse:  [70] 70  Resp:   [16] 16  BP: (143)/(80) 143/80  188 lbs 6.4 oz    Constitutional: Awake, NAD  Eyes: PERRL  Respiratory: normal WOB on RA, equal air entry bilaterally  Cardiovascular: RRR, S1S2 present  GI: abdomen soft, nontender, nondistended. PD catheter in RLQ  Skin: no skin breaks or tears  Musculoskeletal: normal ROM in all joints  Neurologic: motor and sensation grossly intact    Data   Results for orders placed or performed during the hospital encounter of 02/12/25 (from the past 24 hours)   EKG 12-lead, tracing only   Result Value Ref Range    Systolic Blood Pressure  mmHg    Diastolic Blood Pressure  mmHg    Ventricular Rate 64 BPM    Atrial Rate 64 BPM    VA Interval 202 ms    QRS Duration 88 ms     ms    QTc 416 ms    P Axis 38 degrees    R AXIS -14 degrees    T Axis 45 degrees    Interpretation ECG       Sinus rhythm  Possible Anterior infarct (cited on or before 17-Jul-2024)  Abnormal ECG  When compared with ECG of 17-Jul-2024 11:46,  Premature atrial complexes are no longer Present  Questionable change in initial forces of Anteroseptal leads

## 2025-02-13 NOTE — PLAN OF CARE
"Goal Outcome Evaluation:    Plan of Care Reviewed With: patient, spouse    Overall Patient Progress: no change    Outcome Evaluation: Adequate UOP. Pain well controlled w/ prns. CVPs in good range.    /69   Pulse 75   Temp 97.5  F (36.4  C) (Oral)   Resp 16   Ht 1.675 m (5' 5.95\")   Wt 85.5 kg (188 lb 6.4 oz)   SpO2 99%   BMI 30.46 kg/m      Shift: 4989-4356  Isolation Status: None  VS: VSS on RA, afebrile  Neuro: Aox4  Behaviors: Calm, cooperative  BG: Q4hrs, 324  Labs: K 5.3, Hgb 12.2  Respiratory: WDL  Cardiac: WDL, CVPs 7-9  Pain/Nausea: Reports incisional pain. Denies nausea.  PRN: Oxycodone x1  Diet: Regular, fair appetite  IV Access: R PIV, R internal jugular, L AV fistula  Infusion(s): D5LR @ 125ml/hr (maintenance)  GI/: Voiding via jonas, no BM since before surgery, not passing gas yet  Skin: RLQ abd incision & old PD cath site (covered w/ surgical dressing) drainage marked  Mobility: not OOB yet  Events/Education: Started med card & lab book.  Plan: Continue with plan of care.    "

## 2025-02-13 NOTE — PROVIDER NOTIFICATION
02/12/25 2115   Call Information   Date of Call 02/12/25   Time of Call 2108   Name of person requesting the team Courtney RICK   Title of person requesting team RN   RRT Arrival time 2110   Time RRT ended 2115   Reason for call   Type of RRT Adult   Primary reason for call Sepsis suspected   Sepsis Suspected Elevated Lactate level   Was patient transferred from the ED, ICU, or PACU within last 24 hours prior to RRT call? 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 He presents today for evaluation and potential kidney transplant.   Notable History/Conditions Organ failure;Diabetes;Cardiac   Assessment VSS   Interventions No interventions   Adjustments to Recommend None   Patient Outcome   Patient Outcome Stabilized on unit   RRT Team   Attending/Primary/Covering Physician Surg/Xplant   Date Attending Physician notified 02/12/25   Time Attending Physician notified 2108   Physician(s) Debbie KRUGER PA-C   Lead RN Max H   Post RRT Intervention Assessment   Post RRT Assessment Stable/Improved   Date Follow Up Done 02/13/25   Time Follow Up Done 0015   Comments LA down to 2.0

## 2025-02-13 NOTE — ANESTHESIA PROCEDURE NOTES
Arterial Line Procedure Note    Pre-Procedure   Staff -        Anesthesiologist:  Suhail Liu MD       Performed By: anesthesiologist       Location: OR       Pre-Anesthestic Checklist: patient identified, IV checked, risks and benefits discussed, informed consent, monitors and equipment checked, pre-op evaluation and at physician/surgeon's request  Timeout:       Correct Patient: Yes        Correct Procedure: Yes        Correct Site: Yes        Correct Position: Yes   Line Placement:   This line was placed Post Induction starting at 2/13/2025 6:37 AM and ending at 2/13/2025 6:47 AM  Procedure   Procedure: arterial line and new line       Laterality: right       Insertion Site: radial.  Sterile Prep        Standard elements of sterile barrier followed       Skin prep: Chloraprep  Insertion/Injection        Technique: ultrasound guided        1. Ultrasound was used to evaluate the access site.       2. Artery evaluated via ultrasound for patency/adequacy.       3. Using real-time ultrasound the needle/catheter was observed entering the artery/vein.       Catheter Type/Size: 20 G, 12 cm  Narrative         Secured by: suture       Tegaderm dressing used.       Complications: None apparent (failed on the left x2. hematoma),        Arterial waveform: Yes        IBP within 10% of NIBP: Yes

## 2025-02-13 NOTE — ANESTHESIA POSTPROCEDURE EVALUATION
Patient: Peter Carpio    Procedure: Procedure(s):  Transplant kidney recipient  donor with ureteral stent placement and peritoneal dialysis catheter removal       Anesthesia Type:  General    Note:  Disposition: Inpatient   Postop Pain Control: Uneventful            Sign Out: Well controlled pain   PONV: No   Neuro/Psych: Uneventful            Sign Out: Acceptable/Baseline neuro status   Airway/Respiratory: Uneventful            Sign Out: Acceptable/Baseline resp. status   CV/Hemodynamics: Uneventful            Sign Out: Acceptable CV status; No obvious hypovolemia; No obvious fluid overload   Other NRE: NONE   DID A NON-ROUTINE EVENT OCCUR? No           Last vitals:  Vitals Value Taken Time   /71 25 1115   Temp 37  C (98.6  F) 25 1006   Pulse 77 25 1119   Resp 17 25 1119   SpO2 99 % 25 1119   Vitals shown include unfiled device data.    Electronically Signed By: Jese Felix MD, MD  2025  11:19 AM

## 2025-02-13 NOTE — CODE/RAPID RESPONSE
Rapid Response Team Note    Assessment   A rapid response was called on Peter Carpio due to lactic acidosis. Unclear etiology of elevated lactic. Clinically appears well, stable vitals without infectious symptoms and here for possible kidney transplant. Possible that lactic acid chronically elevated in setting of PD vs difficult lab draw. Has multiple pending pre-transplant labs.     Plan   -  Pending CXR, UA amongst additional pre-transplant labs  -  Recheck lactic per protocol  -  The  Transplant Surgery  primary team was able to be reached and they are in agreement with the above plan.  -  Disposition: The patient will remain on the current unit. We will continue to monitor this patient closely.  -  Reassessment and plan follow-up will be performed by the primary team    Debbie Zhou PA-C  Rapid Response Team JADE  Securely message with [employer]     Medical Decision Making       25 MINUTES SPENT BY ME on the date of service doing chart review, history, exam, documentation & further activities per the note.        Hospital Course   Brief Summary of events leading to rapid response:   Admitted for possible kidney transplant. Feeling well - no infectious concerns. Denies fevers, cough, shortness of breath, abdominal pain, nausea, vomiting, diarrhea or dysuria. He is a difficult draw - explained its possible that lab is falsely elevated in setting of difficult stick as well in setting of PD.     Physical Exam   Vital Signs: Temp: 97.6  F (36.4  C) Temp src: Oral BP: (!) 143/80 Pulse: 70   Resp: 16        Weight: 188 lbs 6.4 oz      Exam:   General Appearance: Awake. Alert and oriented x4. Ambulatory around the room. Non-toxic appearing.   Respiratory: Normal work of breathing on room air. Lungs CTAB. No wheezes.   Cardiovascular: RRR. S1, S2. No murmurs.   GI: Abdomen non-distended. Soft, non-tender. No guarding.   Skin: Warm, dry.

## 2025-02-14 LAB
ANION GAP SERPL CALCULATED.3IONS-SCNC: 16 MMOL/L (ref 7–15)
BASOPHILS # BLD AUTO: 0 10E3/UL (ref 0–0.2)
BASOPHILS NFR BLD AUTO: 0 %
BUN SERPL-MCNC: 61 MG/DL (ref 8–23)
CALCIUM SERPL-MCNC: 8.9 MG/DL (ref 8.8–10.4)
CHLORIDE SERPL-SCNC: 101 MMOL/L (ref 98–107)
CREAT SERPL-MCNC: 5.25 MG/DL (ref 0.67–1.17)
EBV VCA IGM SER IA-ACNC: <10 U/ML
EBV VCA IGM SER IA-ACNC: NORMAL
EGFRCR SERPLBLD CKD-EPI 2021: 11 ML/MIN/1.73M2
EOSINOPHIL # BLD AUTO: 0 10E3/UL (ref 0–0.7)
EOSINOPHIL NFR BLD AUTO: 0 %
ERYTHROCYTE [DISTWIDTH] IN BLOOD BY AUTOMATED COUNT: 13.7 % (ref 10–15)
GLUCOSE BLDC GLUCOMTR-MCNC: 106 MG/DL (ref 70–99)
GLUCOSE BLDC GLUCOMTR-MCNC: 114 MG/DL (ref 70–99)
GLUCOSE BLDC GLUCOMTR-MCNC: 115 MG/DL (ref 70–99)
GLUCOSE BLDC GLUCOMTR-MCNC: 127 MG/DL (ref 70–99)
GLUCOSE BLDC GLUCOMTR-MCNC: 142 MG/DL (ref 70–99)
GLUCOSE BLDC GLUCOMTR-MCNC: 144 MG/DL (ref 70–99)
GLUCOSE BLDC GLUCOMTR-MCNC: 146 MG/DL (ref 70–99)
GLUCOSE BLDC GLUCOMTR-MCNC: 150 MG/DL (ref 70–99)
GLUCOSE BLDC GLUCOMTR-MCNC: 152 MG/DL (ref 70–99)
GLUCOSE BLDC GLUCOMTR-MCNC: 152 MG/DL (ref 70–99)
GLUCOSE BLDC GLUCOMTR-MCNC: 155 MG/DL (ref 70–99)
GLUCOSE BLDC GLUCOMTR-MCNC: 159 MG/DL (ref 70–99)
GLUCOSE BLDC GLUCOMTR-MCNC: 160 MG/DL (ref 70–99)
GLUCOSE BLDC GLUCOMTR-MCNC: 165 MG/DL (ref 70–99)
GLUCOSE BLDC GLUCOMTR-MCNC: 167 MG/DL (ref 70–99)
GLUCOSE BLDC GLUCOMTR-MCNC: 169 MG/DL (ref 70–99)
GLUCOSE BLDC GLUCOMTR-MCNC: 175 MG/DL (ref 70–99)
GLUCOSE BLDC GLUCOMTR-MCNC: 180 MG/DL (ref 70–99)
GLUCOSE BLDC GLUCOMTR-MCNC: 194 MG/DL (ref 70–99)
GLUCOSE BLDC GLUCOMTR-MCNC: 224 MG/DL (ref 70–99)
GLUCOSE BLDC GLUCOMTR-MCNC: 225 MG/DL (ref 70–99)
GLUCOSE BLDC GLUCOMTR-MCNC: 233 MG/DL (ref 70–99)
GLUCOSE BLDC GLUCOMTR-MCNC: 99 MG/DL (ref 70–99)
GLUCOSE BLDC GLUCOMTR-MCNC: 99 MG/DL (ref 70–99)
GLUCOSE SERPL-MCNC: 160 MG/DL (ref 70–99)
HCO3 SERPL-SCNC: 20 MMOL/L (ref 22–29)
HCT VFR BLD AUTO: 32.5 % (ref 40–53)
HGB BLD-MCNC: 10.9 G/DL (ref 13.3–17.7)
HGB BLD-MCNC: 10.9 G/DL (ref 13.3–17.7)
HGB BLD-MCNC: 11.4 G/DL (ref 13.3–17.7)
IMM GRANULOCYTES # BLD: 0.1 10E3/UL
IMM GRANULOCYTES NFR BLD: 1 %
LYMPHOCYTES # BLD AUTO: 0.3 10E3/UL (ref 0.8–5.3)
LYMPHOCYTES NFR BLD AUTO: 3 %
MAGNESIUM SERPL-MCNC: 2.1 MG/DL (ref 1.7–2.3)
MCH RBC QN AUTO: 32.1 PG (ref 26.5–33)
MCHC RBC AUTO-ENTMCNC: 33.5 G/DL (ref 31.5–36.5)
MCV RBC AUTO: 96 FL (ref 78–100)
MONOCYTES # BLD AUTO: 0.9 10E3/UL (ref 0–1.3)
MONOCYTES NFR BLD AUTO: 7 %
NEUTROPHILS # BLD AUTO: 11 10E3/UL (ref 1.6–8.3)
NEUTROPHILS NFR BLD AUTO: 89 %
NRBC # BLD AUTO: 0 10E3/UL
NRBC BLD AUTO-RTO: 0 /100
PHOSPHATE SERPL-MCNC: 4.6 MG/DL (ref 2.5–4.5)
PLATELET # BLD AUTO: 157 10E3/UL (ref 150–450)
POTASSIUM SERPL-SCNC: 4 MMOL/L (ref 3.4–5.3)
RBC # BLD AUTO: 3.4 10E6/UL (ref 4.4–5.9)
SODIUM SERPL-SCNC: 137 MMOL/L (ref 135–145)
WBC # BLD AUTO: 12.3 10E3/UL (ref 4–11)

## 2025-02-14 PROCEDURE — 250N000009 HC RX 250

## 2025-02-14 PROCEDURE — 84132 ASSAY OF SERUM POTASSIUM: CPT | Performed by: STUDENT IN AN ORGANIZED HEALTH CARE EDUCATION/TRAINING PROGRAM

## 2025-02-14 PROCEDURE — 250N000011 HC RX IP 250 OP 636: Mod: JZ

## 2025-02-14 PROCEDURE — 99223 1ST HOSP IP/OBS HIGH 75: CPT | Mod: 24 | Performed by: NURSE PRACTITIONER

## 2025-02-14 PROCEDURE — 120N000011 HC R&B TRANSPLANT UMMC

## 2025-02-14 PROCEDURE — 80048 BASIC METABOLIC PNL TOTAL CA: CPT | Performed by: STUDENT IN AN ORGANIZED HEALTH CARE EDUCATION/TRAINING PROGRAM

## 2025-02-14 PROCEDURE — 82310 ASSAY OF CALCIUM: CPT | Performed by: STUDENT IN AN ORGANIZED HEALTH CARE EDUCATION/TRAINING PROGRAM

## 2025-02-14 PROCEDURE — 99233 SBSQ HOSP IP/OBS HIGH 50: CPT | Mod: 24 | Performed by: PHYSICIAN ASSISTANT

## 2025-02-14 PROCEDURE — 99418 PROLNG IP/OBS E/M EA 15 MIN: CPT | Performed by: NURSE PRACTITIONER

## 2025-02-14 PROCEDURE — 250N000011 HC RX IP 250 OP 636: Performed by: STUDENT IN AN ORGANIZED HEALTH CARE EDUCATION/TRAINING PROGRAM

## 2025-02-14 PROCEDURE — 250N000012 HC RX MED GY IP 250 OP 636 PS 637: Performed by: STUDENT IN AN ORGANIZED HEALTH CARE EDUCATION/TRAINING PROGRAM

## 2025-02-14 PROCEDURE — 36415 COLL VENOUS BLD VENIPUNCTURE: CPT | Performed by: STUDENT IN AN ORGANIZED HEALTH CARE EDUCATION/TRAINING PROGRAM

## 2025-02-14 PROCEDURE — 84100 ASSAY OF PHOSPHORUS: CPT | Performed by: STUDENT IN AN ORGANIZED HEALTH CARE EDUCATION/TRAINING PROGRAM

## 2025-02-14 PROCEDURE — 83735 ASSAY OF MAGNESIUM: CPT | Performed by: STUDENT IN AN ORGANIZED HEALTH CARE EDUCATION/TRAINING PROGRAM

## 2025-02-14 PROCEDURE — 85018 HEMOGLOBIN: CPT | Performed by: STUDENT IN AN ORGANIZED HEALTH CARE EDUCATION/TRAINING PROGRAM

## 2025-02-14 PROCEDURE — 85004 AUTOMATED DIFF WBC COUNT: CPT

## 2025-02-14 PROCEDURE — 250N000013 HC RX MED GY IP 250 OP 250 PS 637

## 2025-02-14 PROCEDURE — 250N000013 HC RX MED GY IP 250 OP 250 PS 637: Performed by: STUDENT IN AN ORGANIZED HEALTH CARE EDUCATION/TRAINING PROGRAM

## 2025-02-14 PROCEDURE — 250N000012 HC RX MED GY IP 250 OP 636 PS 637: Performed by: NURSE PRACTITIONER

## 2025-02-14 PROCEDURE — 258N000003 HC RX IP 258 OP 636

## 2025-02-14 RX ORDER — METHYLPREDNISOLONE SODIUM SUCCINATE 125 MG/2ML
100 INJECTION INTRAMUSCULAR; INTRAVENOUS ONCE
Status: COMPLETED | OUTPATIENT
Start: 2025-02-15 | End: 2025-02-15

## 2025-02-14 RX ORDER — ALBUTEROL SULFATE 90 UG/1
1-2 INHALANT RESPIRATORY (INHALATION)
Status: CANCELLED
Start: 2025-02-17

## 2025-02-14 RX ORDER — ALBUTEROL SULFATE 0.83 MG/ML
2.5 SOLUTION RESPIRATORY (INHALATION)
Status: CANCELLED | OUTPATIENT
Start: 2025-02-17

## 2025-02-14 RX ORDER — DIPHENHYDRAMINE HYDROCHLORIDE 50 MG/ML
50 INJECTION, SOLUTION INTRAMUSCULAR; INTRAVENOUS
Status: CANCELLED
Start: 2025-02-17

## 2025-02-14 RX ORDER — PREDNISONE 5 MG/1
5 TABLET ORAL DAILY
Status: DISCONTINUED | OUTPATIENT
Start: 2025-03-13 | End: 2025-02-17 | Stop reason: HOSPADM

## 2025-02-14 RX ORDER — PREDNISONE 20 MG/1
20 TABLET ORAL DAILY
Status: DISCONTINUED | OUTPATIENT
Start: 2025-02-20 | End: 2025-02-17 | Stop reason: HOSPADM

## 2025-02-14 RX ORDER — PREDNISONE 10 MG/1
10 TABLET ORAL DAILY
Status: DISCONTINUED | OUTPATIENT
Start: 2025-03-06 | End: 2025-02-17 | Stop reason: HOSPADM

## 2025-02-14 RX ORDER — HEPARIN SODIUM (PORCINE) LOCK FLUSH IV SOLN 100 UNIT/ML 100 UNIT/ML
5 SOLUTION INTRAVENOUS
Status: CANCELLED | OUTPATIENT
Start: 2025-02-17

## 2025-02-14 RX ORDER — ASPIRIN 81 MG/1
81 TABLET, CHEWABLE ORAL DAILY
Status: DISCONTINUED | OUTPATIENT
Start: 2025-02-14 | End: 2025-02-17 | Stop reason: HOSPADM

## 2025-02-14 RX ORDER — TAMSULOSIN HYDROCHLORIDE 0.4 MG/1
0.4 CAPSULE ORAL DAILY
Status: DISCONTINUED | OUTPATIENT
Start: 2025-02-14 | End: 2025-02-17 | Stop reason: HOSPADM

## 2025-02-14 RX ORDER — ATORVASTATIN CALCIUM 40 MG/1
40 TABLET, FILM COATED ORAL DAILY
Status: DISCONTINUED | OUTPATIENT
Start: 2025-02-15 | End: 2025-02-17 | Stop reason: HOSPADM

## 2025-02-14 RX ORDER — EPINEPHRINE 1 MG/ML
0.3 INJECTION, SOLUTION, CONCENTRATE INTRAVENOUS EVERY 5 MIN PRN
Status: CANCELLED | OUTPATIENT
Start: 2025-02-17

## 2025-02-14 RX ORDER — SODIUM CHLORIDE, SODIUM LACTATE, POTASSIUM CHLORIDE, CALCIUM CHLORIDE 600; 310; 30; 20 MG/100ML; MG/100ML; MG/100ML; MG/100ML
INJECTION, SOLUTION INTRAVENOUS CONTINUOUS
Status: DISCONTINUED | OUTPATIENT
Start: 2025-02-14 | End: 2025-02-15

## 2025-02-14 RX ORDER — HEPARIN SODIUM,PORCINE 10 UNIT/ML
5-20 VIAL (ML) INTRAVENOUS DAILY PRN
Status: CANCELLED | OUTPATIENT
Start: 2025-02-17

## 2025-02-14 RX ORDER — GABAPENTIN 100 MG/1
200 CAPSULE ORAL AT BEDTIME
Status: DISCONTINUED | OUTPATIENT
Start: 2025-02-14 | End: 2025-02-16

## 2025-02-14 RX ORDER — PREDNISONE 20 MG/1
40 TABLET ORAL DAILY
Status: DISCONTINUED | OUTPATIENT
Start: 2025-02-18 | End: 2025-02-17 | Stop reason: HOSPADM

## 2025-02-14 RX ORDER — CHLORAL HYDRATE 500 MG
2 CAPSULE ORAL 2 TIMES DAILY
Status: DISCONTINUED | OUTPATIENT
Start: 2025-02-14 | End: 2025-02-17 | Stop reason: HOSPADM

## 2025-02-14 RX ORDER — METHYLPREDNISOLONE SODIUM SUCCINATE 40 MG/ML
40 INJECTION INTRAMUSCULAR; INTRAVENOUS
Status: CANCELLED
Start: 2025-02-17

## 2025-02-14 RX ORDER — DIPHENHYDRAMINE HYDROCHLORIDE 50 MG/ML
25 INJECTION, SOLUTION INTRAMUSCULAR; INTRAVENOUS
Status: CANCELLED
Start: 2025-02-17

## 2025-02-14 RX ADMIN — ACETAMINOPHEN 975 MG: 325 TABLET, FILM COATED ORAL at 22:02

## 2025-02-14 RX ADMIN — INSULIN ASPART 8 UNITS: 100 INJECTION, SOLUTION INTRAVENOUS; SUBCUTANEOUS at 18:46

## 2025-02-14 RX ADMIN — FUROSEMIDE 80 MG: 10 INJECTION, SOLUTION INTRAMUSCULAR; INTRAVENOUS at 15:21

## 2025-02-14 RX ADMIN — ACETAMINOPHEN 975 MG: 325 TABLET, FILM COATED ORAL at 06:23

## 2025-02-14 RX ADMIN — SODIUM CHLORIDE, POTASSIUM CHLORIDE, SODIUM LACTATE AND CALCIUM CHLORIDE: 600; 310; 30; 20 INJECTION, SOLUTION INTRAVENOUS at 12:44

## 2025-02-14 RX ADMIN — INSULIN HUMAN 30 UNITS: 100 INJECTION, SUSPENSION SUBCUTANEOUS at 15:31

## 2025-02-14 RX ADMIN — SODIUM CHLORIDE, SODIUM LACTATE, POTASSIUM CHLORIDE, CALCIUM CHLORIDE AND DEXTROSE MONOHYDRATE: 5; 600; 310; 30; 20 INJECTION, SOLUTION INTRAVENOUS at 10:54

## 2025-02-14 RX ADMIN — TACROLIMUS 2.5 MG: 1 CAPSULE ORAL at 08:05

## 2025-02-14 RX ADMIN — MYCOPHENOLATE MOFETIL 750 MG: 250 CAPSULE ORAL at 08:05

## 2025-02-14 RX ADMIN — MYCOPHENOLATE MOFETIL 750 MG: 250 CAPSULE ORAL at 17:54

## 2025-02-14 RX ADMIN — TAMSULOSIN HYDROCHLORIDE 0.4 MG: 0.4 CAPSULE ORAL at 15:30

## 2025-02-14 RX ADMIN — ASPIRIN 81 MG CHEWABLE TABLET 81 MG: 81 TABLET CHEWABLE at 15:30

## 2025-02-14 RX ADMIN — OXYCODONE HYDROCHLORIDE 5 MG: 5 TABLET ORAL at 18:45

## 2025-02-14 RX ADMIN — SENNOSIDES AND DOCUSATE SODIUM 1 TABLET: 50; 8.6 TABLET ORAL at 08:06

## 2025-02-14 RX ADMIN — INSULIN HUMAN 0 UNITS/HR: 1 INJECTION, SOLUTION INTRAVENOUS at 14:19

## 2025-02-14 RX ADMIN — SODIUM CHLORIDE 250 MG: 9 INJECTION, SOLUTION INTRAVENOUS at 15:17

## 2025-02-14 RX ADMIN — FUROSEMIDE 80 MG: 10 INJECTION, SOLUTION INTRAMUSCULAR; INTRAVENOUS at 08:22

## 2025-02-14 RX ADMIN — TACROLIMUS 2.5 MG: 1 CAPSULE ORAL at 17:54

## 2025-02-14 RX ADMIN — ACETAMINOPHEN 975 MG: 325 TABLET, FILM COATED ORAL at 14:11

## 2025-02-14 RX ADMIN — POLYETHYLENE GLYCOL 3350 17 G: 17 POWDER, FOR SOLUTION ORAL at 08:24

## 2025-02-14 RX ADMIN — SULFAMETHOXAZOLE AND TRIMETHOPRIM 1 TABLET: 400; 80 TABLET ORAL at 08:06

## 2025-02-14 RX ADMIN — OXYCODONE 2.5 MG: 5 TABLET ORAL at 14:11

## 2025-02-14 RX ADMIN — SENNOSIDES AND DOCUSATE SODIUM 1 TABLET: 50; 8.6 TABLET ORAL at 20:03

## 2025-02-14 RX ADMIN — GABAPENTIN 200 MG: 100 CAPSULE ORAL at 22:02

## 2025-02-14 RX ADMIN — ATORVASTATIN CALCIUM 20 MG: 20 TABLET, FILM COATED ORAL at 08:06

## 2025-02-14 RX ADMIN — OMEGA-3 FATTY ACIDS CAP 1000 MG 2 G: 1000 CAP at 20:02

## 2025-02-14 ASSESSMENT — ACTIVITIES OF DAILY LIVING (ADL)
ADLS_ACUITY_SCORE: 36
DEPENDENT_IADLS:: INDEPENDENT
ADLS_ACUITY_SCORE: 36
ADLS_ACUITY_SCORE: 41
ADLS_ACUITY_SCORE: 36
ADLS_ACUITY_SCORE: 36
ADLS_ACUITY_SCORE: 41
ADLS_ACUITY_SCORE: 36
ADLS_ACUITY_SCORE: 41
ADLS_ACUITY_SCORE: 41
ADLS_ACUITY_SCORE: 36
ADLS_ACUITY_SCORE: 41
ADLS_ACUITY_SCORE: 36

## 2025-02-14 NOTE — PROGRESS NOTES
[facility]  Transplant Nephrology Progress Note  Date of Admission:  2/12/2025  Today's Date: 02/14/2025  Requesting physician: Wali Newell*    Recommendations:   - Continue current immunosuppression.  - No acute indications for dialysis.     Assessment & Plan   # DDKT: Trend down. Good urine output.    - Baseline Creatinine: ~ TBD   - Proteinuria: Not checked post transplant   - DSA Hx: Not checked recently due to time from transplant   - Last cPRA: 0%   - BK Viremia: Not checked recently due to time from transplant   - Kidney Tx Biopsy Hx: No biopsy history.   - Transplant Ureteral Stent: Yes    # Immunosuppression: Tacrolimus immediate release (goal 8-10), Mycophenolate mofetil (dose 750 mg every 12 hours), and Methylprednisolone (dose taper)   - Induction with Recent Transplant:  Low Intensity Protocol   - Continue with intensive monitoring of immunosuppression for efficacy and toxicity.   - Historical Changes in Immunosuppression: None   - Changes: Not at this time    # Infection Prevention:     - PJP: Sulfa/TMP (Bactrim)  - CMV: Valganciclovir (Valcyte)      - CMV IgG Ab High Risk Discordance (D+/R-) at time of transplant: No  Present CMV Serostatus: Negative  - EBV IgG Ab High Risk Discordance (D+/R-) at time of transplant: No  Present EBV Serostatus: Positive    # Hypertension: Controlled;  Goal BP: < 150/90   - PTA medications: atenolol 50 mg daily and furosemide 20 mg daily.   - Changes: Not at this time    # Diabetes: Borderline control (HbA1c 7-9%) Last HbA1c: 7.1%   - Management per Surgery.     # Anemia in Chronic Renal Disease: Hgb: Stable, low      BARBER: No   - Iron studies:  normal iron panel with high ferritin     # Mineral Bone Disorder:    - Secondary renal hyperparathyroidism; PTH level: Moderately elevated (301-600 pg/ml)        On treatment: None  - Vitamin D; level: Not checked recently        On supplement: No  - Calcium; level:  Normal        On supplement: No  - Phosphorus; level: High        On supplement: No    # Electrolytes: Patient has metabolic acidosis.  - Potassium; level: Normal        On supplement: No  - Magnesium; level: Normal        On supplement: Yes; magnesium oxide 400 mg daily  - Bicarbonate; level: Low        On supplement: No  - Sodium; level: Normal    # Other Significant PMH:   - Gout: on allopurinol.  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 Saw Dr. Pham cancer specialist place out of [facility] Undergoing surveillance colonoscopy. Last colonoscopy in march 2024 with out any new lesions.   - CAD: cardiac catheterization 7/24: pLAD to mLAD 70% stenosis and mLAD 40% stenosis. He is now s/p PCI with EDWIN x 1 to LAD.   - NSTEMI: in 2022. Stress test showed very small defect. No intervention pursued due to kidney function at that time. On statin, ASA 81, and Imdur.     # Transplant History:  Etiology of Kidney Failure: Diabetes mellitus type 2  Tx: DDKT  Transplant: 2/13/2025 (Kidney)  Significant transplant-related complications: None    Recommendations were communicated to the primary team verbally.    Seen and discussed with Dr. Hall.    Ro Friedman PA-C  Transplant Nephrology      Interval History  Mr. Rivass creatinine is 5.25 (02/14 0550); Trend down.  Good urine output, 2.8 L, although did have urine output prior to transplant while on dialysis.  Other significant labs/tests/vitals: hemoglobin stable, electrolytes stable. Improvement in sodium bicarbonate.   No events overnight.  No chest pain or shortness of breath.  No leg swelling.  Some nausea but no vomiting.  No fever, sweats or chills.      Review of Systems   4 point ROS was obtained and negative except as noted in the Interval History.    MEDICATIONS:  Current Facility-Administered Medications   Medication Dose Route Frequency Provider Last Rate Last Admin    acetaminophen  (TYLENOL) tablet 975 mg  975 mg Oral Q8H Micky Bass MD   975 mg at 02/14/25 0623    atorvastatin (LIPITOR) tablet 20 mg  20 mg Oral Daily Susie Mao APRN CNP   20 mg at 02/14/25 0806    [START ON 2/17/2025] basiliximab (SIMULECT) 20 mg in sodium chloride 0.9 % 50 mL infusion  20 mg Intravenous Once Susie Mao APRN CNP        furosemide (LASIX) injection 80 mg  80 mg Intravenous BID Micky Bass MD   80 mg at 02/14/25 0822    [START ON 2/15/2025] magnesium oxide (MAG-OX) tablet 400 mg  400 mg Oral Daily with lunch Micky Bass MD        methylPREDNISolone Na Suc (solu-MEDROL) 250 mg in sodium chloride 0.9 % 59 mL intermittent infusion  250 mg Intravenous Once Susie Mao APRN CNP        [START ON 2/15/2025] methylPREDNISolone Na Suc (solu-MEDROL) injection 100 mg  100 mg Intravenous Once Susie Mao APRN CNP        mycophenolate (GENERIC EQUIVALENT) capsule 750 mg  750 mg Oral BID IS Micky Bass MD   750 mg at 02/14/25 0805    polyethylene glycol (MIRALAX) Packet 17 g  17 g Oral Daily Micky Bass MD   17 g at 02/14/25 0824    [START ON 2/16/2025] predniSONE (DELTASONE) tablet 60 mg  60 mg Oral Daily Susie Mao APRN CNP        Followed by    [START ON 2/18/2025] predniSONE (DELTASONE) tablet 40 mg  40 mg Oral Daily Susie Mao APRN CNP        Followed by    [START ON 2/20/2025] predniSONE (DELTASONE) tablet 20 mg  20 mg Oral Daily Susie Mao APRN CNP        Followed by    [START ON 2/27/2025] predniSONE (DELTASONE) tablet 15 mg  15 mg Oral Daily Susie Mao APRN CNP        Followed by    [START ON 3/6/2025] predniSONE (DELTASONE) tablet 10 mg  10 mg Oral Daily Susie Mao APRN CNP        Followed by    [START ON 3/13/2025] predniSONE (DELTASONE) tablet 5 mg  5 mg Oral Daily Susie Mao APRN CNP        senna-docusate (SENOKOT-S/PERICOLACE) 8.6-50 MG per tablet 1 tablet  1 tablet Oral BID Micky Bass MD   1 tablet at 02/14/25 0806     "sodium chloride (PF) 0.9% PF flush 10 mL  10 mL Intracatheter Q8H Micky Bass MD   10 mL at 25 1804    sulfamethoxazole-trimethoprim (BACTRIM) 400-80 MG per tablet 1 tablet  1 tablet Oral Q  AM Micky Bass MD   1 tablet at 25 0806    tacrolimus (GENERIC EQUIVALENT) capsule 2.5 mg  0.03 mg/kg Oral BID IS Micky Bass MD   2.5 mg at 25 0805    [START ON 2/15/2025] valGANciclovir (VALCYTE) tablet 450 mg  450 mg Oral Once per day on  Micky Bass MD         Current Facility-Administered Medications   Medication Dose Route Frequency Provider Last Rate Last Admin    dextrose 10% infusion   Intravenous Continuous PRN Roscoe Brown MD        dextrose 5% in lactated ringers infusion   Intravenous Continuous Micky Bass  mL/hr at 25 1100 Rate Verify at 25 1100    insulin regular (MYXREDLIN) 1 unit/mL infusion  0-24 Units/hr Intravenous Continuous Roscoe Brown MD 6 mL/hr at 25 1057 6 Units/hr at 25 1057    sodium chloride 0.45% infusion   Intravenous Continuous PRN Micky Bass MD        sodium chloride 0.9 % infusion  1,000 mL Intravenous Continuous PRN Micky Bass MD   Paused at 25 1600       Physical Exam   Temp  Av.9  F (36.6  C)  Min: 97.6  F (36.4  C)  Max: 98.6  F (37  C)  Arterial Line BP  Min: 141/70  Max: 141/70  Arterial Line MAP (mmHg)  Av mmHg  Min: 90 mmHg  Max: 90 mmHg      Pulse  Av.2  Min: 66  Max: 86 Resp  Avg: 15.2  Min: 12  Max: 18  SpO2  Av %  Min: 95 %  Max: 100 %    CVP (mmHg): 9 mmHgBP 128/70   Pulse 78   Temp 97.4  F (36.3  C) (Oral)   Resp 14   Ht 1.675 m (5' 5.95\")   Wt 85.5 kg (188 lb 6.4 oz)   SpO2 99%   BMI 30.46 kg/m     Date 25 0700 - 25 0659   Shift 7341-2288 2280-8809 4349-0348 24 Hour Total   INTAKE   I.V. 2600   2600   Shift Total(mL/kg) 2600(30.42)   2600(30.42)   OUTPUT   Urine 340   340   Blood 50   50   Shift Total(mL/kg) 390(4.56)   " 390(4.56)   Weight (kg) 85.46 85.46 85.46 85.46      Admit Weight: 85.5 kg (188 lb 6.4 oz)     GENERAL APPEARANCE: alert and no distress  HENT: mouth without ulcers or lesions  RESP: lungs clear to auscultation - no rales, rhonchi or wheezes  CV: regular rhythm, normal rate, no rub, no murmur  EDEMA: no LE edema bilaterally  ABDOMEN: soft, nondistended, nontender, bowel sounds normal  MS: extremities normal - no gross deformities noted, no evidence of inflammation in joints, no muscle tenderness  SKIN: no rash  TX KIDNEY: mild TTP  DIALYSIS ACCESS: none    Data   All labs reviewed by me.  CMP  Recent Labs   Lab 02/14/25  1057 02/14/25  1002 02/14/25  0901 02/14/25  0802 02/14/25  0617 02/14/25  0550 02/14/25  0226 02/14/25  0155 02/13/25  2301 02/13/25  2226 02/13/25  1911 02/13/25  1838 02/13/25  1027 02/13/25  1017 02/13/25  0902 02/13/25  0743 02/13/25  0057 02/12/25  2047   NA  --   --   --   --   --  137  --   --   --   --   --   --   --  136 133* 138  --  139   POTASSIUM  --   --   --   --   --  4.0  4.0  --  4.0  --  4.1  --  4.8   < > 4.3  4.3 4.7 4.4   < > 3.9   CHLORIDE  --   --   --   --   --  101  --   --   --   --   --   --   --  99  --   --   --  98   CO2  --   --   --   --   --  20*  --   --   --   --   --   --   --  17*  --   --   --  21*   ANIONGAP  --   --   --   --   --  16*  --   --   --   --   --   --   --  20*  --   --   --  20*   * 152* 144* 106*   < > 160*   < >  --    < >  --    < >  --    < > 169* 210* 179*   < > 239*   BUN  --   --   --   --   --  61.0*  --   --   --   --   --   --   --  61.3*  --   --   --  63.4*   CR  --   --   --   --   --  5.25*  --   --   --   --   --   --   --  5.49*  --   --   --  5.85*   GFRESTIMATED  --   --   --   --   --  11*  --   --   --   --   --   --   --  10*  --   --   --  9*   GINA  --   --   --   --   --  8.9  --   --   --   --   --   --   --  9.0  --   --   --  9.3   MAG  --   --   --   --   --  2.1  --   --   --   --   --   --   --  2.3  --    --   --   --    PHOS  --   --   --   --   --  4.6*  --   --   --   --   --   --   --  4.8*  --   --   --   --    PROTTOTAL  --   --   --   --   --   --   --   --   --   --   --   --   --   --   --   --   --  6.5   ALBUMIN  --   --   --   --   --   --   --   --   --   --   --   --   --   --   --   --   --  3.9   BILITOTAL  --   --   --   --   --   --   --   --   --   --   --   --   --   --   --   --   --  0.2   ALKPHOS  --   --   --   --   --   --   --   --   --   --   --   --   --   --   --   --   --  59   AST  --   --   --   --   --   --   --   --   --   --   --   --   --   --   --   --   --  17   ALT  --   --   --   --   --   --   --   --   --   --   --   --   --   --   --   --   --  15    < > = values in this interval not displayed.     CBC  Recent Labs   Lab 02/14/25  0550 02/14/25  0155 02/13/25  2226 02/13/25  1838 02/13/25  1546 02/13/25  1017 02/13/25  0743 02/12/25  2046   HGB 10.9*  10.9* 11.4* 11.6* 12.1*   < > 11.9*   < > 13.5   WBC 12.3*  --   --   --   --  8.1  --  7.5   RBC 3.40*  --   --   --   --  3.70*  --  4.16*   HCT 32.5*  --   --   --   --  35.3*  --  39.2*   MCV 96  --   --   --   --  95  --  94   MCH 32.1  --   --   --   --  32.2  --  32.5   MCHC 33.5  --   --   --   --  33.7  --  34.4   RDW 13.7  --   --   --   --  13.3  --  13.4     --   --   --   --  146*  --  182    < > = values in this interval not displayed.     INR  Recent Labs   Lab 02/12/25 2047   INR 0.81*   PTT <20*     ABG  Recent Labs   Lab 02/13/25  0902 02/13/25  0743   PH 7.34* 7.40   PCO2 38 36   PO2 139* 98   HCO3 21 22   O2PER 36.0 28.0      Urine Studies  Recent Labs   Lab Test 02/12/25  2132 07/03/24  0958 05/16/24  1327   COLOR Light Yellow Yellow Yellow   APPEARANCE Slightly Cloudy* Slightly Cloudy* Cloudy*   URINEGLC >=1000* 100* 300*   URINEBILI Negative Negative Negative   URINEKETONE Negative Negative Negative   SG 1.013 1.014 1.017   UBLD Trace* Small* Small*   URINEPH 6.5 6.0 6.0   PROTEIN 100* 30* 200*    NITRITE Negative Negative Negative   LEUKEST Large* Large* Large*   RBCU 8* 12* 21*   WBCU >182* >182* >182*     No lab results found.  PTH  No lab results found.  Iron Studies  No lab results found.    IMAGING:  All imaging studies reviewed by me.

## 2025-02-14 NOTE — PROGRESS NOTES
Care Management Initial Consult    General Information  Assessment completed with: Patient, Spouse or significant other, pt and SO  Type of CM/SW Visit: Initial Assessment    Primary Care Provider verified and updated as needed: Yes   Readmission within the last 30 days: no previous admission in last 30 days      Reason for Consult: discharge planning  Advance Care Planning: Advance Care Planning Reviewed: no concerns identified          Communication Assessment  Patient's communication style: spoken language (English or Bilingual)    Hearing Difficulty or Deaf: yes   Wear Glasses or Blind: yes    Cognitive  Cognitive/Neuro/Behavioral: WDL  Level of Consciousness: alert  Arousal Level: opens eyes spontaneously  Orientation: oriented x 4  Mood/Behavior: calm, cooperative  Best Language: 0 - No aphasia  Speech: spontaneous, logical, clear    Living Environment:   People in home: significant other  pt and Isabella  Current living Arrangements:        Able to return to prior arrangements: yes       Family/Social Support:  Care provided by: spouse/significant other  Provides care for: no one  Marital Status: Lives with Significant Other  Support system: Significant Other, Children       Isabella  Description of Support System: Supportive    Support Assessment: Adequate family and caregiver support    Current Resources:   Patient receiving home care services: No        Community Resources: None  Equipment currently used at home: none  Supplies currently used at home: None    Employment/Financial:  Employment Status: unemployed        Financial Concerns: none   Referral to Financial Worker: No       Does the patient's insurance plan have a 3 day qualifying hospital stay waiver?  No    Lifestyle & Psychosocial Needs:  Social Drivers of Health     Food Insecurity: Low Risk  (2/12/2025)    Food Insecurity     Within the past 12 months, did you worry that your food would run out before you got money to buy more?: No     Within  the past 12 months, did the food you bought just not last and you didn t have money to get more?: No   Depression: Not at risk (2/4/2025)    [facility]    PHQ-2     PHQ-2 Score: 0   Housing Stability: Low Risk  (2/12/2025)    Housing Stability     Do you have housing? : Yes     Are you worried about losing your housing?: No   Tobacco Use: Medium Risk (2/13/2025)    Patient History     Smoking Tobacco Use: Former     Smokeless Tobacco Use: Never     Passive Exposure: Not on file   Financial Resource Strain: Low Risk  (2/12/2025)    Financial Resource Strain     Within the past 12 months, have you or your family members you live with been unable to get utilities (heat, electricity) when it was really needed?: No   Alcohol Use: Not on file   Transportation Needs: Low Risk  (2/12/2025)    Transportation Needs     Within the past 12 months, has lack of transportation kept you from medical appointments, getting your medicines, non-medical meetings or appointments, work, or from getting things that you need?: No   Physical Activity: Not on file   Interpersonal Safety: Low Risk  (2/13/2025)    Interpersonal Safety     Do you feel physically and emotionally safe where you currently live?: Yes     Within the past 12 months, have you been hit, slapped, kicked or otherwise physically hurt by someone?: No     Within the past 12 months, have you been humiliated or emotionally abused in other ways by your partner or ex-partner?: No   Stress: Not on file   Social Connections: Unknown (1/1/2022)    Received from [facility], [facility]    Social Connections     Frequency of Communication with Friends and Family: Not on file   Health Literacy: Not on file       Functional Status:  Prior to admission patient needed assistance:   Dependent ADLs:: Independent  Dependent IADLs:: Independent       Mental Health Status:  Mental Health Status: No Current  Concerns       Chemical Dependency Status:  Chemical Dependency Status: No Current Concerns             Values/Beliefs:  Spiritual, Cultural Beliefs, Orthodoxy Practices, Values that affect care: no  Description of Beliefs that Will Affect Care: Olivia            Discussed  Partnership in Safe Discharge Planning  document with patient/family: No    Additional Information:  Patient underwent a  donor kidney transplant on 2025.  Met with patient bedside to update psychosocial assessment and provide brief education about SW role while inpatient, as well as expectations/requirements and follow up needs post-transplant. SW also provided education about need for compliance with transplant medications, and explained ESRD Medicare benefits and medication coverage under Medicare part B. Medicare 2728 forms completed and signed by patient.    SW met with PT post transplant to discuss discharge planning and any post transplant psychosocial needs. PT states that he has no mental health needs at this time and SW gave contact information for his SW (Berna Espinosa [phone]) to call if needs or questions are identified at a later time.          Next Steps: SOTSW team will continue to follow for psychosocial support and safe discharge planning.     GIOVANNA Gerber [facility]  Specialty Float   [phone]  [email or web address]

## 2025-02-14 NOTE — PROGRESS NOTES
CLINICAL NUTRITION SERVICES - DISCHARGE NOTE    Patient s discharge needs assessed and discharge planning has been conducted with the multidisciplinary transplant care team including physicians, pharmacy, social work and transplant coordinator.    Discharge instructions written     Follow up/Monitoring:  Once discharged, place outpatient nutrition consult via the transplant team if nutrition concerns arise.    Christiana Caraballo MS/RD/LD/CNSC  Available on [employer]   M-F (7am-3:30pm) - 7A/7B Clinical Dietitian  Weekend/Holiday Dietitian (7am-3:30pm)    ** Clinical Dietitians no longer available on pager

## 2025-02-14 NOTE — DISCHARGE SUMMARY
[facility]    Discharge Summary  Transplant Surgery    Date of Admission:  2025  Date of Discharge:  2025  Discharging Provider: Graciela Chopra PA-C  Date of Service (when I saw the patient): 25    Discharge Diagnoses   Principal Problem:    Kidney replaced by transplant  Active Problems:    Diabetes mellitus, type 2 (H)    Immunosuppressed status    Drug-induced hyperglycemia      Procedure/Surgery Information   Procedure: Procedure(s):  Transplant kidney recipient  donor with ureteral stent placement and peritoneal dialysis catheter removal  Remove catheter peritoneal   Surgeon(s): Surgeons and Role:     * Wali Newell MD - Primary     * Anupama Arevalo MD - Resident - Assisting     * Minh Lopez MD - Resident - Assisting     * Micky Bass MD - Fellow - Assisting       Non-operative procedures None performed     History of Present Illness   Peter Carpio is a 74 year old male with medical history significant for 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 He underwent a DCD kidney transplant with stent and PD catheter removal on 25 with Dr. Wali Newell.     Hospital Course   Peter Carpio was admitted on 2025.  The following problems were addressed during his hospitalization:    Kidney transplant: Creatinine down to 2.1 (trending down from 5.9) by day of discharge. Good urine output.  -Post-op US with poor visualization due to packing material packing material with obscured  renal artery and vein anastomosis sites. No perinephric fluid collection.    Immunosuppressed status secondary to medications: cPRA 0, low intensity induction  Basiliximab: POD 0 & 4 (completed inpatient)  Steroids:  Taper per protocol.  MMF: 750 mg BID  Tacro: Goal level 8-10. Level 5.1. Increase from 2.5 mg BID to 3 mg BID. Check level tomorrow.  -Infectious  prophylaxis with Bactrim indefinitely and valganciclovir x 6 months.    Transplant coordinator: Ruth Jacob, phone:  [phone]  Donor type: DCD  DSA at time of transplant: no  Ureteral stent: yes  CMV:  Donor + / Recipient -  EBV:  Donor + / Recipient +  Induction: Low intensity (Basiliximab x 2 doses/steroid taper)    Neuro:  Acute post op pain:    - Tylenol to PRN   - Stopped oxycodone (not using)     Hematology:   Anemia of chronic disease: Hgb ~10-11, stable.   Leukocytosis: Likely related to steroids, improving.      Cardiorespiratory:   HTN: PTA atenolol.    - Continue metoprolol 12.5 mg BID.   CAD; NSTEMI; HLD: s/p PCI with EDWIN x 1 to LAD in 07/2024. PTA ASA, isosorbide, and atorvastatin.    - Continue ASA, statin, and metoprolol.      GI/Nutrition: Regular diet  Constipation: Continue senna BID, Miralax daily.   Nausea/GI upset:   - Protonix started due to GI upset in the setting of high dose steroids    - Zofran PRN     Endocrine:   DM2; Steroid induced hyperglycemia: PTA on 70/30 BID. Endocrine consulted.   - Appreciate Endocrinology recommendations.     Diabetes Management Discharge Plan:    Glucose Control Regimen:  1) Take your 70/30 (humulin 70/30) before breakfast and before dinner. Adjust the dose of 70/30 depending on your prednisone dose (dates below subject to change per your transplant team's recommendations):  Date Prednisone dose  Breakfast 70/30 Dinner 70/30   2/16-2/17 60 mg 68 units 28 units   2/18-2/19 40 mg 50 units 23 units   2/20-2/26 20 mg  40 units 19 units   2/27-3/5 15 mg 38 units 16 units   3/6-3/12 10 mg 35 units 15 units   3/13 onwards 5 mg (or less) 35 units 15 units         2) Rapid-acting insulin: lispro (Humalog) correction - see chart below. Take for high blood glucoses three times daily before meals and at bedtime.  You may add the correction dose to the carbohydrate coverage/mealtime insulin dose and give in one injection--ideally 10-15 minutes before a meal.  You  may take the correction dose even if you skip a meal (as long as it has been 4 hours since previous correction dose).      Pre-meal correction scale:     Blood Glucose Insulin Lispro Before Meals:   Less than 140 0 units   140 -189  1 units   190 - 239 2 units   240 - 289 3 units   290 - 339 4 units    340 - 389  5 units   390 - 439 6 units   440 or more 7 units      Bedtime correction scale:      Blood Glucose Insulin Lispro At Bedtime:   Less than 200 0 units   200-249  1 units   250 - 299 2 units   300 - 349 3 units   350 - 399 4 units    400 or more 5 units         Outpatient Follow-Up: An appointment request was sent to the [facility] coordinator to schedule your outpatient diabetes appointment 1-2 weeks from discharge. You can call the [facility] at [phone] if you have scheduling questions or do not hear from them within a few days of discharge. Follow up sooner if blood glucose runs consistently greater than 200 mg/dL or if having more than two episodes less than 70 mg/dL.     If you have urgent questions or concerns regarding your blood sugars or insulin, you may contact [phone] (the main hospital ). Ask to speak with the Endocrinologist on call.     Your target A1c value is less than 7.5% to help prevent future complications from diabetes.       Fluid/Electrolytes:   Hypokalemia: K 3.4   - K dur 20 mEq x 1  Hypomagnesemia: Ppx    - Continue Mg Oxide 400 mg daily     Infectious disease: No issues.       Discharge Disposition   Discharged to home   Condition at discharge: Stable    Pending Results   These results will be followed up by Ruth Jacob  Unresulted Labs Ordered in the Past 30 Days of this Admission       Date and Time Order Name Status Description    2/13/2025  1:10 AM Prepare red blood cells (unit) Preliminary     2/13/2025  1:10 AM Prepare red blood cells (unit) Preliminary     2/12/2025  7:47 PM HBV HCV HIV by BERE In process     2/12/2025   "7:47 PM BK Virus IgG Antibody In process     2/12/2025  7:47 PM Oxalate serum or plasma In process             Primary Care Physician   Yared Greene    /81 (BP Location: Left arm)   Pulse 73   Temp 97.7  F (36.5  C) (Oral)   Resp 16   Ht 1.675 m (5' 5.95\")   Wt 85.8 kg (189 lb 3.2 oz)   SpO2 100%   BMI 30.59 kg/m    General Appearance: in no apparent distress.   Skin: Warm, dry  Heart: perfused  Lungs: non-labored breathing on RA  Abdomen: The abdomen is soft. Incision closed with staples and open to air.   : jonas is present; removed on rounds  Extremities: edema: trace generalized  Neurologic: awake, alert, and oriented. Tremor absent    Consultations This Hospital Stay   NURSING TO CONSULT FOR VASCULAR ACCESS CARE IP CONSULT  SOT MEDICATION HISTORY IP PHARMACY CONSULT  PHARMACY IP CONSULT  NUTRITION SERVICES ADULT IP CONSULT  NEPHROLOGY KIDNEY/PANCREAS TRANSPLANT ADULT IP CONSULT  NEPHROLOGY KIDNEY/PANCREAS TRANSPLANT ADULT IP CONSULT  PHARMACY IP CONSULT  CARE MANAGEMENT / SOCIAL WORK IP CONSULT  ENDOCRINE DIABETES ADULT IP CONSULT  CARE MANAGEMENT / SOCIAL WORK IP CONSULT  PHARMACY LIAISON FOR MEDICATION COVERAGE CONSULT    Time Spent on this Encounter   I have spent greater than 30 minutes on this discharge.    Discharge Orders   Discharge Medications   Current Discharge Medication List        START taking these medications    Details   acetaminophen (TYLENOL) 325 MG tablet Take 3 tablets (975 mg) by mouth every 6 hours as needed for mild pain or fever.    Associated Diagnoses: Status post kidney transplant      fish oil-omega-3 fatty acids 1000 MG capsule Take 2 capsules (2 g) by mouth 2 times daily.    Associated Diagnoses: Status post kidney transplant      !! insulin aspart (NOVOLOG PEN) 100 UNIT/ML pen Inject 1-7 Units subcutaneously 3 times daily (before meals). Correction Scale -   Do Not give Correction Insulin if Pre-Meal BG less than 140. For Pre-Meal  - 189 give 1 unit. For " Pre-Meal - 239 give 2 units. For Pre-Meal -289 give 3 units. For Pre-Meal -339 give 4 units. For Pre-Meal -389 give 5 units. For Pre-Meal -439 give 6 units. For Pre-Meal BG greater than or equal to 440 give 7 units To be given with prandial insulin, and based on pre-meal blood glucose. Administering insulin within 5 minutes of the start of the meal is ideal. Administer insulin no more than 30 minutes after the start of the meal, unless directed otherwise by provider. Notify provider if glucose greater than or equal to 350 mg/dL after administration of correction dose.  Qty: 15 mL, Refills: 1    Associated Diagnoses: Drug-induced hyperglycemia      !! insulin aspart (NOVOLOG PEN) 100 UNIT/ML pen Inject 1-5 Units subcutaneously at bedtime. Do Not give Bedtime Correction Insulin if BG less than 200. For -249 give 1 units. For -299 give 2 units. For -349 give 3 units. For -399 give 4 units. For BG greater than or equal to 400 give 5 units. Notify provider if glucose greater than or equal to 350 mg/dL after administration of correction dose.  Qty: 15 mL, Refills: 1    Associated Diagnoses: Drug-induced hyperglycemia      magnesium oxide (MAG-OX) 400 MG tablet Take 1 tablet (400 mg) by mouth daily (with lunch).  Qty: 30 tablet, Refills: 3    Associated Diagnoses: Status post kidney transplant      metoprolol tartrate (LOPRESSOR) 25 MG tablet Take 0.5 tablets (12.5 mg) by mouth 2 times daily.  Qty: 30 tablet, Refills: 3    Associated Diagnoses: Status post kidney transplant      mycophenolate (GENERIC EQUIVALENT) 250 MG capsule Take 3 capsules (750 mg) by mouth 2 times daily.  Qty: 180 capsule, Refills: 11    Associated Diagnoses: Status post kidney transplant      ondansetron (ZOFRAN ODT) 4 MG ODT tab Take 1 tablet (4 mg) by mouth every 6 hours as needed for nausea.  Qty: 12 tablet, Refills: 1    Associated Diagnoses: Status post kidney transplant      pantoprazole  (PROTONIX) 40 MG EC tablet Take 1 tablet (40 mg) by mouth daily.  Qty: 30 tablet, Refills: 2    Associated Diagnoses: Status post kidney transplant      polyethylene glycol (MIRALAX) 17 GM/Dose powder Take 17 g by mouth daily.    Associated Diagnoses: Status post kidney transplant      predniSONE (DELTASONE) 10 MG tablet Take 4 tablets (40 mg) by mouth daily for 2 days, THEN 2 tablets (20 mg) daily for 7 days, THEN 1.5 tablets (15 mg) daily for 7 days, THEN 1 tablet (10 mg) daily for 7 days, THEN 0.5 tablets (5 mg) daily for 7 days.  Qty: 20 tablet, Refills: 0    Associated Diagnoses: Status post kidney transplant      senna-docusate (SENOKOT-S/PERICOLACE) 8.6-50 MG tablet Take 1-2 tablets by mouth 2 times daily.    Associated Diagnoses: Status post kidney transplant      sulfamethoxazole-trimethoprim (BACTRIM) 400-80 MG tablet Take 1 tablet by mouth daily.  Qty: 30 tablet, Refills: 11    Associated Diagnoses: Status post kidney transplant      !! tacrolimus (GENERIC EQUIVALENT) 0.5 MG capsule Tacrolimus 0.5 mg capsules BID to allow for dose adjustments.  Qty: 60 capsule, Refills: 11    Associated Diagnoses: Status post kidney transplant      !! tacrolimus (GENERIC EQUIVALENT) 1 MG capsule Take 3 capsules (3 mg) by mouth 2 times daily.  Qty: 180 capsule, Refills: 11    Associated Diagnoses: Status post kidney transplant      valGANciclovir (VALCYTE) 450 MG tablet Take 1 tablet (450 mg) by mouth every other day.  Qty: 60 tablet, Refills: 5    Comments: Please titrate to 900 mg (2 tablets) daily as renal function improves  Associated Diagnoses: Status post kidney transplant       !! - Potential duplicate medications found. Please discuss with provider.        CONTINUE these medications which have CHANGED    Details   atorvastatin (LIPITOR) 40 MG tablet Take 1 tablet (40 mg) by mouth daily.  Qty: 30 tablet, Refills: 5    Associated Diagnoses: Status post kidney transplant      insulin NPH-Regular (HUMULIN 70/30;NOVOLIN  70/30) susp With prednisone 40 mg dose take 50 units prior to breakfast and 23 units prior to dinner. With prednisone 20 mg dose take 40 units prior to breakfast and 19 units prior to dinner. With prednisone 15 mg dose take 38 units prior to breakfast and 16 units prior to dinner. With prednisone 10 mg dose take 35 units prior to breakfast and 15 units prior to dinner. With prednisone 5 mg dose take 35 units prior to breakfast and 15 units prior to dinner.  Qty: 15 mL, Refills: 0    Associated Diagnoses: Drug-induced hyperglycemia           CONTINUE these medications which have NOT CHANGED    Details   allopurinol (ZYLOPRIM) 100 MG tablet Take 100 mg by mouth daily      aspirin 81 MG EC tablet Take 81 mg by mouth daily.      finasteride (PROSCAR) 5 MG tablet Take 1 tablet (5 mg) by mouth daily.  Qty: 90 tablet, Refills: 3    Associated Diagnoses: BPH with obstruction/lower urinary tract symptoms      gabapentin (NEURONTIN) 100 MG capsule Take 200 mg by mouth at bedtime.      tamsulosin (FLOMAX) 0.4 MG capsule Take 0.4 mg by mouth daily.      Continuous Glucose Sensor (FREESTYLE ANTONIO 14 DAY SENSOR) [facility]            STOP taking these medications       atenolol (TENORMIN) 50 MG tablet Comments:   Reason for Stopping:         calcitRIOL (ROCALTROL) 0.25 MCG capsule Comments:   Reason for Stopping:         famotidine (PEPCID) 20 MG tablet Comments:   Reason for Stopping:         furosemide (LASIX) 20 MG tablet Comments:   Reason for Stopping:         gentamicin (GARAMYCIN) 0.3 % ophthalmic solution Comments:   Reason for Stopping:         isosorbide mononitrate (IMDUR) 30 MG 24 hr tablet Comments:   Reason for Stopping:         Multiple Vitamins-Minerals (PRESERVISION AREDS) CAPS Comments:   Reason for Stopping:         Multiple Vitamins-Minerals (SENIOR MULTIVITAMIN PLUS PO) Comments:   Reason for Stopping:         sevelamer carbonate (RENVELA) 800 MG tablet Comments:   Reason for Stopping:         Vitamin D3 (VITAMIN  D-1000 MAX ST) 25 mcg (1000 units) tablet Comments:   Reason for Stopping:                  ADULT [facility] Specialty Follow-up and recommended labs and tests    Please schedule appointment with Patricia Wheeler or Laura Salas within 7-10 days of discharge     Reason for your hospital stay    Patient underwent kidney transplant with ureteral stent placement and PD catheter removal on 2/13/25. History of DM type 2, drug induced hyperglycemia secondary to steroids.     Activity    Your activity upon discharge: Walk at least four times a day, lift no greater than 10 pounds for 6-8 weeks from the time of surgery.  No driving while taking narcotics or 3 weeks after surgery.     ADULT [facility] Specialty Follow-up and recommended labs and tests    [facility] FOLLOW UP:   1. [facility]: Over the next 2 days you will be seen in the [facility] (ph. [phone], option 3). Your labs will be drawn at the beginning of your appointment. DO NOT take your medications prior to having labs drawn. Please bring all your medications with you from home to take after labs are drawn.   2. Follow up with Dr. Wali Newell in Transplant Clinic in 1-2 weeks.   3. Follow up with Transplant Nephrologist as scheduled.   4.  Ureteral stent removal in 4-6 weeks, to be scheduled by Transplant Coordinator. If a  does not contact you for this, please contact your transplant coordinator.   5. Follow up with your primary care provider in ~8 weeks. Patient to schedule.   6. Follow up with [facility]. An appointment request was sent to the [facility] coordinator to schedule your outpatient diabetes appointment 1-2 weeks from discharge. You can call the [facility] at [phone] if you have scheduling questions or do not hear from them within a few days of discharge. Follow up sooner if blood glucose runs consistently greater than 200 mg/dL or if  having more than two episodes less than 70 mg/dL.    Remember to always bring an updated medication list to all appointments.      Call your Transplant Coordinator ([phone]) with questions about Transplant Center appointment scheduling.     LABS:   CBC, BMP, magnesium, phosphorus, tacrolimus level to be drawn daily while in ATC, then every Monday and Thursday by home health care nurse if arranged, or at an outpatient lab.     Monitor and record    Monitor blood pressure and weight daily.     Monitor glucose three times daily before meals, and at bedtime.  Blood Glucose (BG) goals:  mg/dL before meals, less than 180 mg/dL 2 hrs after meals.     When to contact your care team    WHEN TO CONTACT YOUR  COORDINATOR:   Transplant:  Transplant Coordinator: Ruth Jacob, phone: [phone]   Notify your coordinator if you have pain over your kidney, increased redness or drainage from your incision, fever greater than 100F, decreased urine output or new or increased amount of blood in urine.   Notify your coordinator immediately if you are ever unable to take your immunosuppressive medications for any reason.   If you have URGENT concerns after office hours, please call the hospital switchboard at [phone] and ask to have the organ transplant nurse on-call paged. If you have a life-threatening emergency, go to the nearest emergency room.    Endocrine:  -If blood glucose runs consistently greater than 200 mg/dL or if having more than two episodes less than 70 mg/dL follow up with [facility] Endocrine clinic. Call clinic to schedule appointment, phone: [phone].  -If you have urgent questions or concerns regarding your blood sugars or insulin, you may contact [phone] (the main hospital ). Ask to speak with the Endocrinologist on call.     Wound care and dressings    Instructions to care for your wound at home: If you have staples in place, they will be removed in 3 weeks after operation. Wash  "incision daily with soap and water. Do not soak or scrub.   .     Diet    Follow this diet upon discharge: Diet recommendations post-transplant: Heart healthy dietary habits long term (low saturated/trans fat, low sodium). High protein diet x 8 weeks. Practice food safety precautions.         Data   Most Recent 3 CBC's:  Recent Labs   Lab Test 02/17/25  0630 02/16/25  0539 02/15/25  0559   WBC 10.2 12.7* 14.2*   HGB 10.0* 10.7* 11.1*   MCV 98 96 95    132* 149*      Most Recent 3 BMP's:  Recent Labs   Lab Test 02/17/25  1157 02/17/25  0841 02/17/25  0746 02/17/25  0630 02/16/25  0601 02/16/25  0539 02/15/25  0646 02/15/25  0559   NA  --   --   --  146*  --  142  --  140   POTASSIUM  --   --   --  3.4  --  3.3*  --  3.8   CHLORIDE  --   --   --  110*  --  105  --  102   CO2  --   --   --  25  --  23  --  22   BUN  --   --   --  57.7*  --  66.9*  --  65.1*   CR  --   --   --  2.14*  --  2.88*  --  3.97*   ANIONGAP  --   --   --  11  --  14  --  16*   GINA  --   --   --  9.0  --  9.1  --  9.3   * 117* 67* 77   < > 99   < > 118*    < > = values in this interval not displayed.     Most Recent 2 LFT's:  Recent Labs   Lab Test 02/12/25 2047 05/16/24  1326   AST 17 8   ALT 15 9   ALKPHOS 59 58   BILITOTAL 0.2 0.2     Most Recent INR's and Anticoagulation Dosing History:  Anticoagulation Dose History          Latest Ref Rng & Units 5/16/2024 7/17/2024 2/12/2025   Recent Dosing and Labs   INR 0.85 - 1.15 1.08  1.01  0.81      Most Recent 3 Troponin's:No lab results found.  Most Recent Cholesterol Panel:  Recent Labs   Lab Test 02/12/25 2047   CHOL 141   HDL 41   TRIG 420*     Most Recent 6 Bacteria Isolates From Any Culture (See EPIC Reports for Culture Details):No lab results found.  Most Recent TSH, T4 and A1c Labs:  Recent Labs   Lab Test 02/12/25 2046   A1C 7.1*       No results found for: \"LIPASE\"  No results found for: \"AMYLASE\"     "

## 2025-02-14 NOTE — DISCHARGE INSTRUCTIONS
Nutrition Services - Post-Transplant Diet Guidelines   Follow recommendations on the Guide to Your Diet after Transplant handout (summary below).    You have increased energy and protein needs for six to eight weeks after transplant. Your goal is to consume at least 90 grams of protein per day during this time frame.   Eat a heart-healthy diet (low sodium, low saturated and trans-fat) once you are outside of the 6-8 week post-transplant window, and once your appetite improves to normal  In some cases (but not in all cases), adjust potassium intake   Take calcium and vitamin D supplement if your doctor or team orders  Take measures to prevent food poisoning: store and prepare foods to the proper temperature, practice good handwashing, heat all deli meat (to 165 degrees Fahrenheit), avoid raw fish and meats (including uncooked eggs, non-pasteurized or raw milk, and mold-ripened, non-pasteurized, and raw cheeses [including Brie, Camembert, Roquefort, Stilton, Gorgonzola and Trever or other soft, unpasteurized cheeses such as Mexican queso fresco]), and throw out leftovers older than two days.   Do not consume grapefruit or pomegranate-containing products. These can interact with your medications.   Avoid the following post txp d/t risk for rejection, unknown effects on the organs, and/or potential interactions with immunosuppressants:       - Herbal, Chinese, holistic, chiropractic, natural, alternative medicines and supplements       - Detoxes and cleanses       - Weight loss pills       - Protein powders or other products with extracts or herbs (ie green tea extract)  If you have any nutrition-related questions or concerns after discharge, please contact our outpatient transplant dietitian, Mariluz Hopkins at [phone]      Diabetes Management Discharge Plan    Blood glucose monitoring: Three times daily before meals, and at bedtime.  Blood Glucose (BG) goals:  mg/dL before meals, less than 180 mg/dL 2 hrs  after meals.     Glucose Control Regimen:  1) Take your 70/30 (humulin 70/30) before breakfast and before dinner. Adjust the dose of 70/30 depending on your prednisone dose (dates below subject to change per your transplant team's recommendations):  Date Prednisone dose  Breakfast 70/30 Dinner 70/30   2/16-2/17 60 mg 68 units 28 units   2/18-2/19 40 mg 50 units 23 units   2/20-2/26 20 mg  40 units 19 units   2/27-3/5 15 mg 38 units 16 units   3/6-3/12 10 mg 35 units 15 units   3/13 onwards 5 mg (or less) 35 units 15 units       2) Rapid-acting insulin: lispro (Humalog) correction - see chart below. Take for high blood glucoses three times daily before meals and at bedtime.  You may add the correction dose to the carbohydrate coverage/mealtime insulin dose and give in one injection--ideally 10-15 minutes before a meal.  You may take the correction dose even if you skip a meal (as long as it has been 4 hours since previous correction dose).     Pre-meal correction scale:    Blood Glucose Insulin Lispro Before Meals:   Less than 140 0 units   140 -189  1 units   190 - 239 2 units   240 - 289 3 units   290 - 339 4 units    340 - 389  5 units   390 - 439 6 units   440 or more 7 units     Bedtime correction scale:     Blood Glucose Insulin Lispro At Bedtime:   Less than 200 0 units   200-249  1 units   250 - 299 2 units   300 - 349 3 units   350 - 399 4 units    400 or more 5 units        Outpatient Follow-Up: An appointment request was sent to the [facility] coordinator to schedule your outpatient diabetes appointment 1-2 weeks from discharge. You can call the [facility] at [phone] if you have scheduling questions or do not hear from them within a few days of discharge. Follow up sooner if blood glucose runs consistently greater than 200 mg/dL or if having more than two episodes less than 70 mg/dL.     If you have urgent questions or concerns regarding your blood sugars or insulin,  you may contact [phone] (the main hospital ). Ask to speak with the Endocrinologist on call.     Your target A1c value is less than 7.5% to help prevent future complications from diabetes.      Thank you for letting the Diabetes Management Team be involved in your care!    Hypoglycemia (Low Blood Glucose) Management:  Signs/symptoms:  Shaking, sweating, fast heartbeat  Feeling dizzy, tired, or weak   Feeling anxious and easy to irritate  Feeling nervous, crabby, or confused  Hunger  Vision problems, headache  Numb or tingling mouth    If blood glucose is 51 to 70:   Eat or drink 15 grams of carbohydrate. Examples:   1/2 cup (4 ounces) apple juice or regular soda pop, or   1 cup (8 ounces) milk, or   15 skittles, or   3 to 4 glucose tablets.   Re-check your blood glucose in 15 minutes.   Repeat these steps every 15 minutes until your blood glucose is above 80.       If blood glucose is 50 or less:   Eat or drink 30 grams of carbohydrate. Examples:   1 cup (8 ounces) apple juice or regular soda pop, or   2 cups (16 ounces) milk, or   1 banana, or   6 to 8 glucose tablets.   Re-check your blood glucose in 15 minutes.   Repeat these steps every 15 minutes until your blood glucose is above 80.

## 2025-02-14 NOTE — PROGRESS NOTES
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 He underwent a DCD kidney transplant with stent and PD catheter removal on 2/13/25 with Dr. Wali Newell.    Graft function: POD #1  Kidney: Cr 5.25 (from 5.8). UOP ~2L over 24 H,  mL/hr. Post-op US showing patent doppler and no hydronephrosis.    Immunosuppressed status secondary to medications: cPRA 0, low intensity induction  Basiliximab: POD 0 & 4  Steroids:  Taper per protocol.  MMF: 750 mg BID  Tacro: Goal level 8-10    Hematology:   Anemia of chronic disease: Hgb 10.9 (from 11.6)  Leukocytosis: WBC 12.3 (from 8.1), likely due to stress from surgery and steroids, remains afebrile, continue to monitor    Cardiorespiratory:   HTN: PTA atenolol  - Blood pressure appropriate post-op, continue to monitor   CAD; NSTEMI: s/p PCI with EDWIN x 1 to LAD in 07/2024. PTA ASA and isosorbide  HLD: continue PTA atorvastatin    GI/Nutrition:   Diet: Regular  Bowel regimen: Senna, PEG    Endocrine:   DM2; steroid induced hyperglycemia: on insulin regimen PTA  - Insulin gtt  - Endocrine consult placed today    Fluid/Electrolytes: MIVF:  mL/hr    : Hdez to remain due to new surgical anastomosis x 3 days.      Infectious disease: Afebrile, WBC 12.3, monitor    Prophylaxis: DVT, fall, GI, viral (Valcyte), pneumocystis (Bactrim)    Disposition: 7A     JADE/Fellow/Resident Provider: ALEKSANDR Jeff CNP, vocera/pager [phone]    Medically Ready for Discharge: Anticipated in 2-4 Days       Faculty: Wali Newell MD  _________________________________________________________________    Interval History: History from patient and/or EMR  Overnight events: Insulin gtt started overnight for BG ~350. Patient states he's feeling well this morning. Pain is  manageable. Denies any nausea. Sitting up in chair eating breakfast.    ROS:   A 10-point review of systems was negative except as noted above.    Meds:  Current Facility-Administered Medications   Medication Dose Route Frequency Provider Last Rate Last Admin    acetaminophen (TYLENOL) tablet 975 mg  975 mg Oral Q8H Micky Bass MD   975 mg at 02/14/25 0623    atorvastatin (LIPITOR) tablet 20 mg  20 mg Oral Daily Susie Mao APRN CNP   20 mg at 02/14/25 0806    [START ON 2/17/2025] basiliximab (SIMULECT) 20 mg in sodium chloride 0.9 % 50 mL infusion  20 mg Intravenous Once Susie Mao APRN CNP        furosemide (LASIX) injection 80 mg  80 mg Intravenous BID Micky Bass MD   80 mg at 02/14/25 0822    [START ON 2/15/2025] magnesium oxide (MAG-OX) tablet 400 mg  400 mg Oral Daily with lunch Micky Bass MD        methylPREDNISolone Na Suc (solu-MEDROL) 250 mg in sodium chloride 0.9 % 59 mL intermittent infusion  250 mg Intravenous Once Susie Mao APRN CNP        [START ON 2/15/2025] methylPREDNISolone Na Suc (solu-MEDROL) injection 100 mg  100 mg Intravenous Once Susie Mao APRN CNP        mycophenolate (GENERIC EQUIVALENT) capsule 750 mg  750 mg Oral BID IS Micky Bass MD   750 mg at 02/14/25 0805    polyethylene glycol (MIRALAX) Packet 17 g  17 g Oral Daily Micky Bass MD   17 g at 02/14/25 0824    [START ON 2/16/2025] predniSONE (DELTASONE) tablet 60 mg  60 mg Oral Daily Susie Mao APRN CNP        Followed by    [START ON 2/18/2025] predniSONE (DELTASONE) tablet 40 mg  40 mg Oral Daily Susie Mao APRN CNP        Followed by    [START ON 2/20/2025] predniSONE (DELTASONE) tablet 20 mg  20 mg Oral Daily Susie Mao APRN CNP        Followed by    [START ON 2/27/2025] predniSONE (DELTASONE) tablet 15 mg  15 mg Oral Daily Susie Mao APRN CNP        Followed by    [START ON 3/6/2025] predniSONE (DELTASONE) tablet 10 mg  10 mg Oral Daily May[place],  "ALEKSANDR Richards CNP        Followed by    [START ON 3/13/2025] predniSONE (DELTASONE) tablet 5 mg  5 mg Oral Daily Susie Mao APRN CNP        senna-docusate (SENOKOT-S/PERICOLACE) 8.6-50 MG per tablet 1 tablet  1 tablet Oral BID Micky Bass MD   1 tablet at 02/14/25 0806    sodium chloride (PF) 0.9% PF flush 10 mL  10 mL Intracatheter Q8H Micky Bass MD   10 mL at 02/13/25 1804    sulfamethoxazole-trimethoprim (BACTRIM) 400-80 MG per tablet 1 tablet  1 tablet Oral Q Mon Wed Fri AM Micky Bass MD   1 tablet at 02/14/25 0806    tacrolimus (GENERIC EQUIVALENT) capsule 2.5 mg  0.03 mg/kg Oral BID IS Micky Bass MD   2.5 mg at 02/14/25 0805    [START ON 2/15/2025] valGANciclovir (VALCYTE) tablet 450 mg  450 mg Oral Once per day on Tuesday Saturday Micky Bass MD           Physical Exam:     Admit Weight: 85.5 kg (188 lb 6.4 oz)    Current vitals:   /70   Pulse 78   Temp 97.4  F (36.3  C) (Oral)   Resp 14   Ht 1.675 m (5' 5.95\")   Wt 85.5 kg (188 lb 6.4 oz)   SpO2 99%   BMI 30.46 kg/m      Vital sign ranges:    Temp:  [97.4  F (36.3  C)-98.8  F (37.1  C)] 97.4  F (36.3  C)  Pulse:  [73-91] 78  Resp:  [11-16] 14  BP: (115-144)/(58-80) 128/70  SpO2:  [95 %-100 %] 99 %    General Appearance: in no apparent distress.   Skin: Warm, perfused  Heart: sinus rhythm  Lungs: non-labored breathing on RA  Abdomen: The abdomen is soft, slightly tender around incision site, incision covered with dressing, slight shadowing noted  : jonas is present  Extremities: edema: none  Neurologic: awake, alert, and oriented. Tremor absent.    Data:   CMP  Recent Labs   Lab 02/14/25  1057 02/14/25  1002 02/14/25  0617 02/14/25  0550 02/14/25  0226 02/14/25  0155 02/13/25  1027 02/13/25  1017 02/13/25  0902 02/13/25  0743 02/13/25  0057 02/12/25 2047   NA  --   --   --  137  --   --   --  136 133* 138   < > 139   POTASSIUM  --   --   --  4.0  4.0  --  4.0   < > 4.3  4.3 4.7 4.4   < > 3.9   CHLORIDE  --   --   " --  101  --   --   --  99  --   --   --  98   CO2  --   --   --  20*  --   --   --  17*  --   --   --  21*   * 152*   < > 160*   < >  --    < > 169* 210* 179*   < > 239*   BUN  --   --   --  61.0*  --   --   --  61.3*  --   --   --  63.4*   CR  --   --   --  5.25*  --   --   --  5.49*  --   --   --  5.85*   GFRESTIMATED  --   --   --  11*  --   --   --  10*  --   --   --  9*   GINA  --   --   --  8.9  --   --   --  9.0  --   --   --  9.3   ICAW  --   --   --   --   --   --   --   --  4.5 4.6  --   --    MAG  --   --   --  2.1  --   --   --  2.3  --   --   --   --    PHOS  --   --   --  4.6*  --   --   --  4.8*  --   --   --   --    ALBUMIN  --   --   --   --   --   --   --   --   --   --   --  3.9   BILITOTAL  --   --   --   --   --   --   --   --   --   --   --  0.2   ALKPHOS  --   --   --   --   --   --   --   --   --   --   --  59   AST  --   --   --   --   --   --   --   --   --   --   --  17   ALT  --   --   --   --   --   --   --   --   --   --   --  15    < > = values in this interval not displayed.     CBC  Recent Labs   Lab 02/14/25  0550 02/14/25  0155 02/13/25  1546 02/13/25  1017 02/13/25  0743 02/12/25  2046   HGB 10.9*  10.9* 11.4*   < > 11.9*   < > 13.5   WBC 12.3*  --   --  8.1  --  7.5     --   --  146*  --  182   A1C  --   --   --   --   --  7.1*    < > = values in this interval not displayed.

## 2025-02-14 NOTE — CONSULTS
Discharge Pharmacy Test Claim    Patient has pharmacy benefits through [employer] Medicare advantage plan with an unmet drug deductible with $265.06 remaining. Requested test claims and expected copays before and after the drug deductible is met listed below. Once patient reaches $2000 out of pocket, copays reduce to $0.    Test Claim Initial Copay Copay after Drug Deductible is met   baqsimi 327.80 115.76   glucagon kit 238.40 47.68   gvoke 338.54 126.49   humulin N kwikpens 27.02 27.02   insulin lispro kwikpens 30.95 30.95   novolin N flexpens not covered     Fawn Riddle  [facility] Liaison, [place]  Ph: [phone]  Fax: [phone]  Available on Teams and Vocera  Disclaimer: Pharmacy test claims are estimates and may not reflect final costs. Suggested alternatives aim to be cost-effective and may not be therapeutically equivalent. This consult is informational and does not constitute medical advice. Clinical decisions should be made by qualified healthcare providers.

## 2025-02-14 NOTE — PLAN OF CARE
Goal Outcome Evaluation:      Plan of Care Reviewed With: patient, significant other    Overall Patient Progress: improvingOverall Patient Progress: improving    Outcome Evaluation: discharge planning

## 2025-02-14 NOTE — PROGRESS NOTES
"Attest - I have read the below note by the dietetic intern and agree with the assessment.     Christianakrys Caraballo MS/RD/LD/CNSC  Available on [employer]   M-F (7am-3:30pm) -  Clinical Dietitian  Weekend/Holiday Dietitian (7am-3:30pm)    ** Clinical Dietitians no longer available on pager      CLINICAL NUTRITION SERVICES - ASSESSMENT NOTE    RECOMMENDATIONS FOR MDs/PROVIDERS TO ORDER:  None    Malnutrition Status:    Patient does not meet two of the established criteria necessary for diagnosing malnutrition    Registered Dietitian Interventions:  Ensure Max daily between meal  Encouraged PO intake  Nutrition Education  -Post op txp diet education  -Food safety in post op txp    Future/Additional Recommendations:  Monitor po intake, supplement intolerance, labs, GOC     REASON FOR ASSESSMENT  Provider order - Post Op Kidney Transplant    SUBJECTIVE INFORMATION  Assessed patient in room.    NUTRITION HISTORY  Meet pt the room, pt reports he had no decreased po intake and stable appetite PTA. He typical had 2 meals at home, skip dinner with snacks instead(fruit); drink water, 2 cups coffee and 1 can pop through the day. He denies any weight loss recently and did not take any oral supplements at home      CURRENT NUTRITION ORDERS  Diet: Advance Diet as Tolerated, Regular Diet     CURRENT INTAKE/TOLERANCE  Pt report appetite was good and had breakfast this morning as his first meal since admit, and tolerated well, denies N/V but feel some abdominal gas after eating. Discussed with pt regarding his nutrition need, he agreed receive Ensure Max to support his nutritional goal    LABS  Nutrition-relevant labs: Reviewed  BUN: 61 (H)  Cr: 5.25 (H)  GFR: 11 (L)  Phos: 4.6 (H)  B - 210 last 24 hrs    MEDICATIONS  Nutrition-relevant medications: Reviewed  Lasix, Insulin aspart, Insulin NPH, Insulin gtt, Magnesium oxide, Miralax, Senna, Bactrim, PRN Zofran    ANTHROPOMETRICS  Height: 167.5 cm (5' 5.945\")  Most Recent Weight: 85.5 " kg (188 lb 6.4 oz)  IBW: 63.7 kg  % IBW: 134%  BMI (kg/m ): 30.46 - Obesity Class I BMI 30-34.9    Weight History: no wt loss noted  Wt Readings from Last 10 Encounters:   02/12/25 85.5 kg (188 lb 6.4 oz)   12/26/24 84.4 kg (186 lb)   08/07/24 83.2 kg (183 lb 6.8 oz)   07/17/24 80 kg (176 lb 5.9 oz)   07/03/24 83.2 kg (183 lb 8 oz)   05/16/24 83.2 kg (183 lb 8 oz)   02/02/24 83.9 kg (185 lb)      Dosing Weight: 69 kg, based on adjusted wt    ASSESSED NUTRITION NEEDS  Estimated Energy Needs: 2070 - 2415 kcals/day (30 - 35 kcals/kg)  Justification: Increased needs and Post-op SOT  Estimated Protein Needs: 90 - 138 grams protein/day (1.3 - 2 grams of pro/kg)  Justification: Increased needs and Post-op SOT  Estimated Fluid Needs: 1725 - 2070 mL/day (25 - 30 mL/kg)  Justification: Maintenance and Per provider pending fluid status    SYSTEM FINDINGS      Skin/wounds: Skin bruised; right, fore arm. RLQ abd incision & old PD cath site drainage marked    GI symptoms: no BM recorded    MALNUTRITION  % Intake: Decreased intake does not meet criteria  % Weight Loss: None noted  Subcutaneous Fat Loss: None observed  Muscle Loss: None observed  Fluid Accumulation/Edema: None noted  Malnutrition Diagnosis: Patient does not meet two of the established criteria necessary for diagnosing malnutrition  Malnutrition Present on Admission: No    NUTRITION DIAGNOSIS  Increased nutrient needs calorie/protein  related to post op SOT as evidenced by estimated energy needs of 30 - 35 kcals/kg, 1.3 - 2 grams of pro/kg    INTERVENTIONS  Medical food supplement therapy  Encouraged PO intake  Provided instruction on post-transplant diet with discussion regarding:  - Protein sources and high protein needs in acute post-tx phase.    - Reviewed recommendations to follow low fat/low sodium diet long term and discussed heart healthy diet tips.   - Reviewed need for food safety precautions to prevent food borne illness.    Goals  Patient to consume  % of nutritionally adequate meal trays TID, or the equivalent with supplements/snacks.     Monitoring/Evaluation  Progress toward goals will be monitored and evaluated per policy.     Rosalino Yee  Dietetic Intern

## 2025-02-14 NOTE — PLAN OF CARE
"Goal Outcome Evaluation:      Plan of Care Reviewed With: patient, spouse    Overall Patient Progress: improvingOverall Patient Progress: improving    Outcome Evaluation: good urine output. on insulin drip Alg 4. VSS.Aox4. stable CVP.    /68   Pulse 87   Temp 98  F (36.7  C)   Resp 12   Ht 1.675 m (5' 5.95\")   Wt 85.5 kg (188 lb 6.4 oz)   SpO2 97%   BMI 30.46 kg/m      Shift: 7089-6599  Isolation Status: none  VS: stable on RA, afebrile  Neuro: Aox4  Behaviors: calm and cooperative   BG: Q1 insulin drip Alg 4 300- 159   Labs: hemo 10.4 and Potassium 4.0  Respiratory: capno 100 on RA  Cardiac: teley NSR CVP 7-9  Pain/Nausea: managed with PRN   PRN: oxycodone 1x, tylenol scheduled   Diet: regular   IV Access: R PIV, R internal jugular, L AV fistula   Infusion(s): D5LR 125 + insulin with TKO   Lines/Drains: Hdez   GI/: passing gas. Urine output slowing down   Skin: RLQ abd incision & old PD cath site (covered w/ surgical dressing) drainage marked   Mobility: has not been out of bed   Events/Education: updated med/lab book   Plan: Cont w/POC   "

## 2025-02-14 NOTE — CONSULTS
"  Inpatient Diabetes Management Service : New Consult Note     Patient: Peter Carpio   YOB: 1950    MRN: [id number]     Date of Consult : 02/14/2025   Date of Admission: 2/12/2025     Reason for Consult: \"S/p kidney transplant 2/13/25, hx of DM2 on home insulin, currently on insulin gtt post op for glucose control, will be on steroid taper post-op\"    Consult Requestor: Susie wells            History of Present Illness:     HPI:  Peter Carpio is a 74 year old male with past medical history significant for ESRD secondary to DM type II on PD, CAD, gout, hypertension, and hyperlipidemia who presented to the hospital for possible kidney transplant and now is s/p DDKT on 2/13 with Dr. Newell.     IDS consulted to assist with glycemic management and optimization while inpatient and when applicable, recommendations for transition home.    History obtained via the patient, chart review and discussion with primary team.     Additional Historian:  none    Patient is not known to the Inpatient Diabetes Service from past admission(s)     The patient has known Type II DM which is typically managed outpatient utilizing mixture 70/30 insulin.   BG are monitoring using CGM Freestyle Christiano 3 with ranges reported to be between 100 and 200 with rare hypoglycemia and rare hyperglycemia.  CGM has been removed.     TIR per clinic notes from [facility] on 12/26/24:  FreeStyle LibreView  Time in target range 73%  High 23%, Very High 4%  Current Ave  mg/dl    Since admission, has been started on IV insulin for significant hyperglycemia from surgery, steroids.         Last dose insulin PTA: 70/30 32 unit(s) at 35 before admission    Current inpatient regimen:  70/30: takes 30 unit(s) in AM and 32 unit(s) in PM     BG at time of consult: 150 mg/dL on IV insulin drip + steroids +/- dextrose 5% at 125 ml/hr   Planned Procedures/Surgeries: none     ELOS> 2 days    DM specific Labs today:  if contributory, " otherwise see labs below:    Glucose: 160  Renal function: Creatinine (5.25), eGFR (11)    BMP: Bicarb (20), Anion Gap (16)   BhB: none   Osmolality no  Hgb: 10.9   A1c: 7.1%      Inpatient Glucose Trends:      Overall trend:          Range of 4-6 unit(s) per hour on drip, is in Alg 4.              Diabetes History:   Diabetes Type and Duration: T2DM diagnosed >20 years ago, in his 50's    C-peptide:    7/2024: 18.8 - correlating BG not taken  12/26/2024: 17.4 - correlating BG not taken     GAD65 antibody, zinc transporter 8 antibody, islet antibody, insulin antibody, a not available on epic search       PTA Medication Regimen:   70/30 32 unit(s) in AM and 35 unit(s) PM    Missing doses?: no  Historical Diabetes Medications:   No hx of oral meds on chart review     Glucose monitoring device/frequency/trends: CGM Freestyle Christiano 3  Hypoglycemia PTA:   - Frequency: was having more freq low BG months ago, adjusted dosing, improved.   - Severity:  no history of severe unconscious lows   - Awareness:  intact    - Treatment: eat/drink      Outpatient Diabetes Provider: YADY Wheeler   Formal Diabetes Education/Educator: [facility]  - Reisdorf. E. 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 (last eye exam: 1/9/2025), + ESRD nephropathy, no gastroparesis, + macrovascular disease      Most recent A1c: 7.1%    Hemoglobin at time of last A1c: 13.5  RBC transfusion in past 3 months: none      History of DKA: no      Safety Plan:   - Glucagon: no   - Ketone Strips: no  Medic Alert if Type 1: no    Diet/Lifestyle/Living Situation:       Ability to [place] Prescribed Regimen:  has been able    Food/Housing Insecurity: no          Medications Impacting Glycemia:    Steroids: yes, received  mg on 2/13            D5W containing solutions/medications: dextrose 5% at 125 ml/hr   Other medications/factors impacting glucose:  Diuretic, tacro          Diet/Nutrition:    Orders Placed This Encounter      Advance Diet as Tolerated: Regular Diet Adult     Supplements:  none  TF: no  TPN: no          Review of Systems:    CC: pain well controlled, some constipation   Constitutional: denies fever and chills. no recent weight changes.    HEENT: no headache, no vision changes, no hearing changes, denies sinus congestion, or swollen glands.   Cardiac: no chest pain, palpitations, or racing heart.    Respiratory: no dyspnea on exertion and at rest. no wheezing, cough intermittent dry cough, denies  active sputum production.    GI: denies abdominal pain, tenderness and bloating. no nausea and vomiting. + changes in stool pattern,  constipation     : n/a - cath    MSK/Integumentary. trace swelling/edema. no weakness. no new rashes, wounds, and bruising.    Endocrine: no polyuria, polydipsia           Past Medical History:       Past Medical History:   Diagnosis Date    CAD (coronary artery disease)     Diabetes mellitus, type 2 (H)     ESRD (end stage renal disease) on dialysis (H)     Essential hypertension     Hyperlipidemia LDL goal <70     NSTEMI (non-ST elevated myocardial infarction) (H)              Past Surgical History:      Past Surgical History:   Procedure Laterality Date    CV CORONARY ANGIOGRAM N/A 2024    Procedure: Coronary Angiogram;  Surgeon: Duglas Lamb MD;  Location: [facility] CARDIAC CATH LAB    CV PCI N/A 2024    Procedure: Percutaneous Coronary Intervention;  Surgeon: Duglas Lamb MD;  Location: [facility] CARDIAC CATH LAB    REMOVE CATHETER PERITONEAL N/A 2025    Procedure: Remove catheter peritoneal;  Surgeon: Wali Newell MD;  Location:  OR    TRANSPLANT KIDNEY RECIPIENT  DONOR Left 2025    Procedure: Transplant kidney recipient  donor with ureteral stent placement and peritoneal dialysis catheter removal;  Surgeon: Wali Newell MD;  Location:  OR         "   Social History:      Social History     Tobacco Use    Smoking status: Former     Current packs/day: 0.00     Types: Cigarettes     Quit date:      Years since quittin.1    Smokeless tobacco: Never   Substance Use Topics    Alcohol use: Yes     Comment: rare      History   Sexual Activity    Sexual activity: Not on file      Tobacco: as above   Etoh: as above/not currently     Other Substance: not currently    Marital Status:      Lives in [place] with wife          Family History:    Reviewed.    Family History of Diabetes:     History reviewed. No pertinent family history.          Physical Exam:    /70   Pulse 78   Temp 97.4  F (36.3  C) (Oral)   Resp 14   Ht 1.675 m (5' 5.95\")   Wt 85.5 kg (188 lb 6.4 oz)   SpO2 99%   BMI 30.46 kg/m     General:  stable appearing, in no acute distress.  HEENT:  NC/AT. MMM, sclera not injected, hearing intact.  Wearing glasses   Lungs:  unremarkable, no new cough, no SOB.  ABD:  rounded.  Skin:  warm and dry, no obvious lesions.  Feet:    warm without discoloration,  without evidence of wounds, corns, calluses, or vascular compromise, pulses +,  + CMS intact.    MSK:   moving all extremities.  Lymp:   trace LE edema.  Mental Status:  Alert and oriented x3.  Psych:   Cooperative, good eye contact, full/appropriate affect.         Laboratory Data:      Lab Results   Component Value Date    A1C 7.1 (H) 2025    A1C 6.7 (H) 2024     Recent Labs   Lab Test 25  0550   WBC 12.3*   RBC 3.40*   HGB 10.9*  10.9*   HCT 32.5*   MCV 96   MCH 32.1   MCHC 33.5   RDW 13.7        Recent Labs   Lab Test 25  1057 25  1002 25  0617 25  0550 25  0226 25  0155 25  1027 25  1017   NA  --   --   --  137  --   --   --  136   POTASSIUM  --   --   --  4.0  4.0  --  4.0   < > 4.3  4.3   CHLORIDE  --   --   --  101  --   --   --  99   CO2  --   --   --  20*  --   --   --  17*   ANIONGAP  --   " --   --  16*  --   --   --  20*   * 152*   < > 160*   < >  --    < > 169*   BUN  --   --   --  61.0*  --   --   --  61.3*   CR  --   --   --  5.25*  --   --   --  5.49*   GINA  --   --   --  8.9  --   --   --  9.0    < > = values in this interval not displayed.     Recent Labs   Lab Test 02/12/25 2047   PROTTOTAL 6.5   ALBUMIN 3.9   BILITOTAL 0.2   ALKPHOS 59   AST 17   ALT 15            Assessment and Recommendations:       Assessment:     Type II Diabetes Mellitus, c/b peripheral neuropathy, ESRD and macrovascular disease.  A1c 7.1 % in presence of complex chronic medical issues and anemia   Acute hyperglycemia 2/2 post-op stress, steroids  ESRD s/p DDKT on 2/13 with Dr. Newell.  Metabolic derangements; acidosis - multifactorial   BMI:  31  Anemia     Plan/Recommendations:      - Remain on IV insulin drip, will add NPH today for steroid hyperglycemia  - Add NPH 30 unit(s)  q 24 hrs at 1200 - at same time as  mg - reduce to Alg 2 at the time NPH given for safety and then can adjust per Alg from 2.    - Add Novolog Meal Coverage: 1 units per 6 g CHO, TID AC and PRN with snacks/supplements   - BG monitoring: Every hour on insulin drip   - HOLD PTA meds: Mixture insulin 70/30    - Hypoglycemia protocol    - Carb counting protocol recommended  - IDS to continue to follow.  Please do not hesitate to contact the team with questions/concerns 0700 - 1700 hrs.    Discussion:      Patient is s/p DDKT and doing well.   BG is currently managed on IV insulin with significant rates to bring BG to target.     During the steroid window on 2/13, rates as high as 10 unit(s) per hour    Over the noc, remained on Alg 4 with ranges of 4-6 unit(s) per hour or 96 - 144 unit(s).     Labs reflective of acidosis which is multifactorial (improving renal fxn) and less likely of DKA/HHS  No clear indication to assess ketones/gases at this time.     Overall is doing well.   He would prefer to discharge on 70/30 if that is  appropriate for him since he is most comfortable with that insulin.   He does not have his CGM on now, will plan to resume once home.         Tentative Discharge Planning - to be finalized at/near day of discharge:    Medications: TBD, PTA Mixture insulin 70/30 30 unit(s) in AM and 32 unit(s) in PM     Test Claims: completed on 2/14, see pharm liaison notes for details    Education:  Final needs to be assessed closer to discharge.    Outpatient Follow-up:  recommend w/ their establish Endo provider [facility]       Thank you for this consult. IDS will continue to follow.        Yulissa Darden, ALEKSANDR-CNP, BC-ADM   Inpatient Diabetes Service  Available on [employer] ([employer]) - when on duty.     To contact Inpatient Diabetes Service:  7 AM - 5 PM: Page the IDS JADE following the patient that day (see filed or incomplete progress notes/consult notes under Endocrinology)    OR if uncertain of provider assignment: page job code [phone]    5 PM - 7 AM: First call after hours is to primary service.    For urgent after-hours questions, page job code for on call fellow: [phone]     I spent a total of 110 minutes on the date of the encounter doing prep/post-work, chart review, history and exam, documentation and further activities per the note including lab review, multidisciplinary communication, counseling the patient and/or coordinating care regarding acute hyper/hypoglycemic management, as well as discharge management and planning/communication.  See note for details.      Please notify Inpatient Diabetes Service if changes are planned to steroids, nutrition, TPN/TF and anticipated procedures requiring prolonged NPO status.

## 2025-02-14 NOTE — PROGRESS NOTES
Admitted/transferred from: PACU  Time of arrival on unit: ~1200   2 RN full skin assessment completed by Ena Vera  Skin assessment finding: RLQ abd incision & old PD cath site (surgical dressing in place), L AV fistula   Interventions/actions: No interventions needed at this time    Will continue to monitor.

## 2025-02-14 NOTE — PROGRESS NOTES
Care Management Follow Up    Length of Stay (days): 1    Expected Discharge Date: 02/16/2025     Concerns to be Addressed: all concerns addressed in this encounter, discharge planning     Patient plan of care discussed at interdisciplinary rounds: Yes    Anticipated Discharge Disposition: Home              Anticipated Discharge Services: None  Anticipated Discharge DME: None    Patient/family educated on Medicare website which has current facility and service quality ratings: no  Education Provided on the Discharge Plan: Yes  Patient/Family in Agreement with the Plan: yes    Referrals Placed by CM/SW: External Care Coordination  Private pay costs discussed: Not applicable    Discussed  Partnership in Safe Discharge Planning  document with patient/family: No     Handoff Completed: No, handoff not indicated or clinically appropriate    Additional Information:  Pt s/p kidney transplant.  Pt will need ATC appointments confirmed prior to discharge. Pt will need simulect infusion on POD 4 (2/17)    Met with pt.   Introduced RNCC role.  Reviewed anticipated plan for discharge, transplant labs M/TH, ATC appointments and home care RN services.  Pt declined need for home care.  Pt anticipates having transplant labs drawn at [facility].  Pt notes no concerns or questions at this time.     Next Steps:   Follow for discharge planning  Confirm ATC appointments prior to pt discharge.    Daisy Barnes, RN BSN, PHN, ACM-RN  February 14, 2025  7A Nurse Coordinator    Phone: [phone]  Available on "[employer] 7A SOT RNCC  Weekend/Holiday 7A SOT RNCC    2/14/2025

## 2025-02-15 LAB
ANION GAP SERPL CALCULATED.3IONS-SCNC: 16 MMOL/L (ref 7–15)
BACTERIA UR CULT: NORMAL
BASOPHILS # BLD AUTO: 0 10E3/UL (ref 0–0.2)
BASOPHILS NFR BLD AUTO: 0 %
BUN SERPL-MCNC: 65.1 MG/DL (ref 8–23)
CALCIUM SERPL-MCNC: 9.3 MG/DL (ref 8.8–10.4)
CHLORIDE SERPL-SCNC: 102 MMOL/L (ref 98–107)
CREAT SERPL-MCNC: 3.97 MG/DL (ref 0.67–1.17)
DONOR IDENTIFICATION: NORMAL
DSA COMMENTS: NORMAL
DSA PRESENT: NO
DSA TEST METHOD: NORMAL
EGFRCR SERPLBLD CKD-EPI 2021: 15 ML/MIN/1.73M2
EOSINOPHIL # BLD AUTO: 0 10E3/UL (ref 0–0.7)
EOSINOPHIL NFR BLD AUTO: 0 %
ERYTHROCYTE [DISTWIDTH] IN BLOOD BY AUTOMATED COUNT: 13.7 % (ref 10–15)
GLUCOSE BLDC GLUCOMTR-MCNC: 109 MG/DL (ref 70–99)
GLUCOSE BLDC GLUCOMTR-MCNC: 111 MG/DL (ref 70–99)
GLUCOSE BLDC GLUCOMTR-MCNC: 124 MG/DL (ref 70–99)
GLUCOSE BLDC GLUCOMTR-MCNC: 127 MG/DL (ref 70–99)
GLUCOSE BLDC GLUCOMTR-MCNC: 131 MG/DL (ref 70–99)
GLUCOSE BLDC GLUCOMTR-MCNC: 134 MG/DL (ref 70–99)
GLUCOSE BLDC GLUCOMTR-MCNC: 135 MG/DL (ref 70–99)
GLUCOSE BLDC GLUCOMTR-MCNC: 137 MG/DL (ref 70–99)
GLUCOSE BLDC GLUCOMTR-MCNC: 140 MG/DL (ref 70–99)
GLUCOSE BLDC GLUCOMTR-MCNC: 140 MG/DL (ref 70–99)
GLUCOSE BLDC GLUCOMTR-MCNC: 147 MG/DL (ref 70–99)
GLUCOSE BLDC GLUCOMTR-MCNC: 155 MG/DL (ref 70–99)
GLUCOSE BLDC GLUCOMTR-MCNC: 159 MG/DL (ref 70–99)
GLUCOSE BLDC GLUCOMTR-MCNC: 163 MG/DL (ref 70–99)
GLUCOSE BLDC GLUCOMTR-MCNC: 171 MG/DL (ref 70–99)
GLUCOSE BLDC GLUCOMTR-MCNC: 185 MG/DL (ref 70–99)
GLUCOSE BLDC GLUCOMTR-MCNC: 198 MG/DL (ref 70–99)
GLUCOSE BLDC GLUCOMTR-MCNC: 215 MG/DL (ref 70–99)
GLUCOSE BLDC GLUCOMTR-MCNC: 242 MG/DL (ref 70–99)
GLUCOSE SERPL-MCNC: 118 MG/DL (ref 70–99)
HCO3 SERPL-SCNC: 22 MMOL/L (ref 22–29)
HCT VFR BLD AUTO: 32.3 % (ref 40–53)
HGB BLD-MCNC: 11.1 G/DL (ref 13.3–17.7)
IMM GRANULOCYTES # BLD: 0.1 10E3/UL
IMM GRANULOCYTES NFR BLD: 1 %
LYMPHOCYTES # BLD AUTO: 0.4 10E3/UL (ref 0.8–5.3)
LYMPHOCYTES NFR BLD AUTO: 3 %
MAGNESIUM SERPL-MCNC: 2 MG/DL (ref 1.7–2.3)
MCH RBC QN AUTO: 32.6 PG (ref 26.5–33)
MCHC RBC AUTO-ENTMCNC: 34.4 G/DL (ref 31.5–36.5)
MCV RBC AUTO: 95 FL (ref 78–100)
MONOCYTES # BLD AUTO: 0.9 10E3/UL (ref 0–1.3)
MONOCYTES NFR BLD AUTO: 7 %
NEUTROPHILS # BLD AUTO: 12.8 10E3/UL (ref 1.6–8.3)
NEUTROPHILS NFR BLD AUTO: 90 %
NRBC # BLD AUTO: 0 10E3/UL
NRBC BLD AUTO-RTO: 0 /100
ORGAN: NORMAL
PHOSPHATE SERPL-MCNC: 5.2 MG/DL (ref 2.5–4.5)
PLATELET # BLD AUTO: 149 10E3/UL (ref 150–450)
POTASSIUM SERPL-SCNC: 3.8 MMOL/L (ref 3.4–5.3)
RBC # BLD AUTO: 3.41 10E6/UL (ref 4.4–5.9)
SA 1  COMMENTS: NORMAL
SA 1  COMMENTS: NORMAL
SA 1 CELL: NORMAL
SA 1 CELL: NORMAL
SA 1 TEST METHOD: NORMAL
SA 1 TEST METHOD: NORMAL
SA 2 CELL: NORMAL
SA 2 CELL: NORMAL
SA 2 COMMENTS: NORMAL
SA 2 COMMENTS: NORMAL
SA 2 TEST METHOD: NORMAL
SA 2 TEST METHOD: NORMAL
SA1 HI RISK ABY: NORMAL
SA1 HI RISK ABY: NORMAL
SA1 MOD RISK ABY: NORMAL
SA1 MOD RISK ABY: NORMAL
SA2 HI RISK ABY: NORMAL
SA2 HI RISK ABY: NORMAL
SA2 MOD RISK ABY: NORMAL
SA2 MOD RISK ABY: NORMAL
SODIUM SERPL-SCNC: 140 MMOL/L (ref 135–145)
UNACCEPTABLE ANTIGENS: NORMAL
UNACCEPTABLE ANTIGENS: NORMAL
UNOS CPRA: 0
UNOS CPRA: 0
WBC # BLD AUTO: 14.2 10E3/UL (ref 4–11)

## 2025-02-15 PROCEDURE — 36415 COLL VENOUS BLD VENIPUNCTURE: CPT | Performed by: STUDENT IN AN ORGANIZED HEALTH CARE EDUCATION/TRAINING PROGRAM

## 2025-02-15 PROCEDURE — 250N000013 HC RX MED GY IP 250 OP 250 PS 637: Performed by: NURSE PRACTITIONER

## 2025-02-15 PROCEDURE — 85004 AUTOMATED DIFF WBC COUNT: CPT

## 2025-02-15 PROCEDURE — 80048 BASIC METABOLIC PNL TOTAL CA: CPT | Performed by: STUDENT IN AN ORGANIZED HEALTH CARE EDUCATION/TRAINING PROGRAM

## 2025-02-15 PROCEDURE — 84100 ASSAY OF PHOSPHORUS: CPT | Performed by: STUDENT IN AN ORGANIZED HEALTH CARE EDUCATION/TRAINING PROGRAM

## 2025-02-15 PROCEDURE — 250N000013 HC RX MED GY IP 250 OP 250 PS 637: Performed by: STUDENT IN AN ORGANIZED HEALTH CARE EDUCATION/TRAINING PROGRAM

## 2025-02-15 PROCEDURE — 258N000003 HC RX IP 258 OP 636

## 2025-02-15 PROCEDURE — 250N000011 HC RX IP 250 OP 636: Mod: JW

## 2025-02-15 PROCEDURE — 250N000011 HC RX IP 250 OP 636: Performed by: STUDENT IN AN ORGANIZED HEALTH CARE EDUCATION/TRAINING PROGRAM

## 2025-02-15 PROCEDURE — 250N000009 HC RX 250

## 2025-02-15 PROCEDURE — 83735 ASSAY OF MAGNESIUM: CPT | Performed by: STUDENT IN AN ORGANIZED HEALTH CARE EDUCATION/TRAINING PROGRAM

## 2025-02-15 PROCEDURE — 250N000012 HC RX MED GY IP 250 OP 636 PS 637: Performed by: STUDENT IN AN ORGANIZED HEALTH CARE EDUCATION/TRAINING PROGRAM

## 2025-02-15 PROCEDURE — 120N000011 HC R&B TRANSPLANT UMMC

## 2025-02-15 PROCEDURE — 99233 SBSQ HOSP IP/OBS HIGH 50: CPT | Mod: FS | Performed by: NURSE PRACTITIONER

## 2025-02-15 PROCEDURE — 99232 SBSQ HOSP IP/OBS MODERATE 35: CPT | Mod: GC | Performed by: INTERNAL MEDICINE

## 2025-02-15 PROCEDURE — 250N000013 HC RX MED GY IP 250 OP 250 PS 637

## 2025-02-15 RX ORDER — FINASTERIDE 5 MG/1
5 TABLET, FILM COATED ORAL DAILY
Status: DISCONTINUED | OUTPATIENT
Start: 2025-02-15 | End: 2025-02-17 | Stop reason: HOSPADM

## 2025-02-15 RX ADMIN — ATORVASTATIN CALCIUM 40 MG: 40 TABLET, FILM COATED ORAL at 08:17

## 2025-02-15 RX ADMIN — OMEGA-3 FATTY ACIDS CAP 1000 MG 2 G: 1000 CAP at 20:40

## 2025-02-15 RX ADMIN — ACETAMINOPHEN 975 MG: 325 TABLET, FILM COATED ORAL at 05:58

## 2025-02-15 RX ADMIN — OMEGA-3 FATTY ACIDS CAP 1000 MG 2 G: 1000 CAP at 10:16

## 2025-02-15 RX ADMIN — FINASTERIDE 5 MG: 5 TABLET, FILM COATED ORAL at 16:06

## 2025-02-15 RX ADMIN — INSULIN ASPART 13 UNITS: 100 INJECTION, SOLUTION INTRAVENOUS; SUBCUTANEOUS at 17:34

## 2025-02-15 RX ADMIN — TAMSULOSIN HYDROCHLORIDE 0.4 MG: 0.4 CAPSULE ORAL at 08:17

## 2025-02-15 RX ADMIN — TACROLIMUS 2.5 MG: 1 CAPSULE ORAL at 18:04

## 2025-02-15 RX ADMIN — MYCOPHENOLATE MOFETIL 750 MG: 250 CAPSULE ORAL at 18:04

## 2025-02-15 RX ADMIN — MAGNESIUM OXIDE TAB 400 MG (241.3 MG ELEMENTAL MG) 400 MG: 400 (241.3 MG) TAB at 12:05

## 2025-02-15 RX ADMIN — INSULIN HUMAN 3 UNITS/HR: 1 INJECTION, SOLUTION INTRAVENOUS at 22:15

## 2025-02-15 RX ADMIN — ACETAMINOPHEN 975 MG: 325 TABLET, FILM COATED ORAL at 14:13

## 2025-02-15 RX ADMIN — SENNOSIDES AND DOCUSATE SODIUM 1 TABLET: 50; 8.6 TABLET ORAL at 20:40

## 2025-02-15 RX ADMIN — MYCOPHENOLATE MOFETIL 750 MG: 250 CAPSULE ORAL at 08:16

## 2025-02-15 RX ADMIN — INSULIN ASPART 5 UNITS: 100 INJECTION, SOLUTION INTRAVENOUS; SUBCUTANEOUS at 08:33

## 2025-02-15 RX ADMIN — VALGANCICLOVIR 450 MG: 450 TABLET, FILM COATED ORAL at 20:40

## 2025-02-15 RX ADMIN — FUROSEMIDE 80 MG: 10 INJECTION, SOLUTION INTRAMUSCULAR; INTRAVENOUS at 08:17

## 2025-02-15 RX ADMIN — SODIUM CHLORIDE, POTASSIUM CHLORIDE, SODIUM LACTATE AND CALCIUM CHLORIDE: 600; 310; 30; 20 INJECTION, SOLUTION INTRAVENOUS at 06:44

## 2025-02-15 RX ADMIN — SENNOSIDES AND DOCUSATE SODIUM 1 TABLET: 50; 8.6 TABLET ORAL at 08:17

## 2025-02-15 RX ADMIN — ACETAMINOPHEN 975 MG: 325 TABLET, FILM COATED ORAL at 22:03

## 2025-02-15 RX ADMIN — ASPIRIN 81 MG CHEWABLE TABLET 81 MG: 81 TABLET CHEWABLE at 08:17

## 2025-02-15 RX ADMIN — Medication 12.5 MG: at 20:41

## 2025-02-15 RX ADMIN — GABAPENTIN 200 MG: 100 CAPSULE ORAL at 22:03

## 2025-02-15 RX ADMIN — METHYLPREDNISOLONE SODIUM SUCCINATE 100 MG: 125 INJECTION, POWDER, FOR SOLUTION INTRAMUSCULAR; INTRAVENOUS at 08:17

## 2025-02-15 RX ADMIN — TACROLIMUS 2.5 MG: 1 CAPSULE ORAL at 08:17

## 2025-02-15 RX ADMIN — POLYETHYLENE GLYCOL 3350 17 G: 17 POWDER, FOR SOLUTION ORAL at 08:16

## 2025-02-15 ASSESSMENT — ACTIVITIES OF DAILY LIVING (ADL)
ADLS_ACUITY_SCORE: 40
ADLS_ACUITY_SCORE: 43
ADLS_ACUITY_SCORE: 40
ADLS_ACUITY_SCORE: 43
ADLS_ACUITY_SCORE: 42
ADLS_ACUITY_SCORE: 43
ADLS_ACUITY_SCORE: 43
ADLS_ACUITY_SCORE: 40
ADLS_ACUITY_SCORE: 40
ADLS_ACUITY_SCORE: 43
ADLS_ACUITY_SCORE: 42
ADLS_ACUITY_SCORE: 43
ADLS_ACUITY_SCORE: 40
ADLS_ACUITY_SCORE: 42
ADLS_ACUITY_SCORE: 41
ADLS_ACUITY_SCORE: 41
ADLS_ACUITY_SCORE: 43
ADLS_ACUITY_SCORE: 42
ADLS_ACUITY_SCORE: 41
ADLS_ACUITY_SCORE: 42

## 2025-02-15 NOTE — PROGRESS NOTES
Transplant Surgery  Inpatient Daily Progress Note  02/15/2025    Assessment & Plan: Peter Carpio is a 74 year old male with medical history significant for ESRD 2/2 diabetic nephropathy (on PD since 4/2024), DM2, obesity, NSTEMI (2010), CAD, HTN, and HLD. He underwent a DCD kidney transplant with stent and PD catheter removal on 2/13/25 with Dr. Wali Newell.    Changes today:    - Stop IV Lasix 80 mg BID   - Start metoprolol 12.5 mg BID  _________________________________________________    s/p DCD DDKT +stent 2/13/25: POD#2. Post-op US with poor visualization. Cr 4 (from 5.3), improving. Good UOP.    - Hdez to remain x 3 days d/t new surgical anastomosis.     Immunosuppressed status secondary to medications:   Induction: via low intensity protocol (cPRA 0) with basiliximab x 2 doses and steroid pulse with four week taper.  Maintenance:    -  mg BID   - Tacrolimus 2.5 mg BID. Goal level 8-10.     Neuro:  Acute post op pain: Continue scheduled Tylenol, PRN oxycodone (minimal use).     Hematology:   Anemia of chronic disease: Hgb ~11, stable.   Leukocytosis: WBC 14 < 12 < 8. Likely r/t steroids, monitor.     Cardiorespiratory:   HTN: PTA atenolol.    - Start metoprolol 12.5 mg BID.   CAD; NSTEMI; HLD: s/p PCI with EDWIN x 1 to LAD in 07/2024. PTA ASA, isosorbide, and atorvastatin.    - Continue ASA, statin, and start metoprolol.     GI/Nutrition: Regular diet  Constipation: Continue senna BID, Miralax daily.     Endocrine:   DM2; Steroid induced hyperglycemia: PTA on 70/30 BID. Endocrine consulted.   - Appreciate Endocrinology recommendations.     Fluid/Electrolytes: Stop IVF.     Infectious disease: No issues.     Prophylaxis: DVT (mechanical), fall, GI, viral (Valcyte), pneumocystis (Bactrim)    Disposition: 7A     JADE/Fellow/Resident Provider: Ruth Bautista NP Pager #[phone]    Medically Ready for Discharge: Anticipate 1-2 days.        Faculty: Wali Newell,  MD  _________________________________________________________________    Interval History: History from patient and/or EMR  Overnight events: No acute events overnight. No BM since transplant, otherwise feeling well.     ROS:   A 10-point review of systems was negative except as noted above.    Meds:  Current Facility-Administered Medications   Medication Dose Route Frequency Provider Last Rate Last Admin    acetaminophen (TYLENOL) tablet 975 mg  975 mg Oral Q8H Micky Bass MD   975 mg at 02/15/25 0558    aspirin (ASA) chewable tablet 81 mg  81 mg Oral Daily Susie Mao APRN CNP   81 mg at 02/15/25 0817    atorvastatin (LIPITOR) tablet 40 mg  40 mg Oral Daily Susie Mao APRN CNP   40 mg at 02/15/25 0817    [START ON 2/17/2025] basiliximab (SIMULECT) 20 mg in sodium chloride 0.9 % 50 mL infusion  20 mg Intravenous Once Susie Mao APRN CNP        fish oil-omega-3 fatty acids capsule 2 g  2 g Oral BID Susie Mao APRN CNP   2 g at 02/15/25 1016    furosemide (LASIX) injection 80 mg  80 mg Intravenous BID Micky Bass MD   80 mg at 02/15/25 0817    gabapentin (NEURONTIN) capsule 200 mg  200 mg Oral At Bedtime Susie Mao APRN CNP   200 mg at 02/14/25 2202    insulin aspart (NovoLOG) injection (RAPID ACTING)   Subcutaneous TID w/meals Yulissa aDrden APRN CNP   5 Units at 02/15/25 0833    insulin NPH injection 30 Units  30 Units Subcutaneous QAM AC Alberto Miller MD   30 Units at 02/15/25 1016    magnesium oxide (MAG-OX) tablet 400 mg  400 mg Oral Daily with lunch Micky Bass MD   400 mg at 02/15/25 1205    mycophenolate (GENERIC EQUIVALENT) capsule 750 mg  750 mg Oral BID IS Micky Bass MD   750 mg at 02/15/25 0816    polyethylene glycol (MIRALAX) Packet 17 g  17 g Oral Daily Micky Bass MD   17 g at 02/15/25 0816    [START ON 2/16/2025] predniSONE (DELTASONE) tablet 60 mg  60 mg Oral Daily Susie Mao APRN CNP        Followed by    [START ON 2/18/2025]  "predniSONE (DELTASONE) tablet 40 mg  40 mg Oral Daily Susie Mao APRN CNP        Followed by    [START ON 2/20/2025] predniSONE (DELTASONE) tablet 20 mg  20 mg Oral Daily Susie Mao APRN CNP        Followed by    [START ON 2/27/2025] predniSONE (DELTASONE) tablet 15 mg  15 mg Oral Daily Susie Mao APRN CNP        Followed by    [START ON 3/6/2025] predniSONE (DELTASONE) tablet 10 mg  10 mg Oral Daily Susie Mao APRN CNP        Followed by    [START ON 3/13/2025] predniSONE (DELTASONE) tablet 5 mg  5 mg Oral Daily Susie Mao APRN CNP        senna-docusate (SENOKOT-S/PERICOLACE) 8.6-50 MG per tablet 1 tablet  1 tablet Oral BID Micky Bass MD   1 tablet at 02/15/25 0817    sodium chloride (PF) 0.9% PF flush 10 mL  10 mL Intracatheter Q8H Micky Bass MD   10 mL at 02/15/25 0208    sulfamethoxazole-trimethoprim (BACTRIM) 400-80 MG per tablet 1 tablet  1 tablet Oral Q Mon Wed Fri AM Micky Bass MD   1 tablet at 02/14/25 0806    tacrolimus (GENERIC EQUIVALENT) capsule 2.5 mg  0.03 mg/kg Oral BID IS Micky Bass MD   2.5 mg at 02/15/25 0817    tamsulosin (FLOMAX) capsule 0.4 mg  0.4 mg Oral Daily Susie Mao APRN CNP   0.4 mg at 02/15/25 0817    valGANciclovir (VALCYTE) tablet 450 mg  450 mg Oral Once per day on Tuesday Saturday Micky Bass MD           Physical Exam:     Admit Weight: 85.5 kg (188 lb 6.4 oz)    Current vitals:   BP (!) 140/83 (BP Location: Right arm)   Pulse 81   Temp 98  F (36.7  C) (Oral)   Resp 16   Ht 1.675 m (5' 5.95\")   Wt 85.5 kg (188 lb 6.4 oz)   SpO2 98%   BMI 30.46 kg/m      Vital sign ranges:    Temp:  [97.5  F (36.4  C)-98  F (36.7  C)] 98  F (36.7  C)  Pulse:  [70-81] 81  Resp:  [16] 16  BP: (124-150)/(62-83) 140/83  SpO2:  [94 %-98 %] 98 %    General Appearance: in no apparent distress.   Skin: Warm, perfused  Heart: sinus rhythm  Lungs: non-labored breathing on RA  Abdomen: The abdomen is soft. Incision closed with staples " and open to air. Packing removed from PD cath site.   : jonas is present  Extremities: edema: none  Neurologic: awake, alert, and oriented. Tremor absent.    Data:   CMP  Recent Labs   Lab 02/15/25  1306 02/15/25  1201 02/15/25  0646 02/15/25  0559 02/14/25  0617 02/14/25  0550 02/13/25  1017 02/13/25  0902 02/13/25  0743 02/13/25  0057 02/12/25 2047   NA  --   --   --  140  --  137   < > 133* 138   < > 139   POTASSIUM  --   --   --  3.8  --  4.0  4.0   < > 4.7 4.4   < > 3.9   CHLORIDE  --   --   --  102  --  101   < >  --   --   --  98   CO2  --   --   --  22  --  20*   < >  --   --   --  21*   * 131*   < > 118*   < > 160*   < > 210* 179*   < > 239*   BUN  --   --   --  65.1*  --  61.0*   < >  --   --   --  63.4*   CR  --   --   --  3.97*  --  5.25*   < >  --   --   --  5.85*   GFRESTIMATED  --   --   --  15*  --  11*   < >  --   --   --  9*   GINA  --   --   --  9.3  --  8.9   < >  --   --   --  9.3   ICAW  --   --   --   --   --   --   --  4.5 4.6  --   --    MAG  --   --   --  2.0  --  2.1   < >  --   --   --   --    PHOS  --   --   --  5.2*  --  4.6*   < >  --   --   --   --    ALBUMIN  --   --   --   --   --   --   --   --   --   --  3.9   BILITOTAL  --   --   --   --   --   --   --   --   --   --  0.2   ALKPHOS  --   --   --   --   --   --   --   --   --   --  59   AST  --   --   --   --   --   --   --   --   --   --  17   ALT  --   --   --   --   --   --   --   --   --   --  15    < > = values in this interval not displayed.     CBC  Recent Labs   Lab 02/15/25  0559 02/14/25  0550 02/13/25  0743 02/12/25 2046   HGB 11.1* 10.9*  10.9*   < > 13.5   WBC 14.2* 12.3*   < > 7.5   * 157   < > 182   A1C  --   --   --  7.1*    < > = values in this interval not displayed.

## 2025-02-15 NOTE — PLAN OF CARE
"Goal Outcome Evaluation:      Plan of Care Reviewed With: patient, spouse    Overall Patient Progress: improvingOverall Patient Progress: improving    Outcome Evaluation: VSS, good urine output, pain managed with tylenol    BP (!) 146/75   Pulse 70   Temp 97.9  F (36.6  C) (Oral)   Resp 16   Ht 1.675 m (5' 5.95\")   Wt 85.5 kg (188 lb 6.4 oz)   SpO2 97%   BMI 30.46 kg/m       Cares: 6633-0482    VSS on RA, pain managed with scheduled tylenol. Patient has Hdez in place with good urine output (650mL this shift - yellow in color). No BM's since surgery, but passing gas. Insulin drip running at algorithm 2 (per patient care order). Sugars ranging 111-225. LR running at 100mL/hr.  Abdominal incision with operative dressing in place. Continuing plan of care.   "

## 2025-02-15 NOTE — PROGRESS NOTES
[facility]  Transplant Nephrology Progress Note  Date of Admission:  2/12/2025  Today's Date: 02/15/2025  Requesting physician: Wali Newell*    Recommendations:   - If BP consistently above 140/90 mm Hg, then please start metoprolol 12.5 mg bid for history of NSTEMI and past beta-blocker use.   - Continue current immunosuppression.  - No acute indications for dialysis.   - Strict I/Os and daily standing weights (dry weight of 186 lbs) to assess need and dose of lasix.     Assessment & Plan   # DDKT: Trend down. Good urine output.    - Baseline Creatinine: ~ TBD   - Proteinuria: Not checked post transplant   - DSA Hx: Not checked recently due to time from transplant   - Last cPRA: 0%   - BK Viremia: Not checked recently due to time from transplant   - Kidney Tx Biopsy Hx: No biopsy history.   - Transplant Ureteral Stent: Yes    # Immunosuppression: Tacrolimus immediate release (goal 8-10), Mycophenolate mofetil (dose 750 mg every 12 hours), and Methylprednisolone (dose taper)   - Induction with Recent Transplant:  Low Intensity Protocol   - Continue with intensive monitoring of immunosuppression for efficacy and toxicity.   - Historical Changes in Immunosuppression: None   - Changes: Not at this time    # Infection Prevention:     - PJP: Sulfa/TMP (Bactrim)  - CMV: Valganciclovir (Valcyte)      - CMV IgG Ab High Risk Discordance (D+/R-) at time of transplant: No  Present CMV Serostatus: Negative  - EBV IgG Ab High Risk Discordance (D+/R-) at time of transplant: No  Present EBV Serostatus: Positive    # Hypertension: Controlled;  Goal BP: < 150/90   - PTA medications: atenolol 50 mg daily and furosemide 20 mg daily.   - Changes: Yes - start metoprolol 12.5 mg bid    # Diabetes: Borderline control (HbA1c 7-9%) Last HbA1c: 7.1%   - Management per Surgery.     # Anemia in Chronic Renal Disease: Hgb: Stable, low      BARBER: No   - Iron studies:  normal iron panel  with high ferritin     # Mineral Bone Disorder:    - Secondary renal hyperparathyroidism; PTH level: Moderately elevated (301-600 pg/ml)        On treatment: None  - Vitamin D; level: Not checked recently        On supplement: No  - Calcium; level: Normal        On supplement: No  - Phosphorus; level: High        On supplement: No    # Electrolytes:   - Potassium; level: Normal        On supplement: No  - Magnesium; level: Normal        On supplement: Yes; magnesium oxide 400 mg daily  - Bicarbonate; level: Normal        On supplement: No  - Sodium; level: Normal    # Other Significant PMH:   - Gout: on allopurinol.  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 Saw Dr. Pham cancer specialist place out of [facility] Undergoing surveillance colonoscopy. Last colonoscopy in march 2024 with out any new lesions.   - CAD: cardiac catheterization 7/24: pLAD to mLAD 70% stenosis and mLAD 40% stenosis. He is now s/p PCI with EDWIN x 1 to LAD.   - NSTEMI: in 2022. Stress test showed very small defect. No intervention pursued due to kidney function at that time. On statin, ASA 81, and Imdur.     # Transplant History:  Etiology of Kidney Failure: Diabetes mellitus type 2  Tx: DDKT  Transplant: 2/13/2025 (Kidney)  Significant transplant-related complications: None    Recommendations were communicated to the primary team verbally.    Seen and discussed with Dr. Keen.    ALEKSANDR [facility]  Transplant Nephrology      Interval History  Mr. Hernández creatinine is 3.97 (02/15 0550); Trend down.  Good urine output.  Other significant labs/tests/vitals: VSS. Afebrile.   No events overnight.  No chest pain or shortness of breath.  No leg swelling.  Some nausea but no vomiting.  No fever, sweats or chills.      Review of Systems   4 point ROS was obtained and negative except as noted in the Interval History.    MEDICATIONS:  Current Facility-Administered Medications    Medication Dose Route Frequency Provider Last Rate Last Admin    acetaminophen (TYLENOL) tablet 975 mg  975 mg Oral Q8H Micky Bass MD   975 mg at 02/15/25 0558    aspirin EC tablet 81 mg  81 mg Oral Daily Susie Mao APRN CNP   81 mg at 02/14/25 1530    atorvastatin (LIPITOR) tablet 40 mg  40 mg Oral Daily Susie Mao APRN CNP        [START ON 2/17/2025] basiliximab (SIMULECT) 20 mg in sodium chloride 0.9 % 50 mL infusion  20 mg Intravenous Once Susie Mao APRN CNP        fish oil-omega-3 fatty acids capsule 2 g  2 g Oral BID Susie Mao APRN CNP   2 g at 02/14/25 2002    furosemide (LASIX) injection 80 mg  80 mg Intravenous BID Micky Bass MD   80 mg at 02/14/25 1521    gabapentin (NEURONTIN) capsule 200 mg  200 mg Oral At Bedtime Susie Mao APRN CNP   200 mg at 02/14/25 2202    insulin aspart (NovoLOG) injection (RAPID ACTING)   Subcutaneous TID w/meals Yulissa Darden APRN CNP   8 Units at 02/14/25 1846    insulin NPH injection 30 Units  30 Units Subcutaneous Q24H Yulissa Darden APRN CNP   30 Units at 02/14/25 1531    magnesium oxide (MAG-OX) tablet 400 mg  400 mg Oral Daily with lunch Micky Bass MD        methylPREDNISolone Na Suc (solu-MEDROL) injection 100 mg  100 mg Intravenous Once Susie Mao APRN CNP        mycophenolate (GENERIC EQUIVALENT) capsule 750 mg  750 mg Oral BID IS Micky Bass MD   750 mg at 02/14/25 1754    polyethylene glycol (MIRALAX) Packet 17 g  17 g Oral Daily Micky Bass MD   17 g at 02/14/25 0824    [START ON 2/16/2025] predniSONE (DELTASONE) tablet 60 mg  60 mg Oral Daily Susie Mao APRN CNP        Followed by    [START ON 2/18/2025] predniSONE (DELTASONE) tablet 40 mg  40 mg Oral Daily Susie Mao APRN CNP        Followed by    [START ON 2/20/2025] predniSONE (DELTASONE) tablet 20 mg  20 mg Oral Daily Susie Mao APRN CNP        Followed by    [START ON 2/27/2025] predniSONE (DELTASONE) tablet  15 mg  15 mg Oral Daily Susie Mao APRN CNP        Followed by    [START ON 3/6/2025] predniSONE (DELTASONE) tablet 10 mg  10 mg Oral Daily Susie Mao APRN CNP        Followed by    [START ON 3/13/2025] predniSONE (DELTASONE) tablet 5 mg  5 mg Oral Daily Susie Mao APRN CNP        senna-docusate (SENOKOT-S/PERICOLACE) 8.6-50 MG per tablet 1 tablet  1 tablet Oral BID Micky Bass MD   1 tablet at 25    sodium chloride (PF) 0.9% PF flush 10 mL  10 mL Intracatheter Q8H Micky Bass MD   10 mL at 02/15/25 0208    sulfamethoxazole-trimethoprim (BACTRIM) 400-80 MG per tablet 1 tablet  1 tablet Oral Q  Micky Bass MD   1 tablet at 25 0806    tacrolimus (GENERIC EQUIVALENT) capsule 2.5 mg  0.03 mg/kg Oral BID IS Micky Bass MD   2.5 mg at 25 1754    tamsulosin (FLOMAX) capsule 0.4 mg  0.4 mg Oral Daily Susie Mao APRN CNP   0.4 mg at 25 1530    valGANciclovir (VALCYTE) tablet 450 mg  450 mg Oral Once per day on  Micky Bass MD         Current Facility-Administered Medications   Medication Dose Route Frequency Provider Last Rate Last Admin    dextrose 10% infusion   Intravenous Continuous PRN Roscoe Brown MD        insulin regular (MYXREDLIN) 1 unit/mL infusion  0-24 Units/hr Intravenous Continuous Roscoe Brown MD 1 mL/hr at 02/15/25 0647 1 Units/hr at 02/15/25 0647    lactated ringers infusion   Intravenous Continuous Susie Mao APRN  mL/hr at 02/15/25 0644 New Bag at 02/15/25 0644       Physical Exam   Temp  Av.9  F (36.6  C)  Min: 97.6  F (36.4  C)  Max: 98.6  F (37  C)  Arterial Line BP  Min: 141/70  Max: 141/70  Arterial Line MAP (mmHg)  Av mmHg  Min: 90 mmHg  Max: 90 mmHg      Pulse  Av.2  Min: 66  Max: 86 Resp  Avg: 15.2  Min: 12  Max: 18  SpO2  Av %  Min: 95 %  Max: 100 %    CVP (mmHg): 9 mmHgBP (!) 146/75   Pulse 70   Temp 97.9  F (36.6  C) (Oral)   Resp 16   Ht  "1.675 m (5' 5.95\")   Wt 85.5 kg (188 lb 6.4 oz)   SpO2 97%   BMI 30.46 kg/m     Date 02/13/25 0700 - 02/14/25 0659   Shift 2266-0916 2596-4347 2428-1940 24 Hour Total   INTAKE   I.V. 2600   2600   Shift Total(mL/kg) 2600(30.42)   2600(30.42)   OUTPUT   Urine 340   340   Blood 50   50   Shift Total(mL/kg) 390(4.56)   390(4.56)   Weight (kg) 85.46 85.46 85.46 85.46      Admit Weight: 85.5 kg (188 lb 6.4 oz)     GENERAL APPEARANCE: alert and no distress  HENT: mouth without ulcers or lesions  RESP: lungs clear to auscultation - no rales, rhonchi or wheezes  CV: regular rhythm, normal rate, no rub, no murmur  EDEMA: no LE edema bilaterally  ABDOMEN: soft, nondistended, nontender, bowel sounds normal  MS: extremities normal - no gross deformities noted, no evidence of inflammation in joints, no muscle tenderness  SKIN: no rash  TX KIDNEY: mild TTP  DIALYSIS ACCESS: none    Data   All labs reviewed by me.  CMP  Recent Labs   Lab 02/15/25  0646 02/15/25  0559 02/15/25  0454 02/15/25  0305 02/14/25  0617 02/14/25  0550 02/14/25  0226 02/14/25  0155 02/13/25  2301 02/13/25  2226 02/13/25  1027 02/13/25  1017 02/13/25  0902 02/13/25  0057 02/12/25  2047   NA  --  140  --   --   --  137  --   --   --   --   --  136 133*   < > 139   POTASSIUM  --  3.8  --   --   --  4.0  4.0  --  4.0  --  4.1   < > 4.3  4.3 4.7   < > 3.9   CHLORIDE  --  102  --   --   --  101  --   --   --   --   --  99  --   --  98   CO2  --  22  --   --   --  20*  --   --   --   --   --  17*  --   --  21*   ANIONGAP  --  16*  --   --   --  16*  --   --   --   --   --  20*  --   --  20*   * 118* 127* 124*   < > 160*   < >  --    < >  --    < > 169* 210*   < > 239*   BUN  --  65.1*  --   --   --  61.0*  --   --   --   --   --  61.3*  --   --  63.4*   CR  --  3.97*  --   --   --  5.25*  --   --   --   --   --  5.49*  --   --  5.85*   GFRESTIMATED  --  15*  --   --   --  11*  --   --   --   --   --  10*  --   --  9*   GINA  --  9.3  --   --   --  8.9  " --   --   --   --   --  9.0  --   --  9.3   MAG  --  2.0  --   --   --  2.1  --   --   --   --   --  2.3  --   --   --    PHOS  --  5.2*  --   --   --  4.6*  --   --   --   --   --  4.8*  --   --   --    PROTTOTAL  --   --   --   --   --   --   --   --   --   --   --   --   --   --  6.5   ALBUMIN  --   --   --   --   --   --   --   --   --   --   --   --   --   --  3.9   BILITOTAL  --   --   --   --   --   --   --   --   --   --   --   --   --   --  0.2   ALKPHOS  --   --   --   --   --   --   --   --   --   --   --   --   --   --  59   AST  --   --   --   --   --   --   --   --   --   --   --   --   --   --  17   ALT  --   --   --   --   --   --   --   --   --   --   --   --   --   --  15    < > = values in this interval not displayed.     CBC  Recent Labs   Lab 02/15/25  0559 02/14/25  0550 02/14/25  0155 02/13/25  2226 02/13/25  1546 02/13/25  1017 02/13/25  0743 02/12/25  2046   HGB 11.1* 10.9*  10.9* 11.4* 11.6*   < > 11.9*   < > 13.5   WBC 14.2* 12.3*  --   --   --  8.1  --  7.5   RBC 3.41* 3.40*  --   --   --  3.70*  --  4.16*   HCT 32.3* 32.5*  --   --   --  35.3*  --  39.2*   MCV 95 96  --   --   --  95  --  94   MCH 32.6 32.1  --   --   --  32.2  --  32.5   MCHC 34.4 33.5  --   --   --  33.7  --  34.4   RDW 13.7 13.7  --   --   --  13.3  --  13.4   * 157  --   --   --  146*  --  182    < > = values in this interval not displayed.     INR  Recent Labs   Lab 02/12/25 2047   INR 0.81*   PTT <20*     ABG  Recent Labs   Lab 02/13/25  0902 02/13/25  0743   PH 7.34* 7.40   PCO2 38 36   PO2 139* 98   HCO3 21 22   O2PER 36.0 28.0      Urine Studies  Recent Labs   Lab Test 02/12/25  2132 07/03/24  0958 05/16/24  1327   COLOR Light Yellow Yellow Yellow   APPEARANCE Slightly Cloudy* Slightly Cloudy* Cloudy*   URINEGLC >=1000* 100* 300*   URINEBILI Negative Negative Negative   URINEKETONE Negative Negative Negative   SG 1.013 1.014 1.017   UBLD Trace* Small* Small*   URINEPH 6.5 6.0 6.0   PROTEIN 100* 30* 200*    NITRITE Negative Negative Negative   LEUKEST Large* Large* Large*   RBCU 8* 12* 21*   WBCU >182* >182* >182*     No lab results found.  PTH  No lab results found.  Iron Studies  No lab results found.    IMAGING:  All imaging studies reviewed by me.

## 2025-02-15 NOTE — PLAN OF CARE
"Goal Outcome Evaluation:      Plan of Care Reviewed With: patient, spouse    Overall Patient Progress: improvingOverall Patient Progress: improving    Outcome Evaluation: VSS, good UOP, minimal pain    BP (!) 140/83 (BP Location: Right arm)   Pulse 81   Temp 98  F (36.7  C) (Oral)   Resp 16   Ht 1.675 m (5' 5.95\")   Wt 85.5 kg (188 lb 6.4 oz)   SpO2 98%   BMI 30.46 kg/m      Patient intermittent HTN, on RA, afebrile. -159, insulin gtt algorithm 2. Abdominal incision pain managed with scheduled tylenol. Denies nausea. Tolerating regular diet with fair appetite. IJ running insulin gtt. Good UOP via jonas. BM x1. Abdominal incision + old PD cath site CDI open to air. Up with SBA.  Lab book and med card up to date. Will continue with POC and notify MD with changes or concerns.    "

## 2025-02-15 NOTE — PLAN OF CARE
"Vitals: BP (!) 146/74 (BP Location: Right arm)   Pulse 74   Temp 97.9  F (36.6  C) (Oral)   Resp 16   Ht 1.675 m (5' 5.95\")   Wt 85.5 kg (188 lb 6.4 oz)   SpO2 99%   BMI 30.46 kg/m    Room air  Blood glucose: insulin drip: alg 2. -198. Carb covered.   Pain/nausea: denies  Diet: regular. Good appetite  Lines: TLIJ infusing insulin + TKO. Dressing cdi  : jonas  ml. Yellow with sediments. Okay to irrigate if pt requires (talked to Morena agarwal)  GI: x 1 on days  Drains: na  Skin: incision staples markos, cdi side dressing on.   Mobility: SBA x walker x GB.   Wife at bedside. Supportive.   Med/lab book updated.     "

## 2025-02-15 NOTE — PROGRESS NOTES
"  Inpatient Diabetes Management Service : New Consult Note     Patient: Peter Carpio   YOB: 1950    MRN: [id number]     Date of Consult : 02/15/2025   Date of Admission: 2/12/2025     Reason for Consult: \"S/p kidney transplant 2/13/25, hx of DM2 on home insulin, currently on insulin gtt post op for glucose control, will be on steroid taper post-op\"    Consult Requestor: Susie wells            History of Present Illness:     HPI:  Peter Carpio is a 74 year old male with past medical history significant for ESRD secondary to DM type II on PD, CAD, gout, hypertension, and hyperlipidemia who presented to the hospital for possible kidney transplant and now is s/p DDKT on 2/13 with Dr. Newell.     IDS consulted to assist with glycemic management and optimization while inpatient and when applicable, recommendations for transition home.    History obtained via the patient, chart review and discussion with primary team.     Additional Historian:  none    Patient is not known to the Inpatient Diabetes Service from past admission(s)     The patient has known Type II DM which is typically managed outpatient utilizing mixture 70/30 insulin.   BG are monitoring using CGM Freestyle Christiano 3 with ranges reported to be between 100 and 200 with rare hypoglycemia and rare hyperglycemia.  CGM has been removed.     TIR per clinic notes from [facility] on 12/26/24:  FreeStyle LibreView  Time in target range 73%  High 23%, Very High 4%  Current Ave  mg/dl    Since admission, has been started on IV insulin for significant hyperglycemia from surgery, steroids.         Last dose insulin PTA: 70/30 32 unit(s) at 35 before admission    Current inpatient regimen:  70/30: takes 30 unit(s) in AM and 32 unit(s) in PM     BG at time of consult: 150 mg/dL on IV insulin drip + steroids +/- dextrose 5% at 125 ml/hr   Planned Procedures/Surgeries: none     ELOS> 2 days    DM specific Labs today:  if contributory, " otherwise see labs below:    Glucose: 160  Renal function: Creatinine (5.25), eGFR (11)    BMP: Bicarb (20), Anion Gap (16)   BhB: none   Osmolality no  Hgb: 10.9   A1c: 7.1%      Inpatient Glucose Trends:               Diabetes History:   Diabetes Type and Duration: T2DM diagnosed >20 years ago, in his 50's    C-peptide:    7/2024: 18.8 - correlating BG not taken  12/26/2024: 17.4 - correlating BG not taken     GAD65 antibody, zinc transporter 8 antibody, islet antibody, insulin antibody, a not available on epic search       PTA Medication Regimen:   70/30 32 unit(s) in AM and 35 unit(s) PM    Missing doses?: no  Historical Diabetes Medications:   No hx of oral meds on chart review     Glucose monitoring device/frequency/trends: CGM Freestyle Christiano 3  Hypoglycemia PTA:   - Frequency: was having more freq low BG months ago, adjusted dosing, improved.   - Severity:  no history of severe unconscious lows   - Awareness:  intact    - Treatment: eat/drink      Outpatient Diabetes Provider: YADY Wheeler   Formal Diabetes Education/Educator: [facility]  - Reisdorf. E. 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 (last eye exam: 1/9/2025), + ESRD nephropathy, no gastroparesis, + macrovascular disease      Most recent A1c: 7.1%    Hemoglobin at time of last A1c: 13.5  RBC transfusion in past 3 months: none      History of DKA: no      Safety Plan:   - Glucagon: no   - Ketone Strips: no  Medic Alert if Type 1: no    Diet/Lifestyle/Living Situation:       Ability to [place] Prescribed Regimen:  has been able    Food/Housing Insecurity: no          Medications Impacting Glycemia:    Steroids: yes, received  mg on 2/13            D5W containing solutions/medications: dextrose 5% at 125 ml/hr   Other medications/factors impacting glucose:  Diuretic, tacro         Diet/Nutrition:    Orders Placed This Encounter      Advance Diet as Tolerated:  Regular Diet Adult     Supplements:  none  TF: no  TPN: no          Review of Systems:    CC: pain well controlled, some constipation   Constitutional: denies fever and chills. no recent weight changes.    HEENT: no headache, no vision changes, no hearing changes, denies sinus congestion, or swollen glands.   Cardiac: no chest pain, palpitations, or racing heart.    Respiratory: no dyspnea on exertion and at rest. no wheezing, cough intermittent dry cough, denies  active sputum production.    GI: denies abdominal pain, tenderness and bloating. no nausea and vomiting. + changes in stool pattern,  constipation     : n/a - cath    MSK/Integumentary. trace swelling/edema. no weakness. no new rashes, wounds, and bruising.    Endocrine: no polyuria, polydipsia           Past Medical History:       Past Medical History:   Diagnosis Date    CAD (coronary artery disease)     Diabetes mellitus, type 2 (H)     ESRD (end stage renal disease) on dialysis (H)     Essential hypertension     Hyperlipidemia LDL goal <70     NSTEMI (non-ST elevated myocardial infarction) (H)              Past Surgical History:      Past Surgical History:   Procedure Laterality Date    CV CORONARY ANGIOGRAM N/A 2024    Procedure: Coronary Angiogram;  Surgeon: Duglas Lamb MD;  Location: [facility] CARDIAC CATH LAB    CV PCI N/A 2024    Procedure: Percutaneous Coronary Intervention;  Surgeon: Duglas Lamb MD;  Location: [facility] CARDIAC CATH LAB    REMOVE CATHETER PERITONEAL N/A 2025    Procedure: Remove catheter peritoneal;  Surgeon: Wali Newell MD;  Location:  OR    TRANSPLANT KIDNEY RECIPIENT  DONOR Left 2025    Procedure: Transplant kidney recipient  donor with ureteral stent placement and peritoneal dialysis catheter removal;  Surgeon: Wali Newell MD;  Location:  OR           Social History:      Social History     Tobacco Use    Smoking status:  "Former     Current packs/day: 0.00     Types: Cigarettes     Quit date:      Years since quittin.1    Smokeless tobacco: Never   Substance Use Topics    Alcohol use: Yes     Comment: rare      History   Sexual Activity    Sexual activity: Not on file      Tobacco: as above   Etoh: as above/not currently     Other Substance: not currently    Marital Status:      Lives in [place] with wife          Family History:    Reviewed.    Family History of Diabetes:     History reviewed. No pertinent family history.          Physical Exam:    /82 (BP Location: Right arm)   Pulse 78   Temp 97.5  F (36.4  C) (Oral)   Resp 16   Ht 1.675 m (5' 5.95\")   Wt 85.5 kg (188 lb 6.4 oz)   SpO2 97%   BMI 30.46 kg/m     General:  stable appearing, in no acute distress.  HEENT:  NC/AT. MMM, sclera not injected, hearing intact.  Wearing glasses   Lungs:  unremarkable, no new cough, no SOB.  ABD:  rounded.  Skin:  warm and dry, no obvious lesions.  Feet:    warm without discoloration,  without evidence of wounds, corns, calluses, or vascular compromise, pulses +,  + CMS intact.    MSK:   moving all extremities.  Lymp:   trace LE edema.  Mental Status:  Alert and oriented x3.  Psych:   Cooperative, good eye contact, full/appropriate affect.         Laboratory Data:      Lab Results   Component Value Date    A1C 7.1 (H) 2025    A1C 6.7 (H) 2024     Recent Labs   Lab Test 25  0550   WBC 12.3*   RBC 3.40*   HGB 10.9*  10.9*   HCT 32.5*   MCV 96   MCH 32.1   MCHC 33.5   RDW 13.7        Recent Labs   Lab Test 25  1057 25  1002 25  0617 25  0550 25  0226 25  0155 25  1027 25  1017   NA  --   --   --  137  --   --   --  136   POTASSIUM  --   --   --  4.0  4.0  --  4.0   < > 4.3  4.3   CHLORIDE  --   --   --  101  --   --   --  99   CO2  --   --   --  20*  --   --   --  17*   ANIONGAP  --   --   --  16*  --   --   --  20*   * 152*   " < > 160*   < >  --    < > 169*   BUN  --   --   --  61.0*  --   --   --  61.3*   CR  --   --   --  5.25*  --   --   --  5.49*   GINA  --   --   --  8.9  --   --   --  9.0    < > = values in this interval not displayed.     Recent Labs   Lab Test 02/12/25 2047   PROTTOTAL 6.5   ALBUMIN 3.9   BILITOTAL 0.2   ALKPHOS 59   AST 17   ALT 15            Assessment and Recommendations:       Assessment:     Type II Diabetes Mellitus, c/b peripheral neuropathy, ESRD and macrovascular disease.  A1c 7.1 % in presence of complex chronic medical issues and anemia   Acute hyperglycemia 2/2 post-op stress, steroids  ESRD s/p DDKT on 2/13 with Dr. Newell.  Metabolic derangements; acidosis - multifactorial   BMI:  31  Anemia     Plan/Recommendations:      - Remain on IV insulin drip, will add NPH to evening today for steroid hyperglycemia  - Continue NPH 30 unit(s)  q 24 hrs at 0730 - at same time with prednisone    -Add NPH 20 unit(s)  q 24 hrs at 1800   - Continue Novolog Meal Coverage: 1 units per 6 g CHO, TID AC and PRN with snacks/supplements   - BG monitoring: Every hour on insulin drip   - HOLD PTA meds: Mixture insulin 70/30    - Hypoglycemia protocol    - Carb counting protocol recommended      Discussion:      Patient is s/p DDKT and doing well.   BG is currently managed on IV insulin with significant rates to bring BG to target.     During the steroid window on 2/13, rates as high as 10 unit(s) per hour however his average dose is currently around 2 units/hour. His BG is between 111-155 today. We added NPH to evening to cover his basal dose for 24 hours and try to stop insulin drip. His steroid dose will be decreased to prednisone 60 mg by tomorrow which will decrease insulin needs.    Overall is doing well and has good urinary output  He would prefer to discharge on 70/30 if that is appropriate for him since he is most comfortable with that insulin.   He does not have his CGM on now, will plan to resume once home.          Tentative Discharge Planning - to be finalized at/near day of discharge:    Medications: TBD, PTA Mixture insulin 70/30 30 unit(s) in AM and 32 unit(s) in PM     Test Claims: completed on 2/14, see pharm liaison notes for details    Education:  Final needs to be assessed closer to discharge.    Outpatient Follow-up:  recommend w/ their establish Endo provider S/. [facility]       Thank you for this consult. Endo team will continue to follow.        Alberto Miller  Endocrine Fellow  Pager#[phone]  [place]

## 2025-02-15 NOTE — PLAN OF CARE
"Goal Outcome Evaluation:      Plan of Care Reviewed With: patient, spouse    Overall Patient Progress: improvingOverall Patient Progress: improving    Outcome Evaluation: CVP discontinued. up to chair most of day. pain tolerable    BP (!) 150/74 (BP Location: Right arm)   Pulse 81   Temp 98  F (36.7  C) (Core)   Resp 16   Ht 1.675 m (5' 5.95\")   Wt 85.5 kg (188 lb 6.4 oz)   SpO2 98%   BMI 30.46 kg/m      Patient VSS on RA, afebrile. BG , on insulin gtt algorithm 2 per patient care order from endocrine; carb coverage and NPH. Abdominal pain managed with oxy and scheduled tylenol. Denies nausea. Tolerating regular diet with fair appetite. IJ running MIVF. PIV saline locked. Good UOP via jonas catheter. No BM, passing flatus. Abdominal incision with op dressing. Up with SBA.  Transplant education in progress. Med card lab book up to date. SW and dietician saw patient and spouse today. Will continue with POC and notify MD with changes or concerns.    "

## 2025-02-16 LAB
ANION GAP SERPL CALCULATED.3IONS-SCNC: 14 MMOL/L (ref 7–15)
BASOPHILS # BLD AUTO: 0 10E3/UL (ref 0–0.2)
BASOPHILS NFR BLD AUTO: 0 %
BUN SERPL-MCNC: 66.9 MG/DL (ref 8–23)
CALCIUM SERPL-MCNC: 9.1 MG/DL (ref 8.8–10.4)
CHLORIDE SERPL-SCNC: 105 MMOL/L (ref 98–107)
CREAT SERPL-MCNC: 2.88 MG/DL (ref 0.67–1.17)
EGFRCR SERPLBLD CKD-EPI 2021: 22 ML/MIN/1.73M2
EOSINOPHIL # BLD AUTO: 0 10E3/UL (ref 0–0.7)
EOSINOPHIL NFR BLD AUTO: 0 %
ERYTHROCYTE [DISTWIDTH] IN BLOOD BY AUTOMATED COUNT: 13.7 % (ref 10–15)
GLUCOSE BLDC GLUCOMTR-MCNC: 101 MG/DL (ref 70–99)
GLUCOSE BLDC GLUCOMTR-MCNC: 106 MG/DL (ref 70–99)
GLUCOSE BLDC GLUCOMTR-MCNC: 107 MG/DL (ref 70–99)
GLUCOSE BLDC GLUCOMTR-MCNC: 110 MG/DL (ref 70–99)
GLUCOSE BLDC GLUCOMTR-MCNC: 117 MG/DL (ref 70–99)
GLUCOSE BLDC GLUCOMTR-MCNC: 125 MG/DL (ref 70–99)
GLUCOSE BLDC GLUCOMTR-MCNC: 130 MG/DL (ref 70–99)
GLUCOSE BLDC GLUCOMTR-MCNC: 166 MG/DL (ref 70–99)
GLUCOSE BLDC GLUCOMTR-MCNC: 166 MG/DL (ref 70–99)
GLUCOSE BLDC GLUCOMTR-MCNC: 82 MG/DL (ref 70–99)
GLUCOSE BLDC GLUCOMTR-MCNC: 89 MG/DL (ref 70–99)
GLUCOSE BLDC GLUCOMTR-MCNC: 90 MG/DL (ref 70–99)
GLUCOSE BLDC GLUCOMTR-MCNC: 95 MG/DL (ref 70–99)
GLUCOSE BLDC GLUCOMTR-MCNC: 99 MG/DL (ref 70–99)
GLUCOSE SERPL-MCNC: 99 MG/DL (ref 70–99)
HCO3 SERPL-SCNC: 23 MMOL/L (ref 22–29)
HCT VFR BLD AUTO: 31.2 % (ref 40–53)
HGB BLD-MCNC: 10.7 G/DL (ref 13.3–17.7)
IMM GRANULOCYTES # BLD: 0.2 10E3/UL
IMM GRANULOCYTES NFR BLD: 1 %
LYMPHOCYTES # BLD AUTO: 0.7 10E3/UL (ref 0.8–5.3)
LYMPHOCYTES NFR BLD AUTO: 5 %
MAGNESIUM SERPL-MCNC: 2.1 MG/DL (ref 1.7–2.3)
MCH RBC QN AUTO: 32.9 PG (ref 26.5–33)
MCHC RBC AUTO-ENTMCNC: 34.3 G/DL (ref 31.5–36.5)
MCV RBC AUTO: 96 FL (ref 78–100)
MONOCYTES # BLD AUTO: 1.3 10E3/UL (ref 0–1.3)
MONOCYTES NFR BLD AUTO: 10 %
NEUTROPHILS # BLD AUTO: 10.6 10E3/UL (ref 1.6–8.3)
NEUTROPHILS NFR BLD AUTO: 83 %
NRBC # BLD AUTO: 0 10E3/UL
NRBC BLD AUTO-RTO: 0 /100
PHOSPHATE SERPL-MCNC: 4.5 MG/DL (ref 2.5–4.5)
PLATELET # BLD AUTO: 132 10E3/UL (ref 150–450)
POTASSIUM SERPL-SCNC: 3.3 MMOL/L (ref 3.4–5.3)
RBC # BLD AUTO: 3.25 10E6/UL (ref 4.4–5.9)
SODIUM SERPL-SCNC: 142 MMOL/L (ref 135–145)
TACROLIMUS BLD-MCNC: 5.1 UG/L (ref 5–15)
TME LAST DOSE: NORMAL H
TME LAST DOSE: NORMAL H
WBC # BLD AUTO: 12.7 10E3/UL (ref 4–11)

## 2025-02-16 PROCEDURE — 250N000012 HC RX MED GY IP 250 OP 636 PS 637: Performed by: STUDENT IN AN ORGANIZED HEALTH CARE EDUCATION/TRAINING PROGRAM

## 2025-02-16 PROCEDURE — 84100 ASSAY OF PHOSPHORUS: CPT | Performed by: STUDENT IN AN ORGANIZED HEALTH CARE EDUCATION/TRAINING PROGRAM

## 2025-02-16 PROCEDURE — 83735 ASSAY OF MAGNESIUM: CPT | Performed by: STUDENT IN AN ORGANIZED HEALTH CARE EDUCATION/TRAINING PROGRAM

## 2025-02-16 PROCEDURE — 99233 SBSQ HOSP IP/OBS HIGH 50: CPT | Mod: FS | Performed by: NURSE PRACTITIONER

## 2025-02-16 PROCEDURE — 250N000013 HC RX MED GY IP 250 OP 250 PS 637: Performed by: SURGERY

## 2025-02-16 PROCEDURE — 80197 ASSAY OF TACROLIMUS: CPT | Performed by: STUDENT IN AN ORGANIZED HEALTH CARE EDUCATION/TRAINING PROGRAM

## 2025-02-16 PROCEDURE — 250N000012 HC RX MED GY IP 250 OP 636 PS 637: Performed by: NURSE PRACTITIONER

## 2025-02-16 PROCEDURE — 250N000013 HC RX MED GY IP 250 OP 250 PS 637: Performed by: STUDENT IN AN ORGANIZED HEALTH CARE EDUCATION/TRAINING PROGRAM

## 2025-02-16 PROCEDURE — 120N000011 HC R&B TRANSPLANT UMMC

## 2025-02-16 PROCEDURE — 250N000013 HC RX MED GY IP 250 OP 250 PS 637

## 2025-02-16 PROCEDURE — 250N000011 HC RX IP 250 OP 636: Performed by: STUDENT IN AN ORGANIZED HEALTH CARE EDUCATION/TRAINING PROGRAM

## 2025-02-16 PROCEDURE — 85004 AUTOMATED DIFF WBC COUNT: CPT

## 2025-02-16 PROCEDURE — 80048 BASIC METABOLIC PNL TOTAL CA: CPT | Performed by: STUDENT IN AN ORGANIZED HEALTH CARE EDUCATION/TRAINING PROGRAM

## 2025-02-16 PROCEDURE — 250N000012 HC RX MED GY IP 250 OP 636 PS 637

## 2025-02-16 PROCEDURE — 99233 SBSQ HOSP IP/OBS HIGH 50: CPT | Mod: 24 | Performed by: INTERNAL MEDICINE

## 2025-02-16 PROCEDURE — 36415 COLL VENOUS BLD VENIPUNCTURE: CPT | Performed by: STUDENT IN AN ORGANIZED HEALTH CARE EDUCATION/TRAINING PROGRAM

## 2025-02-16 PROCEDURE — 250N000013 HC RX MED GY IP 250 OP 250 PS 637: Performed by: NURSE PRACTITIONER

## 2025-02-16 RX ORDER — POTASSIUM CHLORIDE 750 MG/1
20 TABLET, EXTENDED RELEASE ORAL ONCE
Status: COMPLETED | OUTPATIENT
Start: 2025-02-16 | End: 2025-02-16

## 2025-02-16 RX ORDER — NICOTINE POLACRILEX 4 MG
15-30 LOZENGE BUCCAL
Status: DISCONTINUED | OUTPATIENT
Start: 2025-02-16 | End: 2025-02-16

## 2025-02-16 RX ORDER — TACROLIMUS 1 MG/1
3 CAPSULE ORAL
Status: DISCONTINUED | OUTPATIENT
Start: 2025-02-16 | End: 2025-02-17 | Stop reason: HOSPADM

## 2025-02-16 RX ORDER — PANTOPRAZOLE SODIUM 40 MG/1
40 TABLET, DELAYED RELEASE ORAL DAILY
Status: DISCONTINUED | OUTPATIENT
Start: 2025-02-16 | End: 2025-02-17 | Stop reason: HOSPADM

## 2025-02-16 RX ORDER — DEXTROSE MONOHYDRATE 25 G/50ML
25-50 INJECTION, SOLUTION INTRAVENOUS
Status: DISCONTINUED | OUTPATIENT
Start: 2025-02-16 | End: 2025-02-16

## 2025-02-16 RX ORDER — BISACODYL 10 MG
10 SUPPOSITORY, RECTAL RECTAL ONCE
Status: COMPLETED | OUTPATIENT
Start: 2025-02-16 | End: 2025-02-16

## 2025-02-16 RX ORDER — ACETAMINOPHEN 325 MG/1
975 TABLET ORAL EVERY 8 HOURS PRN
Status: DISCONTINUED | OUTPATIENT
Start: 2025-02-16 | End: 2025-02-17 | Stop reason: HOSPADM

## 2025-02-16 RX ORDER — GABAPENTIN 100 MG/1
200 CAPSULE ORAL EVERY EVENING
Status: DISCONTINUED | OUTPATIENT
Start: 2025-02-16 | End: 2025-02-17 | Stop reason: HOSPADM

## 2025-02-16 RX ADMIN — ONDANSETRON 4 MG: 4 TABLET, ORALLY DISINTEGRATING ORAL at 14:53

## 2025-02-16 RX ADMIN — PROCHLORPERAZINE EDISYLATE 5 MG: 5 INJECTION INTRAMUSCULAR; INTRAVENOUS at 02:50

## 2025-02-16 RX ADMIN — GABAPENTIN 200 MG: 100 CAPSULE ORAL at 20:52

## 2025-02-16 RX ADMIN — TAMSULOSIN HYDROCHLORIDE 0.4 MG: 0.4 CAPSULE ORAL at 07:52

## 2025-02-16 RX ADMIN — PROCHLORPERAZINE MALEATE 5 MG: 5 TABLET ORAL at 17:30

## 2025-02-16 RX ADMIN — Medication 12.5 MG: at 20:51

## 2025-02-16 RX ADMIN — INSULIN ASPART 7 UNITS: 100 INJECTION, SOLUTION INTRAVENOUS; SUBCUTANEOUS at 08:38

## 2025-02-16 RX ADMIN — SENNOSIDES AND DOCUSATE SODIUM 1 TABLET: 50; 8.6 TABLET ORAL at 07:48

## 2025-02-16 RX ADMIN — MYCOPHENOLATE MOFETIL 750 MG: 250 CAPSULE ORAL at 07:49

## 2025-02-16 RX ADMIN — TACROLIMUS 3 MG: 1 CAPSULE ORAL at 18:01

## 2025-02-16 RX ADMIN — OMEGA-3 FATTY ACIDS CAP 1000 MG 2 G: 1000 CAP at 20:51

## 2025-02-16 RX ADMIN — Medication 12.5 MG: at 07:52

## 2025-02-16 RX ADMIN — PANTOPRAZOLE SODIUM 40 MG: 40 TABLET, DELAYED RELEASE ORAL at 11:28

## 2025-02-16 RX ADMIN — ASPIRIN 81 MG CHEWABLE TABLET 81 MG: 81 TABLET CHEWABLE at 07:49

## 2025-02-16 RX ADMIN — MAGNESIUM OXIDE TAB 400 MG (241.3 MG ELEMENTAL MG) 400 MG: 400 (241.3 MG) TAB at 11:28

## 2025-02-16 RX ADMIN — POTASSIUM CHLORIDE 20 MEQ: 750 TABLET, EXTENDED RELEASE ORAL at 07:50

## 2025-02-16 RX ADMIN — ATORVASTATIN CALCIUM 40 MG: 40 TABLET, FILM COATED ORAL at 07:52

## 2025-02-16 RX ADMIN — OMEGA-3 FATTY ACIDS CAP 1000 MG 2 G: 1000 CAP at 07:49

## 2025-02-16 RX ADMIN — ONDANSETRON 4 MG: 4 TABLET, ORALLY DISINTEGRATING ORAL at 02:08

## 2025-02-16 RX ADMIN — INSULIN ASPART 5 UNITS: 100 INJECTION, SOLUTION INTRAVENOUS; SUBCUTANEOUS at 17:26

## 2025-02-16 RX ADMIN — TACROLIMUS 2.5 MG: 1 CAPSULE ORAL at 07:51

## 2025-02-16 RX ADMIN — POLYETHYLENE GLYCOL 3350 17 G: 17 POWDER, FOR SOLUTION ORAL at 07:48

## 2025-02-16 RX ADMIN — FINASTERIDE 5 MG: 5 TABLET, FILM COATED ORAL at 07:50

## 2025-02-16 RX ADMIN — PREDNISONE 60 MG: 10 TABLET ORAL at 07:50

## 2025-02-16 RX ADMIN — ACETAMINOPHEN 975 MG: 325 TABLET, FILM COATED ORAL at 06:09

## 2025-02-16 RX ADMIN — MYCOPHENOLATE MOFETIL 750 MG: 250 CAPSULE ORAL at 18:01

## 2025-02-16 RX ADMIN — INSULIN ASPART 4 UNITS: 100 INJECTION, SOLUTION INTRAVENOUS; SUBCUTANEOUS at 13:16

## 2025-02-16 ASSESSMENT — ACTIVITIES OF DAILY LIVING (ADL)
ADLS_ACUITY_SCORE: 43

## 2025-02-16 NOTE — PLAN OF CARE
"Goal Outcome Evaluation:      Plan of Care Reviewed With: patient, spouse    Overall Patient Progress: no changeOverall Patient Progress: no change    Outcome Evaluation: VSS, good urine output, minimal pain but nauseous    BP (!) 153/68 (BP Location: Right arm)   Pulse 74   Temp 97.8  F (36.6  C) (Oral)   Resp 16   Ht 1.675 m (5' 5.95\")   Wt 85.5 kg (188 lb 6.4 oz)   SpO2 98%   BMI 30.46 kg/m       Shift: 1675-5790  VS: Hypertensive BP of 153/86, within parameters. All other vitals stable.   Neuro: Aox4  BG: Ranging (). Patient started on bedtime NPH insulin.   Pain/Nausea: Pain managed with scheduled tylenol. Zofran and compazine given once for nausea and for a tiny amount of emesis.   Diet: Regular  IV Access: internal jugular   Infusion(s): insulin gtt, algorithm 2.  Lines/drains: Hdez catheter in place.   GI/: Good urine output, last BM 2/15, passing gas  Skin: RLQ incision open to air  Mobility: SBA    Continuing with plan of care    "

## 2025-02-16 NOTE — PROGRESS NOTES
Transplant Surgery  Inpatient Daily Progress Note  02/16/2025    Assessment & Plan: Peetr Carpio is a 74 year old male with medical history significant for ESRD 2/2 diabetic nephropathy (on PD since 4/2024), DM2, obesity, NSTEMI (2010), CAD, HTN, and HLD. He underwent a DCD kidney transplant with stent and PD catheter removal on 2/13/25 with Dr. Wali Newell.    Changes today:   - Remove Hdez catheter    - Suppository x1   - Replace potassium   - Plan for discharge home tomorrow pending Endocrine recommendations   - Start Protonix d/t GI upset in the setting of high dose steroids   - Increase tacrolimus to 3 mg BID  _________________________________________________    s/p DCD DDKT +stent 2/13/25: POD#3. Post-op US with poor visualization. Cr 2.9 (down from 4 < 5.3), improving. Good UOP.    - Remove Hdez catheter and monitor PVR    Immunosuppressed status secondary to medications:   Induction: via low intensity protocol (cPRA 0) with basiliximab x 2 doses and steroid pulse with four week taper.  Maintenance:    -  mg BID   - Tacrolimus 3 mg BID. Goal level 8-10.     Neuro:  Acute post op pain:    - Change Tylenol to PRN   - Stop oxycodone (not using)    Hematology:   Anemia of chronic disease: Hgb ~11, stable.   Leukocytosis: WBC 12 < 14 < 12 < 8. Likely r/t steroids, improving.     Cardiorespiratory:   HTN: PTA atenolol.    - Start metoprolol 12.5 mg BID.   CAD; NSTEMI; HLD: s/p PCI with EDWIN x 1 to LAD in 07/2024. PTA ASA, isosorbide, and atorvastatin.    - Continue ASA, statin, and start metoprolol.     GI/Nutrition: Regular diet  Constipation: Continue senna BID, Miralax daily.     Endocrine:   DM2; Steroid induced hyperglycemia: PTA on 70/30 BID. Endocrine consulted.   - Appreciate Endocrinology recommendations.     Fluid/Electrolytes:   Hypokalemia:    - Replace K 20 mEq    Infectious disease: No issues.     Prophylaxis: DVT (mechanical), fall, GI (PPI), viral (Valcyte), pneumocystis  (Bactrim)    Disposition: 7A     JADE/Fellow/Resident Provider: Ruth Bautista NP Pager #[phone]    Medically Ready for Discharge: Anticipated Tomorrow    Faculty: Wali Newell MD  _________________________________________________________________    Interval History: History from patient and/or EMR  Overnight events: Upset stomach overnight, small emesis. Relieved with Zofran.     ROS:   A 10-point review of systems was negative except as noted above.    Meds:  Current Facility-Administered Medications   Medication Dose Route Frequency Provider Last Rate Last Admin    acetaminophen (TYLENOL) tablet 975 mg  975 mg Oral Q8H Micky Bass MD   975 mg at 02/16/25 0609    aspirin (ASA) chewable tablet 81 mg  81 mg Oral Daily Susie Mao APRN CNP   81 mg at 02/16/25 0749    atorvastatin (LIPITOR) tablet 40 mg  40 mg Oral Daily Susie Mao APRN CNP   40 mg at 02/16/25 0752    [START ON 2/17/2025] basiliximab (SIMULECT) 20 mg in sodium chloride 0.9 % 50 mL infusion  20 mg Intravenous Once Susie Mao APRN CNP        finasteride (PROSCAR) tablet 5 mg  5 mg Oral Daily Ruth Bautista NP   5 mg at 02/16/25 0750    fish oil-omega-3 fatty acids capsule 2 g  2 g Oral BID Susie Mao APRN CNP   2 g at 02/16/25 0749    gabapentin (NEURONTIN) capsule 200 mg  200 mg Oral At Bedtime Susie Mao APRN CNP   200 mg at 02/15/25 2203    insulin aspart (NovoLOG) injection (RAPID ACTING)  1-10 Units Subcutaneous TID AC Alberto Miller MD        insulin aspart (NovoLOG) injection (RAPID ACTING)  1-7 Units Subcutaneous At Bedtime Alberto Miller MD        insulin aspart (NovoLOG) injection (RAPID ACTING)   Subcutaneous TID w/meals Yulissa Darden APRN CNP   7 Units at 02/16/25 0838    insulin NPH injection 25 Units  25 Units Subcutaneous Once Alberto Miller MD        insulin NPH injection 28 Units  28 Units Subcutaneous At Bedtime Alberto Miller MD        [START ON 2/17/2025]  insulin NPH injection 55 Units  55 Units Subcutaneous QAM  Alberto Miller MD        magnesium oxide (MAG-OX) tablet 400 mg  400 mg Oral Daily with lunch Micky Bass MD   400 mg at 02/15/25 1205    metoprolol tartrate (LOPRESSOR) half-tab 12.5 mg  12.5 mg Oral BID Ruth Bautista NP   12.5 mg at 02/16/25 0752    mycophenolate (GENERIC EQUIVALENT) capsule 750 mg  750 mg Oral BID IS Micky Bass MD   750 mg at 02/16/25 0749    polyethylene glycol (MIRALAX) Packet 17 g  17 g Oral Daily Micky Bass MD   17 g at 02/16/25 0748    predniSONE (DELTASONE) tablet 60 mg  60 mg Oral Daily Susie Mao APRN CNP   60 mg at 02/16/25 0750    Followed by    [START ON 2/18/2025] predniSONE (DELTASONE) tablet 40 mg  40 mg Oral Daily Susie Mao APRN CNP        Followed by    [START ON 2/20/2025] predniSONE (DELTASONE) tablet 20 mg  20 mg Oral Daily Susie Mao APRN CNP        Followed by    [START ON 2/27/2025] predniSONE (DELTASONE) tablet 15 mg  15 mg Oral Daily Susie Mao APRN CNP        Followed by    [START ON 3/6/2025] predniSONE (DELTASONE) tablet 10 mg  10 mg Oral Daily Susie Mao APRN CNP        Followed by    [START ON 3/13/2025] predniSONE (DELTASONE) tablet 5 mg  5 mg Oral Daily Susie Mao APRN CNP        senna-docusate (SENOKOT-S/PERICOLACE) 8.6-50 MG per tablet 1 tablet  1 tablet Oral BID Micky Bass MD   1 tablet at 02/16/25 0748    sodium chloride (PF) 0.9% PF flush 10 mL  10 mL Intracatheter Q8H Micky Bass MD   10 mL at 02/16/25 0907    sulfamethoxazole-trimethoprim (BACTRIM) 400-80 MG per tablet 1 tablet  1 tablet Oral Q Mon Wed Fri AM Micky Bass MD   1 tablet at 02/14/25 0806    tacrolimus (GENERIC EQUIVALENT) capsule 2.5 mg  0.03 mg/kg Oral BID IS Micky Bass MD   2.5 mg at 02/16/25 0751    tamsulosin (FLOMAX) capsule 0.4 mg  0.4 mg Oral Daily Susie Mao APRN CNP   0.4 mg at 02/16/25 0752    valGANciclovir (VALCYTE) tablet 450 mg   "450 mg Oral Once per day on Tuesday Saturday Micky Bass MD   450 mg at 02/15/25 2040       Physical Exam:     Admit Weight: 85.5 kg (188 lb 6.4 oz)    Current vitals:   /72 (BP Location: Right arm)   Pulse 64   Temp 97.7  F (36.5  C) (Oral)   Resp 16   Ht 1.675 m (5' 5.95\")   Wt 86.9 kg (191 lb 9.6 oz)   SpO2 97%   BMI 30.98 kg/m      Vital sign ranges:    Temp:  [97.4  F (36.3  C)-98.1  F (36.7  C)] 97.7  F (36.5  C)  Pulse:  [64-81] 64  Resp:  [16] 16  BP: (130-153)/() 133/72  SpO2:  [94 %-99 %] 97 %    General Appearance: in no apparent distress.   Skin: Warm, dry  Heart: perfused  Lungs: non-labored breathing on RA  Abdomen: The abdomen is soft. Incision closed with staples and open to air.   : jonas is present; removed on rounds  Extremities: edema: trace generalized  Neurologic: awake, alert, and oriented. Tremor absent.    Data:   CMP  Recent Labs   Lab 02/16/25  0804 02/16/25  0717 02/16/25  0601 02/16/25  0539 02/15/25  0646 02/15/25  0559 02/13/25  1017 02/13/25  0902 02/13/25  0743 02/13/25  0057 02/12/25 2047   NA  --   --   --  142  --  140   < > 133* 138   < > 139   POTASSIUM  --   --   --  3.3*  --  3.8   < > 4.7 4.4   < > 3.9   CHLORIDE  --   --   --  105  --  102   < >  --   --   --  98   CO2  --   --   --  23  --  22   < >  --   --   --  21*   GLC 95 117*   < > 99   < > 118*   < > 210* 179*   < > 239*   BUN  --   --   --  66.9*  --  65.1*   < >  --   --   --  63.4*   CR  --   --   --  2.88*  --  3.97*   < >  --   --   --  5.85*   GFRESTIMATED  --   --   --  22*  --  15*   < >  --   --   --  9*   GINA  --   --   --  9.1  --  9.3   < >  --   --   --  9.3   ICAW  --   --   --   --   --   --   --  4.5 4.6  --   --    MAG  --   --   --  2.1  --  2.0   < >  --   --   --   --    PHOS  --   --   --  4.5  --  5.2*   < >  --   --   --   --    ALBUMIN  --   --   --   --   --   --   --   --   --   --  3.9   BILITOTAL  --   --   --   --   --   --   --   --   --   --  0.2   ALKPHOS  --  "  --   --   --   --   --   --   --   --   --  59   AST  --   --   --   --   --   --   --   --   --   --  17   ALT  --   --   --   --   --   --   --   --   --   --  15    < > = values in this interval not displayed.     CBC  Recent Labs   Lab 02/16/25  0539 02/15/25  0559 02/13/25  0743 02/12/25 2046   HGB 10.7* 11.1*   < > 13.5   WBC 12.7* 14.2*   < > 7.5   * 149*   < > 182   A1C  --   --   --  7.1*    < > = values in this interval not displayed.

## 2025-02-16 NOTE — PROGRESS NOTES
[facility]  Transplant Nephrology Progress Note  Date of Admission:  2/12/2025  Today's Date: 02/16/2025  Requesting physician: Wali Newell*    Recommendations:   - Replete potassium today.  - Continue metoprolol 12.5 mg bid.   - Continue current immunosuppression.  - No acute indications for dialysis.   - Strict I/Os and daily standing weights (dry weight of 186 lbs) to assess need and dose of lasix.     Assessment & Plan   # DDKT: Trend down. Good urine output.    - Baseline Creatinine: ~ TBD   - Proteinuria: Not checked post transplant   - DSA Hx: Not checked recently due to time from transplant   - Last cPRA: 0%   - BK Viremia: Not checked recently due to time from transplant   - Kidney Tx Biopsy Hx: No biopsy history.   - Transplant Ureteral Stent: Yes    # Immunosuppression: Tacrolimus immediate release (goal 8-10), Mycophenolate mofetil (dose 750 mg every 12 hours), and Methylprednisolone (dose taper)   - Induction with Recent Transplant:  Low Intensity Protocol   - Continue with intensive monitoring of immunosuppression for efficacy and toxicity.   - Historical Changes in Immunosuppression: None   - Changes: Not at this time    # Infection Prevention:     - PJP: Sulfa/TMP (Bactrim)  - CMV: Valganciclovir (Valcyte)      - CMV IgG Ab High Risk Discordance (D+/R-) at time of transplant: No  Present CMV Serostatus: Negative  - EBV IgG Ab High Risk Discordance (D+/R-) at time of transplant: No  Present EBV Serostatus: Positive    # Hypertension: Borderline control;  Goal BP: < 150/90   - PTA medications: atenolol 50 mg daily and furosemide 20 mg daily.   - Currently on metoprolol 12.5 mg bid for history of NSTEMI.   - Changes: Not at this time    # Diabetes: Borderline control (HbA1c 7-9%) Last HbA1c: 7.1%   - Management per Surgery.     # Anemia in Chronic Renal Disease: Hgb: Stable, low      BARBER: No   - Iron studies:  normal iron panel with high  ferritin     # Mineral Bone Disorder:    - Secondary renal hyperparathyroidism; PTH level: Moderately elevated (301-600 pg/ml)        On treatment: None  - Vitamin D; level: Not checked recently        On supplement: No  - Calcium; level: Normal        On supplement: No  - Phosphorus; level: Normal        On supplement: No    # Electrolytes:   - Potassium; level: Low        On supplement: No  - Magnesium; level: Normal        On supplement: Yes; magnesium oxide 400 mg daily  - Bicarbonate; level: Normal        On supplement: No  - Sodium; level: Normal    # Other Significant PMH:   - Gout: on allopurinol.  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 Saw  [place] cancer specialist [facility] out of [facility] Undergoing surveillance colonoscopy. Last colonoscopy in march 2024 with out any new lesions.   - CAD: cardiac catheterization 7/24: pLAD to mLAD 70% stenosis and mLAD 40% stenosis. He is now s/p PCI with EDWIN x 1 to LAD.   - NSTEMI: in 2022. Stress test showed very small defect. No intervention pursued due to kidney function at that time. On statin, ASA 81, and Imdur.     # Transplant History:  Etiology of Kidney Failure: Diabetes mellitus type 2  Tx: DDKT  Transplant: 2/13/2025 (Kidney)  Significant transplant-related complications: None    Recommendations were communicated to the primary team verbally.    Seen and discussed with Dr. Keen.    ALEKSANDR Elliott [facility]  Transplant Nephrology      Interval History  Mr. Hernández creatinine is 2.88 (02/16 0550); Trend down.  Good urine output.  Other significant labs/tests/vitals: VSS. Afebrile.   No events overnight.  No chest pain or shortness of breath.  Trace leg swelling.  No nausea and vomiting. Normal bowel movements.   No fever, sweats or chills.      Review of Systems   4 point ROS was obtained and negative except as noted in the Interval History.    MEDICATIONS:  Current Facility-Administered  Medications   Medication Dose Route Frequency Provider Last Rate Last Admin    acetaminophen (TYLENOL) tablet 975 mg  975 mg Oral Q8H Micky Bass MD   975 mg at 02/16/25 0609    aspirin (ASA) chewable tablet 81 mg  81 mg Oral Daily Susie Mao APRN CNP   81 mg at 02/15/25 0817    atorvastatin (LIPITOR) tablet 40 mg  40 mg Oral Daily uSsie Mao APRN CNP   40 mg at 02/15/25 0817    [START ON 2/17/2025] basiliximab (SIMULECT) 20 mg in sodium chloride 0.9 % 50 mL infusion  20 mg Intravenous Once Susie Mao APRN CNP        finasteride (PROSCAR) tablet 5 mg  5 mg Oral Daily Ruth Bautista NP   5 mg at 02/15/25 1606    fish oil-omega-3 fatty acids capsule 2 g  2 g Oral BID Susie Mao APRN CNP   2 g at 02/15/25 2040    gabapentin (NEURONTIN) capsule 200 mg  200 mg Oral At Bedtime Susie Mao APRN CNP   200 mg at 02/15/25 2203    insulin aspart (NovoLOG) injection (RAPID ACTING)   Subcutaneous TID w/meals Yulissa Darden APRN CNP   13 Units at 02/15/25 1734    insulin NPH injection 15 Units  15 Units Subcutaneous At Bedtime Alberto Miller MD   15 Units at 02/15/25 2312    insulin NPH injection 30 Units  30 Units Subcutaneous QAM AC Alberto Miller MD   30 Units at 02/15/25 1016    magnesium oxide (MAG-OX) tablet 400 mg  400 mg Oral Daily with lunch Micky Bass MD   400 mg at 02/15/25 1205    metoprolol tartrate (LOPRESSOR) half-tab 12.5 mg  12.5 mg Oral BID Ruth Bautista NP   12.5 mg at 02/15/25 2041    mycophenolate (GENERIC EQUIVALENT) capsule 750 mg  750 mg Oral BID IS Micky Bass MD   750 mg at 02/15/25 1804    polyethylene glycol (MIRALAX) Packet 17 g  17 g Oral Daily Micky Bass MD   17 g at 02/15/25 0816    potassium chloride srego ER (KLOR-CON M10) CR tablet 20 mEq  20 mEq Oral Once Ruth Bautista NP        predniSONE (DELTASONE) tablet 60 mg  60 mg Oral Daily Susie Mao APRN CNP        Followed by    [START ON 2/18/2025]  predniSONE (DELTASONE) tablet 40 mg  40 mg Oral Daily Susie Mao APRN CNP        Followed by    [START ON 2025] predniSONE (DELTASONE) tablet 20 mg  20 mg Oral Daily Susie Mao APRN CNP        Followed by    [START ON 2025] predniSONE (DELTASONE) tablet 15 mg  15 mg Oral Daily Susie Mao APRN CNP        Followed by    [START ON 3/6/2025] predniSONE (DELTASONE) tablet 10 mg  10 mg Oral Daily Susie Mao APRN CNP        Followed by    [START ON 3/13/2025] predniSONE (DELTASONE) tablet 5 mg  5 mg Oral Daily Susie Mao APRN CNP        senna-docusate (SENOKOT-S/PERICOLACE) 8.6-50 MG per tablet 1 tablet  1 tablet Oral BID Micky Bass MD   1 tablet at 02/15/25 204    sodium chloride (PF) 0.9% PF flush 10 mL  10 mL Intracatheter Q8H Micky Bass MD   10 mL at 25 0300    sulfamethoxazole-trimethoprim (BACTRIM) 400-80 MG per tablet 1 tablet  1 tablet Oral Q  AM Micky Bass MD   1 tablet at 25 0806    tacrolimus (GENERIC EQUIVALENT) capsule 2.5 mg  0.03 mg/kg Oral BID IS Micky Bass MD   2.5 mg at 02/15/25 1804    tamsulosin (FLOMAX) capsule 0.4 mg  0.4 mg Oral Daily Susie Mao APRN CNP   0.4 mg at 02/15/25 0817    valGANciclovir (VALCYTE) tablet 450 mg  450 mg Oral Once per day on  Micky Bass MD   450 mg at 02/15/25 2040     Current Facility-Administered Medications   Medication Dose Route Frequency Provider Last Rate Last Admin    dextrose 10% infusion   Intravenous Continuous PRN Roscoe Brown MD        insulin regular (MYXREDLIN) 1 unit/mL infusion  0-24 Units/hr Intravenous Continuous Roscoe Brown MD 1 mL/hr at 25 0715 1 Units/hr at 25 0715       Physical Exam   Temp  Av.9  F (36.6  C)  Min: 97.6  F (36.4  C)  Max: 98.6  F (37  C)  Arterial Line BP  Min: 141/70  Max: 141/70  Arterial Line MAP (mmHg)  Av mmHg  Min: 90 mmHg  Max: 90 mmHg      Pulse  Av.2  Min: 66  Max: 86 Resp   "Avg: 15.2  Min: 12  Max: 18  SpO2  Av %  Min: 95 %  Max: 100 %    CVP (mmHg): 9 mmHgBP (!) 153/68 (BP Location: Right arm)   Pulse 74   Temp 97.8  F (36.6  C) (Oral)   Resp 16   Ht 1.675 m (5' 5.95\")   Wt 85.5 kg (188 lb 6.4 oz)   SpO2 98%   BMI 30.46 kg/m     Date 25 07 - 25 0659   Shift 6564-9885 3768-8515 5164-4629 24 Hour Total   INTAKE   I.V. 2600   2600   Shift Total(mL/kg) 2600(30.42)   2600(30.42)   OUTPUT   Urine 340   340   Blood 50   50   Shift Total(mL/kg) 390(4.56)   390(4.56)   Weight (kg) 85.46 85.46 85.46 85.46      Admit Weight: 85.5 kg (188 lb 6.4 oz)     GENERAL APPEARANCE: alert and no distress  HENT: mouth without ulcers or lesions  RESP: lungs clear to auscultation - no rales, rhonchi or wheezes  CV: regular rhythm, normal rate, no rub, no murmur  EDEMA: trace LE edema bilaterally  ABDOMEN: soft, nondistended, nontender, bowel sounds normal  MS: extremities normal - no gross deformities noted, no evidence of inflammation in joints, no muscle tenderness  SKIN: no rash  TX KIDNEY: no TTP  DIALYSIS ACCESS: none    Data   All labs reviewed by me.  CMP  Recent Labs   Lab 25  0717 25  0601 25  0539 25  0503 02/15/25  0646 02/15/25  0559 25  0617 25  0550 25  0226 25  0155 25  1027 25  1017 25  0057 25   NA  --   --  142  --   --  140  --  137  --   --   --  136   < > 139   POTASSIUM  --   --  3.3*  --   --  3.8  --  4.0  4.0  --  4.0   < > 4.3  4.3   < > 3.9   CHLORIDE  --   --  105  --   --  102  --  101  --   --   --  99  --  98   CO2  --   --  23  --   --  22  --  20*  --   --   --  17*  --  21*   ANIONGAP  --   --  14  --   --  16*  --  16*  --   --   --  20*  --  20*   * 90 99 101*   < > 118*   < > 160*   < >  --    < > 169*   < > 239*   BUN  --   --  66.9*  --   --  65.1*  --  61.0*  --   --   --  61.3*  --  63.4*   CR  --   --  2.88*  --   --  3.97*  --  5.25*  --   --   --  5.49*  " --  5.85*   GFRESTIMATED  --   --  22*  --   --  15*  --  11*  --   --   --  10*  --  9*   GINA  --   --  9.1  --   --  9.3  --  8.9  --   --   --  9.0  --  9.3   MAG  --   --  2.1  --   --  2.0  --  2.1  --   --   --  2.3  --   --    PHOS  --   --  4.5  --   --  5.2*  --  4.6*  --   --   --  4.8*  --   --    PROTTOTAL  --   --   --   --   --   --   --   --   --   --   --   --   --  6.5   ALBUMIN  --   --   --   --   --   --   --   --   --   --   --   --   --  3.9   BILITOTAL  --   --   --   --   --   --   --   --   --   --   --   --   --  0.2   ALKPHOS  --   --   --   --   --   --   --   --   --   --   --   --   --  59   AST  --   --   --   --   --   --   --   --   --   --   --   --   --  17   ALT  --   --   --   --   --   --   --   --   --   --   --   --   --  15    < > = values in this interval not displayed.     CBC  Recent Labs   Lab 02/16/25  0539 02/15/25  0559 02/14/25  0550 02/14/25  0155 02/13/25  1546 02/13/25  1017   HGB 10.7* 11.1* 10.9*  10.9* 11.4*   < > 11.9*   WBC 12.7* 14.2* 12.3*  --   --  8.1   RBC 3.25* 3.41* 3.40*  --   --  3.70*   HCT 31.2* 32.3* 32.5*  --   --  35.3*   MCV 96 95 96  --   --  95   MCH 32.9 32.6 32.1  --   --  32.2   MCHC 34.3 34.4 33.5  --   --  33.7   RDW 13.7 13.7 13.7  --   --  13.3   * 149* 157  --   --  146*    < > = values in this interval not displayed.     INR  Recent Labs   Lab 02/12/25 2047   INR 0.81*   PTT <20*     ABG  Recent Labs   Lab 02/13/25  0902 02/13/25  0743   PH 7.34* 7.40   PCO2 38 36   PO2 139* 98   HCO3 21 22   O2PER 36.0 28.0      Urine Studies  Recent Labs   Lab Test 02/12/25  2132 07/03/24  0958 05/16/24  1327   COLOR Light Yellow Yellow Yellow   APPEARANCE Slightly Cloudy* Slightly Cloudy* Cloudy*   URINEGLC >=1000* 100* 300*   URINEBILI Negative Negative Negative   URINEKETONE Negative Negative Negative   SG 1.013 1.014 1.017   UBLD Trace* Small* Small*   URINEPH 6.5 6.0 6.0   PROTEIN 100* 30* 200*   NITRITE Negative Negative Negative    LEUKEST Large* Large* Large*   RBCU 8* 12* 21*   WBCU >182* >182* >182*     No lab results found.  PTH  No lab results found.  Iron Studies  No lab results found.    IMAGING:  All imaging studies reviewed by me.

## 2025-02-16 NOTE — PLAN OF CARE
"/72 (BP Location: Right arm)   Pulse 64   Temp 97.7  F (36.5  C) (Oral)   Resp 16   Ht 1.675 m (5' 5.95\")   Wt 86.9 kg (191 lb 9.6 oz)   SpO2 97%   BMI 30.98 kg/m        2980-1289     Hypertensive, OVSS on RA. A+Ox4. Insulin gtt discontinued this shift. ACHS blood sugar checks. NPH given. Denies pain. Potassium 3.3, PO replacement given. Hdez removed this morning, 2 negative PVRs. Urinal in bathroom to save urine. LBM today. Internal jugular-2 lumens SL, one TKO. Pt declined scheduled suppository at this time. Abdominal incision DESTINY. Regular diet w/carb coverage. Emesis x1, team aware. Declined nausea medications. Med card and lab book up to date. Transplant handbook in room. Business card given for videos. Up SBA with walker and MARILYNN. Pt up in chair for part of shift. Pt still needs to speak with specialty pharmacy. Plan for discharge tomorrow. Endocrine following. Will continue to monitor and notify team with changes.                  "

## 2025-02-16 NOTE — PROGRESS NOTES
\                            Inpatient Diabetes Management Service: Daily Progress Note     HPI: Peter Carpio is a 74 year old male with past medical history significant for ESRD secondary to DM type II on PD, CAD, gout, hypertension, and hyperlipidemia who presented to the hospital for possible kidney transplant and now is s/p DDKT on 2/13 with Dr. Newell.            Assessment/Plan:     Assessment:      Type II Diabetes Mellitus, c/b peripheral neuropathy, ESRD and macrovascular disease.  A1c 7.1 % in presence of complex chronic medical issues and anemia   Acute hyperglycemia 2/2 post-op stress, steroids  ESRD s/p DDKT on 2/13 with Dr. Newell.  Metabolic derangements; acidosis - multifactorial   BMI:  31  Anemia      Plan/Recommendations:    - START NPH 55u this am(already received 30, give additional 25), add 28u before dinner  - Will discharge with 70/30 as below.   - Continue aspart    - Continue Novolog Meal Coverage: 1 units per 6 g CHO, TID AC and PRN with snacks/supplements   - BG monitoring: TID, AC, 2a  - Hypoglycemia protocol    - Carb counting protocol recommended      Discussion:     Patient has only ever used mixed insulins at home and this is his preference. Will lay out taper as below       AM Dose  PM Dose (before dinner)   Prednisone 60mg 2/16-17  68   33  Prednisone 40mg 2/18-2/19  50   26  Prednisone 20mg 2/20-2/26  40   20  Prednisone 15mg 2/27-3/5  38   18  Prednisone 10mg 3/6-3/12  35   15  Prednisone 5mg 3/13-onwards 35   15    Will need followup with angelina vera or alonzo within the next week or two    Please also discharge with medium dose correction scale novolog to use before meals if running high.         Tentative Discharge Planning - to be finalized at/near day of discharge:    Medications: 70/30 BID taper, plus novolog correction scale    Test Claims: novolog none needed.   Education: none needed Needs to be assessed closer to discharge.    Outpatient Follow-up:  recommend [facility] Patricia Wheeler and Laura Salas (CDE)    Please allow for 24 hours when asking for final discharge recommendations.  Requests made less than this cannot be guaranteed to be done on an urgent/emergent basis, especially last minute for lengthy/complex steroid tapers.          Interval History/Review of Systems :   The last 24 hours progress and nursing notes reviewed.    Doing well. Wife at bedside. Eating all meals.      Inpatient Glucose Control:         Planned Procedures/Surgeries: none         Medications Impacting Glycemia:   Steroids:  prednisone taper     Prednisone 60mg 2/16-17  Prednisone 40mg 2/18-2/19  Prednisone 20mg 2/20-2/26  Prednisone 15mg 2/27-3/5  Prednisone 10mg 3/6-3/12  Prednisone 5mg 3/13-onwards  D5W-containing solutions/medications:    Other medications impacting glucose:          Nutrition:   Orders Placed This Encounter      Advance Diet as Tolerated: Regular Diet Adult    Supplements:    TF:  /  TPN:          Diabetes History: see full consult note for complete diabetes history   Diabetes Type and Duration: T2DM diagnosed >20 years ago, in his 50's     C-peptide:     7/2024: 18.8 - correlating BG not taken  12/26/2024: 17.4 - correlating BG not taken      GAD65 antibody, zinc transporter 8 antibody, islet antibody, insulin antibody, a not available on epic search         PTA Medication Regimen:   70/30 32 unit(s) in AM and 35 unit(s) PM     Missing doses?: no  Historical Diabetes Medications:   No hx of oral meds on chart review      Glucose monitoring device/frequency/trends: CGM Freestyle Christiano 3  Hypoglycemia PTA:   - Frequency: was having more freq low BG months ago, adjusted dosing, improved.   - Severity:  no history of severe unconscious lows   - Awareness:  intact    - Treatment: eat/drink       Outpatient Diabetes Provider: YADY Wheeler   Formal Diabetes Education/Educator: [facility]  - Reisdorf. E. RD      Physical Exam:   BP (!) 142/78 (BP Location: Right arm)    "Pulse 66   Temp 98.1  F (36.7  C) (Oral)   Resp 16   Ht 1.675 m (5' 5.95\")   Wt 86.9 kg (191 lb 9.6 oz)   SpO2 98%   BMI 30.98 kg/m    Physical Exam   CONSTITUTIONAL: healthy, alert and NAD, responding appropriately  ENT: normocephalic, no visual evidence of trauma, normal nose and oral mucosa  EYES: conjunctivae and sclerae normal, no exophthalmos or proptosis  THYROID:  no visualized nodules or goiter  LUNGS: no audible wheeze, cough or visible cyanosis, no visible retractions or increased work of breathing  EXTREMITIES: no hand tremors  NEUROLOGY: cranial nerves grossly intact with no obvious deficit.  SKIN:  no visualized skin lesions or rash, no edema visualized  PSYCH: mentation appears normal, normal judgement           Data:     Lab Results   Component Value Date    A1C 7.1 02/12/2025    A1C 6.7 12/26/2024       ROUTINE IP LABS (Last four results)  BMP  Recent Labs   Lab 02/16/25  1232 02/16/25  1021 02/16/25  0901 02/16/25  0804 02/16/25  0601 02/16/25  0539 02/15/25  0646 02/15/25  0559 02/14/25  0617 02/14/25  0550 02/14/25  0226 02/14/25  0155 02/13/25  1027 02/13/25  1017   NA  --   --   --   --   --  142  --  140  --  137  --   --   --  136   POTASSIUM  --   --   --   --   --  3.3*  --  3.8  --  4.0  4.0  --  4.0   < > 4.3  4.3   CHLORIDE  --   --   --   --   --  105  --  102  --  101  --   --   --  99   GINA  --   --   --   --   --  9.1  --  9.3  --  8.9  --   --   --  9.0   CO2  --   --   --   --   --  23  --  22  --  20*  --   --   --  17*   BUN  --   --   --   --   --  66.9*  --  65.1*  --  61.0*  --   --   --  61.3*   CR  --   --   --   --   --  2.88*  --  3.97*  --  5.25*  --   --   --  5.49*   * 166* 166* 95   < > 99   < > 118*   < > 160*   < >  --    < > 169*    < > = values in this interval not displayed.     CBC  Recent Labs   Lab 02/16/25  0539 02/15/25  0559 02/14/25  0550 02/14/25  0155 02/13/25  1546 02/13/25  1017   WBC 12.7* 14.2* 12.3*  --   --  8.1   RBC 3.25* 3.41* " 3.40*  --   --  3.70*   HGB 10.7* 11.1* 10.9*  10.9* 11.4*   < > 11.9*   HCT 31.2* 32.3* 32.5*  --   --  35.3*   MCV 96 95 96  --   --  95   MCH 32.9 32.6 32.1  --   --  32.2   MCHC 34.3 34.4 33.5  --   --  33.7   RDW 13.7 13.7 13.7  --   --  13.3   * 149* 157  --   --  146*    < > = values in this interval not displayed.     INR  Recent Labs   Lab 02/12/25 2047   INR 0.81*           Plan discussed with patient, bedside RN, and primary team via this note.      To contact Inpatient Diabetes Service:     7 AM - 5 PM: Page the Test[employer] JADE following the patient that day (see filed or incomplete progress notes/consult notes under Endocrinology)    OR if uncertain of provider assignment: page job code [phone]    5 PM - 7 AM: First call after hours is to primary service.    For urgent after-hours questions, page job code for on call fellow: [phone]     I spent a total of 60 minutes on the date of the encounter doing prep/post-work, chart review, history and exam, documentation and further activities per the note including lab review, multidisciplinary communication, counseling the patient and/or coordinating care regarding acute hyper/hypoglycemic management, as well as discharge management and planning/communication.  See note for details.      Please notify inpatient diabetes service if changes are planned to steroids, nutrition, or if procedures are planned requiring prolonged NPO status.Diabetes Management Team job code: [phone]

## 2025-02-16 NOTE — PLAN OF CARE
Vitals: afebrile, room air, /75.  Neuro : a x o x 4  Blood glucose: achs, 106. Carb covered.   Pain/nausea: compazine x 1 for nausea.   Diet: regular, fair appetite. Did not like his order. Too spicy.   Lines: TLIJ SL. AVF LUE thrill/bruit ++  : voided 150 ml urinal.   GI: last bm this am.   Drains: na  Skin: incision abdomen staples markos. PD cath removed site dressing changed.   Mobility: up sba x walker.   Spouse at bedside, supportive.   Education : med and lab book updated by AM RN.   Labs : K 3.3 was orally replaced in am, redraw am labs

## 2025-02-17 ENCOUNTER — TELEPHONE (OUTPATIENT)
Dept: PHARMACY | Facility: CLINIC | Age: 75
End: 2025-02-17
Payer: COMMERCIAL

## 2025-02-17 VITALS
TEMPERATURE: 97.9 F | WEIGHT: 189.2 LBS | HEART RATE: 81 BPM | DIASTOLIC BLOOD PRESSURE: 96 MMHG | RESPIRATION RATE: 16 BRPM | OXYGEN SATURATION: 100 % | BODY MASS INDEX: 30.41 KG/M2 | HEIGHT: 66 IN | SYSTOLIC BLOOD PRESSURE: 133 MMHG

## 2025-02-17 PROBLEM — D84.9 IMMUNOSUPPRESSED STATUS: Status: ACTIVE | Noted: 2025-02-17

## 2025-02-17 PROBLEM — T50.905A DRUG-INDUCED HYPERGLYCEMIA: Status: ACTIVE | Noted: 2025-02-17

## 2025-02-17 PROBLEM — R73.9 DRUG-INDUCED HYPERGLYCEMIA: Status: ACTIVE | Noted: 2025-02-17

## 2025-02-17 LAB
ANION GAP SERPL CALCULATED.3IONS-SCNC: 11 MMOL/L (ref 7–15)
BASOPHILS # BLD AUTO: 0 10E3/UL (ref 0–0.2)
BASOPHILS NFR BLD AUTO: 0 %
BUN SERPL-MCNC: 57.7 MG/DL (ref 8–23)
CALCIUM SERPL-MCNC: 9 MG/DL (ref 8.8–10.4)
CELL TYPE ALLO: NORMAL
CELL TYPE AUTO: NORMAL
CHANNELSHIFTALLOB1: -85
CHANNELSHIFTALLOB2: -82
CHANNELSHIFTALLOT1: -41
CHANNELSHIFTALLOT2: -45
CHANNELSHIFTAUTOB1: -77
CHANNELSHIFTAUTOB2: -87
CHANNELSHIFTAUTOT1: -22
CHANNELSHIFTAUTOT2: -24
CHLORIDE SERPL-SCNC: 110 MMOL/L (ref 98–107)
COMMENT ALLOB2: NORMAL
CREAT SERPL-MCNC: 2.14 MG/DL (ref 0.67–1.17)
CROSSMATCHDATEALLO: NORMAL
CROSSMATCHDATEAUTO: NORMAL
DONOR ALLO: NORMAL
DONOR AUTO: NORMAL
DONORCELLDATE ALLO: NORMAL
DONORCELLDATE AUTO: NORMAL
EGFRCR SERPLBLD CKD-EPI 2021: 32 ML/MIN/1.73M2
EOSINOPHIL # BLD AUTO: 0 10E3/UL (ref 0–0.7)
EOSINOPHIL NFR BLD AUTO: 0 %
ERYTHROCYTE [DISTWIDTH] IN BLOOD BY AUTOMATED COUNT: 13.6 % (ref 10–15)
GLUCOSE BLDC GLUCOMTR-MCNC: 117 MG/DL (ref 70–99)
GLUCOSE BLDC GLUCOMTR-MCNC: 119 MG/DL (ref 70–99)
GLUCOSE BLDC GLUCOMTR-MCNC: 67 MG/DL (ref 70–99)
GLUCOSE BLDC GLUCOMTR-MCNC: 75 MG/DL (ref 70–99)
GLUCOSE BLDC GLUCOMTR-MCNC: 77 MG/DL (ref 70–99)
GLUCOSE BLDC GLUCOMTR-MCNC: 79 MG/DL (ref 70–99)
GLUCOSE SERPL-MCNC: 77 MG/DL (ref 70–99)
HCO3 SERPL-SCNC: 25 MMOL/L (ref 22–29)
HCT VFR BLD AUTO: 30.4 % (ref 40–53)
HGB BLD-MCNC: 10 G/DL (ref 13.3–17.7)
IMM GRANULOCYTES # BLD: 0.1 10E3/UL
IMM GRANULOCYTES NFR BLD: 1 %
LYMPHOCYTES # BLD AUTO: 0.8 10E3/UL (ref 0.8–5.3)
LYMPHOCYTES NFR BLD AUTO: 8 %
MAGNESIUM SERPL-MCNC: 2.1 MG/DL (ref 1.7–2.3)
MCH RBC QN AUTO: 32.3 PG (ref 26.5–33)
MCHC RBC AUTO-ENTMCNC: 32.9 G/DL (ref 31.5–36.5)
MCV RBC AUTO: 98 FL (ref 78–100)
MONOCYTES # BLD AUTO: 0.8 10E3/UL (ref 0–1.3)
MONOCYTES NFR BLD AUTO: 8 %
NEUTROPHILS # BLD AUTO: 8.5 10E3/UL (ref 1.6–8.3)
NEUTROPHILS NFR BLD AUTO: 83 %
NRBC # BLD AUTO: 0 10E3/UL
NRBC BLD AUTO-RTO: 0 /100
OXALATE SERPL-SCNC: 8.4 UMOL/L
PHOSPHATE SERPL-MCNC: 3 MG/DL (ref 2.5–4.5)
PLATELET # BLD AUTO: 155 10E3/UL (ref 150–450)
POS CUT OFF ALLO B: >69
POS CUT OFF ALLO T: >53
POS CUT OFF AUTO B: >69
POS CUT OFF AUTO T: >53
POTASSIUM SERPL-SCNC: 3.4 MMOL/L (ref 3.4–5.3)
RBC # BLD AUTO: 3.1 10E6/UL (ref 4.4–5.9)
RESULT ALLO B1: NORMAL
RESULT ALLO B2: NORMAL
RESULT ALLO T1: NORMAL
RESULT ALLO T2: NORMAL
RESULT AUTO B1: NORMAL
RESULT AUTO B2: NORMAL
RESULT AUTO T1: NORMAL
RESULT AUTO T2: NORMAL
SERUM DATE ALLO B1: NORMAL
SERUM DATE ALLO B2: NORMAL
SERUM DATE ALLO T1: NORMAL
SERUM DATE ALLO T2: NORMAL
SERUM DATE AUTO B1: NORMAL
SERUM DATE AUTO B2: NORMAL
SERUM DATE AUTO T1: NORMAL
SERUM DATE AUTO T2: NORMAL
SODIUM SERPL-SCNC: 146 MMOL/L (ref 135–145)
TESTMETHODALLO: NORMAL
TESTMETHODAUTO: NORMAL
TREATMENT ALLO B1: NORMAL
TREATMENT ALLO B2: NORMAL
TREATMENT ALLO T1: NORMAL
TREATMENT ALLO T2: NORMAL
TREATMENT AUTO B1: NORMAL
TREATMENT AUTO B2: NORMAL
TREATMENT AUTO T1: NORMAL
TREATMENT AUTO T2: NORMAL
WBC # BLD AUTO: 10.2 10E3/UL (ref 4–11)

## 2025-02-17 PROCEDURE — 84100 ASSAY OF PHOSPHORUS: CPT | Performed by: STUDENT IN AN ORGANIZED HEALTH CARE EDUCATION/TRAINING PROGRAM

## 2025-02-17 PROCEDURE — 250N000012 HC RX MED GY IP 250 OP 636 PS 637: Performed by: STUDENT IN AN ORGANIZED HEALTH CARE EDUCATION/TRAINING PROGRAM

## 2025-02-17 PROCEDURE — 99232 SBSQ HOSP IP/OBS MODERATE 35: CPT | Mod: 24

## 2025-02-17 PROCEDURE — 250N000011 HC RX IP 250 OP 636

## 2025-02-17 PROCEDURE — 36592 COLLECT BLOOD FROM PICC: CPT | Performed by: STUDENT IN AN ORGANIZED HEALTH CARE EDUCATION/TRAINING PROGRAM

## 2025-02-17 PROCEDURE — 258N000003 HC RX IP 258 OP 636

## 2025-02-17 PROCEDURE — 85004 AUTOMATED DIFF WBC COUNT: CPT

## 2025-02-17 PROCEDURE — 250N000012 HC RX MED GY IP 250 OP 636 PS 637: Performed by: NURSE PRACTITIONER

## 2025-02-17 PROCEDURE — 99238 HOSP IP/OBS DSCHRG MGMT 30/<: CPT | Mod: FS | Performed by: PHYSICIAN ASSISTANT

## 2025-02-17 PROCEDURE — 250N000013 HC RX MED GY IP 250 OP 250 PS 637: Performed by: STUDENT IN AN ORGANIZED HEALTH CARE EDUCATION/TRAINING PROGRAM

## 2025-02-17 PROCEDURE — 80048 BASIC METABOLIC PNL TOTAL CA: CPT | Performed by: STUDENT IN AN ORGANIZED HEALTH CARE EDUCATION/TRAINING PROGRAM

## 2025-02-17 PROCEDURE — 250N000013 HC RX MED GY IP 250 OP 250 PS 637: Performed by: SURGERY

## 2025-02-17 PROCEDURE — 99233 SBSQ HOSP IP/OBS HIGH 50: CPT | Mod: GC | Performed by: STUDENT IN AN ORGANIZED HEALTH CARE EDUCATION/TRAINING PROGRAM

## 2025-02-17 PROCEDURE — 250N000013 HC RX MED GY IP 250 OP 250 PS 637

## 2025-02-17 PROCEDURE — 83735 ASSAY OF MAGNESIUM: CPT | Performed by: STUDENT IN AN ORGANIZED HEALTH CARE EDUCATION/TRAINING PROGRAM

## 2025-02-17 PROCEDURE — 250N000012 HC RX MED GY IP 250 OP 636 PS 637

## 2025-02-17 PROCEDURE — 250N000013 HC RX MED GY IP 250 OP 250 PS 637: Performed by: NURSE PRACTITIONER

## 2025-02-17 RX ORDER — POLYETHYLENE GLYCOL 3350 17 G/17G
17 POWDER, FOR SOLUTION ORAL DAILY
COMMUNITY
Start: 2025-02-17 | End: 2025-02-24

## 2025-02-17 RX ORDER — MYCOPHENOLATE MOFETIL 250 MG/1
750 CAPSULE ORAL 2 TIMES DAILY
Qty: 180 CAPSULE | Refills: 11 | Status: ACTIVE | OUTPATIENT
Start: 2025-02-17 | End: 2025-02-20

## 2025-02-17 RX ORDER — ONDANSETRON 4 MG/1
4 TABLET, ORALLY DISINTEGRATING ORAL EVERY 6 HOURS PRN
Qty: 12 TABLET | Refills: 1 | Status: SHIPPED | OUTPATIENT
Start: 2025-02-17 | End: 2025-04-16

## 2025-02-17 RX ORDER — VALGANCICLOVIR 450 MG/1
450 TABLET, FILM COATED ORAL EVERY OTHER DAY
Qty: 60 TABLET | Refills: 5 | Status: ACTIVE | OUTPATIENT
Start: 2025-02-19 | End: 2025-02-20

## 2025-02-17 RX ORDER — SULFAMETHOXAZOLE AND TRIMETHOPRIM 400; 80 MG/1; MG/1
1 TABLET ORAL DAILY
Status: DISCONTINUED | OUTPATIENT
Start: 2025-02-18 | End: 2025-02-17 | Stop reason: HOSPADM

## 2025-02-17 RX ORDER — METOPROLOL TARTRATE 25 MG/1
12.5 TABLET, FILM COATED ORAL 2 TIMES DAILY
Qty: 30 TABLET | Refills: 3 | Status: SHIPPED | OUTPATIENT
Start: 2025-02-17 | End: 2025-02-20

## 2025-02-17 RX ORDER — CHLORAL HYDRATE 500 MG
2 CAPSULE ORAL 2 TIMES DAILY
COMMUNITY
Start: 2025-02-17

## 2025-02-17 RX ORDER — SULFAMETHOXAZOLE AND TRIMETHOPRIM 400; 80 MG/1; MG/1
1 TABLET ORAL DAILY
Qty: 30 TABLET | Refills: 11 | Status: ACTIVE | OUTPATIENT
Start: 2025-02-18 | End: 2025-02-20

## 2025-02-17 RX ORDER — PREDNISONE 10 MG/1
TABLET ORAL
Qty: 20 TABLET | Refills: 0 | Status: SHIPPED | OUTPATIENT
Start: 2025-02-18 | End: 2025-03-20

## 2025-02-17 RX ORDER — PREDNISONE 10 MG/1
TABLET ORAL
Qty: 20 TABLET | Refills: 0 | Status: SHIPPED | OUTPATIENT
Start: 2025-02-18 | End: 2025-02-17

## 2025-02-17 RX ORDER — TACROLIMUS 0.5 MG/1
CAPSULE ORAL
Qty: 60 CAPSULE | Refills: 11 | Status: ACTIVE | OUTPATIENT
Start: 2025-02-17 | End: 2025-02-20

## 2025-02-17 RX ORDER — VALGANCICLOVIR 450 MG/1
450 TABLET, FILM COATED ORAL EVERY OTHER DAY
Status: DISCONTINUED | OUTPATIENT
Start: 2025-02-17 | End: 2025-02-17 | Stop reason: HOSPADM

## 2025-02-17 RX ORDER — ACETAMINOPHEN 325 MG/1
975 TABLET ORAL EVERY 6 HOURS PRN
COMMUNITY
Start: 2025-02-17

## 2025-02-17 RX ORDER — POTASSIUM CHLORIDE 750 MG/1
20 TABLET, EXTENDED RELEASE ORAL ONCE
Status: DISCONTINUED | OUTPATIENT
Start: 2025-02-17 | End: 2025-02-17 | Stop reason: HOSPADM

## 2025-02-17 RX ORDER — TACROLIMUS 1 MG/1
3 CAPSULE ORAL 2 TIMES DAILY
Qty: 180 CAPSULE | Refills: 11 | Status: ACTIVE | OUTPATIENT
Start: 2025-02-17 | End: 2025-02-20

## 2025-02-17 RX ORDER — AMOXICILLIN 250 MG
1-2 CAPSULE ORAL 2 TIMES DAILY
COMMUNITY
Start: 2025-02-17 | End: 2025-02-24

## 2025-02-17 RX ORDER — MAGNESIUM OXIDE 400 MG/1
400 TABLET ORAL
Qty: 30 TABLET | Refills: 3 | Status: SHIPPED | OUTPATIENT
Start: 2025-02-17 | End: 2025-02-20

## 2025-02-17 RX ORDER — PANTOPRAZOLE SODIUM 40 MG/1
40 TABLET, DELAYED RELEASE ORAL DAILY
Qty: 30 TABLET | Refills: 2 | Status: SHIPPED | OUTPATIENT
Start: 2025-02-18 | End: 2025-02-20

## 2025-02-17 RX ORDER — ATORVASTATIN CALCIUM 40 MG/1
40 TABLET, FILM COATED ORAL DAILY
Qty: 30 TABLET | Refills: 5 | Status: SHIPPED | OUTPATIENT
Start: 2025-02-18 | End: 2025-03-03

## 2025-02-17 RX ADMIN — INSULIN ASPART 9 UNITS: 100 INJECTION, SOLUTION INTRAVENOUS; SUBCUTANEOUS at 12:42

## 2025-02-17 RX ADMIN — MAGNESIUM OXIDE TAB 400 MG (241.3 MG ELEMENTAL MG) 400 MG: 400 (241.3 MG) TAB at 12:41

## 2025-02-17 RX ADMIN — MYCOPHENOLATE MOFETIL 750 MG: 250 CAPSULE ORAL at 08:54

## 2025-02-17 RX ADMIN — FINASTERIDE 5 MG: 5 TABLET, FILM COATED ORAL at 08:59

## 2025-02-17 RX ADMIN — PREDNISONE 60 MG: 10 TABLET ORAL at 10:04

## 2025-02-17 RX ADMIN — Medication 12.5 MG: at 08:59

## 2025-02-17 RX ADMIN — VALGANCICLOVIR 450 MG: 450 TABLET, FILM COATED ORAL at 08:55

## 2025-02-17 RX ADMIN — ASPIRIN 81 MG CHEWABLE TABLET 81 MG: 81 TABLET CHEWABLE at 08:59

## 2025-02-17 RX ADMIN — ATORVASTATIN CALCIUM 40 MG: 40 TABLET, FILM COATED ORAL at 08:59

## 2025-02-17 RX ADMIN — OMEGA-3 FATTY ACIDS CAP 1000 MG 2 G: 1000 CAP at 08:58

## 2025-02-17 RX ADMIN — TACROLIMUS 3 MG: 1 CAPSULE ORAL at 08:55

## 2025-02-17 RX ADMIN — TAMSULOSIN HYDROCHLORIDE 0.4 MG: 0.4 CAPSULE ORAL at 08:58

## 2025-02-17 RX ADMIN — SODIUM CHLORIDE 20 MG: 9 INJECTION, SOLUTION INTRAVENOUS at 09:03

## 2025-02-17 RX ADMIN — PANTOPRAZOLE SODIUM 40 MG: 40 TABLET, DELAYED RELEASE ORAL at 08:59

## 2025-02-17 RX ADMIN — SENNOSIDES AND DOCUSATE SODIUM 1 TABLET: 50; 8.6 TABLET ORAL at 08:59

## 2025-02-17 ASSESSMENT — ACTIVITIES OF DAILY LIVING (ADL)
ADLS_ACUITY_SCORE: 43

## 2025-02-17 NOTE — TELEPHONE ENCOUNTER
A pharmacist spent 60 minutes providing medication teaching with Peter Carpio and Farnaz (spouse) for discharge with a focus on new medications/dose changes.  The discharge medication list was reviewed with the patient/family and the following points were discussed, as applicable: Name, description, purpose, dose/strength, duration of medications, common side effects, food/medications to avoid, action to be taken if dose is missed, when to call MD, and how to obtain refills.  The patient will be responsible for managing medications. Additionally, the following transplant related education was covered: Purpose of medication card, Medication videos, Timing of medications and day of lab draw considerations , Prescription Insurance , and Discharge process for receiving meds   Patient will  transplant supplies including 7 day pill organizer, thermometer, and BP monitor at the discharge pharmacy along with medications.  Patient will use local pharmacy or other mail order for outpatient medications.   Clinical Pharmacy Consult:                                                      Transplant Specific:   Date of Transplant: 2/13/25  Type of Transplant: kidney  First Transplant: yes  History of rejection: no    Immunosuppression Regimen   TAC 3mg qAM & 3mg qPM, Prednisone taper, and MMF 750mg qAM & 750mg qPM  Patient specific goal: 8-10  Most recent level: 5.1, date 2/16/25  Immunosuppressant Levels:  Subtherapeutic  Pt adherent to lab draws: yes  Scr:   Lab Results   Component Value Date    CR 2.14 02/17/2025     Side effects: Vomiting and Nausea    Prophylactic Medications  PJP Prophylactic: Bactrim SS daily  Scheduled Discontinue Date: Lifelong     Antifungal: Not needed thus far  Scheduled Discontinue Date: N/A     CMV Prophylactic: CrCl 25 to 39 mL/minute: Valcyte 450 mg every other day   Scheduled Discontinue Date: 6 months Anticipated date: 8/13/25    Acid Reducer: Protonix (pantoprazole)  Scheduled  Reviewed Date:  TBD    Vascular Prophylactic: Aspirin 81 mg PO daily  Scheduled Discontinue Date:  PTA med    Reminders:  Bring to first clinic appt: med box, med card, bp monitor, all medications being taken, and lab book.  2.   MTM pharmacist visit on first clinic appt and if ok, again in 3 to 4 months during follow up appt.  3.   Avoid Grapefruit and Grapefruit juice.   4.   Avoid herbal supplements. If wish to take other medications or supplements, call your coordinator.   5.   Keep lab appts.   6.   Can use apps on phone like [employer] to help manage medication lists and reminders.   7.   Make sure you are protecting your skin by wearing long sleeves and applying sunscreen to exposed skin, for any significant time in the sun.     Transplant Coordinator is Ruth Kelsey [facility]

## 2025-02-17 NOTE — PROGRESS NOTES
CLINICAL NUTRITION SERVICES - BRIEF/DISCHARGE NOTE     Nutrition Prescription    Future/Additional Recommendations:  Minimize diet restrictions as able d/t high calorie/protein needs post-transplant.  Oral supplements as needed to help meet nutritional needs.     High protein food choices with meals to help meet high needs post-transplant over the next 6-8 weeks.     Heart-healthy diet (low saturated fat, low sodium, high fiber) and food safety precautions long term due to immunosuppression regimen post-transplant         EVALUATION OF THE PROGRESS TOWARD GOALS     NEW FINDINGS   Patient SO had multiple follow up questions re: post-Tx diet guidelines.  Noted they use a small community well to source their water.  Recommended obtaining different water for drinking (ex: bottled) and cooking (unless being used after getting to a running boil for at least 1 min).  Reviewed additional food safety points as well.     Patient s discharge needs assessed and discharge planning has been conducted with the multidisciplinary transplant care team including physicians, pharmacy, social work and transplant coordinator.     Monitoring/Evaluation  Progress toward goals will be monitored and evaluated per protocol.    Once discharged, place outpatient nutrition consult via the transplant team if nutrition concerns arise.    Ruth Chauhan, MS, RD, LD, CCTD, CNSC  7A + 7B (beds 3032-5940)  Available on Vocera [7A or 7B Clinical Dietitian]   Weekend/Holiday: Vocera [Weekend Clinical Dietitian]

## 2025-02-17 NOTE — PROGRESS NOTES
[facility]   Transplant Nephrology Progress Note  Date of Admission:  2/12/2025  Today's Date: 02/17/2025    Recommendations:   - Replete potassium today.  - Continue current immunosuppression.  - No acute indications for dialysis.   - Strict I/Os and daily standing weights (dry weight of 186 lbs) to assess need for lasix.     Assessment & Plan   # DDKT: Trend down. Good urine output. Weight trending to EDW              - Baseline Creatinine: ~ TBD              - Proteinuria: Not checked post transplant              - DSA Hx: Not checked recently due to time from transplant                        - Last cPRA: 0%              - BK Viremia: Not checked recently due to time from transplant              - Kidney Tx Biopsy Hx: No biopsy history.              - Transplant Ureteral Stent: Yes     # Immunosuppression: Tacrolimus immediate release (goal 8-10), Mycophenolate mofetil (dose 750 mg every 12 hours), and Methylprednisolone (dose taper)              - Induction with Recent Transplant:  Low Intensity Protocol              - Continue with intensive monitoring of immunosuppression for efficacy and toxicity.              - Historical Changes in Immunosuppression: None              - Changes: Not at this time     # Infection Prevention:                       - PJP: Sulfa/TMP (Bactrim)  - CMV: Valganciclovir (Valcyte)              - CMV IgG Ab High Risk Discordance (D+/R-) at time of transplant: No                  Present CMV Serostatus: Negative  - EBV IgG Ab High Risk Discordance (D+/R-) at time of transplant: No                   Present EBV Serostatus: Positive     # Hypertension: Borderline control;   Goal BP: < 150/90              - PTA medications: atenolol 50 mg daily and furosemide 20 mg daily.              - Currently on metoprolol 12.5 mg bid for history of NSTEMI.              - Changes: Not at this time     # Diabetes: Borderline control (HbA1c 7-9%)            Last HbA1c: 7.1%              - Management per Surgery.      # Anemia in Chronic Renal Disease: Hgb: Stable, low      BARBER: No              - Iron studies:  normal iron panel with high ferritin      # Mineral Bone Disorder:    - Secondary renal hyperparathyroidism; PTH level: Moderately elevated (301-600 pg/ml)        On treatment: None  - Vitamin D; level: Not checked recently        On supplement: No  - Calcium; level: Normal        On supplement: No  - Phosphorus; level: Normal        On supplement: No     # Electrolytes:   - Potassium; level: Low        On supplement: No  - Magnesium; level: Normal        On supplement: Yes; magnesium oxide 400 mg daily  - Bicarbonate; level: Normal        On supplement: No  - Sodium; level: Normal     # Other Significant PMH:              - Gout: on allopurinol.  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 Saw Dr. Pham cancer specialist place out of [facility] Undergoing surveillance colonoscopy. Last colonoscopy in march 2024 with out any new lesions.   - CAD: cardiac catheterization 7/24: pLAD to mLAD 70% stenosis and mLAD 40% stenosis. He is now s/p PCI with EDWIN x 1 to LAD.   - NSTEMI: in 2022. Stress test showed very small defect. No intervention pursued due to kidney function at that time. On statin, ASA 81, and Imdur.      # Transplant History:  Etiology of Kidney Failure: Diabetes mellitus type 2  Tx: DDKT  Transplant: 2/13/2025 (Kidney)  Significant transplant-related complications: None    Recommendations were communicated to the primary team via this note.    Seen and discussed with Dr. Leonila Woods MD  Transplant Nephrology  Contact information via Vocera Web Console or via [facility] Paging/Directory      Interval History  sCr continues to come down  UOP ~1.4L  No SOB. Minimal incision pain    Review of Systems   4 point ROS was obtained and negative except as noted in the Interval  History.    MEDICATIONS:  Current Facility-Administered Medications   Medication Dose Route Frequency Provider Last Rate Last Admin    aspirin (ASA) chewable tablet 81 mg  81 mg Oral Daily Susie Mao APRN CNP   81 mg at 02/17/25 0859    atorvastatin (LIPITOR) tablet 40 mg  40 mg Oral Daily Susie Mao APRN CNP   40 mg at 02/17/25 0859    finasteride (PROSCAR) tablet 5 mg  5 mg Oral Daily Ruth Bautista NP   5 mg at 02/17/25 0859    fish oil-omega-3 fatty acids capsule 2 g  2 g Oral BID Susie Mao APRN CNP   2 g at 02/17/25 0858    gabapentin (NEURONTIN) capsule 200 mg  200 mg Oral QPM Wali Newell MD   200 mg at 02/16/25 2052    insulin aspart (NovoLOG) injection (RAPID ACTING)  1-10 Units Subcutaneous TID  Alberto Miller MD        insulin aspart (NovoLOG) injection (RAPID ACTING)  1-7 Units Subcutaneous At Bedtime Alberto Miller MD        insulin aspart (NovoLOG) injection (RAPID ACTING)   Subcutaneous TID w/meals Yulissa Darden APRN CNP   5 Units at 02/16/25 1726    insulin NPH injection 22 Units  22 Units Subcutaneous At Bedtime Erin Lopez PA        insulin NPH injection 55 Units  55 Units Subcutaneous QAM  Alberto Milelr MD   55 Units at 02/17/25 1004    magnesium oxide (MAG-OX) tablet 400 mg  400 mg Oral Daily with lunch Micky Bass MD   400 mg at 02/16/25 1128    metoprolol tartrate (LOPRESSOR) half-tab 12.5 mg  12.5 mg Oral BID Ruth Bautista NP   12.5 mg at 02/17/25 0859    mycophenolate (GENERIC EQUIVALENT) capsule 750 mg  750 mg Oral BID IS Micky Bass MD   750 mg at 02/17/25 0854    pantoprazole (PROTONIX) EC tablet 40 mg  40 mg Oral Daily Ruth Bautista NP   40 mg at 02/17/25 0859    polyethylene glycol (MIRALAX) Packet 17 g  17 g Oral Daily Micky Bass MD   17 g at 02/16/25 0748    [START ON 2/18/2025] predniSONE (DELTASONE) tablet 40 mg  40 mg Oral Daily Susie Mao APRN CNP        Followed  "by    [START ON 2025] predniSONE (DELTASONE) tablet 20 mg  20 mg Oral Daily Susie Mao APRN CNP        Followed by    [START ON 2025] predniSONE (DELTASONE) tablet 15 mg  15 mg Oral Daily Susie Mao APRN CNP        Followed by    [START ON 3/6/2025] predniSONE (DELTASONE) tablet 10 mg  10 mg Oral Daily Susie Mao APRN CNP        Followed by    [START ON 3/13/2025] predniSONE (DELTASONE) tablet 5 mg  5 mg Oral Daily Susie Mao APRN CNP        senna-docusate (SENOKOT-S/PERICOLACE) 8.6-50 MG per tablet 1 tablet  1 tablet Oral BID Micky Bass MD   1 tablet at 25 0859    sodium chloride (PF) 0.9% PF flush 10 mL  10 mL Intracatheter Q8H Micky Bass MD   10 mL at 25 1014    [START ON 2025] sulfamethoxazole-trimethoprim (BACTRIM) 400-80 MG per tablet 1 tablet  1 tablet Oral Daily Wali Newell MD        tacrolimus (GENERIC EQUIVALENT) capsule 3 mg  3 mg Oral BID IS Ruth Bautista NP   3 mg at 25 0855    tamsulosin (FLOMAX) capsule 0.4 mg  0.4 mg Oral Daily Susie Mao APRN CNP   0.4 mg at 25 0858    valGANciclovir (VALCYTE) tablet 450 mg  450 mg Oral Every Other Day Wali Newell MD   450 mg at 25 0855     Current Facility-Administered Medications   Medication Dose Route Frequency Provider Last Rate Last Admin    dextrose 10% infusion   Intravenous Continuous PRN Roscoe Brown MD           Physical Exam   Temp  Av.9  F (36.6  C)  Min: 97.4  F (36.3  C)  Max: 98.8  F (37.1  C)  Arterial Line BP  Min: 120/53  Max: 141/70  Arterial Line MAP (mmHg)  Av.4 mmHg  Min: 47 mmHg  Max: 90 mmHg      Pulse  Av.6  Min: 64  Max: 91 Resp  Av.6  Min: 10  Max: 18  SpO2  Av.4 %  Min: 94 %  Max: 100 %    CVP (mmHg): 8 mmHgBP 130/57 (BP Location: Right arm)   Pulse 73   Temp 97.7  F (36.5  C) (Oral)   Resp 16   Ht 1.675 m (5' 5.95\")   Wt 85.8 kg (189 lb 3.2 oz)   SpO2 100%   " BMI 30.59 kg/m      Admit Weight: 85.5 kg (188 lb 6.4 oz)     General: NAD  HEENT: mmm   Cardiac: rrr   Pulmonary: unlabored   Abdominal: nondistended  Extremities: trace LE edema   Neuro: A&Ox4      Data   All labs reviewed by me.  CMP  Recent Labs   Lab 02/17/25  0841 02/17/25  0746 02/17/25  0630 02/17/25  0610 02/16/25  0601 02/16/25  0539 02/15/25  0646 02/15/25  0559 02/14/25  0617 02/14/25  0550 02/13/25  0057 02/12/25 2047   NA  --   --  146*  --   --  142  --  140  --  137   < > 139   POTASSIUM  --   --  3.4  --   --  3.3*  --  3.8  --  4.0  4.0   < > 3.9   CHLORIDE  --   --  110*  --   --  105  --  102  --  101   < > 98   CO2  --   --  25  --   --  23  --  22  --  20*   < > 21*   ANIONGAP  --   --  11  --   --  14  --  16*  --  16*   < > 20*   * 67* 77 75   < > 99   < > 118*   < > 160*   < > 239*   BUN  --   --  57.7*  --   --  66.9*  --  65.1*  --  61.0*   < > 63.4*   CR  --   --  2.14*  --   --  2.88*  --  3.97*  --  5.25*   < > 5.85*   GFRESTIMATED  --   --  32*  --   --  22*  --  15*  --  11*   < > 9*   GINA  --   --  9.0  --   --  9.1  --  9.3  --  8.9   < > 9.3   MAG  --   --  2.1  --   --  2.1  --  2.0  --  2.1   < >  --    PHOS  --   --  3.0  --   --  4.5  --  5.2*  --  4.6*   < >  --    PROTTOTAL  --   --   --   --   --   --   --   --   --   --   --  6.5   ALBUMIN  --   --   --   --   --   --   --   --   --   --   --  3.9   BILITOTAL  --   --   --   --   --   --   --   --   --   --   --  0.2   ALKPHOS  --   --   --   --   --   --   --   --   --   --   --  59   AST  --   --   --   --   --   --   --   --   --   --   --  17   ALT  --   --   --   --   --   --   --   --   --   --   --  15    < > = values in this interval not displayed.     CBC  Recent Labs   Lab 02/17/25  0630 02/16/25  0539 02/15/25  0559 02/14/25  0550   HGB 10.0* 10.7* 11.1* 10.9*  10.9*   WBC 10.2 12.7* 14.2* 12.3*   RBC 3.10* 3.25* 3.41* 3.40*   HCT 30.4* 31.2* 32.3* 32.5*   MCV 98 96 95 96   MCH 32.3 32.9 32.6 32.1    MCHC 32.9 34.3 34.4 33.5   RDW 13.6 13.7 13.7 13.7    132* 149* 157     INR  Recent Labs   Lab 02/12/25  2047   INR 0.81*   PTT <20*     ABG  Recent Labs   Lab 02/13/25  0902 02/13/25  0743   PH 7.34* 7.40   PCO2 38 36   PO2 139* 98   HCO3 21 22   O2PER 36.0 28.0      Urine Studies  Recent Labs   Lab Test 02/12/25  2132 07/03/24  0958 05/16/24  1327   COLOR Light Yellow Yellow Yellow   APPEARANCE Slightly Cloudy* Slightly Cloudy* Cloudy*   URINEGLC >=1000* 100* 300*   URINEBILI Negative Negative Negative   URINEKETONE Negative Negative Negative   SG 1.013 1.014 1.017   UBLD Trace* Small* Small*   URINEPH 6.5 6.0 6.0   PROTEIN 100* 30* 200*   NITRITE Negative Negative Negative   LEUKEST Large* Large* Large*   RBCU 8* 12* 21*   WBCU >182* >182* >182*     No lab results found.  PTH  No lab results found.  Iron Studies  No lab results found.

## 2025-02-17 NOTE — PROGRESS NOTES
CARE MANAGEMENT FOLLOW UP    Additional Information:   02/17: CHW delegated by RNCC, has scheduled ATC appts for this Pt for tomorrow Tuesday 02/18/2025 and Wednesday 02/19/2025 at the [facility].    Brooke Rendon   Community Health Worker, 7A&7B [facility].  [place], [place]   P [phone]   Fax: [phone]  Email: [email or web address]

## 2025-02-17 NOTE — PLAN OF CARE
"Goal Outcome Evaluation:      Plan of Care Reviewed With: patient, spouse    Overall Patient Progress: no changeOverall Patient Progress: no change    Outcome Evaluation: VSS, good urine output, denied nausea    /57 (BP Location: Right arm)   Pulse 79   Temp 97.8  F (36.6  C) (Oral)   Resp 16   Ht 1.675 m (5' 5.95\")   Wt 85.8 kg (189 lb 3.2 oz)   SpO2 99%   BMI 30.59 kg/m      Cares: 6595-1855    VSS on RA. Mild pain managed without pain medication (declined PRN Tylenol). Declined nausea overnight. Blood sugars ranging (). Incision open to air, dressing over PD cath site. Good urine output. No BM's on this shift. Regular diet. Internal jugular saline locked. Transfers SBA with walker. Patient still needs to meet with specialty pharmacy. Possible discharge today.   "

## 2025-02-17 NOTE — PROGRESS NOTES
\                            Inpatient Diabetes Management Service: Daily Progress Note     HPI: Peter Carpio is a 74 year old male with past medical history significant for ESRD secondary to DM type II on PD, CAD, gout, hypertension, and hyperlipidemia who presented to the hospital for possible kidney transplant and now is s/p DDKT on 2/13 with Dr. Newell.            Assessment/Plan:     Assessment:      Type II Diabetes Mellitus c/b peripheral neuropathy, ESRD and macrovascular disease.  A1c 7.1 % in presence of complex chronic medical issues and anemia   Acute hyperglycemia 2/2 post-op stress, steroids  ESRD s/p DDKT on 2/13 with Dr. Newell.  Metabolic derangements; acidosis - multifactorial   BMI:  31  Anemia      Plan/Recommendations:    - NPH 55 units at 0800  - Decrease NPH 28 --> 22 units before dinner  - Novolog Meal Coverage: 1 units per 6 g CHO, TID AC and PRN with snacks/supplements   - Novolog correction: 1:25>140 TID AC, >200 HS (high resistance)  - BG monitoring: TID AC, HS, 0200  - Hypoglycemia protocol    - Carb counting protocol recommended      Discussion:   BG lower than goal overnight. Will reduce recommendations for PM insulin dosing. Planning for discharge home today. Discussed discharge plan with pt and pt's spouse. No questions at this time.     Contacted by bedside RN this AM. Pt with low BG overnight and 1 hour post-prandial check at 117mg/dL. Pt ate 50g of carbs. Recommend holding carb coverage (6 units of Novolog for 35g cho) and giving full dose of NPH.       Tentative Discharge Planning:  see below for final recommendations  Medications: 70/30 BID taper (pt has only used mixed insulins at home and prefers this), plus humalog correction scale    Test Claims: see note from Fawn Riddle 2/14/25.  Education: none needed  Outpatient Follow-up:  recommend [facility] Patricia Wheeler and Laura Salas (CDE); request placed 2/17/25    Please allow for 24 hours when asking for  final discharge recommendations.  Requests made less than this cannot be guaranteed to be done on an urgent/emergent basis, especially last minute for lengthy/complex steroid tapers.    Diabetes Management Discharge Plan  Instructions to patient were posted in AVS and discussed on day of discharge.   Medications and supplies are to be ordered by primary service on discharge.   *please use the DIAB non-branded discharge supply order set ([id number])*    Patient will need the following supplies prescribed:   Update Rx for Humulin 70/30 Kwikpens (does not need refill  Lispro Kwikpens    Blood glucose monitoring: Three times daily before meals, and at bedtime.  Blood Glucose (BG) goals:  mg/dL before meals, less than 180 mg/dL 2 hrs after meals.     Glucose Control Regimen:  1) Take your 70/30 (humulin 70/30) before breakfast and before dinner. Adjust the dose of 70/30 depending on your prednisone dose (dates below subject to change per your transplant team's recommendations):  Date Prednisone dose  Breakfast 70/30 Dinner 70/30   2/16-2/17 60 mg 68 units 28 units   2/18-2/19 40 mg 50 units 23 units   2/20-2/26 20 mg  40 units 19 units   2/27-3/5 15 mg 38 units 16 units   3/6-3/12 10 mg 35 units 15 units   3/13 onwards 5 mg (or less) 35 units 15 units       2) Rapid-acting insulin: lispro (Humalog) correction - see chart below. Take for high blood glucoses three times daily before meals and at bedtime.  You may add the correction dose to the carbohydrate coverage/mealtime insulin dose and give in one injection--ideally 10-15 minutes before a meal.  You may take the correction dose even if you skip a meal (as long as it has been 4 hours since previous correction dose).     Pre-meal correction scale:    Blood Glucose Insulin Lispro Before Meals:   Less than 140 0 units   140 -189  1 units   190 - 239 2 units   240 - 289 3 units   290 - 339 4 units    340 - 389  5 units   390 - 439 6 units   440 or more 7 units      Bedtime correction scale:     Blood Glucose Insulin Lispro At Bedtime:   Less than 200 0 units   200-249  1 units   250 - 299 2 units   300 - 349 3 units   350 - 399 4 units    400 or more 5 units        Outpatient Follow-Up: An appointment request was sent to the [facility] coordinator to schedule your outpatient diabetes appointment 1-2 weeks from discharge. You can call the [facility] at [phone] if you have scheduling questions or do not hear from them within a few days of discharge. Follow up sooner if blood glucose runs consistently greater than 200 mg/dL or if having more than two episodes less than 70 mg/dL.     If you have urgent questions or concerns regarding your blood sugars or insulin, you may contact [phone] (the main hospital ). Ask to speak with the Endocrinologist on call.     Your target A1c value is less than 7.5% to help prevent future complications from diabetes.      Thank you for letting the Diabetes Management Team be involved in your care!          Interval History/Review of Systems :   The last 24 hours progress and nursing notes reviewed.  - No acute events overnight.  - No issues with nausea/vomiting.  - Planning to discharge home today.     Planned Procedures/Surgeries: none    Inpatient Glucose Control:            Medications Impacting Glycemia:   Steroids:  prednisone taper     Prednisone 60mg 2/16-17  Prednisone 40mg 2/18-2/19  Prednisone 20mg 2/20-2/26  Prednisone 15mg 2/27-3/5  Prednisone 10mg 3/6-3/12  Prednisone 5mg 3/13-onwards  D5W-containing solutions/medications:    Other medications impacting glucose:          Nutrition:   Orders Placed This Encounter      Advance Diet as Tolerated: Regular Diet Adult    Supplements:  none  TF:  /  TPN:  none        Diabetes History: see full consult note for complete diabetes history   Diabetes Type and Duration: T2DM diagnosed >20 years ago, in his 50's     C-peptide:  7/2024: 18.8 -  "correlating BG not taken  12/26/2024: 17.4 - correlating BG not taken      GAD65 antibody, zinc transporter 8 antibody, islet antibody, insulin antibody, a not available on epic search         PTA Medication Regimen:   70/30 32 unit(s) in AM and 35 unit(s) PM     Missing doses?: no  Historical Diabetes Medications:   No hx of oral meds on chart review      Glucose monitoring device/frequency/trends: CGM Freestyle Christiano 3  Hypoglycemia PTA:   - Frequency: was having more freq low BG months ago, adjusted dosing, improved.   - Severity:  no history of severe unconscious lows   - Awareness:  intact    - Treatment: eat/drink       Outpatient Diabetes Provider: YADY Wheeler   Formal Diabetes Education/Educator: [employer] [address]        Physical Exam:   /57 (BP Location: Right arm)   Pulse 79   Temp 97.8  F (36.6  C) (Oral)   Resp 16   Ht 1.675 m (5' 5.95\")   Wt 85.8 kg (189 lb 3.2 oz)   SpO2 99%   BMI 30.59 kg/m    Constitutional: well-appearing patient in no acute distress.  Eyes: sclera anicteric, wearing glasses.  Respiratory: No signs of labored breathing.         Data:     Lab Results   Component Value Date    A1C 7.1 02/12/2025    A1C 6.7 12/26/2024       ROUTINE IP LABS (Last four results)  BMP  Recent Labs   Lab 02/17/25  0610 02/17/25  0405 02/17/25  0150 02/16/25  2108 02/16/25  0601 02/16/25  0539 02/15/25  0646 02/15/25  0559 02/14/25  0617 02/14/25  0550 02/14/25  0226 02/14/25  0155 02/13/25  1027 02/13/25  1017   NA  --   --   --   --   --  142  --  140  --  137  --   --   --  136   POTASSIUM  --   --   --   --   --  3.3*  --  3.8  --  4.0  4.0  --  4.0   < > 4.3  4.3   CHLORIDE  --   --   --   --   --  105  --  102  --  101  --   --   --  99   GINA  --   --   --   --   --  9.1  --  9.3  --  8.9  --   --   --  9.0   CO2  --   --   --   --   --  23  --  22  --  20*  --   --   --  17*   BUN  --   --   --   --   --  66.9*  --  65.1*  --  61.0*  --   --   --  61.3*   CR  --   --   --   --  "  --  2.88*  --  3.97*  --  5.25*  --   --   --  5.49*   GLC 75 77 79 130*   < > 99   < > 118*   < > 160*   < >  --    < > 169*    < > = values in this interval not displayed.     CBC  Recent Labs   Lab 02/16/25  0539 02/15/25  0559 02/14/25  0550 02/14/25  0155 02/13/25  1546 02/13/25  1017   WBC 12.7* 14.2* 12.3*  --   --  8.1   RBC 3.25* 3.41* 3.40*  --   --  3.70*   HGB 10.7* 11.1* 10.9*  10.9* 11.4*   < > 11.9*   HCT 31.2* 32.3* 32.5*  --   --  35.3*   MCV 96 95 96  --   --  95   MCH 32.9 32.6 32.1  --   --  32.2   MCHC 34.3 34.4 33.5  --   --  33.7   RDW 13.7 13.7 13.7  --   --  13.3   * 149* 157  --   --  146*    < > = values in this interval not displayed.     INR  Recent Labs   Lab 02/12/25 2047   INR 0.81*       Erin Lopez PA-C  Inpatient Diabetes Service  Available on [employer] or Secure Chat     To contact Inpatient Diabetes Service:     7 AM - 5 PM: Page the IDS JADE following the patient that day (see filed or incomplete progress notes/consult notes under Endocrinology)    OR if uncertain of provider assignment: page job code [phone]    5 PM - 7 AM: First call after hours is to primary service.    For urgent after-hours questions, page job code for on call fellow: [phone]      Please notify inpatient diabetes service if changes are planned to steroids, nutrition, or if procedures are planned requiring prolonged NPO status.Diabetes Management Team job code: [phone]     I spent a total of 45 minutes on the date of the encounter doing prep/post-work, chart review, history and exam, documentation and further activities per the note including lab review, multidisciplinary communication, counseling the patient and/or coordinating care regarding acute hyper/hypoglycemic management, as well as discharge management and planning/communication.  See note for details.

## 2025-02-17 NOTE — PHARMACY-TRANSPLANT NOTE
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 (on PD since 4/2024), DM2, obesity, NSTEMI (2010), CAD (7/17/2024 PCI with EDWIN x 1 to LAD, was on ticagrelor/clopidogrel x 3 months then stopped per cardiology for transplant), HTN, and HLD     Induction  2/13 Basiliximab 20mg; SM 500mg  2/14 SM 250mg  2/15 SM 100mg  2/17 Basiliximab 20mg    Maintenance  + Tacrolimus goal 8-10ng/mL  + Mycophenolate mofetil 750mg BID  + Prednisone taper to off    Infectious ppx  + PJP: Bactrim indefinitely  + CMV D+/R-: Valganciclovir x 6months duration    Hx of NSTEMI 2010; CAD s/p PCI 7/2024  + Atorvastatin 40mg daily indefinitely  + Aspirin 81mg daily indefinitely  + Metoprolol 12.5mg BID (swapped from PTAM atenolol)    Hypertriglyceridemia  + Fish oil 2g twice daily    BPH:   + Finasteride 5mg daily  + Tamsulosin 0.4mg daily      Pharmacy has monitored for medication interactions and immunosuppression levels in conjunction with the multidisciplinary team. In anticipation for discharge, medication therapy needs have been addressed daily throughout the current admission via multidisciplinary rounds and/or discussions, order verification, daily clinical pharmacy review, and communication with prescribers.

## 2025-02-17 NOTE — PLAN OF CARE
DISCHARGE:  Patient with orders to discharge to home.     Education Provided:   Med Card - updated 2/17  Lab Book - update 2/17  Handouts - n/a  Specialty Pharmacy Done 2/17  LDAs - n/a    Discharge instructions, medications & follow ups reviewed with patient and wife. Copy of discharge summary given to wife. R IJ removed. Belongings returned from security n/a. [facility] notified, report given - yes, 2/17.    Patient in stable condition. AVSS. Patient and wife had no further questions regarding discharge instructions and medications. Patient transferred out by wheelchair & left with wife.

## 2025-02-17 NOTE — DISCHARGE SUMMARY
[facility]    Discharge Summary  Transplant Surgery    Date of Admission:  2025  Date of Discharge:  2025  Discharging Provider: Graciela Chopra PA-C  Date of Service (when I saw the patient): 25    Discharge Diagnoses   Principal Problem:    Kidney replaced by transplant  Active Problems:    Diabetes mellitus, type 2 (H)    Immunosuppressed status    Drug-induced hyperglycemia      Procedure/Surgery Information   Procedure: Procedure(s):  Transplant kidney recipient  donor with ureteral stent placement and peritoneal dialysis catheter removal  Remove catheter peritoneal   Surgeon(s): Surgeons and Role:     * Wali Newell MD - Primary     * Anupama Arevalo MD - Resident - Assisting     * Minh Lopez MD - Resident - Assisting     * Micky Bass MD - Fellow - Assisting       Non-operative procedures None performed     History of Present Illness   Peter Carpio is a 74 year old male with medical history significant for 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 He underwent a DCD kidney transplant with stent and PD catheter removal on 25 with Dr. Wali Newell.     Hospital Course   Peter Carpio was admitted on 2025.  The following problems were addressed during his hospitalization:    Kidney transplant: Creatinine down to 2.1 (trending down from 5.9) by day of discharge. Good urine output.  -Post-op US with poor visualization due to packing material packing material with obscured  renal artery and vein anastomosis sites. No perinephric fluid collection.    Immunosuppressed status secondary to medications: cPRA 0, low intensity induction  Basiliximab: POD 0 & 4 (completed inpatient)  Steroids:  Taper per protocol.  MMF: 750 mg BID  Tacro: Goal level 8-10. Level 5.1. Increase from 2.5 mg BID to 3 mg BID. Check level tomorrow.  -Infectious  prophylaxis with Bactrim indefinitely and valganciclovir x 6 months.    Transplant coordinator: Ruth Jacob, phone:  [phone]  Donor type: DCD  DSA at time of transplant: no  Ureteral stent: yes  CMV:  Donor + / Recipient -  EBV:  Donor + / Recipient +  Induction: Low intensity (Basiliximab x 2 doses/steroid taper)    Neuro:  Acute post op pain:    - Tylenol to PRN   - Stopped oxycodone (not using)     Hematology:   Anemia of chronic disease: Hgb ~10-11, stable.   Leukocytosis: Likely related to steroids, improving.      Cardiorespiratory:   HTN: PTA atenolol.    - Continue metoprolol 12.5 mg BID.   CAD; NSTEMI; HLD: s/p PCI with EDWIN x 1 to LAD in 07/2024. PTA ASA, isosorbide, and atorvastatin.    - Continue ASA, statin, and metoprolol.      GI/Nutrition: Regular diet  Constipation: Continue senna BID, Miralax daily.   Nausea/GI upset:   - Protonix started due to GI upset in the setting of high dose steroids    - Zofran PRN     Endocrine:   DM2; Steroid induced hyperglycemia: PTA on 70/30 BID. Endocrine consulted.   - Appreciate Endocrinology recommendations.     Diabetes Management Discharge Plan:    Glucose Control Regimen:  1) Take your 70/30 (humulin 70/30) before breakfast and before dinner. Adjust the dose of 70/30 depending on your prednisone dose (dates below subject to change per your transplant team's recommendations):  Date Prednisone dose  Breakfast 70/30 Dinner 70/30   2/16-2/17 60 mg 68 units 28 units   2/18-2/19 40 mg 50 units 23 units   2/20-2/26 20 mg  40 units 19 units   2/27-3/5 15 mg 38 units 16 units   3/6-3/12 10 mg 35 units 15 units   3/13 onwards 5 mg (or less) 35 units 15 units         2) Rapid-acting insulin: lispro (Humalog) correction - see chart below. Take for high blood glucoses three times daily before meals and at bedtime.  You may add the correction dose to the carbohydrate coverage/mealtime insulin dose and give in one injection--ideally 10-15 minutes before a meal.  You  may take the correction dose even if you skip a meal (as long as it has been 4 hours since previous correction dose).      Pre-meal correction scale:     Blood Glucose Insulin Lispro Before Meals:   Less than 140 0 units   140 -189  1 units   190 - 239 2 units   240 - 289 3 units   290 - 339 4 units    340 - 389  5 units   390 - 439 6 units   440 or more 7 units      Bedtime correction scale:      Blood Glucose Insulin Lispro At Bedtime:   Less than 200 0 units   200-249  1 units   250 - 299 2 units   300 - 349 3 units   350 - 399 4 units    400 or more 5 units         Outpatient Follow-Up: An appointment request was sent to the [facility] coordinator to schedule your outpatient diabetes appointment 1-2 weeks from discharge. You can call the [facility] at [phone] if you have scheduling questions or do not hear from them within a few days of discharge. Follow up sooner if blood glucose runs consistently greater than 200 mg/dL or if having more than two episodes less than 70 mg/dL.     If you have urgent questions or concerns regarding your blood sugars or insulin, you may contact [phone] (the main hospital ). Ask to speak with the Endocrinologist on call.     Your target A1c value is less than 7.5% to help prevent future complications from diabetes.       Fluid/Electrolytes:   Hypokalemia: K 3.4   - K dur 20 mEq x 1  Hypomagnesemia: Ppx    - Continue Mg Oxide 400 mg daily     Infectious disease: No issues.       Discharge Disposition   Discharged to home   Condition at discharge: Stable    Pending Results   These results will be followed up by Ruth Jacob  Unresulted Labs Ordered in the Past 30 Days of this Admission       Date and Time Order Name Status Description    2/13/2025  1:10 AM Prepare red blood cells (unit) Preliminary     2/13/2025  1:10 AM Prepare red blood cells (unit) Preliminary     2/12/2025  7:47 PM HBV HCV HIV by BERE In process     2/12/2025   "7:47 PM BK Virus IgG Antibody In process     2/12/2025  7:47 PM Oxalate serum or plasma In process             Primary Care Physician   Yared Greene    /81 (BP Location: Left arm)   Pulse 73   Temp 97.7  F (36.5  C) (Oral)   Resp 16   Ht 1.675 m (5' 5.95\")   Wt 85.8 kg (189 lb 3.2 oz)   SpO2 100%   BMI 30.59 kg/m    General Appearance: in no apparent distress.   Skin: Warm, dry  Heart: perfused  Lungs: non-labored breathing on RA  Abdomen: The abdomen is soft. Incision closed with staples and open to air.   : jonas is present; removed on rounds  Extremities: edema: trace generalized  Neurologic: awake, alert, and oriented. Tremor absent    Consultations This Hospital Stay   NURSING TO CONSULT FOR VASCULAR ACCESS CARE IP CONSULT  SOT MEDICATION HISTORY IP PHARMACY CONSULT  PHARMACY IP CONSULT  NUTRITION SERVICES ADULT IP CONSULT  NEPHROLOGY KIDNEY/PANCREAS TRANSPLANT ADULT IP CONSULT  NEPHROLOGY KIDNEY/PANCREAS TRANSPLANT ADULT IP CONSULT  PHARMACY IP CONSULT  CARE MANAGEMENT / SOCIAL WORK IP CONSULT  ENDOCRINE DIABETES ADULT IP CONSULT  CARE MANAGEMENT / SOCIAL WORK IP CONSULT  PHARMACY LIAISON FOR MEDICATION COVERAGE CONSULT    Time Spent on this Encounter   I have spent greater than 30 minutes on this discharge.    Discharge Orders   Discharge Medications   Current Discharge Medication List        START taking these medications    Details   acetaminophen (TYLENOL) 325 MG tablet Take 3 tablets (975 mg) by mouth every 6 hours as needed for mild pain or fever.    Associated Diagnoses: Status post kidney transplant      fish oil-omega-3 fatty acids 1000 MG capsule Take 2 capsules (2 g) by mouth 2 times daily.    Associated Diagnoses: Status post kidney transplant      !! insulin aspart (NOVOLOG PEN) 100 UNIT/ML pen Inject 1-7 Units subcutaneously 3 times daily (before meals). Correction Scale -   Do Not give Correction Insulin if Pre-Meal BG less than 140. For Pre-Meal  - 189 give 1 unit. For " Pre-Meal - 239 give 2 units. For Pre-Meal -289 give 3 units. For Pre-Meal -339 give 4 units. For Pre-Meal -389 give 5 units. For Pre-Meal -439 give 6 units. For Pre-Meal BG greater than or equal to 440 give 7 units To be given with prandial insulin, and based on pre-meal blood glucose. Administering insulin within 5 minutes of the start of the meal is ideal. Administer insulin no more than 30 minutes after the start of the meal, unless directed otherwise by provider. Notify provider if glucose greater than or equal to 350 mg/dL after administration of correction dose.  Qty: 15 mL, Refills: 1    Associated Diagnoses: Drug-induced hyperglycemia      !! insulin aspart (NOVOLOG PEN) 100 UNIT/ML pen Inject 1-5 Units subcutaneously at bedtime. Do Not give Bedtime Correction Insulin if BG less than 200. For -249 give 1 units. For -299 give 2 units. For -349 give 3 units. For -399 give 4 units. For BG greater than or equal to 400 give 5 units. Notify provider if glucose greater than or equal to 350 mg/dL after administration of correction dose.  Qty: 15 mL, Refills: 1    Associated Diagnoses: Drug-induced hyperglycemia      magnesium oxide (MAG-OX) 400 MG tablet Take 1 tablet (400 mg) by mouth daily (with lunch).  Qty: 30 tablet, Refills: 3    Associated Diagnoses: Status post kidney transplant      metoprolol tartrate (LOPRESSOR) 25 MG tablet Take 0.5 tablets (12.5 mg) by mouth 2 times daily.  Qty: 30 tablet, Refills: 3    Associated Diagnoses: Status post kidney transplant      mycophenolate (GENERIC EQUIVALENT) 250 MG capsule Take 3 capsules (750 mg) by mouth 2 times daily.  Qty: 180 capsule, Refills: 11    Associated Diagnoses: Status post kidney transplant      ondansetron (ZOFRAN ODT) 4 MG ODT tab Take 1 tablet (4 mg) by mouth every 6 hours as needed for nausea.  Qty: 12 tablet, Refills: 1    Associated Diagnoses: Status post kidney transplant      pantoprazole  (PROTONIX) 40 MG EC tablet Take 1 tablet (40 mg) by mouth daily.  Qty: 30 tablet, Refills: 2    Associated Diagnoses: Status post kidney transplant      polyethylene glycol (MIRALAX) 17 GM/Dose powder Take 17 g by mouth daily.    Associated Diagnoses: Status post kidney transplant      predniSONE (DELTASONE) 10 MG tablet Take 4 tablets (40 mg) by mouth daily for 2 days, THEN 2 tablets (20 mg) daily for 7 days, THEN 1.5 tablets (15 mg) daily for 7 days, THEN 1 tablet (10 mg) daily for 7 days, THEN 0.5 tablets (5 mg) daily for 7 days.  Qty: 20 tablet, Refills: 0    Associated Diagnoses: Status post kidney transplant      senna-docusate (SENOKOT-S/PERICOLACE) 8.6-50 MG tablet Take 1-2 tablets by mouth 2 times daily.    Associated Diagnoses: Status post kidney transplant      sulfamethoxazole-trimethoprim (BACTRIM) 400-80 MG tablet Take 1 tablet by mouth daily.  Qty: 30 tablet, Refills: 11    Associated Diagnoses: Status post kidney transplant      !! tacrolimus (GENERIC EQUIVALENT) 0.5 MG capsule Tacrolimus 0.5 mg capsules BID to allow for dose adjustments.  Qty: 60 capsule, Refills: 11    Associated Diagnoses: Status post kidney transplant      !! tacrolimus (GENERIC EQUIVALENT) 1 MG capsule Take 3 capsules (3 mg) by mouth 2 times daily.  Qty: 180 capsule, Refills: 11    Associated Diagnoses: Status post kidney transplant      valGANciclovir (VALCYTE) 450 MG tablet Take 1 tablet (450 mg) by mouth every other day.  Qty: 60 tablet, Refills: 5    Comments: Please titrate to 900 mg (2 tablets) daily as renal function improves  Associated Diagnoses: Status post kidney transplant       !! - Potential duplicate medications found. Please discuss with provider.        CONTINUE these medications which have CHANGED    Details   atorvastatin (LIPITOR) 40 MG tablet Take 1 tablet (40 mg) by mouth daily.  Qty: 30 tablet, Refills: 5    Associated Diagnoses: Status post kidney transplant      insulin NPH-Regular (HUMULIN 70/30;NOVOLIN  70/30) susp With prednisone 40 mg dose take 50 units prior to breakfast and 23 units prior to dinner. With prednisone 20 mg dose take 40 units prior to breakfast and 19 units prior to dinner. With prednisone 15 mg dose take 38 units prior to breakfast and 16 units prior to dinner. With prednisone 10 mg dose take 35 units prior to breakfast and 15 units prior to dinner. With prednisone 5 mg dose take 35 units prior to breakfast and 15 units prior to dinner.  Qty: 15 mL, Refills: 0    Associated Diagnoses: Drug-induced hyperglycemia           CONTINUE these medications which have NOT CHANGED    Details   allopurinol (ZYLOPRIM) 100 MG tablet Take 100 mg by mouth daily      aspirin 81 MG EC tablet Take 81 mg by mouth daily.      finasteride (PROSCAR) 5 MG tablet Take 1 tablet (5 mg) by mouth daily.  Qty: 90 tablet, Refills: 3    Associated Diagnoses: BPH with obstruction/lower urinary tract symptoms      gabapentin (NEURONTIN) 100 MG capsule Take 200 mg by mouth at bedtime.      tamsulosin (FLOMAX) 0.4 MG capsule Take 0.4 mg by mouth daily.      Continuous Glucose Sensor (FREESTYLE ANTONIO 14 DAY SENSOR) [facility]            STOP taking these medications       atenolol (TENORMIN) 50 MG tablet Comments:   Reason for Stopping:         calcitRIOL (ROCALTROL) 0.25 MCG capsule Comments:   Reason for Stopping:         famotidine (PEPCID) 20 MG tablet Comments:   Reason for Stopping:         furosemide (LASIX) 20 MG tablet Comments:   Reason for Stopping:         gentamicin (GARAMYCIN) 0.3 % ophthalmic solution Comments:   Reason for Stopping:         isosorbide mononitrate (IMDUR) 30 MG 24 hr tablet Comments:   Reason for Stopping:         Multiple Vitamins-Minerals (PRESERVISION AREDS) CAPS Comments:   Reason for Stopping:         Multiple Vitamins-Minerals (SENIOR MULTIVITAMIN PLUS PO) Comments:   Reason for Stopping:         sevelamer carbonate (RENVELA) 800 MG tablet Comments:   Reason for Stopping:         Vitamin D3 (VITAMIN  D-1000 MAX ST) 25 mcg (1000 units) tablet Comments:   Reason for Stopping:                  ADULT [facility] Special[facility] Follow-up and recommended labs and tests    Please schedule appointment with Patricia Wheeler or Laura Salas within 7-10 days of discharge     Reason for your hospital stay    Patient underwent kidney transplant with ureteral stent placement and PD catheter removal on 2/13/25. History of DM type 2, drug induced hyperglycemia secondary to steroids.     Activity    Your activity upon discharge: Walk at least four times a day, lift no greater than 10 pounds for 6-8 weeks from the time of surgery.  No driving while taking narcotics or 3 weeks after surgery.     ADULT [facility] Follow-up and recommended labs and tests    [facility] FOLLOW UP:   1. [facility]: Over the next 2 days you will be seen in the [facility] (ph. [phone], option 3). Your labs will be drawn at the beginning of your appointment. DO NOT take your medications prior to having labs drawn. Please bring all your medications with you from home to take after labs are drawn.   2. Follow up with Dr. Wali Newell in Transplant Clinic in 1-2 weeks.   3. Follow up with Transplant Nephrologist as scheduled.   4.  Ureteral stent removal in 4-6 weeks, to be scheduled by Transplant Coordinator. If a  does not contact you for this, please contact your transplant coordinator.   5. Follow up with your primary care provider in ~8 weeks. Patient to schedule.   6. Follow up with [facility]. An appointment request was sent to the [facility] coordinator to schedule your outpatient diabetes appointment 1-2 weeks from discharge. You can call the [facility] at [phone] if you have scheduling questions or do not hear from them within a few days of discharge. Follow up sooner if blood glucose runs consistently greater than 200 mg/dL or if  having more than two episodes less than 70 mg/dL.    Remember to always bring an updated medication list to all appointments.      Call your Transplant Coordinator ([phone]) with questions about Transplant Center appointment scheduling.     LABS:   CBC, BMP, magnesium, phosphorus, tacrolimus level to be drawn daily while in ATC, then every Monday and Thursday by home health care nurse if arranged, or at an outpatient lab.     Monitor and record    Monitor blood pressure and weight daily.     Monitor glucose three times daily before meals, and at bedtime.  Blood Glucose (BG) goals:  mg/dL before meals, less than 180 mg/dL 2 hrs after meals.     When to contact your care team    WHEN TO CONTACT YOUR  COORDINATOR:   Transplant:  Transplant Coordinator: Ruth Jacob, phone: [phone]   Notify your coordinator if you have pain over your kidney, increased redness or drainage from your incision, fever greater than 100F, decreased urine output or new or increased amount of blood in urine.   Notify your coordinator immediately if you are ever unable to take your immunosuppressive medications for any reason.   If you have URGENT concerns after office hours, please call the hospital switchboard at [phone] and ask to have the organ transplant nurse on-call paged. If you have a life-threatening emergency, go to the nearest emergency room.    Endocrine:  -If blood glucose runs consistently greater than 200 mg/dL or if having more than two episodes less than 70 mg/dL follow up with [facility] Endocrine clinic. Call clinic to schedule appointment, phone: [phone].  -If you have urgent questions or concerns regarding your blood sugars or insulin, you may contact [phone] (the main hospital ). Ask to speak with the Endocrinologist on call.     Wound care and dressings    Instructions to care for your wound at home: If you have staples in place, they will be removed in 3 weeks after operation. Wash  "incision daily with soap and water. Do not soak or scrub.   .     Diet    Follow this diet upon discharge: Diet recommendations post-transplant: Heart healthy dietary habits long term (low saturated/trans fat, low sodium). High protein diet x 8 weeks. Practice food safety precautions.         Data   Most Recent 3 CBC's:  Recent Labs   Lab Test 02/17/25  0630 02/16/25  0539 02/15/25  0559   WBC 10.2 12.7* 14.2*   HGB 10.0* 10.7* 11.1*   MCV 98 96 95    132* 149*      Most Recent 3 BMP's:  Recent Labs   Lab Test 02/17/25  1157 02/17/25  0841 02/17/25  0746 02/17/25  0630 02/16/25  0601 02/16/25  0539 02/15/25  0646 02/15/25  0559   NA  --   --   --  146*  --  142  --  140   POTASSIUM  --   --   --  3.4  --  3.3*  --  3.8   CHLORIDE  --   --   --  110*  --  105  --  102   CO2  --   --   --  25  --  23  --  22   BUN  --   --   --  57.7*  --  66.9*  --  65.1*   CR  --   --   --  2.14*  --  2.88*  --  3.97*   ANIONGAP  --   --   --  11  --  14  --  16*   GINA  --   --   --  9.0  --  9.1  --  9.3   * 117* 67* 77   < > 99   < > 118*    < > = values in this interval not displayed.     Most Recent 2 LFT's:  Recent Labs   Lab Test 02/12/25 2047 05/16/24  1326   AST 17 8   ALT 15 9   ALKPHOS 59 58   BILITOTAL 0.2 0.2     Most Recent INR's and Anticoagulation Dosing History:  Anticoagulation Dose History          Latest Ref Rng & Units 5/16/2024 7/17/2024 2/12/2025   Recent Dosing and Labs   INR 0.85 - 1.15 1.08  1.01  0.81      Most Recent 3 Troponin's:No lab results found.  Most Recent Cholesterol Panel:  Recent Labs   Lab Test 02/12/25 2047   CHOL 141   HDL 41   TRIG 420*     Most Recent 6 Bacteria Isolates From Any Culture (See EPIC Reports for Culture Details):No lab results found.  Most Recent TSH, T4 and A1c Labs:  Recent Labs   Lab Test 02/12/25 2046   A1C 7.1*       No results found for: \"LIPASE\"  No results found for: \"AMYLASE\"     "

## 2025-02-17 NOTE — PLAN OF CARE
"BP (!) 133/96 (BP Location: Left arm)   Pulse 81   Temp 97.9  F (36.6  C) (Oral)   Resp 16   Ht 1.675 m (5' 5.95\")   Wt 85.8 kg (189 lb 3.2 oz)   SpO2 100%   BMI 30.59 kg/m      Shift: 1184-2308  Isolation Status: none  VS: stable on RA, afebrile  Neuro: Aox4  Behaviors: calm, cooperative  BG: ACHS (prednisone taper) // dropped into the 60s prior to breakfast, endocrine advised to skip carb coverage insulin after breakfast  Respiratory: WDL  Cardiac: WDL  Pain/Nausea: denies nausea, pain well controlled with scheduled meds  Diet: Reg (carb coverage)   IV Access: R internal jugular triple lumen  GI/: voids spont. Without difficulty, last BM 2/16  Skin: incision DESTINY  Mobility: UAL  Events/Education: med education, diet education, discharge education    Problem: Adult Inpatient Plan of Care  Goal: Plan of Care Review  Description: The Plan of Care Review/Shift note should be completed every shift.  The Outcome Evaluation is a brief statement about your assessment that the patient is improving, declining, or no change.  This information will be displayed automatically on your shift  note.  Outcome: Progressing  Flowsheets (Taken 2/17/2025 1501)  Outcome Evaluation: good urine output, denies nausea, medication education  Plan of Care Reviewed With: patient     "

## 2025-02-18 ENCOUNTER — TELEPHONE (OUTPATIENT)
Dept: PHARMACY | Facility: CLINIC | Age: 75
End: 2025-02-18

## 2025-02-18 ENCOUNTER — LAB (OUTPATIENT)
Dept: LAB | Facility: CLINIC | Age: 75
End: 2025-02-18
Payer: COMMERCIAL

## 2025-02-18 ENCOUNTER — INFUSION THERAPY VISIT (OUTPATIENT)
Dept: INFUSION THERAPY | Facility: CLINIC | Age: 75
End: 2025-02-18
Attending: NURSE PRACTITIONER
Payer: COMMERCIAL

## 2025-02-18 ENCOUNTER — ANCILLARY PROCEDURE (OUTPATIENT)
Dept: ULTRASOUND IMAGING | Facility: CLINIC | Age: 75
End: 2025-02-18
Attending: NURSE PRACTITIONER
Payer: COMMERCIAL

## 2025-02-18 ENCOUNTER — TELEPHONE (OUTPATIENT)
Dept: ENDOCRINOLOGY | Facility: CLINIC | Age: 75
End: 2025-02-18

## 2025-02-18 VITALS
SYSTOLIC BLOOD PRESSURE: 135 MMHG | TEMPERATURE: 97.5 F | OXYGEN SATURATION: 97 % | BODY MASS INDEX: 30.98 KG/M2 | WEIGHT: 191.6 LBS | HEART RATE: 82 BPM | DIASTOLIC BLOOD PRESSURE: 76 MMHG | RESPIRATION RATE: 16 BRPM

## 2025-02-18 DIAGNOSIS — Z94.0 KIDNEY REPLACED BY TRANSPLANT: Primary | ICD-10-CM

## 2025-02-18 DIAGNOSIS — Z94.0 KIDNEY REPLACED BY TRANSPLANT: ICD-10-CM

## 2025-02-18 DIAGNOSIS — R60.0 LEG EDEMA, RIGHT: ICD-10-CM

## 2025-02-18 DIAGNOSIS — Z48.298 AFTERCARE FOLLOWING ORGAN TRANSPLANT: ICD-10-CM

## 2025-02-18 DIAGNOSIS — N18.6 END STAGE RENAL DISEASE (H): ICD-10-CM

## 2025-02-18 DIAGNOSIS — R60.0 LEG EDEMA, RIGHT: Primary | ICD-10-CM

## 2025-02-18 DIAGNOSIS — Z98.890 OTHER SPECIFIED POSTPROCEDURAL STATES: ICD-10-CM

## 2025-02-18 DIAGNOSIS — Z79.899 ENCOUNTER FOR LONG-TERM CURRENT USE OF MEDICATION: ICD-10-CM

## 2025-02-18 DIAGNOSIS — Z20.828 CONTACT WITH AND (SUSPECTED) EXPOSURE TO OTHER VIRAL COMMUNICABLE DISEASES: ICD-10-CM

## 2025-02-18 LAB
ANION GAP SERPL CALCULATED.3IONS-SCNC: 12 MMOL/L (ref 7–15)
BASOPHILS # BLD AUTO: 0.1 10E3/UL (ref 0–0.2)
BASOPHILS NFR BLD AUTO: 1 %
BUN SERPL-MCNC: 53.2 MG/DL (ref 8–23)
CALCIUM SERPL-MCNC: 9 MG/DL (ref 8.8–10.4)
CHLORIDE SERPL-SCNC: 107 MMOL/L (ref 98–107)
CREAT SERPL-MCNC: 1.81 MG/DL (ref 0.67–1.17)
EGFRCR SERPLBLD CKD-EPI 2021: 39 ML/MIN/1.73M2
EOSINOPHIL # BLD AUTO: 0.1 10E3/UL (ref 0–0.7)
EOSINOPHIL NFR BLD AUTO: 1 %
ERYTHROCYTE [DISTWIDTH] IN BLOOD BY AUTOMATED COUNT: 13.5 % (ref 10–15)
GLUCOSE BLDC GLUCOMTR-MCNC: 106 MG/DL (ref 70–99)
GLUCOSE SERPL-MCNC: 97 MG/DL (ref 70–99)
HCO3 SERPL-SCNC: 25 MMOL/L (ref 22–29)
HCT VFR BLD AUTO: 34.4 % (ref 40–53)
HGB BLD-MCNC: 11.3 G/DL (ref 13.3–17.7)
IMM GRANULOCYTES # BLD: 0.2 10E3/UL
IMM GRANULOCYTES NFR BLD: 2 %
LYMPHOCYTES # BLD AUTO: 1.2 10E3/UL (ref 0.8–5.3)
LYMPHOCYTES NFR BLD AUTO: 12 %
MAGNESIUM SERPL-MCNC: 2.1 MG/DL (ref 1.7–2.3)
MCH RBC QN AUTO: 32 PG (ref 26.5–33)
MCHC RBC AUTO-ENTMCNC: 32.8 G/DL (ref 31.5–36.5)
MCV RBC AUTO: 98 FL (ref 78–100)
MONOCYTES # BLD AUTO: 0.9 10E3/UL (ref 0–1.3)
MONOCYTES NFR BLD AUTO: 8 %
NEUTROPHILS # BLD AUTO: 7.6 10E3/UL (ref 1.6–8.3)
NEUTROPHILS NFR BLD AUTO: 75 %
NRBC # BLD AUTO: 0 10E3/UL
NRBC BLD AUTO-RTO: 0 /100
PHOSPHATE SERPL-MCNC: 2.3 MG/DL (ref 2.5–4.5)
PLATELET # BLD AUTO: 176 10E3/UL (ref 150–450)
POTASSIUM SERPL-SCNC: 3.6 MMOL/L (ref 3.4–5.3)
RBC # BLD AUTO: 3.53 10E6/UL (ref 4.4–5.9)
SODIUM SERPL-SCNC: 144 MMOL/L (ref 135–145)
TACROLIMUS BLD-MCNC: 9.2 UG/L (ref 5–15)
TME LAST DOSE: NORMAL H
TME LAST DOSE: NORMAL H
WBC # BLD AUTO: 10.1 10E3/UL (ref 4–11)

## 2025-02-18 PROCEDURE — 99000 SPECIMEN HANDLING OFFICE-LAB: CPT | Performed by: PATHOLOGY

## 2025-02-18 PROCEDURE — 83735 ASSAY OF MAGNESIUM: CPT | Performed by: PATHOLOGY

## 2025-02-18 PROCEDURE — 84100 ASSAY OF PHOSPHORUS: CPT | Performed by: PATHOLOGY

## 2025-02-18 PROCEDURE — 80048 BASIC METABOLIC PNL TOTAL CA: CPT | Performed by: PATHOLOGY

## 2025-02-18 PROCEDURE — 99215 OFFICE O/P EST HI 40 MIN: CPT | Mod: 24 | Performed by: NURSE PRACTITIONER

## 2025-02-18 PROCEDURE — 80197 ASSAY OF TACROLIMUS: CPT | Performed by: NURSE PRACTITIONER

## 2025-02-18 PROCEDURE — G2211 COMPLEX E/M VISIT ADD ON: HCPCS | Performed by: NURSE PRACTITIONER

## 2025-02-18 PROCEDURE — 85025 COMPLETE CBC W/AUTO DIFF WBC: CPT | Performed by: PATHOLOGY

## 2025-02-18 PROCEDURE — 93971 EXTREMITY STUDY: CPT | Mod: TC | Performed by: RADIOLOGY

## 2025-02-18 PROCEDURE — 82962 GLUCOSE BLOOD TEST: CPT | Performed by: NURSE PRACTITIONER

## 2025-02-18 PROCEDURE — 36415 COLL VENOUS BLD VENIPUNCTURE: CPT | Performed by: PATHOLOGY

## 2025-02-18 NOTE — PROGRESS NOTES
TRANSPLANT NEPHROLOGY CLINIC VISIT     Assessment & Plan   # DDKT: - Cr Trend down. Venous duplex for leg edema. Glucose readings running low, had him change NPH dose to 40 units AM and 19 units PM today. Will arrange for follow up with tiffany MEEK tomorrow.    - Baseline Creatinine: ~ TBD   - Proteinuria: Not checked recently   - DSA Hx: No DSA at time of transplant   - Last cPRA: 0%   - BK Viremia: No   - Kidney Tx Biopsy Hx: No biopsy history.    # Immunosuppression: Tacrolimus immediate release (goal 8-10), Mycophenolate mofetil (dose 750 mg every 12 hours), and Prednisone (dose taper)   - Induction with Recent Transplant:  Low Intensity Protocol   - Continue with intensive monitoring of immunosuppression for efficacy and toxicity.   - Historical Changes in Immunosuppression: None   - Changes: Not at this time    # Infection Prevention:     - PJP: Sulfa/TMP (Bactrim)  - CMV: Valganciclovir (Valcyte)      - CMV IgG Ab High Risk Discordance (D+/R-) at time of transplant: No  Present CMV Serostatus: Positive  - EBV IgG Ab High Risk Discordance (D+/R-) at time of transplant: No  Present EBV Serostatus: Positive    # Hypertension: Controlled;  Goal BP: < 150/90   - Changes: Not at this time    # Diabetes: Borderline control (HbA1c 7-9%) Last HbA1c: 7.1%  -glucose running low. Move to NPH dosing of 40 units in AM and 19 in PM starting today.     # Anemia in Chronic Renal Disease: Hgb: Stable      BARBER: No   - Iron studies: Not checked recently    # Mineral Bone Disorder:    - Secondary renal hyperparathyroidism; PTH level: Not checked recently        On treatment: None  - Vitamin D; level: Not checked recently        On supplement: No  - Calcium; level: Normal        On supplement: No  - Phosphorus; level: Low normal        On supplement: No    # Electrolytes:   - Potassium; level: Normal        On supplement: No  - Magnesium; level: Normal        On supplement: Yes  - Bicarbonate; level: Normal        On supplement:  No  - Sodium; level: Normal    # Other Significant PMH:   -  - Gout: on allopurinol.  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 Saw Dr. Pham cancer specialist place out of [facility] Undergoing surveillance colonoscopy. Last colonoscopy in march 2024 with out any new lesions.   - CAD: cardiac catheterization 7/24: pLAD to mLAD 70% stenosis and mLAD 40% stenosis. He is now s/p PCI with EDWIN x 1 to LAD.   - NSTEMI: in 2022. Stress test showed very small defect. No intervention pursued due to kidney function at that time. On statin, ASA 81, and Imdur.       # Transplant History:  Etiology of Kidney Failure: Diabetic nephropathy  Tx: DDKT  Transplant: 2/13/2025 (Kidney)  Significant transplant-related complications: None    Transplant Office Phone Number: [phone]    Assessment and plan was discussed with the patient and he voiced his understanding and agreement.    Return visit: [facility] tomorrow     ALEKSANDR Connell CNP    The longitudinal plan of care for the diagnosis(es)/condition(s) as documented were addressed during this visit. Due to the added complexity in care, I will continue to support Peter in the subsequent management and with ongoing continuity of care.      Chief Complaint   Mr. Carpio is a 74 year old here for hospital follow up after kidney transplant.     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 He underwent a DCD kidney transplant with stent and PD catheter removal on 2/13/25 with Dr. Wali Newell.     Doing well. Pain controlled.   Urinating every hour - not measuring. No dysuria.   LBM today. No N/V/D.   Appetite is good and improving. Drank about a liter yesterday due to nausea. Feeling better today and will drink more.   CGM alarmed last night multiple times  due to low readings - around 57. Comes up with juice and snack.   Notes slightly more edema in legs this AM, no pain. R> L but believes this is his baseline.         Home BP: Not checked    Problem List   Patient Active Problem List   Diagnosis    Hypertension    Chronic kidney disease, stage 4, severely decreased GFR (H)    Diabetic nephropathy (H)    Gout    Secondary renal hyperparathyroidism    Coronary artery disease    Diabetes mellitus, type 2 (H)    Hypertrophy of prostate without urinary obstruction    Malignant neoplasm of ascending colon (H)    Cardiovascular disease    Status post coronary angiogram    Pre-transplant evaluation for kidney transplant    Anemia in chronic kidney disease    Chronic 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    Dependence on renal dialysis    ESRD (end stage renal disease) on dialysis (H)    Hyperlipidemia    NSTEMI (non-ST elevated myocardial infarction) (H)    Awaiting transplantation of kidney    Kidney replaced by transplant    Immunosuppressed status    Drug-induced hyperglycemia       Allergies   No Known Allergies    Medications   Current Outpatient Medications   Medication Sig Dispense Refill    acetaminophen (TYLENOL) 325 MG tablet Take 3 tablets (975 mg) by mouth every 6 hours as needed for mild pain or fever.      allopurinol (ZYLOPRIM) 100 MG tablet Take 100 mg by mouth daily      aspirin 81 MG EC tablet Take 81 mg by mouth daily.      atorvastatin (LIPITOR) 40 MG tablet Take 1 tablet (40 mg) by mouth daily. 30 tablet 5    Continuous Glucose Sensor (FREESTYLE ANTONIO 14 DAY SENSOR) MISC       finasteride (PROSCAR) 5 MG tablet Take 1 tablet (5 mg) by mouth daily. 90 tablet 3    fish oil-omega-3 fatty acids 1000 MG capsule Take 2 capsules (2 g) by mouth 2 times daily.      gabapentin (NEURONTIN) 100 MG capsule Take 200 mg by mouth at bedtime.      insulin aspart (NOVOLOG PEN) 100 UNIT/ML pen Inject 1-7 Units subcutaneously 3 times daily (before meals). Correction  Scale -   Do Not give Correction Insulin if Pre-Meal BG less than 140. For Pre-Meal  - 189 give 1 unit. For Pre-Meal - 239 give 2 units. For Pre-Meal -289 give 3 units. For Pre-Meal -339 give 4 units. For Pre-Meal -389 give 5 units. For Pre-Meal -439 give 6 units. For Pre-Meal BG greater than or equal to 440 give 7 units To be given with prandial insulin, and based on pre-meal blood glucose. Administering insulin within 5 minutes of the start of the meal is ideal. Administer insulin no more than 30 minutes after the start of the meal, unless directed otherwise by provider. Notify provider if glucose greater than or equal to 350 mg/dL after administration of correction dose. 15 mL 1    insulin aspart (NOVOLOG PEN) 100 UNIT/ML pen Inject 1-5 Units subcutaneously at bedtime. Do Not give Bedtime Correction Insulin if BG less than 200. For -249 give 1 units. For -299 give 2 units. For -349 give 3 units. For -399 give 4 units. For BG greater than or equal to 400 give 5 units. Notify provider if glucose greater than or equal to 350 mg/dL after administration of correction dose. 15 mL 1    insulin NPH-Regular (HUMULIN 70/30;NOVOLIN 70/30) susp With prednisone 40 mg dose take 50 units prior to breakfast and 23 units prior to dinner. With prednisone 20 mg dose take 40 units prior to breakfast and 19 units prior to dinner. With prednisone 15 mg dose take 38 units prior to breakfast and 16 units prior to dinner. With prednisone 10 mg dose take 35 units prior to breakfast and 15 units prior to dinner. With prednisone 5 mg dose take 35 units prior to breakfast and 15 units prior to dinner. 15 mL 0    magnesium oxide (MAG-OX) 400 MG tablet Take 1 tablet (400 mg) by mouth daily (with lunch). 30 tablet 3    metoprolol tartrate (LOPRESSOR) 25 MG tablet Take 0.5 tablets (12.5 mg) by mouth 2 times daily. 30 tablet 3    mycophenolate (GENERIC EQUIVALENT) 250 MG capsule Take 3  capsules (750 mg) by mouth 2 times daily. 180 capsule 11    ondansetron (ZOFRAN ODT) 4 MG ODT tab Take 1 tablet (4 mg) by mouth every 6 hours as needed for nausea. 12 tablet 1    pantoprazole (PROTONIX) 40 MG EC tablet Take 1 tablet (40 mg) by mouth daily. 30 tablet 2    polyethylene glycol (MIRALAX) 17 GM/Dose powder Take 17 g by mouth daily.      predniSONE (DELTASONE) 10 MG tablet Take 4 tablets (40 mg) by mouth daily for 2 days, THEN 2 tablets (20 mg) daily for 7 days, THEN 1.5 tablets (15 mg) daily for 7 days, THEN 1 tablet (10 mg) daily for 7 days, THEN 0.5 tablets (5 mg) daily for 7 days. 20 tablet 0    senna-docusate (SENOKOT-S/PERICOLACE) 8.6-50 MG tablet Take 1-2 tablets by mouth 2 times daily.      sulfamethoxazole-trimethoprim (BACTRIM) 400-80 MG tablet Take 1 tablet by mouth daily. 30 tablet 11    tacrolimus (GENERIC EQUIVALENT) 0.5 MG capsule Tacrolimus 0.5 mg capsules BID to allow for dose adjustments. 60 capsule 11    tacrolimus (GENERIC EQUIVALENT) 1 MG capsule Take 3 capsules (3 mg) by mouth 2 times daily. 180 capsule 11    tamsulosin (FLOMAX) 0.4 MG capsule Take 0.4 mg by mouth daily.      [START ON 2/19/2025] valGANciclovir (VALCYTE) 450 MG tablet Take 1 tablet (450 mg) by mouth every other day. 60 tablet 5     No current facility-administered medications for this visit.     There are no discontinued medications.    Physical Exam   Vital Signs: There were no vitals taken for this visit.    GENERAL APPEARANCE: alert and no distress  HENT: mouth without ulcers or lesions  RESP: lungs clear to auscultation - no rales, rhonchi or wheezes  CV: regular rhythm, normal rate, no rub, no murmur  EDEMA: +2 LE edema bilaterally R > L.   ABDOMEN: soft, nondistended, nontender, bowel sounds normal  RLQ incision intact with staples - no erythema or drainage.    MS: extremities normal - no gross deformities noted, no evidence of inflammation in joints, no muscle tenderness  SKIN: no rash    Data         Latest  Ref Rng & Units 2/18/2025     7:20 AM 2/17/2025    11:57 AM 2/17/2025     8:41 AM   Renal   Sodium 135 - 145 mmol/L 144      K 3.4 - 5.3 mmol/L 3.6      Cl 98 - 107 mmol/L 107      Cl (external) 98 - 107 mmol/L 107      CO2 22 - 29 mmol/L 25      Urea Nitrogen 8.0 - 23.0 mg/dL 53.2      Creatinine 0.67 - 1.17 mg/dL 1.81      Glucose 70 - 99 mg/dL 97  119  117    Calcium 8.8 - 10.4 mg/dL 9.0      Magnesium 1.7 - 2.3 mg/dL 2.1            Latest Ref Rng & Units 2/18/2025     7:20 AM 2/17/2025     6:30 AM 2/16/2025     5:39 AM   Bone Health   Phosphorus 2.5 - 4.5 mg/dL 2.3  3.0  4.5          Latest Ref Rng & Units 2/18/2025     7:20 AM 2/17/2025     6:30 AM 2/16/2025     5:39 AM   Heme   WBC 4.0 - 11.0 10e3/uL 10.1  10.2  12.7    Hgb 13.3 - 17.7 g/dL 11.3  10.0  10.7    Plt 150 - 450 10e3/uL 176  155  132          Latest Ref Rng & Units 2/12/2025     8:47 PM 5/16/2024     1:26 PM   Liver   AP 40 - 150 U/L 59  58    TBili <=1.2 mg/dL 0.2  0.2    ALT 0 - 70 U/L 15  9    AST 0 - 45 U/L 17  8    Tot Protein 6.4 - 8.3 g/dL 6.5  7.0    Albumin 3.5 - 5.2 g/dL 3.9  4.1          Latest Ref Rng & Units 2/12/2025     8:46 PM 12/26/2024     1:20 PM   Pancreas   A1C <5.7 % 7.1  6.7             Latest Ref Rng & Units 2/12/2025     8:47 PM 5/16/2024     1:26 PM   [facility] Virology   EBV CAPSID ANTIBODY IGG No detectable antibody. Positive  Positive      Failed to redirect to the Timeline version of the REVFS SmartLink.  Recent Labs   Lab Test 02/16/25  0539   TACROL 5.1

## 2025-02-18 NOTE — PROGRESS NOTES
"Peter Carpio came to [facility] today for a lab and assess following a kidney transplant on 2/13/25.      Discharge date: 2/17/25   Transplant coordinator: Ruth Jacob  Phone number patient can be reached at: [phone]     Physical Assessment:  See physical assessment located under \"Document Flowsheets\".  Incision site: clean, dry and intact. Staples in place. Small incision from PD catheter removal is also intact and healing  Lines: NA  Hdez: NA  Urine clarity: yellow and clear this morning. At the hospital, it was more of a dark yellow, girma color.   Hydration: 32-36 oz drank yesterday. Patient understands fluid goal of at least 2 L. Patient had about 800 ml while in [facility].  Edema: Pt has bilateral lower extremity edema with R > L. Pt has swelling in his hands. He feels like he is retaining quite a bit of fluid. No diuretic started today but it is a possibility per Stephania Alatorre NP.   Nutrition: Smaller appetite that pre-transplant. Able to eat at each meal time and he feels like it is improving each day.    Last BM: Last one this morning prior to coming to [facility]. Soft but formed.   Pain: 1 out of 10. Minimal, worse with movement. Patient is taking tylenol as needed.  My transplant place videos watched: Not yet - pt watched some last night and they know how to access the videos.    Blood glucoses:  Patient 97 this morning with labs after eating breakfast. He had a low of about 68 last night, then was in the 80s. His highest since discharge was around 100. Patient took NPH 40 units per Stephania Alatorre NP, while in [facility]. Pt was ordered for 50 units per plan from endocrine but due to lower glucoses, this was decreased. Patient will continue to check glucoses pre-meals and bedtime and document these. Patient understands to hold his short acting insulin based on what his pre-meal and bedtime glucoses are.     Labs drawn by [facility] staff: No  Vascular access: not needed    Plan of care for today:   Education " done.  Set up pill box and answered medication questions.    Medication changes:   Clarified insulin orders.   No other changes as of the time of [facility] visit.     Medications administered:  Patient self-administered home medications.     Patient education:    The following teaching topics were addressed: Importance of drinking 2L of non-caffeinated fluids daily, Incisional care, Signs/symptoms of infection, Good handwashing, Medications (purposes, doses and times of administration), Phone numbers to call with concenrs (Transplant coordinator, [facility] [address] and [facility]), and Plan of care   Patient and wife  verbalized understanding and all questions answered.    Drug level:  Patient took 3mg of tacrolimus last evening at 8 pm.  Care coordinator to follow up with the result.    Face to face time: 120 min    Discharge Plan    Pt will follow up with ultrasound this afternoon and labs/[facility] tomorrow morning  Discharge instructions reviewed with patient: YES  Patient/Representative verbalized understanding, all questions answered: YES    Discharged from unit at 1020 with whom: wife to home.    Lily Daniel RN

## 2025-02-18 NOTE — PATIENT INSTRUCTIONS
Dear Peter Carpio    Thank you for choosing [facility] ([facility]) for your transplant cares.  The following information is a summary of our appointment as well as important reminders.      Plan:  1) Drink 2-3 liters of non-caffeine containing fluids per day ( oz)   2) Eat high phosphorus and magnesium foods. Try to eat high protein foods as well to help with healing. Do not eat grapefruit, pomegranate or pomelo.   3) Notify transplant surgery team if incision is draining   4) Notify transplant team if vital signs are out of the parameters on your record book   5) Medication changes:  None today  6) If you miss medications, you can take them within 4 hours of the scheduled time and then go back to your regular schedule. If you miss them by more than 4 hours, then call the transplant team.   If you have an emesis (throw up) after taking your pills within 10-15 min and you can see the pills, then retake your medication. If it has been more than 15 minutes then call the transplant team.   7) No driving for at least 3 weeks. No lifting more than 10 pounds (1 gallon milk) for at least 4 weeks.   8) Plan to return:  Today at 1:25 pm for Ultrasound in [place]  Tomorrow at 7:15 for labs and visit in [facility]     Contact Information:  Transplant Coordinator: Ruth Jacob  Transplant Office:  [phone]  [facility]:  [phone]  Ask for physician or coordinator on call for kidney transplant.     If you are a transplant patient and require transplant education, please click on this link: [email or web address].        If you have any questions on your upcoming Specialty Infusion appointments, please call scheduling at [phone].  It was a pleasure taking care of you today.    Sincerely,    [facility]  [address] [place] [address]   [id number]  Phone:  [phone]

## 2025-02-18 NOTE — TELEPHONE ENCOUNTER
[facility] Call Center    Phone Message    May a detailed message be left on voicemail: yes     Reason for Call: Other: .     Stephania states patient was recently discharged from the [facility] yesterday, 2/17/2025. Stephania states patient is having issues with glucose and that patient is on two types of insulin and on a prednisone taper. Stephania is wanting patient to be seen soon. Writer had tried to schedule the appt, first available was 07/2025. Stephania is wanting to get a call back to further discuss. Please advise.         Action Taken: Message routed to:  [facility] ([facility] Endo    Travel Screening: Not Applicable     Date of Service:

## 2025-02-18 NOTE — LETTER
2/18/2025      Peter Carpio  [address] [facility] [address]      Dear Colleague,    Thank you for referring your patient, Peter Carpio, to the [facility]. Please see a copy of my visit note below.    TRANSPLANT NEPHROLOGY CLINIC VISIT     Assessment & Plan  # DDKT: - Cr Trend down. Venous duplex for leg edema. Glucose readings running low, had him change NPH dose to 40 units AM and 19 units PM today. Will arrange for follow up with endo. [facility] tomorrow.    - Baseline Creatinine: ~ TBD   - Proteinuria: Not checked recently   - DSA Hx: No DSA at time of transplant   - Last cPRA: 0%   - BK Viremia: No   - Kidney Tx Biopsy Hx: No biopsy history.    # Immunosuppression: Tacrolimus immediate release (goal 8-10), Mycophenolate mofetil (dose 750 mg every 12 hours), and Prednisone (dose taper)   - Induction with Recent Transplant:  Low Intensity Protocol   - Continue with intensive monitoring of immunosuppression for efficacy and toxicity.   - Historical Changes in Immunosuppression: None   - Changes: Not at this time    # Infection Prevention:     - PJP: Sulfa/TMP (Bactrim)  - CMV: Valganciclovir (Valcyte)      - CMV IgG Ab High Risk Discordance (D+/R-) at time of transplant: No  Present CMV Serostatus: Positive  - EBV IgG Ab High Risk Discordance (D+/R-) at time of transplant: No  Present EBV Serostatus: Positive    # Hypertension: Controlled;  Goal BP: < 150/90   - Changes: Not at this time    # Diabetes: Borderline control (HbA1c 7-9%) Last HbA1c: 7.1%  -glucose running low. Move to NPH dosing of 40 units in AM and 19 in PM starting today.     # Anemia in Chronic Renal Disease: Hgb: Stable      BARBER: No   - Iron studies: Not checked recently    # Mineral Bone Disorder:    - Secondary renal hyperparathyroidism; PTH level: Not checked recently        On treatment: None  - Vitamin D; level: Not checked recently        On supplement: No  - Calcium; level:  Normal        On supplement: No  - Phosphorus; level: Low normal        On supplement: No    # Electrolytes:   - Potassium; level: Normal        On supplement: No  - Magnesium; level: Normal        On supplement: Yes  - Bicarbonate; level: Normal        On supplement: No  - Sodium; level: Normal    # Other Significant PMH:   -  - Gout: on allopurinol.  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 Saw Dr. Pham cancer specialist place out of [facility] Undergoing surveillance colonoscopy. Last colonoscopy in march 2024 with out any new lesions.   - CAD: cardiac catheterization 7/24: pLAD to mLAD 70% stenosis and mLAD 40% stenosis. He is now s/p PCI with EDWIN x 1 to LAD.   - NSTEMI: in 2022. Stress test showed very small defect. No intervention pursued due to kidney function at that time. On statin, ASA 81, and Imdur.       # Transplant History:  Etiology of Kidney Failure: Diabetic nephropathy  Tx: DDKT  Transplant: 2/13/2025 (Kidney)  Significant transplant-related complications: None    Transplant Office Phone Number: [phone]    Assessment and plan was discussed with the patient and he voiced his understanding and agreement.    [facility]     ALEKSANDR Connell CNP    The longitudinal plan of care for the diagnosis(es)/condition(s) as documented were addressed during this visit. Due to the added complexity in care, I will continue to support Peter in the subsequent management and with ongoing continuity of care.      Chief Complaint  Mr. Carpio is a 74 year old here for hospital follow up after kidney transplant.     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 He underwent a DCD kidney transplant with stent and PD catheter removal on 2/13/25 with Dr. Wali Newell.      Doing well. Pain controlled.   Urinating every hour - not measuring. No dysuria.   LBM today. No N/V/D.   Appetite is good and improving. Drank about a liter yesterday due to nausea. Feeling better today and will drink more.   CGM alarmed last night multiple times due to low readings - around 57. Comes up with juice and snack.   Notes slightly more edema in legs this AM, no pain. R> L but believes this is his baseline.         Home BP: Not checked    Problem List  Patient Active Problem List   Diagnosis     Hypertension     Chronic kidney disease, stage 4, severely decreased GFR (H)     Diabetic nephropathy (H)     Gout     Secondary renal hyperparathyroidism     Coronary artery disease     Diabetes mellitus, type 2 (H)     Hypertrophy of prostate without urinary obstruction     Malignant neoplasm of ascending colon (H)     Cardiovascular disease     Status post coronary angiogram     Pre-transplant evaluation for kidney transplant     Anemia in chronic kidney disease     Chronic 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     Dependence on renal dialysis     ESRD (end stage renal disease) on dialysis (H)     Hyperlipidemia     NSTEMI (non-ST elevated myocardial infarction) (H)     Awaiting transplantation of kidney     Kidney replaced by transplant     Immunosuppressed status     Drug-induced hyperglycemia       Allergies  No Known Allergies    Medications  Current Outpatient Medications   Medication Sig Dispense Refill     acetaminophen (TYLENOL) 325 MG tablet Take 3 tablets (975 mg) by mouth every 6 hours as needed for mild pain or fever.       allopurinol (ZYLOPRIM) 100 MG tablet Take 100 mg by mouth daily       aspirin 81 MG EC tablet Take 81 mg by mouth daily.       atorvastatin (LIPITOR) 40 MG tablet Take 1 tablet (40 mg) by mouth daily. 30 tablet 5     Continuous Glucose Sensor (FREESTYLE ANTONIO 14 DAY SENSOR) MISC        finasteride (PROSCAR) 5 MG tablet Take 1 tablet (5 mg) by mouth daily. 90 tablet 3      fish oil-omega-3 fatty acids 1000 MG capsule Take 2 capsules (2 g) by mouth 2 times daily.       gabapentin (NEURONTIN) 100 MG capsule Take 200 mg by mouth at bedtime.       insulin aspart (NOVOLOG PEN) 100 UNIT/ML pen Inject 1-7 Units subcutaneously 3 times daily (before meals). Correction Scale -   Do Not give Correction Insulin if Pre-Meal BG less than 140. For Pre-Meal  - 189 give 1 unit. For Pre-Meal - 239 give 2 units. For Pre-Meal -289 give 3 units. For Pre-Meal -339 give 4 units. For Pre-Meal -389 give 5 units. For Pre-Meal -439 give 6 units. For Pre-Meal BG greater than or equal to 440 give 7 units To be given with prandial insulin, and based on pre-meal blood glucose. Administering insulin within 5 minutes of the start of the meal is ideal. Administer insulin no more than 30 minutes after the start of the meal, unless directed otherwise by provider. Notify provider if glucose greater than or equal to 350 mg/dL after administration of correction dose. 15 mL 1     insulin aspart (NOVOLOG PEN) 100 UNIT/ML pen Inject 1-5 Units subcutaneously at bedtime. Do Not give Bedtime Correction Insulin if BG less than 200. For -249 give 1 units. For -299 give 2 units. For -349 give 3 units. For -399 give 4 units. For BG greater than or equal to 400 give 5 units. Notify provider if glucose greater than or equal to 350 mg/dL after administration of correction dose. 15 mL 1     insulin NPH-Regular (HUMULIN 70/30;NOVOLIN 70/30) susp With prednisone 40 mg dose take 50 units prior to breakfast and 23 units prior to dinner. With prednisone 20 mg dose take 40 units prior to breakfast and 19 units prior to dinner. With prednisone 15 mg dose take 38 units prior to breakfast and 16 units prior to dinner. With prednisone 10 mg dose take 35 units prior to breakfast and 15 units prior to dinner. With prednisone 5 mg dose take 35 units prior to breakfast and 15 units prior  to dinner. 15 mL 0     magnesium oxide (MAG-OX) 400 MG tablet Take 1 tablet (400 mg) by mouth daily (with lunch). 30 tablet 3     metoprolol tartrate (LOPRESSOR) 25 MG tablet Take 0.5 tablets (12.5 mg) by mouth 2 times daily. 30 tablet 3     mycophenolate (GENERIC EQUIVALENT) 250 MG capsule Take 3 capsules (750 mg) by mouth 2 times daily. 180 capsule 11     ondansetron (ZOFRAN ODT) 4 MG ODT tab Take 1 tablet (4 mg) by mouth every 6 hours as needed for nausea. 12 tablet 1     pantoprazole (PROTONIX) 40 MG EC tablet Take 1 tablet (40 mg) by mouth daily. 30 tablet 2     polyethylene glycol (MIRALAX) 17 GM/Dose powder Take 17 g by mouth daily.       predniSONE (DELTASONE) 10 MG tablet Take 4 tablets (40 mg) by mouth daily for 2 days, THEN 2 tablets (20 mg) daily for 7 days, THEN 1.5 tablets (15 mg) daily for 7 days, THEN 1 tablet (10 mg) daily for 7 days, THEN 0.5 tablets (5 mg) daily for 7 days. 20 tablet 0     senna-docusate (SENOKOT-S/PERICOLACE) 8.6-50 MG tablet Take 1-2 tablets by mouth 2 times daily.       sulfamethoxazole-trimethoprim (BACTRIM) 400-80 MG tablet Take 1 tablet by mouth daily. 30 tablet 11     tacrolimus (GENERIC EQUIVALENT) 0.5 MG capsule Tacrolimus 0.5 mg capsules BID to allow for dose adjustments. 60 capsule 11     tacrolimus (GENERIC EQUIVALENT) 1 MG capsule Take 3 capsules (3 mg) by mouth 2 times daily. 180 capsule 11     tamsulosin (FLOMAX) 0.4 MG capsule Take 0.4 mg by mouth daily.       [START ON 2/19/2025] valGANciclovir (VALCYTE) 450 MG tablet Take 1 tablet (450 mg) by mouth every other day. 60 tablet 5     No current facility-administered medications for this visit.     There are no discontinued medications.    Physical Exam  Vital Signs: There were no vitals taken for this visit.    GENERAL APPEARANCE: alert and no distress  HENT: mouth without ulcers or lesions  RESP: lungs clear to auscultation - no rales, rhonchi or wheezes  CV: regular rhythm, normal rate, no rub, no murmur  EDEMA:  +2 LE edema bilaterally R > L.   ABDOMEN: soft, nondistended, nontender, bowel sounds normal  RLQ incision intact with staples - no erythema or drainage.    MS: extremities normal - no gross deformities noted, no evidence of inflammation in joints, no muscle tenderness  SKIN: no rash    Data        Latest Ref Rng & Units 2/18/2025     7:20 AM 2/17/2025    11:57 AM 2/17/2025     8:41 AM   Renal   Sodium 135 - 145 mmol/L 144      K 3.4 - 5.3 mmol/L 3.6      Cl 98 - 107 mmol/L 107      Cl (external) 98 - 107 mmol/L 107      CO2 22 - 29 mmol/L 25      Urea Nitrogen 8.0 - 23.0 mg/dL 53.2      Creatinine 0.67 - 1.17 mg/dL 1.81      Glucose 70 - 99 mg/dL 97  119  117    Calcium 8.8 - 10.4 mg/dL 9.0      Magnesium 1.7 - 2.3 mg/dL 2.1            Latest Ref Rng & Units 2/18/2025     7:20 AM 2/17/2025     6:30 AM 2/16/2025     5:39 AM   Bone Health   Phosphorus 2.5 - 4.5 mg/dL 2.3  3.0  4.5          Latest Ref Rng & Units 2/18/2025     7:20 AM 2/17/2025     6:30 AM 2/16/2025     5:39 AM   Heme   WBC 4.0 - 11.0 10e3/uL 10.1  10.2  12.7    Hgb 13.3 - 17.7 g/dL 11.3  10.0  10.7    Plt 150 - 450 10e3/uL 176  155  132          Latest Ref Rng & Units 2/12/2025     8:47 PM 5/16/2024     1:26 PM   Liver   AP 40 - 150 U/L 59  58    TBili <=1.2 mg/dL 0.2  0.2    ALT 0 - 70 U/L 15  9    AST 0 - 45 U/L 17  8    Tot Protein 6.4 - 8.3 g/dL 6.5  7.0    Albumin 3.5 - 5.2 g/dL 3.9  4.1          Latest Ref Rng & Units 2/12/2025     8:46 PM 12/26/2024     1:20 PM   Pancreas   A1C <5.7 % 7.1  6.7             Latest Ref Rng & Units 2/12/2025     8:47 PM 5/16/2024     1:26 PM   [facility] Virology   EBV CAPSID ANTIBODY IGG No detectable antibody. Positive  Positive      Failed to redirect to the Timeline version of the REVFS SmartLink.  Recent Labs   Lab Test 02/16/25  0539   TACROL 5.1              Again, thank you for allowing me to participate in the care of your patient.        Sincerely,        ALEKSANDR Connell CNP    Electronically signed

## 2025-02-18 NOTE — TELEPHONE ENCOUNTER
2 [facility]    Patient Discharge Instructions    Johanna Garcia / [id number] : 1935    Admitted 2019 Discharged: 2019     IF YOU HAVE ANY PROBLEMS ONCE YOU ARE AT HOME CALL THE FOLLOWING NUMBERS:   Main office number: [phone]    Your follow up appointment to see Dr. Alysia Gilford is scheduled in 1-2 weeks. If you are unsure of your appointment date call the office at [phone]. Take Home Medications     · Resume your home medictions as directed  · A prescription for pain medication has been given   · It is important that you take the medication exactly as they are prescribed. · Keep your medication in the bottles provided by the pharmacist and keep a list of the medication names, dosages, and times to be taken in your wallet. · Do not take other medications without consulting your doctor. · Note:  If you have already received and/or filled a prescription for one or more of the medications you've received a prescription for when leaving the hospital, you may disregard the duplicate prescription. What to do at [address] [place] [address] Ave your prehospital diet. If you have excessive nausea or vomitting call your doctor's office    Wear portable sequential compressive devices at least 18 hours per day for 2 weeks. They may be used beyond 2 weeks as needed to help control swelling. DAMIEN hose should be worn during the day - may be removed for sleep. Should be worn on both legs for 2 weeks and then on the operative extremity for a total of 6 weeks. Begin In-Home Physical Therapy; 3 times a week to work on gait training, range of motion, strengthening, and weight bearing exercises as tolerable. Continue to use your walker or cane when walking; may progress from the walker to a cane to complete total bearing as tolerable. Do not bend your hip past 90 degrees. Do not cross your legs. Sleep on back with pillow between legs for 6 weeks.     Keep Peter Carpio is a post-kidney transplant patient who discharged on 2/17/25. Patient will use local pharmacy or other mail order for outpatient medications.  The patient will be responsible for managing medications.    SOT*LESLIE NAVARRO) IS A (KIDNEY) TRANSPLANT PATIENT  (DATE OF TRANSPLANT: (DATE: 2/13/2025) )   HEALTH BENEFIT: ([facility]) MEDICARE PART B & PART D ADVANTAGE PLAN  ID# [id number] GRP# [id number] (EFFECTIVE (DATE: 1/1/2019) )   PROCESSING INFO: ID# [id number] GRP# [id number] PCN#[id number] BIN#[id number] (EFFECTIVE (DATE: 1/1/2019) )   (DO NOT BILL TO ORIGINAL MEDICARE ID# [id number]  (PART A EFFECTIVE (DATE: 4/1/2015) , PART B EFFECTIVE (DATE: 4/1/2015) )  PT WILL PAY %20 COST OF MEDICATION AT TIME OF SERVICE     PHARMACY BENEFIT: ( PART D) PART D  PROCESSING INFO: ID# [id number] GRP# [id number] PCN#[id number] BIN#[id number] (EFFECTIVE (DATE: 1/1/2019) )   DEDUCTIBLE (300)    COPAY STRUCTURE  INITIAL PHASE:  $ (4) FOR TIER 1  $ (10) FOR TIER 2  % (20) FOR TIER 3  % (40) COINSURANCE FOR TIER 4  % (29) COINSURANCE FOR TIER 5 MEDICATIONS   CATASTROPHIC PHASE: TOTAL YEAR DRUG COSTS REACH $2000  PATIENT PAYS 0%     TEST CLAIM SPECIALTY #28  MYCOPHENOLATE 250MG (#240/30DS) =$45.42  PROGRAF 1MG (#120/30DS)= PRODUCT NOT ON FORMUALRY   TACROLIMUS 1MG (#120/30DS)=$25.85  CYCLOSPORINE 100MG (#60/30DS)=$113.88  VALGANCICLOVIR 450MG (#60/30DS)=$125.41  VALACYCLOVIR 1GM (#90/30DS)=$10    TEST CLAIM DISCHARGE #27 (21 YEARS AND OLDER):  MYCOPHENOLATE 250MG (#240/30DS) =$45.42  PROGRAF 1MG (#120/30DS)= PRODUCT NOT ON FORMUALRY   TACROLIMUS 1MG (#120/30DS)=$25.85  CYCLOSPORINE 100MG (#60/30DS)=$113.88  VALGANCICLOVIR 450MG (#60/30DS)=$125.41  VALACYCLOVIR 1GM (#90/30DS)=$10    **CAN PATIENT FILL AT [facility] FOR MEDICATIONS LISTED? YES     **IMMUNOS ARE CURRENTLY PAYING THROUGH WITH PART D BENEFITS. ONCE [facility] HAS THE REPORT FROM MEDICARE THAT IT IS A MEDICARE COVERED TRANSPLANT THEN CLAIMS WILL SWITCH OVER AND PAY WITH  PART B BENEFITS WITH %20 COINSURANCE/PATIENT RESPONSIBILITY    Thank you,  Christiana Will, PharmD, BCACP  [facility]  [phone]       incision DRY. You may need to sponge bathe to avoid getting the incision wet. When to Call    - Call if you have a temperature greater then 101  - Unable to keep food down  - Are unable to bear any wieght   - Need a pain medication refill     Information obtained by :  I understand that if any problems occur once I am at home I am to contact my physician. I understand and acknowledge receipt of the instructions indicated above.                                                                                                                                            Physician's or R.N.'s Signature                                                                  Date/Time                                                                                                                                              Patient or Representative Signature                                                          Date/Time

## 2025-02-19 ENCOUNTER — LAB (OUTPATIENT)
Dept: LAB | Facility: CLINIC | Age: 75
End: 2025-02-19
Attending: NURSE PRACTITIONER
Payer: COMMERCIAL

## 2025-02-19 ENCOUNTER — OFFICE VISIT (OUTPATIENT)
Dept: INFUSION THERAPY | Facility: CLINIC | Age: 75
End: 2025-02-19
Attending: NURSE PRACTITIONER
Payer: COMMERCIAL

## 2025-02-19 ENCOUNTER — TELEPHONE (OUTPATIENT)
Dept: ENDOCRINOLOGY | Facility: CLINIC | Age: 75
End: 2025-02-19

## 2025-02-19 ENCOUNTER — TELEPHONE (OUTPATIENT)
Dept: TRANSPLANT | Facility: CLINIC | Age: 75
End: 2025-02-19

## 2025-02-19 VITALS
OXYGEN SATURATION: 97 % | HEART RATE: 93 BPM | SYSTOLIC BLOOD PRESSURE: 119 MMHG | DIASTOLIC BLOOD PRESSURE: 78 MMHG | BODY MASS INDEX: 31.04 KG/M2 | RESPIRATION RATE: 18 BRPM | WEIGHT: 192 LBS

## 2025-02-19 DIAGNOSIS — N18.6 END STAGE RENAL DISEASE (H): ICD-10-CM

## 2025-02-19 DIAGNOSIS — Z94.0 KIDNEY REPLACED BY TRANSPLANT: Primary | ICD-10-CM

## 2025-02-19 DIAGNOSIS — E83.39 HYPOPHOSPHATEMIA: Primary | ICD-10-CM

## 2025-02-19 DIAGNOSIS — Z48.298 AFTERCARE FOLLOWING ORGAN TRANSPLANT: ICD-10-CM

## 2025-02-19 DIAGNOSIS — Z94.0 STATUS POST KIDNEY TRANSPLANT: ICD-10-CM

## 2025-02-19 DIAGNOSIS — Z94.0 KIDNEY REPLACED BY TRANSPLANT: ICD-10-CM

## 2025-02-19 LAB
ANION GAP SERPL CALCULATED.3IONS-SCNC: 10 MMOL/L (ref 7–15)
BASOPHILS # BLD AUTO: 0.1 10E3/UL (ref 0–0.2)
BASOPHILS NFR BLD AUTO: 1 %
BUN SERPL-MCNC: 49.5 MG/DL (ref 8–23)
CALCIUM SERPL-MCNC: 8.9 MG/DL (ref 8.8–10.4)
CHLORIDE SERPL-SCNC: 110 MMOL/L (ref 98–107)
CREAT SERPL-MCNC: 1.65 MG/DL (ref 0.67–1.17)
EGFRCR SERPLBLD CKD-EPI 2021: 43 ML/MIN/1.73M2
EOSINOPHIL # BLD AUTO: 0.3 10E3/UL (ref 0–0.7)
EOSINOPHIL NFR BLD AUTO: 3 %
ERYTHROCYTE [DISTWIDTH] IN BLOOD BY AUTOMATED COUNT: 13.6 % (ref 10–15)
FERRITIN SERPL-MCNC: 1030 NG/ML (ref 31–409)
GLUCOSE SERPL-MCNC: 122 MG/DL (ref 70–99)
HBV DNA SERPL QL NAA+PROBE: NORMAL
HCO3 SERPL-SCNC: 24 MMOL/L (ref 22–29)
HCT VFR BLD AUTO: 34.5 % (ref 40–53)
HCV RNA SERPL QL NAA+PROBE: NORMAL
HGB BLD-MCNC: 11.4 G/DL (ref 13.3–17.7)
HIV1+2 RNA SERPL QL NAA+PROBE: NORMAL
IMM GRANULOCYTES # BLD: 0.3 10E3/UL
IMM GRANULOCYTES NFR BLD: 4 %
IRON BINDING CAPACITY (ROCHE): 220 UG/DL (ref 240–430)
IRON SATN MFR SERPL: 41 % (ref 15–46)
IRON SERPL-MCNC: 91 UG/DL (ref 61–157)
LYMPHOCYTES # BLD AUTO: 1.5 10E3/UL (ref 0.8–5.3)
LYMPHOCYTES NFR BLD AUTO: 15 %
MAGNESIUM SERPL-MCNC: 1.9 MG/DL (ref 1.7–2.3)
MCH RBC QN AUTO: 32.1 PG (ref 26.5–33)
MCHC RBC AUTO-ENTMCNC: 33 G/DL (ref 31.5–36.5)
MCV RBC AUTO: 97 FL (ref 78–100)
MONOCYTES # BLD AUTO: 0.8 10E3/UL (ref 0–1.3)
MONOCYTES NFR BLD AUTO: 8 %
MYCOPHENOLATE SERPL LC/MS/MS-MCNC: 3.48 MG/L (ref 1–3.5)
MYCOPHENOLATE-G SERPL LC/MS/MS-MCNC: 93.5 MG/L (ref 30–95)
NEUTROPHILS # BLD AUTO: 6.6 10E3/UL (ref 1.6–8.3)
NEUTROPHILS NFR BLD AUTO: 69 %
NRBC # BLD AUTO: 0 10E3/UL
NRBC BLD AUTO-RTO: 0 /100
PHOSPHATE SERPL-MCNC: 2.2 MG/DL (ref 2.5–4.5)
PLATELET # BLD AUTO: 196 10E3/UL (ref 150–450)
POTASSIUM SERPL-SCNC: 3.7 MMOL/L (ref 3.4–5.3)
PTH-INTACT SERPL-MCNC: 285 PG/ML (ref 15–65)
RBC # BLD AUTO: 3.55 10E6/UL (ref 4.4–5.9)
SODIUM SERPL-SCNC: 144 MMOL/L (ref 135–145)
TACROLIMUS BLD-MCNC: 10.6 UG/L (ref 5–15)
TME LAST DOSE: NORMAL H
VIT D+METAB SERPL-MCNC: 21 NG/ML (ref 20–50)
WBC # BLD AUTO: 9.5 10E3/UL (ref 4–11)

## 2025-02-19 PROCEDURE — 83735 ASSAY OF MAGNESIUM: CPT | Performed by: PATHOLOGY

## 2025-02-19 PROCEDURE — 84100 ASSAY OF PHOSPHORUS: CPT | Performed by: PATHOLOGY

## 2025-02-19 PROCEDURE — 80197 ASSAY OF TACROLIMUS: CPT | Performed by: NURSE PRACTITIONER

## 2025-02-19 PROCEDURE — 80048 BASIC METABOLIC PNL TOTAL CA: CPT | Performed by: PATHOLOGY

## 2025-02-19 PROCEDURE — 82728 ASSAY OF FERRITIN: CPT | Performed by: PATHOLOGY

## 2025-02-19 PROCEDURE — 83550 IRON BINDING TEST: CPT | Performed by: PATHOLOGY

## 2025-02-19 PROCEDURE — 82306 VITAMIN D 25 HYDROXY: CPT | Performed by: NURSE PRACTITIONER

## 2025-02-19 PROCEDURE — 80180 DRUG SCRN QUAN MYCOPHENOLATE: CPT | Performed by: NURSE PRACTITIONER

## 2025-02-19 PROCEDURE — 99000 SPECIMEN HANDLING OFFICE-LAB: CPT | Performed by: PATHOLOGY

## 2025-02-19 PROCEDURE — 83970 ASSAY OF PARATHORMONE: CPT | Performed by: PATHOLOGY

## 2025-02-19 PROCEDURE — 83540 ASSAY OF IRON: CPT | Performed by: PATHOLOGY

## 2025-02-19 PROCEDURE — 85025 COMPLETE CBC W/AUTO DIFF WBC: CPT | Performed by: PATHOLOGY

## 2025-02-19 PROCEDURE — 36415 COLL VENOUS BLD VENIPUNCTURE: CPT | Performed by: PATHOLOGY

## 2025-02-19 NOTE — PATIENT INSTRUCTIONS
Dear Peter Carpio    Thank you for choosing [facility] ([facility]) for your transplant cares.  The following information is a summary of our appointment as well as important reminders.      Please make sure you are available to answer your phone today because your transplant coordinator will call you IF you need to change the amount of anti-rejection medication that you are taking.    If you are a transplant patient and require transplant education, please click on this link: [email or web address].    Transplant Coordinator: Ruth JUNG  Transplant Office:  [phone]  [facility]:  [phone]  Ask for physician on call for kidney transplant.  Unit 7A (Transplant Unit):  [phone]  [facility]:  [phone]    While on prednisone 20 mg daily:  Take 70/30- 35 units in AM, 14 units with dinner.     Labs tomorrow morning. Time TBD.   Clinic follow up with Stephania Alatorre NP- Date and time TBD.    If you have any questions on your upcoming Specialty Infusion appointments, please call scheduling at [phone].  It was a pleasure taking care of you today.    Sincerely,    [facility]  [address] [place]  [address]  Phone:  [phone]

## 2025-02-19 NOTE — TELEPHONE ENCOUNTER
Duplicate encounter. This was already sent to Fawn Wheeler PA-C yesterday. Pleas refer to 2/18/25 telephone encounter.       Dana Belcher RN  Endocrine Care Coordinator  [facility]

## 2025-02-19 NOTE — PROGRESS NOTES
TRANSPLANT CLINIC VISIT     Assessment & Plan   # DDKT: - Cr trending down to 1.65 from 1.81 with good urine output. Venous duplex for leg edema on 2/18 with no evidence of DVT. Ureteral stent in place.    - Baseline Creatinine: ~ TBD   - Proteinuria: Not checked recently   - DSA Hx: No DSA at time of transplant   - Last cPRA: 0%   - BK Viremia: No   - Kidney Tx Biopsy Hx: No biopsy history.    # Immunosuppression: Tacrolimus immediate release (goal 8-10), Mycophenolate mofetil (dose 750 mg every 12 hours), and Prednisone (dose taper). 2/19 Tac level 9.2, dose changes per coordiantor. Prednisone decreasing to 20 mg daily starting 2/20.    - Induction with Recent Transplant:  Low Intensity Protocol   - Continue with intensive monitoring of immunosuppression for efficacy and toxicity.   - Historical Changes in Immunosuppression: None   - Changes: Not at this time    # Infection Prevention:     - PJP: Sulfa/TMP (Bactrim)  - CMV: Valganciclovir (Valcyte) x 6 months      - CMV IgG Ab High Risk Discordance (D+/R-) at time of transplant: No  Present CMV Serostatus: Positive  - EBV IgG Ab High Risk Discordance (D+/R-) at time of transplant: No  Present EBV Serostatus: Positive    # Hypertension: Controlled;  Goal BP: < 150/90   - Changes: Not at this time    # Diabetes: Borderline control (HbA1c 7-9%) Last HbA1c: 7.1%  -glucose running high last night and low early morning. Patient forgot NPH dosing yesterday and gave NPH at midnight. Discussed getting back on NPH and sliding scale insulin/card coverage as prescribed. Plan to follow up with pharmacy on Friday to discuss blood glucose levels/insulin regimen. Talked to endocrinology on 2/18 to request follow up appointment within the next week, will reach out to them again.   -Instructed to take NPH 35 units every AM and 14 units every PM    # Anemia in Chronic Renal Disease: Hgb: Stable      BARBER: No   - Iron studies: Iron 91, iron sat 41, elevated ferritin.     # Mineral  Bone Disorder:    - Secondary renal hyperparathyroidism; PTH level: 285, will discuss on follow up with nephrology       On treatment: None  - Vitamin D; level: Not checked recently        On supplement: No  - Calcium; level: Normal        On supplement: No  - Phosphorus; level: 2.2, slowly trending down. RN discussed phos diet. Will trend and start supplements as needed.         On supplement: No    # Electrolytes:   - Potassium; level: Normal        On supplement: No  - Magnesium; level: Normal        On supplement: Yes  - Bicarbonate; level: Normal        On supplement: No  - Sodium; level: Normal    # Other Significant PMH:   -  - Gout: on allopurinol.  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 Saw Dr. Pham cancer specialist place out of [facility] Undergoing surveillance colonoscopy. Last colonoscopy in march 2024 with out any new lesions.   - CAD: cardiac catheterization 7/24: pLAD to mLAD 70% stenosis and mLAD 40% stenosis. He is now s/p PCI with EDWIN x 1 to LAD.   - NSTEMI: in 2022. Stress test showed very small defect. No intervention pursued due to kidney function at that time. On statin, ASA 81, and Imdur.       # Transplant History:  Etiology of Kidney Failure: Diabetic nephropathy  Tx: DDKT  Transplant: 2/13/2025 (Kidney)  Significant transplant-related complications: None    Transplant Office Phone Number: [phone]    Assessment and plan was discussed with the patient and he voiced his understanding and agreement.    Return visit: [facility] completed. Plan for labs on 2/20 and pharmacy visit on 2/21.     ALEKSANDR Connell CNP    The longitudinal plan of care for the diagnosis(es)/condition(s) as documented were addressed during this visit. Due to the added complexity in care, I will continue to support Peter in the subsequent management and with ongoing continuity of care.      Chief Complaint   Mr. Carpio is a 74 year old here for  hospital follow up after kidney transplant.     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 He underwent a DCD kidney transplant with stent and PD catheter removal on 2/13/25 with Dr. Wali Newell.     Doing well. Pain controlled.   Good urine output.   LBM today. No N/V/D.   Appetite is good and improving.   Glucose highly variable.   Weight stable. LE edema improved, will hold off on diuretics. Patient to continue to monitor weight.         Home BP: Not checked    Problem List   Patient Active Problem List   Diagnosis    Hypertension    Chronic kidney disease, stage 4, severely decreased GFR (H)    Diabetic nephropathy (H)    Gout    Secondary renal hyperparathyroidism    Coronary artery disease    Diabetes mellitus, type 2 (H)    Hypertrophy of prostate without urinary obstruction    Malignant neoplasm of ascending colon (H)    Cardiovascular disease    Status post coronary angiogram    Pre-transplant evaluation for kidney transplant    Anemia in chronic kidney disease    Chronic 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    Dependence on renal dialysis    ESRD (end stage renal disease) on dialysis (H)    Hyperlipidemia    NSTEMI (non-ST elevated myocardial infarction) (H)    Awaiting transplantation of kidney    Kidney replaced by transplant    Immunosuppressed status    Drug-induced hyperglycemia       Allergies   No Known Allergies    Medications   Current Outpatient Medications   Medication Sig Dispense Refill    acetaminophen (TYLENOL) 325 MG tablet Take 3 tablets (975 mg) by mouth every 6 hours as needed for mild pain or fever.      allopurinol (ZYLOPRIM) 100 MG tablet Take 100 mg by mouth daily      aspirin 81 MG EC tablet Take 81 mg by mouth daily.      atorvastatin (LIPITOR) 40 MG tablet Take 1 tablet (40 mg) by mouth daily. 30 tablet 5     Continuous Glucose Sensor (FREESTYLE ANTONIO 14 DAY SENSOR) [facility]       finasteride (PROSCAR) 5 MG tablet Take 1 tablet (5 mg) by mouth daily. 90 tablet 3    fish oil-omega-3 fatty acids 1000 MG capsule Take 2 capsules (2 g) by mouth 2 times daily.      gabapentin (NEURONTIN) 100 MG capsule Take 200 mg by mouth at bedtime.      insulin aspart (NOVOLOG PEN) 100 UNIT/ML pen Inject 1-7 Units subcutaneously 3 times daily (before meals). Correction Scale -   Do Not give Correction Insulin if Pre-Meal BG less than 140. For Pre-Meal  - 189 give 1 unit. For Pre-Meal - 239 give 2 units. For Pre-Meal -289 give 3 units. For Pre-Meal -339 give 4 units. For Pre-Meal -389 give 5 units. For Pre-Meal -439 give 6 units. For Pre-Meal BG greater than or equal to 440 give 7 units To be given with prandial insulin, and based on pre-meal blood glucose. Administering insulin within 5 minutes of the start of the meal is ideal. Administer insulin no more than 30 minutes after the start of the meal, unless directed otherwise by provider. Notify provider if glucose greater than or equal to 350 mg/dL after administration of correction dose. 15 mL 1    insulin aspart (NOVOLOG PEN) 100 UNIT/ML pen Inject 1-5 Units subcutaneously at bedtime. Do Not give Bedtime Correction Insulin if BG less than 200. For -249 give 1 units. For -299 give 2 units. For -349 give 3 units. For -399 give 4 units. For BG greater than or equal to 400 give 5 units. Notify provider if glucose greater than or equal to 350 mg/dL after administration of correction dose. 15 mL 1    insulin NPH-Regular (HUMULIN 70/30;NOVOLIN 70/30) susp With prednisone 40 mg dose take 50 units prior to breakfast and 23 units prior to dinner. With prednisone 20 mg dose take 40 units prior to breakfast and 19 units prior to dinner. With prednisone 15 mg dose take 38 units prior to breakfast and 16 units prior to dinner. With prednisone  10 mg dose take 35 units prior to breakfast and 15 units prior to dinner. With prednisone 5 mg dose take 35 units prior to breakfast and 15 units prior to dinner. 15 mL 0    magnesium oxide (MAG-OX) 400 MG tablet Take 1 tablet (400 mg) by mouth daily (with lunch). 30 tablet 3    metoprolol tartrate (LOPRESSOR) 25 MG tablet Take 0.5 tablets (12.5 mg) by mouth 2 times daily. 30 tablet 3    mycophenolate (GENERIC EQUIVALENT) 250 MG capsule Take 3 capsules (750 mg) by mouth 2 times daily. 180 capsule 11    ondansetron (ZOFRAN ODT) 4 MG ODT tab Take 1 tablet (4 mg) by mouth every 6 hours as needed for nausea. 12 tablet 1    pantoprazole (PROTONIX) 40 MG EC tablet Take 1 tablet (40 mg) by mouth daily. 30 tablet 2    polyethylene glycol (MIRALAX) 17 GM/Dose powder Take 17 g by mouth daily.      predniSONE (DELTASONE) 10 MG tablet Take 4 tablets (40 mg) by mouth daily for 2 days, THEN 2 tablets (20 mg) daily for 7 days, THEN 1.5 tablets (15 mg) daily for 7 days, THEN 1 tablet (10 mg) daily for 7 days, THEN 0.5 tablets (5 mg) daily for 7 days. 20 tablet 0    senna-docusate (SENOKOT-S/PERICOLACE) 8.6-50 MG tablet Take 1-2 tablets by mouth 2 times daily.      sulfamethoxazole-trimethoprim (BACTRIM) 400-80 MG tablet Take 1 tablet by mouth daily. 30 tablet 11    tacrolimus (GENERIC EQUIVALENT) 0.5 MG capsule Tacrolimus 0.5 mg capsules BID to allow for dose adjustments. 60 capsule 11    tacrolimus (GENERIC EQUIVALENT) 1 MG capsule Take 3 capsules (3 mg) by mouth 2 times daily. 180 capsule 11    tamsulosin (FLOMAX) 0.4 MG capsule Take 0.4 mg by mouth daily.      valGANciclovir (VALCYTE) 450 MG tablet Take 1 tablet (450 mg) by mouth every other day. 60 tablet 5     No current facility-administered medications for this visit.     There are no discontinued medications.    Physical Exam   Vital Signs: There were no vitals taken for this visit.    GENERAL APPEARANCE: alert and no distress  HENT: mouth without ulcers or lesions  RESP:  lungs clear to auscultation - no rales, rhonchi or wheezes  CV: regular rhythm, normal rate, no rub, no murmur  EDEMA: +2 LE edema bilaterally R > L.   ABDOMEN: soft, nondistended, nontender, bowel sounds normal  RLQ incision intact with staples - no erythema or drainage.    MS: extremities normal - no gross deformities noted, no evidence of inflammation in joints, no muscle tenderness  SKIN: no rash    Data         Latest Ref Rng & Units 2/19/2025     7:24 AM 2/18/2025    10:17 AM 2/18/2025     7:20 AM   Renal   Sodium 135 - 145 mmol/L 144   144    K 3.4 - 5.3 mmol/L 3.7   3.6    Cl 98 - 107 mmol/L 110   107    Cl (external) 98 - 107 mmol/L 110   107    CO2 22 - 29 mmol/L 24   25    Urea Nitrogen 8.0 - 23.0 mg/dL 49.5   53.2    Creatinine 0.67 - 1.17 mg/dL 1.65   1.81    Glucose 70 - 99 mg/dL 122  106  97    Calcium 8.8 - 10.4 mg/dL 8.9   9.0    Magnesium 1.7 - 2.3 mg/dL 1.9   2.1          Latest Ref Rng & Units 2/19/2025     7:24 AM 2/18/2025     7:20 AM 2/17/2025     6:30 AM   Bone Health   Phosphorus 2.5 - 4.5 mg/dL 2.2  2.3  3.0    Parathyroid Hormone Intact 15 - 65 pg/mL 285            Latest Ref Rng & Units 2/19/2025     7:24 AM 2/18/2025     7:20 AM 2/17/2025     6:30 AM   Heme   WBC 4.0 - 11.0 10e3/uL 9.5  10.1  10.2    Hgb 13.3 - 17.7 g/dL 11.4  11.3  10.0    Plt 150 - 450 10e3/uL 196  176  155          Latest Ref Rng & Units 2/12/2025     8:47 PM 5/16/2024     1:26 PM   Liver   AP 40 - 150 U/L 59  58    TBili <=1.2 mg/dL 0.2  0.2    ALT 0 - 70 U/L 15  9    AST 0 - 45 U/L 17  8    Tot Protein 6.4 - 8.3 g/dL 6.5  7.0    Albumin 3.5 - 5.2 g/dL 3.9  4.1          Latest Ref Rng & Units 2/12/2025     8:46 PM 12/26/2024     1:20 PM   Pancreas   A1C <5.7 % 7.1  6.7          Latest Ref Rng & Units 2/19/2025     7:24 AM   Iron studies   Iron 61 - 157 ug/dL 91    Iron Sat Index 15 - 46 % 41    Ferritin 31 - 409 ng/mL 1,030          Latest Ref Rng & Units 2/12/2025     8:47 PM 5/16/2024     1:26 PM   [facility] Virology    EBV CAPSID ANTIBODY IGG No detectable antibody. Positive  Positive      Failed to redirect to the Timeline version of the REVFS SmartLink.  Recent Labs   Lab Test 02/16/25  0539 02/18/25  0720   DOSTA  --  2/17/2025   TACROL 5.1 9.2

## 2025-02-19 NOTE — TELEPHONE ENCOUNTER
Additional call made to clinic today, 2/19/25. See below:  Reason for Call: Other: Bertha with the transplant department is stating pt needs to be seen within the next week. There are no appts available within a week. Please reach back out to discuss at [phone]. Thank you.       This has already been sent to [facility] to review.       Dana Belcher RN  Endocrine Care Coordinator  [facility]

## 2025-02-19 NOTE — TELEPHONE ENCOUNTER
[facility] Call Center    Phone Message    May a detailed message be left on voicemail: no     Reason for Call: Other: Bertha with the transplant department is stating pt needs to be seen within the next week. There are no appts available within a week. Please reach back out to discuss at [phone]. Thank you.      Action Taken: Other: Endo    Travel Screening: Not Applicable     Date of Service:

## 2025-02-19 NOTE — PROGRESS NOTES
"Chief Complaint   Patient presents with    Eval/Assessment     LBA day 2     Peter Carpio came to [facility] today for a lab and assess following a kidney transplant on 2/13/25.       Discharge date: 2/17/25           Transplant coordinator: Ruth Jacob  Phone number patient can be reached at: [phone]     Physical Assessment:  See physical assessment located under \"Document Flowsheets\".  Incision site: RLQ with staples. CDI. Incision care given.  Lines: n/a  Hdez: n/a  Urine clarity: clear, denies burning or irritation  Hydration: drinking 3 L water/fluids daily.   Nutrition: good appetite. Denies n/v.    Last BM: regular, this morning  Pain: 1/10, Tylenol PRN     Labs drawn by first floor lab. Results printed for patient. Glucose in [facility]: 122.  Vascular access: n/a    Ultrasound yesterday negative for DVT. Patient reports improvement in swelling.   Glucose last night was 320. Patient took 2 unit(s) of Novolog, 3 units of 70/30 without any change in glucose level. Patient then took another 8 units at midnight of 70/30 and glucose was 76 this morning so he held his morning dose. RN and NP reiterated insulin dose instructions. Patient has follow up with pharmacist on Friday.     Plan of care for today:   Labs and assessment reviewed with Stephania Alatorre NP.   Phosphorus rich diet handout given to patient.   Patient to have endocrine follow up within 1 week. [facility] scheduled him for November. Staff message sent to coordinator.   All questions and concerns addressed.  [facility] care completed.     Medication changes:   During 20 mg Prednisone: Take 70/30 35 unit(s) in AM, 14 unit(s) with dinner per Stephania.    Medications administered:n/a      Patient education:  The following teaching topics were addressed: Importance of drinking 2L of non-caffeinated fluids daily, Incisional care, Signs/symptoms of infection, Good handwashing, Medications (purposes, doses and times of administration), Phone numbers to call " "with concenrs (Transplant coordinator, [address] and [facility]), and Plan of care   Patient and spouse  verbalized understanding and all questions answered.    Drug level:  Patient took 3mg of Tacrolimus last evening at 8 PM.  Care coordinator to follow up with the result.    Face to face time: 35 minutes    Discharge Plan  Pt will follow up with- labs tomorrow. Clinic follow up with Stephania next week. Times TBD.  Discharge instructions reviewed with patient: YES  Patient/Representative verbalized understanding, all questions answered: YES    Discharged from unit at 0900 with whom: spouse to home.    Vital signs:      BP: 119/78 Pulse: 93   Resp: 18 SpO2: 97 % O2 Device: None (Room air)     Weight: 87.1 kg (192 lb)  Estimated body mass index is 31.04 kg/m  as calculated from the following:    Height as of 2/12/25: 1.675 m (5' 5.95\").    Weight as of this encounter: 87.1 kg (192 lb).              "

## 2025-02-19 NOTE — TELEPHONE ENCOUNTER
Medication Refill  Route to [facility]    Pharmacy Name: [facility]  Pharmacy Location: [address]   Name of Medication: All Medications   Quantity / Dose: All Medications    Pt would like the RNCC to give a call pt has some questions.

## 2025-02-19 NOTE — LETTER
2/19/2025      Peter Carpio  [address] [id number]      Dear Colleague,    Thank you for referring your patient, Peter Carpio, to the [facility]. Please see a copy of my visit note below.    TRANSPLANT CLINIC VISIT     Assessment & Plan  # DDKT: - Cr trending down to 1.65 from 1.81 with good urine output. Venous duplex for leg edema on 2/18 with no evidence of DVT. Ureteral stent in place.    - Baseline Creatinine: ~ TBD   - Proteinuria: Not checked recently   - DSA Hx: No DSA at time of transplant   - Last cPRA: 0%   - BK Viremia: No   - Kidney Tx Biopsy Hx: No biopsy history.    # Immunosuppression: Tacrolimus immediate release (goal 8-10), Mycophenolate mofetil (dose 750 mg every 12 hours), and Prednisone (dose taper). 2/19 Tac level 9.2, dose changes per coordiantor. Prednisone decreasing to 20 mg daily starting 2/20.    - Induction with Recent Transplant:  Low Intensity Protocol   - Continue with intensive monitoring of immunosuppression for efficacy and toxicity.   - Historical Changes in Immunosuppression: None   - Changes: Not at this time    # Infection Prevention:     - PJP: Sulfa/TMP (Bactrim)  - CMV: Valganciclovir (Valcyte) x 6 months      - CMV IgG Ab High Risk Discordance (D+/R-) at time of transplant: No  Present CMV Serostatus: Positive  - EBV IgG Ab High Risk Discordance (D+/R-) at time of transplant: No  Present EBV Serostatus: Positive    # Hypertension: Controlled;  Goal BP: < 150/90   - Changes: Not at this time    # Diabetes: Borderline control (HbA1c 7-9%) Last HbA1c: 7.1%  -glucose running high last night and low early morning. Patient forgot NPH dosing yesterday and gave NPH at midnight. Discussed getting back on NPH and sliding scale insulin/card coverage as prescribed. Plan to follow up with pharmacy on Friday to discuss blood glucose levels/insulin regimen. Talked to endocrinology on 2/18 to request follow up  appointment within the next week, will reach out to them again.   -Instructed to take NPH 35 units every AM and 14 units every PM    # Anemia in Chronic Renal Disease: Hgb: Stable      BARBER: No   - Iron studies: Iron 91, iron sat 41, elevated ferritin.     # Mineral Bone Disorder:    - Secondary renal hyperparathyroidism; PTH level: 285, will discuss on follow up with nephrology       On treatment: None  - Vitamin D; level: Not checked recently        On supplement: No  - Calcium; level: Normal        On supplement: No  - Phosphorus; level: 2.2, slowly trending down. RN discussed phos diet. Will trend and start supplements as needed.         On supplement: No    # Electrolytes:   - Potassium; level: Normal        On supplement: No  - Magnesium; level: Normal        On supplement: Yes  - Bicarbonate; level: Normal        On supplement: No  - Sodium; level: Normal    # Other Significant PMH:   -  - Gout: on allopurinol.  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 Saw Dr. Pham cancer specialist place out of [facility] Undergoing surveillance colonoscopy. Last colonoscopy in march 2024 with out any new lesions.   - CAD: cardiac catheterization 7/24: pLAD to mLAD 70% stenosis and mLAD 40% stenosis. He is now s/p PCI with EDWIN x 1 to LAD.   - NSTEMI: in 2022. Stress test showed very small defect. No intervention pursued due to kidney function at that time. On statin, ASA 81, and Imdur.       # Transplant History:  Etiology of Kidney Failure: Diabetic nephropathy  Tx: DDKT  Transplant: 2/13/2025 (Kidney)  Significant transplant-related complications: None    Transplant Office Phone Number: [phone]    Assessment and plan was discussed with the patient and he voiced his understanding and agreement.    Return visit: [facility] completed. Plan for labs on 2/20 and pharmacy visit on 2/21.     ALEKSANDR Connell CNP    The longitudinal plan of care for the  diagnosis(es)/condition(s) as documented were addressed during this visit. Due to the added complexity in care, I will continue to support Peter in the subsequent management and with ongoing continuity of care.      Chief Complaint  Mr. Carpio is a 74 year old here for hospital follow up after kidney transplant.     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 He underwent a DCD kidney transplant with stent and PD catheter removal on 2/13/25 with Dr. Wali Newell.     Doing well. Pain controlled.   Good urine output.   LBM today. No N/V/D.   Appetite is good and improving.   Glucose highly variable.   Weight stable. LE edema improved, will hold off on diuretics. Patient to continue to monitor weight.         Home BP: Not checked    Problem List  Patient Active Problem List   Diagnosis     Hypertension     Chronic kidney disease, stage 4, severely decreased GFR (H)     Diabetic nephropathy (H)     Gout     Secondary renal hyperparathyroidism     Coronary artery disease     Diabetes mellitus, type 2 (H)     Hypertrophy of prostate without urinary obstruction     Malignant neoplasm of ascending colon (H)     Cardiovascular disease     Status post coronary angiogram     Pre-transplant evaluation for kidney transplant     Anemia in chronic kidney disease     Chronic 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     Dependence on renal dialysis     ESRD (end stage renal disease) on dialysis (H)     Hyperlipidemia     NSTEMI (non-ST elevated myocardial infarction) (H)     Awaiting transplantation of kidney     Kidney replaced by transplant     Immunosuppressed status     Drug-induced hyperglycemia       Allergies  No Known Allergies    Medications  Current Outpatient Medications   Medication Sig Dispense Refill     acetaminophen (TYLENOL) 325 MG tablet Take 3  tablets (975 mg) by mouth every 6 hours as needed for mild pain or fever.       allopurinol (ZYLOPRIM) 100 MG tablet Take 100 mg by mouth daily       aspirin 81 MG EC tablet Take 81 mg by mouth daily.       atorvastatin (LIPITOR) 40 MG tablet Take 1 tablet (40 mg) by mouth daily. 30 tablet 5     Continuous Glucose Sensor (FREESTYLE ANTONIO 14 DAY SENSOR) MISC        finasteride (PROSCAR) 5 MG tablet Take 1 tablet (5 mg) by mouth daily. 90 tablet 3     fish oil-omega-3 fatty acids 1000 MG capsule Take 2 capsules (2 g) by mouth 2 times daily.       gabapentin (NEURONTIN) 100 MG capsule Take 200 mg by mouth at bedtime.       insulin aspart (NOVOLOG PEN) 100 UNIT/ML pen Inject 1-7 Units subcutaneously 3 times daily (before meals). Correction Scale -   Do Not give Correction Insulin if Pre-Meal BG less than 140. For Pre-Meal  - 189 give 1 unit. For Pre-Meal - 239 give 2 units. For Pre-Meal -289 give 3 units. For Pre-Meal -339 give 4 units. For Pre-Meal -389 give 5 units. For Pre-Meal -439 give 6 units. For Pre-Meal BG greater than or equal to 440 give 7 units To be given with prandial insulin, and based on pre-meal blood glucose. Administering insulin within 5 minutes of the start of the meal is ideal. Administer insulin no more than 30 minutes after the start of the meal, unless directed otherwise by provider. Notify provider if glucose greater than or equal to 350 mg/dL after administration of correction dose. 15 mL 1     insulin aspart (NOVOLOG PEN) 100 UNIT/ML pen Inject 1-5 Units subcutaneously at bedtime. Do Not give Bedtime Correction Insulin if BG less than 200. For -249 give 1 units. For -299 give 2 units. For -349 give 3 units. For -399 give 4 units. For BG greater than or equal to 400 give 5 units. Notify provider if glucose greater than or equal to 350 mg/dL after administration of correction dose. 15 mL 1     insulin NPH-Regular (HUMULIN 70/30;NOVOLIN  70/30) susp With prednisone 40 mg dose take 50 units prior to breakfast and 23 units prior to dinner. With prednisone 20 mg dose take 40 units prior to breakfast and 19 units prior to dinner. With prednisone 15 mg dose take 38 units prior to breakfast and 16 units prior to dinner. With prednisone 10 mg dose take 35 units prior to breakfast and 15 units prior to dinner. With prednisone 5 mg dose take 35 units prior to breakfast and 15 units prior to dinner. 15 mL 0     magnesium oxide (MAG-OX) 400 MG tablet Take 1 tablet (400 mg) by mouth daily (with lunch). 30 tablet 3     metoprolol tartrate (LOPRESSOR) 25 MG tablet Take 0.5 tablets (12.5 mg) by mouth 2 times daily. 30 tablet 3     mycophenolate (GENERIC EQUIVALENT) 250 MG capsule Take 3 capsules (750 mg) by mouth 2 times daily. 180 capsule 11     ondansetron (ZOFRAN ODT) 4 MG ODT tab Take 1 tablet (4 mg) by mouth every 6 hours as needed for nausea. 12 tablet 1     pantoprazole (PROTONIX) 40 MG EC tablet Take 1 tablet (40 mg) by mouth daily. 30 tablet 2     polyethylene glycol (MIRALAX) 17 GM/Dose powder Take 17 g by mouth daily.       predniSONE (DELTASONE) 10 MG tablet Take 4 tablets (40 mg) by mouth daily for 2 days, THEN 2 tablets (20 mg) daily for 7 days, THEN 1.5 tablets (15 mg) daily for 7 days, THEN 1 tablet (10 mg) daily for 7 days, THEN 0.5 tablets (5 mg) daily for 7 days. 20 tablet 0     senna-docusate (SENOKOT-S/PERICOLACE) 8.6-50 MG tablet Take 1-2 tablets by mouth 2 times daily.       sulfamethoxazole-trimethoprim (BACTRIM) 400-80 MG tablet Take 1 tablet by mouth daily. 30 tablet 11     tacrolimus (GENERIC EQUIVALENT) 0.5 MG capsule Tacrolimus 0.5 mg capsules BID to allow for dose adjustments. 60 capsule 11     tacrolimus (GENERIC EQUIVALENT) 1 MG capsule Take 3 capsules (3 mg) by mouth 2 times daily. 180 capsule 11     tamsulosin (FLOMAX) 0.4 MG capsule Take 0.4 mg by mouth daily.       valGANciclovir (VALCYTE) 450 MG tablet Take 1 tablet (450 mg) by  mouth every other day. 60 tablet 5     No current facility-administered medications for this visit.     There are no discontinued medications.    Physical Exam  Vital Signs: There were no vitals taken for this visit.    GENERAL APPEARANCE: alert and no distress  HENT: mouth without ulcers or lesions  RESP: lungs clear to auscultation - no rales, rhonchi or wheezes  CV: regular rhythm, normal rate, no rub, no murmur  EDEMA: +2 LE edema bilaterally R > L.   ABDOMEN: soft, nondistended, nontender, bowel sounds normal  RLQ incision intact with staples - no erythema or drainage.    MS: extremities normal - no gross deformities noted, no evidence of inflammation in joints, no muscle tenderness  SKIN: no rash    Data        Latest Ref Rng & Units 2/19/2025     7:24 AM 2/18/2025    10:17 AM 2/18/2025     7:20 AM   Renal   Sodium 135 - 145 mmol/L 144   144    K 3.4 - 5.3 mmol/L 3.7   3.6    Cl 98 - 107 mmol/L 110   107    Cl (external) 98 - 107 mmol/L 110   107    CO2 22 - 29 mmol/L 24   25    Urea Nitrogen 8.0 - 23.0 mg/dL 49.5   53.2    Creatinine 0.67 - 1.17 mg/dL 1.65   1.81    Glucose 70 - 99 mg/dL 122  106  97    Calcium 8.8 - 10.4 mg/dL 8.9   9.0    Magnesium 1.7 - 2.3 mg/dL 1.9   2.1          Latest Ref Rng & Units 2/19/2025     7:24 AM 2/18/2025     7:20 AM 2/17/2025     6:30 AM   Bone Health   Phosphorus 2.5 - 4.5 mg/dL 2.2  2.3  3.0    Parathyroid Hormone Intact 15 - 65 pg/mL 285            Latest Ref Rng & Units 2/19/2025     7:24 AM 2/18/2025     7:20 AM 2/17/2025     6:30 AM   Heme   WBC 4.0 - 11.0 10e3/uL 9.5  10.1  10.2    Hgb 13.3 - 17.7 g/dL 11.4  11.3  10.0    Plt 150 - 450 10e3/uL 196  176  155          Latest Ref Rng & Units 2/12/2025     8:47 PM 5/16/2024     1:26 PM   Liver   AP 40 - 150 U/L 59  58    TBili <=1.2 mg/dL 0.2  0.2    ALT 0 - 70 U/L 15  9    AST 0 - 45 U/L 17  8    Tot Protein 6.4 - 8.3 g/dL 6.5  7.0    Albumin 3.5 - 5.2 g/dL 3.9  4.1          Latest Ref Rng & Units 2/12/2025     8:46 PM  12/26/2024     1:20 PM   Pancreas   A1C <5.7 % 7.1  6.7          Latest Ref Rng & Units 2/19/2025     7:24 AM   Iron studies   Iron 61 - 157 ug/dL 91    Iron Sat Index 15 - 46 % 41    Ferritin 31 - 409 ng/mL 1,030          Latest Ref Rng & Units 2/12/2025     8:47 PM 5/16/2024     1:26 PM   [facility] Virology   EBV CAPSID ANTIBODY IGG No detectable antibody. Positive  Positive      Failed to redirect to the Timeline version of the REVFS SmartLink.  Recent Labs   Lab Test 02/16/25  0539 02/18/25  0720   DOSTAC  --  2/17/2025   TACROL 5.1 9.2              Again, thank you for allowing me to participate in the care of your patient.        Sincerely,        ALEKSANDR Connell CNP    Electronically signed

## 2025-02-19 NOTE — TELEPHONE ENCOUNTER
Stephania Alatorre APRN CNP  Damir Gay MD; Ruth Jacob, JANICE  Peter Wei has completed SIPC today.  -Cr down to 1.65 today  -Edema improving without starting diuresis  -Variable glucose readings: educated about following insulin regimen, follow up with pharm on Friday and working with ana to get follow up in the next week  -2/19 Tac level 9.2  -labs tomorrow (Thurs)    Ruth- can you please schedule him for follow up with me next Wednesday or Friday morning?    Thanks!    [facility].   Labs tomorrow AM, tac level today ~11.5 hour trough. Will stay on same dose and repeat tomorrow.   Message sent to endo re: sooner appointment Alexis notified.

## 2025-02-20 ENCOUNTER — TELEPHONE (OUTPATIENT)
Dept: ENDOCRINOLOGY | Facility: CLINIC | Age: 75
End: 2025-02-20

## 2025-02-20 ENCOUNTER — TELEPHONE (OUTPATIENT)
Dept: TRANSPLANT | Facility: CLINIC | Age: 75
End: 2025-02-20

## 2025-02-20 ENCOUNTER — LAB (OUTPATIENT)
Dept: LAB | Facility: CLINIC | Age: 75
End: 2025-02-20
Payer: COMMERCIAL

## 2025-02-20 DIAGNOSIS — Z20.828 CONTACT WITH AND (SUSPECTED) EXPOSURE TO OTHER VIRAL COMMUNICABLE DISEASES: ICD-10-CM

## 2025-02-20 DIAGNOSIS — Z79.899 ENCOUNTER FOR LONG-TERM CURRENT USE OF MEDICATION: ICD-10-CM

## 2025-02-20 DIAGNOSIS — Z94.0 KIDNEY REPLACED BY TRANSPLANT: ICD-10-CM

## 2025-02-20 DIAGNOSIS — Z98.890 OTHER SPECIFIED POSTPROCEDURAL STATES: ICD-10-CM

## 2025-02-20 DIAGNOSIS — Z48.298 AFTERCARE FOLLOWING ORGAN TRANSPLANT: ICD-10-CM

## 2025-02-20 DIAGNOSIS — E83.39 HYPOPHOSPHATEMIA: Primary | ICD-10-CM

## 2025-02-20 LAB
ANION GAP SERPL CALCULATED.3IONS-SCNC: 11 MMOL/L (ref 7–15)
BUN SERPL-MCNC: 45.4 MG/DL (ref 8–23)
CALCIUM SERPL-MCNC: 8.8 MG/DL (ref 8.8–10.4)
CHLORIDE SERPL-SCNC: 109 MMOL/L (ref 98–107)
CREAT SERPL-MCNC: 1.54 MG/DL (ref 0.67–1.17)
EGFRCR SERPLBLD CKD-EPI 2021: 47 ML/MIN/1.73M2
ERYTHROCYTE [DISTWIDTH] IN BLOOD BY AUTOMATED COUNT: 13.6 % (ref 10–15)
GLUCOSE SERPL-MCNC: 139 MG/DL (ref 70–99)
HCO3 SERPL-SCNC: 22 MMOL/L (ref 22–29)
HCT VFR BLD AUTO: 35 % (ref 40–53)
HGB BLD-MCNC: 11.4 G/DL (ref 13.3–17.7)
MAGNESIUM SERPL-MCNC: 1.8 MG/DL (ref 1.7–2.3)
MCH RBC QN AUTO: 31.6 PG (ref 26.5–33)
MCHC RBC AUTO-ENTMCNC: 32.6 G/DL (ref 31.5–36.5)
MCV RBC AUTO: 97 FL (ref 78–100)
MYCOPHENOLATE SERPL LC/MS/MS-MCNC: 5.24 MG/L (ref 1–3.5)
MYCOPHENOLATE-G SERPL LC/MS/MS-MCNC: 72 MG/L (ref 30–95)
PHOSPHATE SERPL-MCNC: 1.6 MG/DL (ref 2.5–4.5)
PLATELET # BLD AUTO: 211 10E3/UL (ref 150–450)
POTASSIUM SERPL-SCNC: 3.8 MMOL/L (ref 3.4–5.3)
RBC # BLD AUTO: 3.61 10E6/UL (ref 4.4–5.9)
SODIUM SERPL-SCNC: 142 MMOL/L (ref 135–145)
TACROLIMUS BLD-MCNC: 9.4 UG/L (ref 5–15)
TME LAST DOSE: ABNORMAL H
TME LAST DOSE: ABNORMAL H
TME LAST DOSE: NORMAL H
TME LAST DOSE: NORMAL H
WBC # BLD AUTO: 9.2 10E3/UL (ref 4–11)

## 2025-02-20 PROCEDURE — 36415 COLL VENOUS BLD VENIPUNCTURE: CPT | Performed by: PATHOLOGY

## 2025-02-20 PROCEDURE — 85027 COMPLETE CBC AUTOMATED: CPT | Performed by: PATHOLOGY

## 2025-02-20 PROCEDURE — 80197 ASSAY OF TACROLIMUS: CPT | Performed by: INTERNAL MEDICINE

## 2025-02-20 PROCEDURE — 80048 BASIC METABOLIC PNL TOTAL CA: CPT | Performed by: PATHOLOGY

## 2025-02-20 PROCEDURE — 83735 ASSAY OF MAGNESIUM: CPT | Performed by: PATHOLOGY

## 2025-02-20 PROCEDURE — 80180 DRUG SCRN QUAN MYCOPHENOLATE: CPT | Performed by: INTERNAL MEDICINE

## 2025-02-20 PROCEDURE — 84100 ASSAY OF PHOSPHORUS: CPT | Performed by: PATHOLOGY

## 2025-02-20 PROCEDURE — 99000 SPECIMEN HANDLING OFFICE-LAB: CPT | Performed by: PATHOLOGY

## 2025-02-20 RX ORDER — PANTOPRAZOLE SODIUM 40 MG/1
40 TABLET, DELAYED RELEASE ORAL DAILY
Qty: 90 TABLET | Refills: 0 | Status: SHIPPED | OUTPATIENT
Start: 2025-02-20

## 2025-02-20 RX ORDER — METOPROLOL TARTRATE 25 MG/1
12.5 TABLET, FILM COATED ORAL 2 TIMES DAILY
Qty: 90 TABLET | Refills: 3 | Status: SHIPPED | OUTPATIENT
Start: 2025-02-20

## 2025-02-20 RX ORDER — VALGANCICLOVIR 450 MG/1
450 TABLET, FILM COATED ORAL DAILY
Qty: 90 TABLET | Refills: 1 | Status: SHIPPED | OUTPATIENT
Start: 2025-02-20

## 2025-02-20 RX ORDER — TACROLIMUS 0.5 MG/1
CAPSULE ORAL
Status: SHIPPED
Start: 2025-02-20

## 2025-02-20 RX ORDER — TACROLIMUS 1 MG/1
2 CAPSULE ORAL 2 TIMES DAILY
Qty: 360 CAPSULE | Refills: 3 | Status: SHIPPED | OUTPATIENT
Start: 2025-02-20 | End: 2025-02-20

## 2025-02-20 RX ORDER — MYCOPHENOLATE MOFETIL 250 MG/1
750 CAPSULE ORAL 2 TIMES DAILY
Qty: 540 CAPSULE | Refills: 3 | Status: SHIPPED | OUTPATIENT
Start: 2025-02-20

## 2025-02-20 RX ORDER — MAGNESIUM OXIDE 400 MG/1
400 TABLET ORAL
Qty: 90 TABLET | Refills: 3 | Status: SHIPPED | OUTPATIENT
Start: 2025-02-20

## 2025-02-20 RX ORDER — SULFAMETHOXAZOLE AND TRIMETHOPRIM 400; 80 MG/1; MG/1
1 TABLET ORAL DAILY
Qty: 90 TABLET | Refills: 3 | Status: SHIPPED | OUTPATIENT
Start: 2025-02-20

## 2025-02-20 RX ORDER — TACROLIMUS 0.5 MG/1
0.5 CAPSULE ORAL 2 TIMES DAILY
Qty: 180 CAPSULE | Refills: 3 | Status: SHIPPED | OUTPATIENT
Start: 2025-02-20 | End: 2025-02-20

## 2025-02-20 RX ORDER — TACROLIMUS 1 MG/1
3 CAPSULE ORAL 2 TIMES DAILY
Qty: 540 CAPSULE | Refills: 3 | Status: SHIPPED | OUTPATIENT
Start: 2025-02-20

## 2025-02-20 NOTE — CONFIDENTIAL NOTE
RECORDS RECEIVED FROM: internal    DATE RECEIVED:  4.22.25    NOTES (FOR ALL VISITS) STATUS DETAILS   OFFICE NOTES from referring provider internal    Sonia Zaragoza MD      OFFICE NOTES [facility] other specialist internal  [facility]     [facility]   [facility]      ED NOTES internal  7/17/24 [facility]    MEDICATION LIST internal     IMAGING      XR (Chest) internal  2.13.25, 12.12.25     CT (HEAD/NECK/CHEST/ABDOMEN) internal  8/7/24, 7.3.24   LABS     DIABETES: HBGA1C, CREATININE, FASTING LIPIDS, MICROALBUMIN URINE, POTASSIUM, TSH, T4    THYROID: TSH, T4, CBC, THYRODLONULIN, TOTAL T3, FREE T4, CALCITONIN, CEA internal

## 2025-02-20 NOTE — TELEPHONE ENCOUNTER
Patient Call: General  Route to LPN    Reason for call: patient is looking to have appointments setup at [facility] off [facility] for 7:15am for Monday and Thursday for the next eight weeks. Patient mentioned that other lab clinic had times all over the aboard for setting up appointments. More details with call back.      Call back needed? Yes    Return Call Needed  Same as documented in contacts section  When to return call?: Same day: Route High Priority

## 2025-02-20 NOTE — TELEPHONE ENCOUNTER
Patient confirmed scheduled appointment:  Date: 04/22/25  Time: 2:00  Visit type: NEW ENDOCRINE  Provider: Marshall Blanchard MD  Location: [facility] DIABETES  Testing/imaging: N/A  Additional notes: Scheduled per pt, will be in [place] for visit. Pt's wife upset that pt was just discharged from hospital and won't be seen until April, explained from chart that Fawn Wheeler's (who sees pt for diabetes) team is in the process of finding a sooner visit.    Lory Tristan, RN  [id number]  Please help schedule Open New or Nonconsult INOCENCIA within  1-2 months with any provider who can see adrenal ([place],  Dr. Barajas, Dr. Diaz, Dr. Bowling, Dr. Diaz)    Rochelle Lafleur on 2/20/2025 at 11:44 AM

## 2025-02-20 NOTE — TELEPHONE ENCOUNTER
Estimated Creatinine Clearance: 43.5 mL/min (A) (based on SCr of 1.54 mg/dL (H)).    Increase Valcyte to 450 mg once daily.     Low phos, per Stephania Alatorre start 250 mg phos bid per JADE. Alexis voiced understanding.

## 2025-02-20 NOTE — TELEPHONE ENCOUNTER
Spoke with Alexis, cannot get into [facility] next week. Will plan on labs at [facility] will call [facility] for available appointment times starting the following week.

## 2025-02-21 LAB — BK VIRUS IGG ANTIBODY: ABNORMAL TITER

## 2025-02-21 NOTE — TELEPHONE ENCOUNTER
Per Fawn Wheeler PA-C:      I have two possible spots next week: 2/24 @ 1130 for a virtual or in person 2/27 @ 8 am    If these times do not work, let's reach out to [facility].    Fawn

## 2025-02-21 NOTE — TELEPHONE ENCOUNTER
2/21 Called and left voicemail, provided phone number [phone] to schedule an appointment with Fawn Wheeler on 2/24 at 1130 video visit or 2/27 at 800 in person.     [facility]   Orthopedics, Podiatry, Sports Medicine, Ent ,Eye , Audiology, Adult Endocrine & Diabetes, Nutrition & Medication Therapy Management Specialties   [facility]

## 2025-02-24 ENCOUNTER — LAB (OUTPATIENT)
Dept: LAB | Facility: CLINIC | Age: 75
End: 2025-02-24
Payer: COMMERCIAL

## 2025-02-24 ENCOUNTER — VIRTUAL VISIT (OUTPATIENT)
Dept: ENDOCRINOLOGY | Facility: CLINIC | Age: 75
End: 2025-02-24
Payer: COMMERCIAL

## 2025-02-24 DIAGNOSIS — Z94.0 KIDNEY REPLACED BY TRANSPLANT: ICD-10-CM

## 2025-02-24 DIAGNOSIS — Z79.899 ENCOUNTER FOR LONG-TERM CURRENT USE OF MEDICATION: ICD-10-CM

## 2025-02-24 DIAGNOSIS — Z20.828 CONTACT WITH AND (SUSPECTED) EXPOSURE TO OTHER VIRAL COMMUNICABLE DISEASES: ICD-10-CM

## 2025-02-24 DIAGNOSIS — Z98.890 OTHER SPECIFIED POSTPROCEDURAL STATES: ICD-10-CM

## 2025-02-24 DIAGNOSIS — Z79.4 TYPE 2 DIABETES MELLITUS WITH DIABETIC NEPHROPATHY, WITH LONG-TERM CURRENT USE OF INSULIN (H): Primary | ICD-10-CM

## 2025-02-24 DIAGNOSIS — E11.21 TYPE 2 DIABETES MELLITUS WITH DIABETIC NEPHROPATHY, WITH LONG-TERM CURRENT USE OF INSULIN (H): Primary | ICD-10-CM

## 2025-02-24 DIAGNOSIS — Z48.298 AFTERCARE FOLLOWING ORGAN TRANSPLANT: ICD-10-CM

## 2025-02-24 LAB
ANION GAP SERPL CALCULATED.3IONS-SCNC: 9 MMOL/L (ref 7–15)
BUN SERPL-MCNC: 28.1 MG/DL (ref 8–23)
CALCIUM SERPL-MCNC: 8.8 MG/DL (ref 8.8–10.4)
CHLORIDE SERPL-SCNC: 108 MMOL/L (ref 98–107)
CREAT SERPL-MCNC: 1.26 MG/DL (ref 0.67–1.17)
EGFRCR SERPLBLD CKD-EPI 2021: 60 ML/MIN/1.73M2
ERYTHROCYTE [DISTWIDTH] IN BLOOD BY AUTOMATED COUNT: 14.3 % (ref 10–15)
GLUCOSE SERPL-MCNC: 193 MG/DL (ref 70–99)
HCO3 SERPL-SCNC: 21 MMOL/L (ref 22–29)
HCT VFR BLD AUTO: 33.3 % (ref 40–53)
HGB BLD-MCNC: 10.6 G/DL (ref 13.3–17.7)
MCH RBC QN AUTO: 31.6 PG (ref 26.5–33)
MCHC RBC AUTO-ENTMCNC: 31.8 G/DL (ref 31.5–36.5)
MCV RBC AUTO: 99 FL (ref 78–100)
PLATELET # BLD AUTO: 240 10E3/UL (ref 150–450)
POTASSIUM SERPL-SCNC: 5.2 MMOL/L (ref 3.4–5.3)
RBC # BLD AUTO: 3.35 10E6/UL (ref 4.4–5.9)
SODIUM SERPL-SCNC: 138 MMOL/L (ref 135–145)
TACROLIMUS BLD-MCNC: 8.5 UG/L (ref 5–15)
TME LAST DOSE: NORMAL H
TME LAST DOSE: NORMAL H
WBC # BLD AUTO: 10.4 10E3/UL (ref 4–11)

## 2025-02-24 PROCEDURE — 98014 SYNCH AUDIO-ONLY EST MOD 30: CPT | Performed by: PHYSICIAN ASSISTANT

## 2025-02-24 PROCEDURE — 80048 BASIC METABOLIC PNL TOTAL CA: CPT | Performed by: PATHOLOGY

## 2025-02-24 PROCEDURE — 85027 COMPLETE CBC AUTOMATED: CPT | Performed by: PATHOLOGY

## 2025-02-24 PROCEDURE — 80197 ASSAY OF TACROLIMUS: CPT | Performed by: INTERNAL MEDICINE

## 2025-02-24 PROCEDURE — 99000 SPECIMEN HANDLING OFFICE-LAB: CPT | Performed by: PATHOLOGY

## 2025-02-24 PROCEDURE — 36415 COLL VENOUS BLD VENIPUNCTURE: CPT | Performed by: PATHOLOGY

## 2025-02-24 NOTE — Clinical Note
Please abstract the following data from this visit with this patient into the appropriate field in Epic:  Tests that can be patient reported without a hard copy:  Eye exam with ophthalmology on this date: in Oct./Nov. 2024 Exam Location: [facility]  Other Tests found in the patient's chart through Chart Review/Care Everywhere:    Note to [employer]: If this section is blank, no results were found via Chart Review/Care Everywhere.

## 2025-02-24 NOTE — PATIENT INSTRUCTIONS
[facility]  Dr Reyna, Endocrinology Department    [address] [id number]  [place] [address]  Appointment [phone]  Fax: [phone]  [place]: Monday - Thursday

## 2025-02-24 NOTE — LETTER
2/24/2025      Peter Carpio  [address] [facility] [address] [address]      Dear Colleague,    Thank you for referring your patient, Peter Carpio, to the [facility]. Please see a copy of my visit note below.    Assessment/Plan :   Type 2 DM. Alexis continues to work on managing his blood sugars. He feels like overall things have been very stable. He had some odd readings the first few days that he was home from the hospital but that has since stabilized. We reviewed his current CGMS report and we discussed his current insulin dosing. Things are stabilizing but he is still getting a bit higher after both lunch and dinner. I would like him to add insulin lispro 2-3 unit(s) with lunch, as well as supper. He will continue with his current Novolin 70-30 dosing. We did discuss switching him to a more standard basal/bolus combination but I would like to wait until he is seen in person. If he has any problems with the changes made, he will contact our office. He will follow-up with Dr. Blanchard in April and I will see him back in June.     Due to the COVID 19 pandemic this visit was a telephone/video visit in order to help prevent spread of infection in this patient and the general population. The patient gave verbal consent for the visit today. I have independently reviewed and interpreted labs, imaging as indicated.       Distant Location (provider location):  On-site  Mode of Communication:  Video Conference via [employer]  Chart review/prep time 5    Visit Start time 11:30  Visit Stop time 11:50  25 minutes spent on the date of the encounter doing chart review, history and exam, documentation and further activities as noted above.      Chief complaint:  Peter is a 74 year old male who returns for follow-up of Type 2 DM.    I have reviewed Care Everywhere including [facility], [facility], [facility], [facility], [facility], [facility], [facility] , [facility], [place] lab reports,  imaging reports and provider notes as indicated.      HISTORY OF PRESENT ILLNESS  Alexis underwent a DCD kidney transplant with stent on February 13th. He was discharged from the hospital on February 17th. Since getting back home, he has had some issues with both hyperglycemia and hypoglycemia. He had a sensor alert that read as a glucose of 68 mg/dl but a fingerstick test indicated his blood sugar was 95 mg/dl. He has also had some issues with spikes after supper. He is currently taking 35 unit(s) of Novolin 70/30 in the morning and 14 unit(s) in the evening. He also takes 2-3 unit(s) of lispro before supper.     Alexis monitors his blood sugars closely with the FreeStyle Christiano 3 sensor. As previously stated, he has had some odd glucose fluctuations over the last two weeks. He is feeling okay, since the surgery. He is tired but he seems to be regaining his strength. He does feel the need to urinate a lot. He was told that this is normal. He has not had any problems with blurred vision or an increase in numbness/tingling in his feet.     Alexis was diagnosed with diabetes about 10 yrs ago. He is not sure of the exact date. He knows that he was started on insulin around the time of diagnosis and he has been on insulin ever since. He currently takes 32 unit(s) of Humalin Mix 70/30 in the morning and 34 unit(s) in the evening. He has been on this dosing for some time and he feels like it works well. His diabetes is complicated by hyperlipidemia, CAD, history of NSTEMI, history of malignant neoplasm of the colon, HTN, and recent renal transplant due to ESRD.     Endocrine relevant labs are as follows:   Latest Reference Range & Units 02/12/25 20:46   Hemoglobin A1C <5.7 % 7.1 (H)   (H): Data is abnormally high      REVIEW OF SYSTEMS    Endocrine: positive for diabetes  Skin: negative  Eyes: negative for, visual blurring, redness, tearing  Ears/Nose/Throat: negative  Respiratory: No shortness of breath, dyspnea on exertion,  cough, or hemoptysis  Cardiovascular: negative for, chest pain, dyspnea on exertion, and lower extremity edema  Gastrointestinal: positive for diarrhea, negative for, nausea, vomiting, and constipation  Genitourinary: positive for nocturia and frequency, negative for, dysuria, urgency, and hesitancy  Musculoskeletal: negative for, muscular weakness, nocturnal cramping, and foot pain  Neurologic: negative for, local weakness, numbness or tingling of hands, and numbness or tingling of feet  Psychiatric: negative  Hematologic/Lymphatic/Immunologic: negative    Past Medical History  Past Medical History:   Diagnosis Date     CAD (coronary artery disease)      Diabetes mellitus, type 2 (H)      ESRD (end stage renal disease) on dialysis (H)      Essential hypertension      Hyperlipidemia LDL goal <70      NSTEMI (non-ST elevated myocardial infarction) (H)        Medications  Current Outpatient Medications   Medication Sig Dispense Refill     acetaminophen (TYLENOL) 325 MG tablet Take 3 tablets (975 mg) by mouth every 6 hours as needed for mild pain or fever.       allopurinol (ZYLOPRIM) 100 MG tablet Take 100 mg by mouth daily       aspirin 81 MG EC tablet Take 81 mg by mouth daily.       atorvastatin (LIPITOR) 40 MG tablet Take 1 tablet (40 mg) by mouth daily. 30 tablet 5     Continuous Glucose Sensor (FREESTYLE ANTONIO 14 DAY SENSOR) MISC        finasteride (PROSCAR) 5 MG tablet Take 1 tablet (5 mg) by mouth daily. 90 tablet 3     fish oil-omega-3 fatty acids 1000 MG capsule Take 2 capsules (2 g) by mouth 2 times daily.       furosemide (LASIX) 20 MG tablet Take 1 tablet (20 mg) by mouth daily. 7 tablet 0     gabapentin (NEURONTIN) 100 MG capsule Take 200 mg by mouth at bedtime.       insulin aspart (NOVOLOG PEN) 100 UNIT/ML pen Inject 1-7 Units subcutaneously 3 times daily (before meals). Correction Scale -   Do Not give Correction Insulin if Pre-Meal BG less than 140. For Pre-Meal  - 189 give 1 unit. For Pre-Meal  - 239 give 2 units. For Pre-Meal -289 give 3 units. For Pre-Meal -339 give 4 units. For Pre-Meal -389 give 5 units. For Pre-Meal -439 give 6 units. For Pre-Meal BG greater than or equal to 440 give 7 units To be given with prandial insulin, and based on pre-meal blood glucose. Administering insulin within 5 minutes of the start of the meal is ideal. Administer insulin no more than 30 minutes after the start of the meal, unless directed otherwise by provider. Notify provider if glucose greater than or equal to 350 mg/dL after administration of correction dose. 15 mL 1     insulin aspart (NOVOLOG PEN) 100 UNIT/ML pen Inject 1-5 Units subcutaneously at bedtime. Do Not give Bedtime Correction Insulin if BG less than 200. For -249 give 1 units. For -299 give 2 units. For -349 give 3 units. For -399 give 4 units. For BG greater than or equal to 400 give 5 units. Notify provider if glucose greater than or equal to 350 mg/dL after administration of correction dose. 15 mL 1     insulin NPH-Regular (HUMULIN 70/30;NOVOLIN 70/30) susp With prednisone 40 mg dose take 50 units prior to breakfast and 23 units prior to dinner. With prednisone 20 mg dose take 40 units prior to breakfast and 19 units prior to dinner. With prednisone 15 mg dose take 38 units prior to breakfast and 16 units prior to dinner. With prednisone 10 mg dose take 35 units prior to breakfast and 15 units prior to dinner. With prednisone 5 mg dose take 35 units prior to breakfast and 15 units prior to dinner. 15 mL 0     magnesium oxide (MAG-OX) 400 MG tablet Take 1 tablet (400 mg) by mouth daily (with lunch). 90 tablet 3     metoprolol tartrate (LOPRESSOR) 25 MG tablet Take 0.5 tablets (12.5 mg) by mouth 2 times daily. 90 tablet 3     mycophenolate (GENERIC EQUIVALENT) 250 MG capsule Take 3 capsules (750 mg) by mouth 2 times daily. 540 capsule 3     ondansetron (ZOFRAN ODT) 4 MG ODT tab Take 1 tablet (4 mg)  by mouth every 6 hours as needed for nausea. 12 tablet 1     pantoprazole (PROTONIX) 40 MG EC tablet Take 1 tablet (40 mg) by mouth daily. 90 tablet 0     phosphorus tablet 250 mg 250 MG per tablet Take 1 tablet (250 mg) by mouth 2 times daily. 60 tablet 0     polyethylene glycol (MIRALAX) 17 GM/Dose powder Take 17 g by mouth daily. (Patient not taking: Reported on 2025)       predniSONE (DELTASONE) 10 MG tablet Take 4 tablets (40 mg) by mouth daily for 2 days, THEN 2 tablets (20 mg) daily for 7 days, THEN 1.5 tablets (15 mg) daily for 7 days, THEN 1 tablet (10 mg) daily for 7 days, THEN 0.5 tablets (5 mg) daily for 7 days. 20 tablet 0     psyllium (METAMUCIL/KONSYL) 58.6 % powder Take 1 teaspoonful by mouth daily.       senna-docusate (SENOKOT-S/PERICOLACE) 8.6-50 MG tablet Take 1-2 tablets by mouth 2 times daily. (Patient not taking: Reported on 2025)       sulfamethoxazole-trimethoprim (BACTRIM) 400-80 MG tablet Take 1 tablet by mouth daily. 90 tablet 3     tacrolimus (GENERIC EQUIVALENT) 0.5 MG capsule Profile Rx: patient will contact pharmacy when needed (Patient not taking: Reported on 2025)       tacrolimus (GENERIC EQUIVALENT) 1 MG capsule Take 3 capsules (3 mg) by mouth 2 times daily. 540 capsule 3     tamsulosin (FLOMAX) 0.4 MG capsule Take 0.4 mg by mouth daily.       valGANciclovir (VALCYTE) 450 MG tablet Take 1 tablet (450 mg) by mouth daily. Stop on 2025 90 tablet 1       Allergies  No Known Allergies    Family History  family history is not on file.    Social History  Social History     Tobacco Use     Smoking status: Former     Current packs/day: 0.00     Types: Cigarettes     Quit date:      Years since quittin.     Smokeless tobacco: Never   Substance Use Topics     Alcohol use: Yes     Comment: rare     Drug use: Never       Physical Exam  There is no height or weight on file to calculate BMI.  GENERAL: no distress. He is accompanied by his wife.  RESP: No audible  wheeze, cough, or increased work of breathing.    NEURO: Awake, alert, responds appropriately to questions.  Mentation and speech fluent.  PSYCH:affect normal       DATA REVIEW  FreeStyle LibreView  Time in target range 63%  Low 1%, High 26%, Very High 10%  Current Ave  mg/dl        Again, thank you for allowing me to participate in the care of your patient.        Sincerely,        Fawn Wheeler PA-C    Electronically signed

## 2025-02-24 NOTE — PROGRESS NOTES
Assessment/Plan :   Type 2 DM. Alexis continues to work on managing his blood sugars. He feels like overall things have been very stable. He had some odd readings the first few days that he was home from the hospital but that has since stabilized. We reviewed his current CGMS report and we discussed his current insulin dosing. Things are stabilizing but he is still getting a bit higher after both lunch and dinner. I would like him to add insulin lispro 2-3 unit(s) with lunch, as well as supper. He will continue with his current Novolin 70-30 dosing. We did discuss switching him to a more standard basal/bolus combination but I would like to wait until he is seen in person. If he has any problems with the changes made, he will contact our office. He will follow-up with Dr. Blanchard in April and I will see him back in June.     Due to the COVID 19 pandemic this visit was a telephone/video visit in order to help prevent spread of infection in this patient and the general population. The patient gave verbal consent for the visit today. I have independently reviewed and interpreted labs, imaging as indicated.       Distant Location (provider location):  On-site  Mode of Communication:  Video Conference via [employer]  Chart review/prep time 5    Visit Start time 11:30  Visit Stop time 11:50  25 minutes spent on the date of the encounter doing chart review, history and exam, documentation and further activities as noted above.      Chief complaint:  Peter is a 74 year old male who returns for follow-up of Type 2 DM.    I have reviewed Care Everywhere including [facility], [facility], [facility], [place], [facility], [facility] , [facility], [place] lab reports, imaging reports and provider notes as indicated.      HISTORY OF PRESENT ILLNESS  Alexis underwent a DCD kidney transplant with stent on February 13th. He was discharged from the hospital on February 17th. Since getting back home, he has had some issues  with both hyperglycemia and hypoglycemia. He had a sensor alert that read as a glucose of 68 mg/dl but a fingerstick test indicated his blood sugar was 95 mg/dl. He has also had some issues with spikes after supper. He is currently taking 35 unit(s) of Novolin 70/30 in the morning and 14 unit(s) in the evening. He also takes 2-3 unit(s) of lispro before supper.     Alexis monitors his blood sugars closely with the FreeStyle Christiano 3 sensor. As previously stated, he has had some odd glucose fluctuations over the last two weeks. He is feeling okay, since the surgery. He is tired but he seems to be regaining his strength. He does feel the need to urinate a lot. He was told that this is normal. He has not had any problems with blurred vision or an increase in numbness/tingling in his feet.     Alexis was diagnosed with diabetes about 10 yrs ago. He is not sure of the exact date. He knows that he was started on insulin around the time of diagnosis and he has been on insulin ever since. He currently takes 32 unit(s) of Humalin Mix 70/30 in the morning and 34 unit(s) in the evening. He has been on this dosing for some time and he feels like it works well. His diabetes is complicated by hyperlipidemia, CAD, history of NSTEMI, history of malignant neoplasm of the colon, HTN, and recent renal transplant due to ESRD.     Endocrine relevant labs are as follows:   Latest Reference Range & Units 02/12/25 20:46   Hemoglobin A1C <5.7 % 7.1 (H)   (H): Data is abnormally high      REVIEW OF SYSTEMS    Endocrine: positive for diabetes  Skin: negative  Eyes: negative for, visual blurring, redness, tearing  Ears/Nose/Throat: negative  Respiratory: No shortness of breath, dyspnea on exertion, cough, or hemoptysis  Cardiovascular: negative for, chest pain, dyspnea on exertion, and lower extremity edema  Gastrointestinal: positive for diarrhea, negative for, nausea, vomiting, and constipation  Genitourinary: positive for nocturia and frequency,  negative for, dysuria, urgency, and hesitancy  Musculoskeletal: negative for, muscular weakness, nocturnal cramping, and foot pain  Neurologic: negative for, local weakness, numbness or tingling of hands, and numbness or tingling of feet  Psychiatric: negative  Hematologic/Lymphatic/Immunologic: negative    Past Medical History  Past Medical History:   Diagnosis Date    CAD (coronary artery disease)     Diabetes mellitus, type 2 (H)     ESRD (end stage renal disease) on dialysis (H)     Essential hypertension     Hyperlipidemia LDL goal <70     NSTEMI (non-ST elevated myocardial infarction) (H)        Medications  Current Outpatient Medications   Medication Sig Dispense Refill    acetaminophen (TYLENOL) 325 MG tablet Take 3 tablets (975 mg) by mouth every 6 hours as needed for mild pain or fever.      allopurinol (ZYLOPRIM) 100 MG tablet Take 100 mg by mouth daily      aspirin 81 MG EC tablet Take 81 mg by mouth daily.      atorvastatin (LIPITOR) 40 MG tablet Take 1 tablet (40 mg) by mouth daily. 30 tablet 5    Continuous Glucose Sensor (FREESTYLE ANTONIO 14 DAY SENSOR) MISC       finasteride (PROSCAR) 5 MG tablet Take 1 tablet (5 mg) by mouth daily. 90 tablet 3    fish oil-omega-3 fatty acids 1000 MG capsule Take 2 capsules (2 g) by mouth 2 times daily.      furosemide (LASIX) 20 MG tablet Take 1 tablet (20 mg) by mouth daily. 7 tablet 0    gabapentin (NEURONTIN) 100 MG capsule Take 200 mg by mouth at bedtime.      insulin aspart (NOVOLOG PEN) 100 UNIT/ML pen Inject 1-7 Units subcutaneously 3 times daily (before meals). Correction Scale -   Do Not give Correction Insulin if Pre-Meal BG less than 140. For Pre-Meal  - 189 give 1 unit. For Pre-Meal - 239 give 2 units. For Pre-Meal -289 give 3 units. For Pre-Meal -339 give 4 units. For Pre-Meal -389 give 5 units. For Pre-Meal -439 give 6 units. For Pre-Meal BG greater than or equal to 440 give 7 units To be given with prandial insulin,  and based on pre-meal blood glucose. Administering insulin within 5 minutes of the start of the meal is ideal. Administer insulin no more than 30 minutes after the start of the meal, unless directed otherwise by provider. Notify provider if glucose greater than or equal to 350 mg/dL after administration of correction dose. 15 mL 1    insulin aspart (NOVOLOG PEN) 100 UNIT/ML pen Inject 1-5 Units subcutaneously at bedtime. Do Not give Bedtime Correction Insulin if BG less than 200. For -249 give 1 units. For -299 give 2 units. For -349 give 3 units. For -399 give 4 units. For BG greater than or equal to 400 give 5 units. Notify provider if glucose greater than or equal to 350 mg/dL after administration of correction dose. 15 mL 1    insulin NPH-Regular (HUMULIN 70/30;NOVOLIN 70/30) susp With prednisone 40 mg dose take 50 units prior to breakfast and 23 units prior to dinner. With prednisone 20 mg dose take 40 units prior to breakfast and 19 units prior to dinner. With prednisone 15 mg dose take 38 units prior to breakfast and 16 units prior to dinner. With prednisone 10 mg dose take 35 units prior to breakfast and 15 units prior to dinner. With prednisone 5 mg dose take 35 units prior to breakfast and 15 units prior to dinner. 15 mL 0    magnesium oxide (MAG-OX) 400 MG tablet Take 1 tablet (400 mg) by mouth daily (with lunch). 90 tablet 3    metoprolol tartrate (LOPRESSOR) 25 MG tablet Take 0.5 tablets (12.5 mg) by mouth 2 times daily. 90 tablet 3    mycophenolate (GENERIC EQUIVALENT) 250 MG capsule Take 3 capsules (750 mg) by mouth 2 times daily. 540 capsule 3    ondansetron (ZOFRAN ODT) 4 MG ODT tab Take 1 tablet (4 mg) by mouth every 6 hours as needed for nausea. 12 tablet 1    pantoprazole (PROTONIX) 40 MG EC tablet Take 1 tablet (40 mg) by mouth daily. 90 tablet 0    phosphorus tablet 250 mg 250 MG per tablet Take 1 tablet (250 mg) by mouth 2 times daily. 60 tablet 0    polyethylene glycol  (MIRALAX) 17 GM/Dose powder Take 17 g by mouth daily. (Patient not taking: Reported on 2025)      predniSONE (DELTASONE) 10 MG tablet Take 4 tablets (40 mg) by mouth daily for 2 days, THEN 2 tablets (20 mg) daily for 7 days, THEN 1.5 tablets (15 mg) daily for 7 days, THEN 1 tablet (10 mg) daily for 7 days, THEN 0.5 tablets (5 mg) daily for 7 days. 20 tablet 0    psyllium (METAMUCIL/KONSYL) 58.6 % powder Take 1 teaspoonful by mouth daily.      senna-docusate (SENOKOT-S/PERICOLACE) 8.6-50 MG tablet Take 1-2 tablets by mouth 2 times daily. (Patient not taking: Reported on 2025)      sulfamethoxazole-trimethoprim (BACTRIM) 400-80 MG tablet Take 1 tablet by mouth daily. 90 tablet 3    tacrolimus (GENERIC EQUIVALENT) 0.5 MG capsule Profile Rx: patient will contact pharmacy when needed (Patient not taking: Reported on 2025)      tacrolimus (GENERIC EQUIVALENT) 1 MG capsule Take 3 capsules (3 mg) by mouth 2 times daily. 540 capsule 3    tamsulosin (FLOMAX) 0.4 MG capsule Take 0.4 mg by mouth daily.      valGANciclovir (VALCYTE) 450 MG tablet Take 1 tablet (450 mg) by mouth daily. Stop on 2025 90 tablet 1       Allergies  No Known Allergies    Family History  family history is not on file.    Social History  Social History     Tobacco Use    Smoking status: Former     Current packs/day: 0.00     Types: Cigarettes     Quit date:      Years since quittin.1    Smokeless tobacco: Never   Substance Use Topics    Alcohol use: Yes     Comment: rare    Drug use: Never       Physical Exam  There is no height or weight on file to calculate BMI.  GENERAL: no distress. He is accompanied by his wife.  RESP: No audible wheeze, cough, or increased work of breathing.    NEURO: Awake, alert, responds appropriately to questions.  Mentation and speech fluent.  PSYCH:affect normal       DATA REVIEW  FreeStyle LibreView  Time in target range 63%  Low 1%, High 26%, Very High 10%  Current Ave  mg/dl       Admission

## 2025-02-25 ENCOUNTER — TELEPHONE (OUTPATIENT)
Dept: TRANSPLANT | Facility: CLINIC | Age: 75
End: 2025-02-25
Payer: COMMERCIAL

## 2025-02-25 NOTE — TELEPHONE ENCOUNTER
Spoke with Alexis, reviewed labs from yesterday. Tac at goal 8-10.   Feels well, denies fevers.   No N/V. Had a regular BM yesterday followed by loose stools. Stopped stool softeners.   Weight 187 lbs (from 192), only took 2 doses of lasix and then stopped as swelling resolved.   Eating and drinking normally, good energy levels.   Denies dizziness.

## 2025-02-26 ENCOUNTER — MYC MEDICAL ADVICE (OUTPATIENT)
Dept: PHARMACY | Facility: CLINIC | Age: 75
End: 2025-02-26
Payer: COMMERCIAL

## 2025-02-27 ENCOUNTER — OFFICE VISIT (OUTPATIENT)
Dept: TRANSPLANT | Facility: CLINIC | Age: 75
End: 2025-02-27
Attending: INTERNAL MEDICINE
Payer: COMMERCIAL

## 2025-02-27 ENCOUNTER — LAB (OUTPATIENT)
Dept: LAB | Facility: CLINIC | Age: 75
End: 2025-02-27
Payer: COMMERCIAL

## 2025-02-27 VITALS
SYSTOLIC BLOOD PRESSURE: 147 MMHG | OXYGEN SATURATION: 99 % | WEIGHT: 188.1 LBS | HEART RATE: 75 BPM | RESPIRATION RATE: 18 BRPM | TEMPERATURE: 98.1 F | BODY MASS INDEX: 30.23 KG/M2 | HEIGHT: 66 IN | DIASTOLIC BLOOD PRESSURE: 77 MMHG

## 2025-02-27 DIAGNOSIS — Z79.899 ENCOUNTER FOR LONG-TERM CURRENT USE OF MEDICATION: ICD-10-CM

## 2025-02-27 DIAGNOSIS — Z94.0 KIDNEY REPLACED BY TRANSPLANT: ICD-10-CM

## 2025-02-27 DIAGNOSIS — Z48.298 AFTERCARE FOLLOWING ORGAN TRANSPLANT: ICD-10-CM

## 2025-02-27 DIAGNOSIS — Z20.828 CONTACT WITH AND (SUSPECTED) EXPOSURE TO OTHER VIRAL COMMUNICABLE DISEASES: ICD-10-CM

## 2025-02-27 DIAGNOSIS — Z98.890 OTHER SPECIFIED POSTPROCEDURAL STATES: ICD-10-CM

## 2025-02-27 DIAGNOSIS — Z94.0 STATUS POST KIDNEY TRANSPLANT: ICD-10-CM

## 2025-02-27 LAB
ANION GAP SERPL CALCULATED.3IONS-SCNC: 8 MMOL/L (ref 7–15)
BUN SERPL-MCNC: 26.9 MG/DL (ref 8–23)
CALCIUM SERPL-MCNC: 9.2 MG/DL (ref 8.8–10.4)
CHLORIDE SERPL-SCNC: 108 MMOL/L (ref 98–107)
CREAT SERPL-MCNC: 1.13 MG/DL (ref 0.67–1.17)
EGFRCR SERPLBLD CKD-EPI 2021: 68 ML/MIN/1.73M2
ERYTHROCYTE [DISTWIDTH] IN BLOOD BY AUTOMATED COUNT: 14.6 % (ref 10–15)
FERRITIN SERPL-MCNC: 624 NG/ML (ref 31–409)
GLUCOSE SERPL-MCNC: 176 MG/DL (ref 70–99)
HCO3 SERPL-SCNC: 23 MMOL/L (ref 22–29)
HCT VFR BLD AUTO: 35.3 % (ref 40–53)
HGB BLD-MCNC: 11.4 G/DL (ref 13.3–17.7)
IRON BINDING CAPACITY (ROCHE): 259 UG/DL (ref 240–430)
IRON SATN MFR SERPL: 24 % (ref 15–46)
IRON SERPL-MCNC: 62 UG/DL (ref 61–157)
MCH RBC QN AUTO: 32.3 PG (ref 26.5–33)
MCHC RBC AUTO-ENTMCNC: 32.3 G/DL (ref 31.5–36.5)
MCV RBC AUTO: 100 FL (ref 78–100)
MYCOPHENOLATE SERPL LC/MS/MS-MCNC: 2.04 MG/L (ref 1–3.5)
MYCOPHENOLATE-G SERPL LC/MS/MS-MCNC: 58.6 MG/L (ref 30–95)
PHOSPHATE SERPL-MCNC: 1.6 MG/DL (ref 2.5–4.5)
PLATELET # BLD AUTO: 242 10E3/UL (ref 150–450)
POTASSIUM SERPL-SCNC: 5.2 MMOL/L (ref 3.4–5.3)
PTH-INTACT SERPL-MCNC: 307 PG/ML (ref 15–65)
RBC # BLD AUTO: 3.53 10E6/UL (ref 4.4–5.9)
SODIUM SERPL-SCNC: 139 MMOL/L (ref 135–145)
TACROLIMUS BLD-MCNC: 8.9 UG/L (ref 5–15)
TME LAST DOSE: NORMAL H
VIT D+METAB SERPL-MCNC: 29 NG/ML (ref 20–50)
WBC # BLD AUTO: 10.9 10E3/UL (ref 4–11)

## 2025-02-27 PROCEDURE — 80048 BASIC METABOLIC PNL TOTAL CA: CPT | Performed by: PATHOLOGY

## 2025-02-27 PROCEDURE — 83540 ASSAY OF IRON: CPT | Performed by: PATHOLOGY

## 2025-02-27 PROCEDURE — 83550 IRON BINDING TEST: CPT | Performed by: PATHOLOGY

## 2025-02-27 PROCEDURE — 83970 ASSAY OF PARATHORMONE: CPT | Performed by: PATHOLOGY

## 2025-02-27 PROCEDURE — 80197 ASSAY OF TACROLIMUS: CPT | Performed by: INTERNAL MEDICINE

## 2025-02-27 PROCEDURE — 82728 ASSAY OF FERRITIN: CPT | Performed by: PATHOLOGY

## 2025-02-27 PROCEDURE — 85027 COMPLETE CBC AUTOMATED: CPT | Performed by: PATHOLOGY

## 2025-02-27 PROCEDURE — 36415 COLL VENOUS BLD VENIPUNCTURE: CPT | Performed by: PATHOLOGY

## 2025-02-27 PROCEDURE — 99000 SPECIMEN HANDLING OFFICE-LAB: CPT | Performed by: PATHOLOGY

## 2025-02-27 PROCEDURE — G0463 HOSPITAL OUTPT CLINIC VISIT: HCPCS | Performed by: NURSE PRACTITIONER

## 2025-02-27 PROCEDURE — 80180 DRUG SCRN QUAN MYCOPHENOLATE: CPT | Performed by: INTERNAL MEDICINE

## 2025-02-27 PROCEDURE — 82306 VITAMIN D 25 HYDROXY: CPT | Performed by: INTERNAL MEDICINE

## 2025-02-27 PROCEDURE — 84100 ASSAY OF PHOSPHORUS: CPT | Performed by: PATHOLOGY

## 2025-02-27 RX ORDER — VALGANCICLOVIR 450 MG/1
450 TABLET, FILM COATED ORAL DAILY
COMMUNITY
Start: 2025-02-27

## 2025-02-27 ASSESSMENT — PAIN SCALES - GENERAL: PAINLEVEL_OUTOF10: MILD PAIN (1)

## 2025-02-27 NOTE — PROGRESS NOTES
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 He underwent a DCD kidney transplant with stent and PD catheter removal on 2/13/25 with Dr. Wali Newell.     Feeling well. Weight trending down close to dry weight. Was on lasix but stopped when edema resolved.   Dry weight 186lbs.   Current weight 188lbs.     Appetite is good. Drinking around 2 liters.   BP at home 140-150 systolic     Urinating with no issues.   Bowels alternate loose to soft. No fever or chills.   No CP or SOB.     Saw Endo this week. Insulin lispro 2-3 units with lunch and dinner added. Continued on current 70/30 dosing. Will see them again in April.         Transplant History:    Transplant Type:  DDKT  Donor Type: Donation after Circulatory Death   Transplant Date:  2/13/2025 (Kidney)   Ureteral Stent:  Yes   Crossmatch:  negative   DSA at Tx:  No  Baseline Cr: TBD   DeNovo DSA: No    Acute Rejection Hx:  No    Present Maintenance Immunosuppression:  Tacrolimus, Mycophenolate mofetil, and Prednisone    CMV IgG Ab Discordance:  No  EBV IgG Ab Discordance:  No    BK Viremia:  No  EBV Viremia:  No    Transplant Coordinator: Ruth Jacob     Transplant Office Phone Number: [phone]     Immunosuppressant Medications       Immunosuppressive Agents Disp Start End     mycophenolate (GENERIC EQUIVALENT) 250 MG capsule 540 capsule 2/20/2025 --    Sig - Route: Take 3 capsules (750 mg) by mouth 2 times daily. - Oral    Class: E-Prescribe    Notes to Pharmacy: TXP DT 2/13/2025 (Kidney) TXP Dischg DT 2/17/2025 DX Kidney replaced by transplant Z94.0 TX Center [facility] ([place], [place])     tacrolimus (GENERIC EQUIVALENT) 1 MG capsule 540 capsule 2/20/2025 --    Sig - Route: Take 3  capsules (3 mg) by mouth 2 times daily. - Oral    Class: E-Prescribe    Notes to Pharmacy: TXP DT 2/13/2025 (Kidney) TXP Dischg DT 2/17/2025 DX Kidney replaced by transplant Z94.0 TX Center [facility] ([place], [place])            Possible Immunosuppression-related side effects:   []             headache  []             vivid dreams  []             irritability  []             cognitive difficuties  []             fine tremor  []             nausea  []             diarrhea  []             neuropathy      []             edema  []             renal calcineurin toxicity  []             hyperkalemia  []             post-transplant diabetes  []             decreased appetite  []             increased appetite  []             other:  []             none    Prescription Medications as of 2/27/2025         Rx Number Disp Refills Start End Last Dispensed Date Next Fill Date Owning Pharmacy    acetaminophen (TYLENOL) 325 MG tablet  -- -- 2/17/2025 --       Sig: Take 3 tablets (975 mg) by mouth every 6 hours as needed for mild pain or fever.    Class: OTC    Route: Oral    allopurinol (ZYLOPRIM) 100 MG tablet  -- -- 3/22/2024 --       Sig: Take 100 mg by mouth daily    Class: Historical    Route: Oral    aspirin 81 MG EC tablet  -- --  --       Sig: Take 81 mg by mouth daily.    Class: Historical    Route: Oral    atorvastatin (LIPITOR) 40 MG tablet  30 tablet 5 2/18/2025 --   [facility] - [place], [place] - [address]     Sig: Take 1 tablet (40 mg) by mouth daily.    Class: E-Prescribe    Route: Oral    Continuous Glucose Sensor (FREESTYLE ANTONIO 14 DAY SENSOR) [facility]  -- -- 5/6/2024 --       Class: Historical    finasteride (PROSCAR) 5 MG tablet  90 tablet 3 11/19/2024 --   [facility] - [place], [place] - [address]    Sig: Take 1 tablet (5 mg) by mouth daily.    Class: E-Prescribe    Route: Oral    fish oil-omega-3 fatty acids 1000 MG capsule   -- -- 2/17/2025 --       Sig: Take 2 capsules (2 g) by mouth 2 times daily.    Class: OTC    Route: Oral    gabapentin (NEURONTIN) 100 MG capsule  -- -- 4/25/2023 --       Sig: Take 200 mg by mouth at bedtime.    Class: Historical    Route: Oral    insulin aspart (NOVOLOG PEN) 100 UNIT/ML pen  15 mL 1 2/17/2025 --   [address]    Sig: Inject 1-7 Units subcutaneously 3 times daily (before meals). Correction Scale -   Do Not give Correction Insulin if Pre-Meal BG less than 140. For Pre-Meal  - 189 give 1 unit. For Pre-Meal - 239 give 2 units. For Pre-Meal -289 give 3 units. For Pre-Meal -339 give 4 units. For Pre-Meal -389 give 5 units. For Pre-Meal -439 give 6 units. For Pre-Meal BG greater than or equal to 440 give 7 units To be given with prandial insulin, and based on pre-meal blood glucose. Administering insulin within 5 minutes of the start of the meal is ideal. Administer insulin no more than 30 minutes after the start of the meal, unless directed otherwise by provider. Notify provider if glucose greater than or equal to 350 mg/dL after administration of correction dose.    Class: E-Prescribe    Route: Subcutaneous    insulin aspart (NOVOLOG PEN) 100 UNIT/ML pen  15 mL 1 2/17/2025 --   [address]    Sig: Inject 1-5 Units subcutaneously at bedtime. Do Not give Bedtime Correction Insulin if BG less than 200. For -249 give 1 units. For -299 give 2 units. For -349 give 3 units. For -399 give 4 units. For BG greater than or equal to 400 give 5 units. Notify provider if glucose greater than or equal to 350 mg/dL after administration of correction dose.    Class: E-Prescribe    Route: Subcutaneous    insulin NPH-Regular (HUMULIN 70/30;NOVOLIN 70/30) susp  15 mL 0 2/17/2025 --   [place] [address]  [facility]    Sig: With prednisone 40 mg dose take 50 units prior to breakfast and 23 units prior to dinner. With prednisone 20 mg dose take 40 units prior to breakfast and 19 units prior to dinner. With prednisone 15 mg dose take 38 units prior to breakfast and 16 units prior to dinner. With prednisone 10 mg dose take 35 units prior to breakfast and 15 units prior to dinner. With prednisone 5 mg dose take 35 units prior to breakfast and 15 units prior to dinner.    Class: E-Prescribe    metoprolol tartrate (LOPRESSOR) 25 MG tablet  90 tablet 3 2/20/2025 --   [facility]    Sig: Take 0.5 tablets (12.5 mg) by mouth 2 times daily.    Class: E-Prescribe    Route: Oral    mycophenolate (GENERIC EQUIVALENT) 250 MG capsule  540 capsule 3 2/20/2025 --   [facility]    Sig: Take 3 capsules (750 mg) by mouth 2 times daily.    Class: E-Prescribe    Notes to Pharmacy: TXP DT 2/13/2025 (Kidney) TXP Dischg DT 2/17/2025 DX Kidney replaced by transplant Z94.0 TX Center [facility] [place] ([place], [place])    Route: Oral    ondansetron (ZOFRAN ODT) 4 MG ODT tab  12 tablet 1 2/17/2025 --   [facility] - [place], [place] - [address] [place] [address]    Sig: Take 1 tablet (4 mg) by mouth every 6 hours as needed for nausea.    Class: E-Prescribe    Route: Oral    pantoprazole (PROTONIX) 40 MG EC tablet  90 tablet 0 2/20/2025 --   [facility]    Sig: Take 1 tablet (40 mg) by mouth daily.    Class: E-Prescribe    Route: Oral    predniSONE (DELTASONE) 10 MG tablet  20 tablet 0 2/18/2025 3/20/2025   [address]    Sig: Take 4 tablets (40 mg) by mouth daily for 2 days, THEN 2 tablets (20 mg) daily for 7 days, THEN 1.5 tablets (15 mg) daily for 7 days, THEN 1 tablet (10 mg) daily for  7 days, THEN 0.5 tablets (5 mg) daily for 7 days.    Class: E-Prescribe    Route: Oral    sulfamethoxazole-trimethoprim (BACTRIM) 400-80 MG tablet  90 tablet 3 2/20/2025 --   [facility]    Sig: Take 1 tablet by mouth daily.    Class: E-Prescribe    Route: Oral    tacrolimus (GENERIC EQUIVALENT) 1 MG capsule  540 capsule 3 2/20/2025 --   [facility]    Sig: Take 3 capsules (3 mg) by mouth 2 times daily.    Class: E-Prescribe    Notes to Pharmacy: TXP DT 2/13/2025 (Kidney) TXP Dischg DT 2/17/2025 DX Kidney replaced by transplant Z94.0 TX Center [facility] ([place], [place])    Route: Oral    tamsulosin (FLOMAX) 0.4 MG capsule  -- --  --       Sig: Take 0.4 mg by mouth daily.    Class: Historical    Route: Oral    valGANciclovir (VALCYTE) 450 MG tablet  90 tablet 1 2/20/2025 --   [facility]    Sig: Take 1 tablet (450 mg) by mouth daily. Stop on 8/13/2025    Class: E-Prescribe    Route: Oral    furosemide (LASIX) 20 MG tablet  7 tablet 0 2/21/2025 --   [id number] - [address] OF [facility]    Sig: Take 1 tablet (20 mg) by mouth daily.    Class: E-Prescribe    Route: Oral    magnesium oxide (MAG-OX) 400 MG tablet  90 tablet 3 2/20/2025 --   [facility]    Sig: Take 1 tablet (400 mg) by mouth daily (with lunch).    Class: E-Prescribe    Route: Oral    psyllium (METAMUCIL/KONSYL) 58.6 % powder  -- --  --       Sig: Take 1 teaspoonful by mouth daily.    Class: Historical    Route: Oral            O:   Temp:  [98.1  F (36.7  C)] 98.1  F (36.7  C)  Pulse:  [75] 75  Resp:  [18] 18  BP: (147)/(77) 147/77  SpO2:  [99 %] 99 %  General Appearance: in no apparent distress.   Skin: Normal, no rashes or jaundice  Heart: regular rate  and rhythm  Lungs: easy respirations, no audible wheezing.  Abdomen: rounded, The wound is dry and intact, without hernia. The abdomen is non-tender. The kidney graft is not tender.  There is no ascites.  Extremities: Tremor absent.   Edema: absent.   RLQ incision intact with staples - CDI. No erythema or drainage.           Latest Ref Rng & Units 2/27/2025     7:05 AM 2/24/2025     7:08 AM 2/20/2025     7:54 AM 2/19/2025     7:24 AM 2/18/2025     7:20 AM   Transplant Immunosuppression Labs   Creat 0.67 - 1.17 mg/dL 1.13  1.26  1.54  1.65  1.81    Urea Nitrogen 8.0 - 23.0 mg/dL 26.9  28.1  45.4  49.5  53.2    WBC 4.0 - 11.0 10e3/uL 10.9  10.4  9.2  9.5  10.1    Neutrophil %    69  75        Chemistries:   Recent Labs   Lab Test 02/27/25  0705   BUN 26.9*   CR 1.13   GFRESTIMATED 68   *     Lab Results   Component Value Date    A1C 7.1 02/12/2025    CPEPT 17.4 12/26/2024     Recent Labs   Lab Test 02/12/25 2047   ALBUMIN 3.9   BILITOTAL 0.2   ALKPHOS 59   AST 17   ALT 15     Urine Studies:  Recent Labs   Lab Test 02/12/25 2132   COLOR Light Yellow   APPEARANCE Slightly Cloudy*   URINEGLC >=1000*   URINEBILI Negative   URINEKETONE Negative   SG 1.013   UBLD Trace*   URINEPH 6.5   PROTEIN 100*   NITRITE Negative   LEUKEST Large*   RBCU 8*   WBCU >182*     No lab results found.  Hematology:   Recent Labs   Lab Test 02/27/25  0705 02/24/25  0708 02/20/25  0754   HGB 11.4* 10.6* 11.4*    240 211   WBC 10.9 10.4 9.2     Coags:   Recent Labs   Lab Test 02/12/25 2047 07/17/24  0847   INR 0.81* 1.01     HLA antibodies:   SA1 HI RISK AYAAN   Date Value Ref Range Status   02/12/2025 None  Final     SA1 MOD RISK AYAAN   Date Value Ref Range Status   02/12/2025 None  Final     SA2 HI RISK AYAAN   Date Value Ref Range Status   02/12/2025 None  Final     SA2 MOD RISK AYAAN   Date Value Ref Range Status   02/12/2025 None  Final       Assessment: Peter Carpio is doing well s/p DDKT:  Issues we addressed during his visit  include:    Plan:    1. Graft function: Cr 1.1   2. Immunosuppression Management: No change    .  Complexity of management:Medium.  Contributing factors:  hypophosphatemia, DDKT,   3. Ensure Valcyte dosing is 450mg daily.   Start phos supplement.     Followup: seeing surgeon on Monday     Total Time: 20 min,   Counselling Time: 10 min.    Stephania Alatorre NP

## 2025-02-27 NOTE — NURSING NOTE
"Chief Complaint   Patient presents with    Surgical Followup     14 days post kidney transplant. Complains of numbness in fingers.      Vital signs:  Temp: 98.1  F (36.7  C) Temp src: Oral BP: (!) 147/77 Pulse: 75   Resp: 18 SpO2: 99 %     Height: 167.5 cm (5' 5.95\") Weight: 85.3 kg (188 lb 1.6 oz)  Estimated body mass index is 30.41 kg/m  as calculated from the following:    Height as of this encounter: 1.675 m (5' 5.95\").    Weight as of this encounter: 85.3 kg (188 lb 1.6 oz).      Tiff Cabello, [facility]  2/27/2025 9:36 AM    "

## 2025-02-27 NOTE — TELEPHONE ENCOUNTER
Patient had 2/24/25 appointment with Fawn Wheeler PA-C.      Dana Belcher, RN  Endocrine Care Coordinator  [facility]

## 2025-02-27 NOTE — LETTER
2/27/2025      Peter Carpio  [address] [facility] [address]      Dear Colleague,    Thank you for referring your patient, Peter Carpio, to the [facility]. Please see a copy of my visit note below.    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 He underwent a DCD kidney transplant with stent and PD catheter removal on 2/13/25 with Dr. Wali Newell.     Feeling well. Weight trending down close to dry weight. Was on lasix but stopped when edema resolved.   Dry weight 186lbs.   Current weight 188lbs.     Appetite is good. Drinking around 2 liters.   BP at home 140-150 systolic     Urinating with no issues.   Bowels alternate loose to soft. No fever or chills.   No CP or SOB.     Saw Endo this week. Insulin lispro 2-3 units with lunch and dinner added. Continued on current 70/30 dosing. Will see them again in April.         Transplant History:    Transplant Type:  DDKT  Donor Type: Donation after Circulatory Death   Transplant Date:  2/13/2025 (Kidney)   Ureteral Stent:  Yes   Crossmatch:  negative   DSA at Tx:  No  Baseline Cr: TBD   DeNovo DSA: No    Acute Rejection Hx:  No    Present Maintenance Immunosuppression:  Tacrolimus, Mycophenolate mofetil, and Prednisone    CMV IgG Ab Discordance:  No  EBV IgG Ab Discordance:  No    BK Viremia:  No  EBV Viremia:  No    Transplant Coordinator: Ruth Jacob     Transplant Office Phone Number: [phone]     Immunosuppressant Medications       Immunosuppressive Agents Disp Start End     mycophenolate (GENERIC EQUIVALENT) 250 MG capsule 540 capsule 2/20/2025 --    Sig - Route: Take 3 capsules (750 mg) by mouth 2 times daily. - Oral    Class: E-Prescribe    Notes to Pharmacy: TXP DT  2/13/2025 (Kidney) TXP Dischg DT 2/17/2025 DX Kidney replaced by transplant Z94.0 TX [facility] ([place], [place])     tacrolimus (GENERIC EQUIVALENT) 1 MG capsule 540 capsule 2/20/2025 --    Sig - Route: Take 3 capsules (3 mg) by mouth 2 times daily. - Oral    Class: E-Prescribe    Notes to Pharmacy: TXP DT 2/13/2025 (Kidney) TXP Dischg DT 2/17/2025 DX Kidney replaced by transplant Z94.0 TX [facility] ([place], [place])            Possible Immunosuppression-related side effects:   []             headache  []             vivid dreams  []             irritability  []             cognitive difficuties  []             fine tremor  []             nausea  []             diarrhea  []             neuropathy      []             edema  []             renal calcineurin toxicity  []             hyperkalemia  []             post-transplant diabetes  []             decreased appetite  []             increased appetite  []             other:  []             none    Prescription Medications as of 2/27/2025         Rx Number Disp Refills Start End Last Dispensed Date Next Fill Date Owning Pharmacy    acetaminophen (TYLENOL) 325 MG tablet  -- -- 2/17/2025 --       Sig: Take 3 tablets (975 mg) by mouth every 6 hours as needed for mild pain or fever.    Class: OTC    Route: Oral    allopurinol (ZYLOPRIM) 100 MG tablet  -- -- 3/22/2024 --       Sig: Take 100 mg by mouth daily    Class: Historical    Route: Oral    aspirin 81 MG EC tablet  -- --  --       Sig: Take 81 mg by mouth daily.    Class: Historical    Route: Oral    atorvastatin (LIPITOR) 40 MG tablet  30 tablet 5 2/18/2025 --   [facility] - [place], [place] - [address]    Sig: Take 1 tablet (40 mg) by mouth daily.    Class: E-Prescribe    Route: Oral    Continuous Glucose Sensor (FREESTYLE ANTONIO 14 DAY SENSOR) [facility]  -- -- 5/6/2024 --       Class: Historical     finasteride (PROSCAR) 5 MG tablet  90 tablet 3 11/19/2024 --   [facility] - [place], [place] - [address]    Sig: Take 1 tablet (5 mg) by mouth daily.    Class: E-Prescribe    Route: Oral    fish oil-omega-3 fatty acids 1000 MG capsule  -- -- 2/17/2025 --       Sig: Take 2 capsules (2 g) by mouth 2 times daily.    Class: OTC    Route: Oral    gabapentin (NEURONTIN) 100 MG capsule  -- -- 4/25/2023 --       Sig: Take 200 mg by mouth at bedtime.    Class: Historical    Route: Oral    insulin aspart (NOVOLOG PEN) 100 UNIT/ML pen  15 mL 1 2/17/2025 --   [place], [place] - [address] [facility] [address]    Sig: Inject 1-7 Units subcutaneously 3 times daily (before meals). Correction Scale -   Do Not give Correction Insulin if Pre-Meal BG less than 140. For Pre-Meal  - 189 give 1 unit. For Pre-Meal - 239 give 2 units. For Pre-Meal -289 give 3 units. For Pre-Meal -339 give 4 units. For Pre-Meal -389 give 5 units. For Pre-Meal -439 give 6 units. For Pre-Meal BG greater than or equal to 440 give 7 units To be given with prandial insulin, and based on pre-meal blood glucose. Administering insulin within 5 minutes of the start of the meal is ideal. Administer insulin no more than 30 minutes after the start of the meal, unless directed otherwise by provider. Notify provider if glucose greater than or equal to 350 mg/dL after administration of correction dose.    Class: E-Prescribe    Route: Subcutaneous    insulin aspart (NOVOLOG PEN) 100 UNIT/ML pen  15 mL 1 2/17/2025 --   [facility] - [place], [place] - [address]    Sig: Inject 1-5 Units subcutaneously at bedtime. Do Not give Bedtime Correction Insulin if BG less than 200. For -249 give 1 units. For -299 give 2 units. For -349 give 3 units. For -399 give 4 units. For BG greater than or equal to 400 give 5 units. Notify provider if glucose greater  than or equal to 350 mg/dL after administration of correction dose.    Class: E-Prescribe    Route: Subcutaneous    insulin NPH-Regular (HUMULIN 70/30;NOVOLIN 70/30) susp  15 mL 0 2/17/2025 --   [place], [place] - [address] [place] [address]    Sig: With prednisone 40 mg dose take 50 units prior to breakfast and 23 units prior to dinner. With prednisone 20 mg dose take 40 units prior to breakfast and 19 units prior to dinner. With prednisone 15 mg dose take 38 units prior to breakfast and 16 units prior to dinner. With prednisone 10 mg dose take 35 units prior to breakfast and 15 units prior to dinner. With prednisone 5 mg dose take 35 units prior to breakfast and 15 units prior to dinner.    Class: E-Prescribe    metoprolol tartrate (LOPRESSOR) 25 MG tablet  90 tablet 3 2/20/2025 --   [facility]    Sig: Take 0.5 tablets (12.5 mg) by mouth 2 times daily.    Class: E-Prescribe    Route: Oral    mycophenolate (GENERIC EQUIVALENT) 250 MG capsule  540 capsule 3 2/20/2025 --   [facility]    Sig: Take 3 capsules (750 mg) by mouth 2 times daily.    Class: E-Prescribe    Notes to Pharmacy: TXP DT 2/13/2025 (Kidney) TXP Dischg DT 2/17/2025 DX Kidney replaced by transplant Z94.0 TX Center [facility] ([place], [place])    Route: Oral    ondansetron (ZOFRAN ODT) 4 MG ODT tab  12 tablet 1 2/17/2025 --   [facility] - [place], [place] - [address] [place] [address]    Sig: Take 1 tablet (4 mg) by mouth every 6 hours as needed for nausea.    Class: E-Prescribe    Route: Oral    pantoprazole (PROTONIX) 40 MG EC tablet  90 tablet 0 2/20/2025 --   [facility]    Sig: Take 1 tablet (40 mg) by mouth daily.    Class: E-Prescribe    Route: Oral    predniSONE (DELTASONE) 10 MG tablet  20 tablet 0 2/18/2025  3/20/2025   [facility] - [place], [place] - [address] [facility]    Sig: Take 4 tablets (40 mg) by mouth daily for 2 days, THEN 2 tablets (20 mg) daily for 7 days, THEN 1.5 tablets (15 mg) daily for 7 days, THEN 1 tablet (10 mg) daily for 7 days, THEN 0.5 tablets (5 mg) daily for 7 days.    Class: E-Prescribe    Route: Oral    sulfamethoxazole-trimethoprim (BACTRIM) 400-80 MG tablet  90 tablet 3 2/20/2025 --   [facility]    Sig: Take 1 tablet by mouth daily.    Class: E-Prescribe    Route: Oral    tacrolimus (GENERIC EQUIVALENT) 1 MG capsule  540 capsule 3 2/20/2025 --   [facility]    Sig: Take 3 capsules (3 mg) by mouth 2 times daily.    Class: E-Prescribe    Notes to Pharmacy: TXP DT 2/13/2025 (Kidney) TXP Dischg DT 2/17/2025 DX Kidney replaced by transplant Z94.0 TX [facility] ([place], [place])    Route: Oral    tamsulosin (FLOMAX) 0.4 MG capsule  -- --  --       Sig: Take 0.4 mg by mouth daily.    Class: Historical    Route: Oral    valGANciclovir (VALCYTE) 450 MG tablet  90 tablet 1 2/20/2025 --   [facility]    Sig: Take 1 tablet (450 mg) by mouth daily. Stop on 8/13/2025    Class: E-Prescribe    Route: Oral    furosemide (LASIX) 20 MG tablet  7 tablet 0 2/21/2025 --   [id number] - [facility] [address] [facility] [address] [facility] [address]    Sig: Take 1 tablet (20 mg) by mouth daily.    Class: E-Prescribe    Route: Oral    magnesium oxide (MAG-OX) 400 MG tablet  90 tablet 3 2/20/2025 --   [facility]    Sig: Take 1 tablet (400 mg) by mouth daily (with lunch).    Class: E-Prescribe    Route: Oral    psyllium (METAMUCIL/KONSYL) 58.6 % powder  -- --  --       Sig: Take 1 teaspoonful by mouth daily.    Class: Historical     Route: Oral            O:   Temp:  [98.1  F (36.7  C)] 98.1  F (36.7  C)  Pulse:  [75] 75  Resp:  [18] 18  BP: (147)/(77) 147/77  SpO2:  [99 %] 99 %  General Appearance: in no apparent distress.   Skin: Normal, no rashes or jaundice  Heart: regular rate and rhythm  Lungs: easy respirations, no audible wheezing.  Abdomen: rounded, The wound is dry and intact, without hernia. The abdomen is non-tender. The kidney graft is not tender.  There is no ascites.  Extremities: Tremor absent.   Edema: absent.   RLQ incision intact with staples - CDI. No erythema or drainage.           Latest Ref Rng & Units 2/27/2025     7:05 AM 2/24/2025     7:08 AM 2/20/2025     7:54 AM 2/19/2025     7:24 AM 2/18/2025     7:20 AM   Transplant Immunosuppression Labs   Creat 0.67 - 1.17 mg/dL 1.13  1.26  1.54  1.65  1.81    Urea Nitrogen 8.0 - 23.0 mg/dL 26.9  28.1  45.4  49.5  53.2    WBC 4.0 - 11.0 10e3/uL 10.9  10.4  9.2  9.5  10.1    Neutrophil %    69  75        Chemistries:   Recent Labs   Lab Test 02/27/25  0705   BUN 26.9*   CR 1.13   GFRESTIMATED 68   *     Lab Results   Component Value Date    A1C 7.1 02/12/2025    CPEPT 17.4 12/26/2024     Recent Labs   Lab Test 02/12/25  2047   ALBUMIN 3.9   BILITOTAL 0.2   ALKPHOS 59   AST 17   ALT 15     Urine Studies:  Recent Labs   Lab Test 02/12/25  2132   COLOR Light Yellow   APPEARANCE Slightly Cloudy*   URINEGLC >=1000*   URINEBILI Negative   URINEKETONE Negative   SG 1.013   UBLD Trace*   URINEPH 6.5   PROTEIN 100*   NITRITE Negative   LEUKEST Large*   RBCU 8*   WBCU >182*     No lab results found.  Hematology:   Recent Labs   Lab Test 02/27/25  0705 02/24/25  0708 02/20/25  0754   HGB 11.4* 10.6* 11.4*    240 211   WBC 10.9 10.4 9.2     Coags:   Recent Labs   Lab Test 02/12/25 2047 07/17/24  0847   INR 0.81* 1.01     HLA antibodies:   SA1 HI RISK AYAAN   Date Value Ref Range Status   02/12/2025 None  Final     SA1 MOD RISK AYAAN   Date Value Ref Range Status   02/12/2025  None  Final     SA2 HI RISK AYAAN   Date Value Ref Range Status   02/12/2025 None  Final     SA2 MOD RISK AYAAN   Date Value Ref Range Status   02/12/2025 None  Final       Assessment: Peter Carpio is doing well s/p DDKT:  Issues we addressed during his visit include:    Plan:    1. Graft function: Cr 1.1   2. Immunosuppression Management: No change    .  Complexity of management:Medium.  Contributing factors:  hypophosphatemia, DDKT,   3. Ensure Valcyte dosing is 450mg daily.   Start phos supplement.     Followup: seeing surgeon on Monday     Total Time: 20 min,   Counselling Time: 10 min.    Stephania Alatorre NP        Again, thank you for allowing me to participate in the care of your patient.        Sincerely,        ALEKSANDR Connell CNP    Electronically signed

## 2025-03-03 ENCOUNTER — LAB (OUTPATIENT)
Dept: LAB | Facility: CLINIC | Age: 75
End: 2025-03-03
Payer: COMMERCIAL

## 2025-03-03 ENCOUNTER — OFFICE VISIT (OUTPATIENT)
Dept: TRANSPLANT | Facility: CLINIC | Age: 75
End: 2025-03-03
Attending: SURGERY
Payer: COMMERCIAL

## 2025-03-03 ENCOUNTER — TELEPHONE (OUTPATIENT)
Dept: TRANSPLANT | Facility: CLINIC | Age: 75
End: 2025-03-03

## 2025-03-03 VITALS
BODY MASS INDEX: 30.02 KG/M2 | OXYGEN SATURATION: 98 % | HEART RATE: 84 BPM | RESPIRATION RATE: 16 BRPM | WEIGHT: 185.7 LBS | DIASTOLIC BLOOD PRESSURE: 81 MMHG | SYSTOLIC BLOOD PRESSURE: 154 MMHG

## 2025-03-03 DIAGNOSIS — Z94.0 KIDNEY REPLACED BY TRANSPLANT: ICD-10-CM

## 2025-03-03 DIAGNOSIS — Z98.890 OTHER SPECIFIED POSTPROCEDURAL STATES: ICD-10-CM

## 2025-03-03 DIAGNOSIS — Z20.828 CONTACT WITH AND (SUSPECTED) EXPOSURE TO OTHER VIRAL COMMUNICABLE DISEASES: ICD-10-CM

## 2025-03-03 DIAGNOSIS — Z48.298 AFTERCARE FOLLOWING ORGAN TRANSPLANT: ICD-10-CM

## 2025-03-03 DIAGNOSIS — Z79.899 ENCOUNTER FOR LONG-TERM CURRENT USE OF MEDICATION: ICD-10-CM

## 2025-03-03 DIAGNOSIS — Z94.0 STATUS POST KIDNEY TRANSPLANT: ICD-10-CM

## 2025-03-03 LAB
ANION GAP SERPL CALCULATED.3IONS-SCNC: 9 MMOL/L (ref 7–15)
BUN SERPL-MCNC: 29.5 MG/DL (ref 8–23)
CALCIUM SERPL-MCNC: 9.5 MG/DL (ref 8.8–10.4)
CHLORIDE SERPL-SCNC: 108 MMOL/L (ref 98–107)
CREAT SERPL-MCNC: 1.13 MG/DL (ref 0.67–1.17)
EGFRCR SERPLBLD CKD-EPI 2021: 68 ML/MIN/1.73M2
ERYTHROCYTE [DISTWIDTH] IN BLOOD BY AUTOMATED COUNT: 14.3 % (ref 10–15)
GLUCOSE SERPL-MCNC: 209 MG/DL (ref 70–99)
HCO3 SERPL-SCNC: 23 MMOL/L (ref 22–29)
HCT VFR BLD AUTO: 37.2 % (ref 40–53)
HGB BLD-MCNC: 11.8 G/DL (ref 13.3–17.7)
MAGNESIUM SERPL-MCNC: 1.5 MG/DL (ref 1.7–2.3)
MCH RBC QN AUTO: 31.7 PG (ref 26.5–33)
MCHC RBC AUTO-ENTMCNC: 31.7 G/DL (ref 31.5–36.5)
MCV RBC AUTO: 100 FL (ref 78–100)
PHOSPHATE SERPL-MCNC: 1.5 MG/DL (ref 2.5–4.5)
PLATELET # BLD AUTO: 224 10E3/UL (ref 150–450)
POTASSIUM SERPL-SCNC: 4.6 MMOL/L (ref 3.4–5.3)
RBC # BLD AUTO: 3.72 10E6/UL (ref 4.4–5.9)
SODIUM SERPL-SCNC: 140 MMOL/L (ref 135–145)
TACROLIMUS BLD-MCNC: 10 UG/L (ref 5–15)
TME LAST DOSE: NORMAL H
TME LAST DOSE: NORMAL H
WBC # BLD AUTO: 7.3 10E3/UL (ref 4–11)

## 2025-03-03 PROCEDURE — 3079F DIAST BP 80-89 MM HG: CPT | Performed by: SURGERY

## 2025-03-03 PROCEDURE — 83735 ASSAY OF MAGNESIUM: CPT | Performed by: PATHOLOGY

## 2025-03-03 PROCEDURE — 84100 ASSAY OF PHOSPHORUS: CPT | Performed by: PATHOLOGY

## 2025-03-03 PROCEDURE — 3077F SYST BP >= 140 MM HG: CPT | Performed by: SURGERY

## 2025-03-03 PROCEDURE — 80048 BASIC METABOLIC PNL TOTAL CA: CPT | Performed by: PATHOLOGY

## 2025-03-03 PROCEDURE — 36415 COLL VENOUS BLD VENIPUNCTURE: CPT | Performed by: PATHOLOGY

## 2025-03-03 PROCEDURE — 85027 COMPLETE CBC AUTOMATED: CPT | Performed by: PATHOLOGY

## 2025-03-03 PROCEDURE — 99213 OFFICE O/P EST LOW 20 MIN: CPT | Mod: 24 | Performed by: SURGERY

## 2025-03-03 PROCEDURE — 80197 ASSAY OF TACROLIMUS: CPT | Performed by: INTERNAL MEDICINE

## 2025-03-03 PROCEDURE — G0463 HOSPITAL OUTPT CLINIC VISIT: HCPCS | Performed by: SURGERY

## 2025-03-03 PROCEDURE — 99000 SPECIMEN HANDLING OFFICE-LAB: CPT | Performed by: PATHOLOGY

## 2025-03-03 RX ORDER — MYCOPHENOLATE MOFETIL 250 MG/1
750 CAPSULE ORAL 2 TIMES DAILY
Qty: 540 CAPSULE | Refills: 3 | Status: SHIPPED | OUTPATIENT
Start: 2025-03-03

## 2025-03-03 RX ORDER — SULFAMETHOXAZOLE AND TRIMETHOPRIM 400; 80 MG/1; MG/1
1 TABLET ORAL DAILY
Qty: 90 TABLET | Refills: 3 | Status: SHIPPED | OUTPATIENT
Start: 2025-03-03

## 2025-03-03 RX ORDER — VALGANCICLOVIR 450 MG/1
450 TABLET, FILM COATED ORAL DAILY
Qty: 90 TABLET | Refills: 1 | Status: SHIPPED | OUTPATIENT
Start: 2025-03-03

## 2025-03-03 RX ORDER — PANTOPRAZOLE SODIUM 40 MG/1
40 TABLET, DELAYED RELEASE ORAL DAILY
Qty: 90 TABLET | Refills: 0 | Status: SHIPPED | OUTPATIENT
Start: 2025-03-03

## 2025-03-03 RX ORDER — METOPROLOL TARTRATE 25 MG/1
12.5 TABLET, FILM COATED ORAL 2 TIMES DAILY
Qty: 90 TABLET | Refills: 3 | Status: SHIPPED | OUTPATIENT
Start: 2025-03-03 | End: 2025-03-05

## 2025-03-03 RX ORDER — TACROLIMUS 1 MG/1
3 CAPSULE ORAL 2 TIMES DAILY
Qty: 540 CAPSULE | Refills: 3 | Status: SHIPPED | OUTPATIENT
Start: 2025-03-03 | End: 2025-03-06

## 2025-03-03 RX ORDER — ATORVASTATIN CALCIUM 40 MG/1
40 TABLET, FILM COATED ORAL DAILY
Qty: 90 TABLET | Refills: 3 | Status: SHIPPED | OUTPATIENT
Start: 2025-03-03

## 2025-03-03 RX ORDER — MAGNESIUM OXIDE 400 MG/1
400 TABLET ORAL
Qty: 90 TABLET | Refills: 3 | Status: SHIPPED | OUTPATIENT
Start: 2025-03-03

## 2025-03-03 RX ORDER — TAMSULOSIN HYDROCHLORIDE 0.4 MG/1
0.4 CAPSULE ORAL DAILY
Qty: 90 CAPSULE | Refills: 3 | Status: SHIPPED | OUTPATIENT
Start: 2025-03-03

## 2025-03-03 NOTE — TELEPHONE ENCOUNTER
Spoke with Alexis, needs his medications sent to [facility], not [place]. Scripts sent and he will reach out to pharmacy to fill. Has an appt with them on Thursday.     Estimated Creatinine Clearance: 58.7 mL/min (based on SCr of 1.13 mg/dL).  Will remain on Valcyte 450 mg daily.

## 2025-03-03 NOTE — Clinical Note
3/3/2025      Peter Carpio  [address] [address]      Dear Colleague,    Thank you for referring your patient, Peter Carpio, to the [facility]. Please see a copy of my visit note below.    No notes on file    Again, thank you for allowing me to participate in the care of your patient.        Sincerely,        Wali Newell MD    Electronically signed

## 2025-03-03 NOTE — NURSING NOTE
Chief Complaint   Patient presents with    Follow Up     Kidney transplant 2/13/25       BP (!) 170/80 (BP Location: Right arm, Patient Position: Sitting, Cuff Size: Adult Large)   Pulse 84   Resp 16   Wt 84.2 kg (185 lb 11.2 oz)   SpO2 98%   BMI 30.02 kg/m      Repeat BP: 154/81    FERNY RAY, RN on 3/3/2025 at 3:26 PM

## 2025-03-03 NOTE — TELEPHONE ENCOUNTER
Please call Peter;  He is not sure what medications he needs to refill with [facility] (sounds like [facility]) Wanted to discuss with care team to make sure this is right.

## 2025-03-05 ENCOUNTER — VIRTUAL VISIT (OUTPATIENT)
Dept: PHARMACY | Facility: CLINIC | Age: 75
End: 2025-03-05
Payer: COMMERCIAL

## 2025-03-05 DIAGNOSIS — E11.69 TYPE 2 DIABETES MELLITUS WITH OTHER SPECIFIED COMPLICATION, WITH LONG-TERM CURRENT USE OF INSULIN (H): ICD-10-CM

## 2025-03-05 DIAGNOSIS — Z94.0 HTN, KIDNEY TRANSPLANT RELATED: Primary | ICD-10-CM

## 2025-03-05 DIAGNOSIS — R19.5 LOOSE STOOLS: ICD-10-CM

## 2025-03-05 DIAGNOSIS — I15.1 HTN, KIDNEY TRANSPLANT RELATED: Primary | ICD-10-CM

## 2025-03-05 DIAGNOSIS — Z94.0 STATUS POST KIDNEY TRANSPLANT: ICD-10-CM

## 2025-03-05 DIAGNOSIS — Z79.4 TYPE 2 DIABETES MELLITUS WITH OTHER SPECIFIED COMPLICATION, WITH LONG-TERM CURRENT USE OF INSULIN (H): ICD-10-CM

## 2025-03-05 PROCEDURE — 99607 MTMS BY PHARM ADDL 15 MIN: CPT | Mod: 93 | Performed by: PHARMACIST

## 2025-03-05 PROCEDURE — 99606 MTMS BY PHARM EST 15 MIN: CPT | Mod: 93 | Performed by: PHARMACIST

## 2025-03-05 RX ORDER — METOPROLOL TARTRATE 25 MG/1
25 TABLET, FILM COATED ORAL 2 TIMES DAILY
Qty: 180 TABLET | Refills: 3 | Status: SHIPPED | OUTPATIENT
Start: 2025-03-05

## 2025-03-05 NOTE — PROGRESS NOTES
Medication Therapy Management (MTM) Encounter    ASSESSMENT:                            Medication Adherence/Access: No issues identified.    Hypertension:   BP not at goal <150/90. Will increase Metoprolol to 25mg twice daily.     Diabetes   Blood sugars still elevated before dinner, but well controlled in the morning for the most part (<150). Will continue current dose of Novolog flexpen until he decreases Prednisone to 5mg daily on 3/13.    Loose stools:   Stable.    PLAN:                            Continue your current dose of Novolog 70/30 while on 10mg of prednisone. When you decrease to 5mg daily (3/13) decrease to 32 units every morning and 14 units every evening.   Increase Metoprolol to 1 tablet (25mg) twice daily.     Follow-up: 4/1 at 11 AM    SUBJECTIVE/OBJECTIVE:                          Peter Carpio is a 74 year old male seen for a follow-up visit.       Reason for visit: . Follow-up on changes last visit:  Start metamucil 1 teaspoonful daily in a glass of water in the morning.   Increase Valcyte (Valganciclovir) to 450mg (1 tab) once every daily.   Push Milk with every meal, increase beans, mixed nuts, eggs with meals.   Decrease Novolin 70/30 35 units every morning before breakfast and 14 units every evening before dinner.     Allergies/ADRs: Reviewed in chart  Past Medical History: Reviewed in chart  Tobacco: He reports that he quit smoking about 22 years ago. His smoking use included cigarettes. He has never used smokeless tobacco.  Alcohol: not currently using  BP Readings from Last 3 Encounters:   03/03/25 (!) 154/81   02/27/25 (!) 147/77   02/19/25 119/78     Medication Adherence/Access: no issues reported.    Hypertension   Metoprolol 12.5mg twice daily   Patient reports no current medication side effects  Patient self monitors blood pressure.  Home BP monitoring 152-157/  Patient's weights have been 180 lb stable in the morning.  Swelling has improved, no longer wearing compression  stockings. .      Diabetes   Novolin 70/30 35 units every morning and 14 units every evening eating dinner at 6PM  Novolog 2 units before lunch, and sliding scale before breakfast.  Prednisone 15mg every morning  Patient is not experiencing side effects.  Prior to transplant 70/30 32 units every morning and 35 units every evening.  Current diabetes symptoms: none  Diet/Exercise: Ensure protein.            Eye exam is up to date  Foot exam: up to date    Loose stools:   Not taking fiber currently, will use if his stools get loose.   Having 3 BMs daily.   No diarrhea in 3-4 days.     Today's Vitals: There were no vitals taken for this visit.  ----------------    I spent 22 minutes with this patient today. All changes were made via collaborative practice agreement with Dr. Valenzuela.     A summary of these recommendations was sent via [employer].    Minh Burk, PharmD  [facility] Pharmacist    Phone: [phone]     Telemedicine Visit Details  The patient's medications can be safely assessed via a telemedicine encounter.  Type of service:  Telephone visit  Originating Location (pt. Location): Home    Distant Location (provider location):  Off-site  Start Time: 8:08 AM  End Time: 8:29 AM     Medication Therapy Recommendations  Diabetes mellitus, type 2 (H)   1 Current Medication: insulin NPH-Regular (HUMULIN 70/30;NOVOLIN 70/30) susp   Current Medication Sig: With prednisone 40 mg dose take 50 units prior to breakfast and 23 units prior to dinner. With prednisone 20 mg dose take 40 units prior to breakfast and 19 units prior to dinner. With prednisone 15 mg dose take 38 units prior to breakfast and 16 units prior to dinner. With prednisone 10 mg dose take 35 units prior to breakfast and 15 units prior to dinner. With prednisone 5 mg dose take 35 units prior to breakfast and 15 units prior to dinner.   Rationale: Dose too high - Dosage too high - Safety   Recommendation: Decrease Dose   Status: Accepted per CPA   Identified Date:  3/5/2025 Completed Date: 3/5/2025         HTN, kidney transplant related   1 Current Medication: metoprolol tartrate (LOPRESSOR) 25 MG tablet   Current Medication Sig: Take 1 tablet (25 mg) by mouth 2 times daily.   Rationale: Dose too low - Dosage too low - Effectiveness   Recommendation: Increase Dose   Status: Accepted per CPA   Identified Date: 3/5/2025 Completed Date: 3/5/2025

## 2025-03-05 NOTE — PATIENT INSTRUCTIONS
"Recommendations from today's MTM visit:                                                      Continue your current dose of Novolog 70/30 while on 10mg of prednisone. When you decrease to 5mg daily (3/13) decrease to 32 units every morning and 14 units every evening.   Increase Metoprolol to 1 tablet (25mg) twice daily.     Follow-up: 4/1 at 11 AM    It was great speaking with you today.  I value your experience and would be very thankful for your time in providing feedback in our clinic survey. In the next few days, you may receive an email or text message from [employer] with a link to a survey related to your  clinical pharmacist.\"     To schedule another MTM appointment, please call the clinic directly or you may call the MTM scheduling line at [phone] or toll-free at [phone].     My Clinical Pharmacist's contact information:                                                      Please feel free to contact me with any questions or concerns you have.      Minh Burk, PharmD  MTM Pharmacist    Phone: [phone]     "

## 2025-03-06 ENCOUNTER — LAB (OUTPATIENT)
Dept: LAB | Facility: CLINIC | Age: 75
End: 2025-03-06
Payer: COMMERCIAL

## 2025-03-06 ENCOUNTER — TELEPHONE (OUTPATIENT)
Dept: TRANSPLANT | Facility: CLINIC | Age: 75
End: 2025-03-06

## 2025-03-06 DIAGNOSIS — Z94.0 KIDNEY TRANSPLANTED: Primary | ICD-10-CM

## 2025-03-06 DIAGNOSIS — Z98.890 OTHER SPECIFIED POSTPROCEDURAL STATES: ICD-10-CM

## 2025-03-06 DIAGNOSIS — Z48.298 AFTERCARE FOLLOWING ORGAN TRANSPLANT: ICD-10-CM

## 2025-03-06 DIAGNOSIS — Z94.0 KIDNEY REPLACED BY TRANSPLANT: ICD-10-CM

## 2025-03-06 DIAGNOSIS — Z20.828 CONTACT WITH AND (SUSPECTED) EXPOSURE TO OTHER VIRAL COMMUNICABLE DISEASES: ICD-10-CM

## 2025-03-06 DIAGNOSIS — Z79.899 ENCOUNTER FOR LONG-TERM CURRENT USE OF MEDICATION: ICD-10-CM

## 2025-03-06 DIAGNOSIS — Z94.0 STATUS POST KIDNEY TRANSPLANT: ICD-10-CM

## 2025-03-06 LAB
ANION GAP SERPL CALCULATED.3IONS-SCNC: 11 MMOL/L (ref 7–15)
BUN SERPL-MCNC: 28.2 MG/DL (ref 8–23)
CALCIUM SERPL-MCNC: 10.4 MG/DL (ref 8.8–10.4)
CHLORIDE SERPL-SCNC: 107 MMOL/L (ref 98–107)
CREAT SERPL-MCNC: 1.1 MG/DL (ref 0.67–1.17)
EGFRCR SERPLBLD CKD-EPI 2021: 70 ML/MIN/1.73M2
ERYTHROCYTE [DISTWIDTH] IN BLOOD BY AUTOMATED COUNT: 14.3 % (ref 10–15)
GLUCOSE SERPL-MCNC: 192 MG/DL (ref 70–99)
HCO3 SERPL-SCNC: 23 MMOL/L (ref 22–29)
HCT VFR BLD AUTO: 37.6 % (ref 40–53)
HGB BLD-MCNC: 12 G/DL (ref 13.3–17.7)
MCH RBC QN AUTO: 32 PG (ref 26.5–33)
MCHC RBC AUTO-ENTMCNC: 31.9 G/DL (ref 31.5–36.5)
MCV RBC AUTO: 100 FL (ref 78–100)
PLATELET # BLD AUTO: 191 10E3/UL (ref 150–450)
POTASSIUM SERPL-SCNC: 5 MMOL/L (ref 3.4–5.3)
RBC # BLD AUTO: 3.75 10E6/UL (ref 4.4–5.9)
SODIUM SERPL-SCNC: 141 MMOL/L (ref 135–145)
TACROLIMUS BLD-MCNC: 7.3 UG/L (ref 5–15)
TME LAST DOSE: NORMAL H
TME LAST DOSE: NORMAL H
WBC # BLD AUTO: 6.4 10E3/UL (ref 4–11)

## 2025-03-06 PROCEDURE — 80197 ASSAY OF TACROLIMUS: CPT | Performed by: INTERNAL MEDICINE

## 2025-03-06 PROCEDURE — 99000 SPECIMEN HANDLING OFFICE-LAB: CPT | Performed by: PATHOLOGY

## 2025-03-06 PROCEDURE — 85027 COMPLETE CBC AUTOMATED: CPT | Performed by: PATHOLOGY

## 2025-03-06 PROCEDURE — 80048 BASIC METABOLIC PNL TOTAL CA: CPT | Performed by: PATHOLOGY

## 2025-03-06 PROCEDURE — 36415 COLL VENOUS BLD VENIPUNCTURE: CPT | Performed by: PATHOLOGY

## 2025-03-06 RX ORDER — TACROLIMUS 1 MG/1
3 CAPSULE ORAL 2 TIMES DAILY
Qty: 540 CAPSULE | Refills: 3 | Status: SHIPPED | OUTPATIENT
Start: 2025-03-06

## 2025-03-06 RX ORDER — TACROLIMUS 0.5 MG/1
0.5 CAPSULE, GELATIN COATED ORAL 2 TIMES DAILY
Qty: 60 CAPSULE | Refills: 11 | Status: SHIPPED | OUTPATIENT
Start: 2025-03-06

## 2025-03-06 NOTE — TELEPHONE ENCOUNTER
ISSUE:    Tac level 7.3 on 03/06/25, goal 9, dose 3 mg BID.    PLAN:   Call Patient and confirm this was an accurate 12-hour trough.   Verify   dose 3 mg BID.   Confirm no new medications or illness.   Confirm no missed doses.     If accurate trough and accurate dose, increase   dose to 3.5 mg BID  *Recommended dose rounded from calculated dose 3.7 mg  BID.      Repeat labs in 1 weeks.   For any dose change <50%, recheck labs per guideline or within 1 month.  For any dose change of more than 50%, recheck drug level based on timing to reach steady state:   Immediate release tacrolimus: 2-3 days  Extended-release tacrolimus: 7 days  Cyclosporine: 2 days  Sirolimus: 12 days  Everolimus: 8 days      OUTCOME:   Spoke with Patient, they confirm accurate trough level and current dose 3 mg BID.   Patient confirmed dose change to 3.5 mg BID.  Patient agreed to repeat labs in 1 weeks.   Orders sent to preferred pharmacy for dose change and lab for repeat labs.   Patient voiced understanding of plan.

## 2025-03-08 ENCOUNTER — HEALTH MAINTENANCE LETTER (OUTPATIENT)
Age: 75
End: 2025-03-08

## 2025-03-10 ENCOUNTER — TELEPHONE (OUTPATIENT)
Dept: TRANSPLANT | Facility: CLINIC | Age: 75
End: 2025-03-10
Payer: COMMERCIAL

## 2025-03-10 DIAGNOSIS — Z94.0 STATUS POST KIDNEY TRANSPLANT: ICD-10-CM

## 2025-03-10 RX ORDER — PANTOPRAZOLE SODIUM 40 MG/1
40 TABLET, DELAYED RELEASE ORAL DAILY
Qty: 30 TABLET | Refills: 0 | Status: SHIPPED | OUTPATIENT
Start: 2025-03-10

## 2025-03-11 ENCOUNTER — LAB (OUTPATIENT)
Dept: LAB | Facility: CLINIC | Age: 75
End: 2025-03-11
Payer: COMMERCIAL

## 2025-03-11 DIAGNOSIS — Z48.298 AFTERCARE FOLLOWING ORGAN TRANSPLANT: ICD-10-CM

## 2025-03-11 DIAGNOSIS — Z20.828 CONTACT WITH AND (SUSPECTED) EXPOSURE TO OTHER VIRAL COMMUNICABLE DISEASES: ICD-10-CM

## 2025-03-11 DIAGNOSIS — Z98.890 OTHER SPECIFIED POSTPROCEDURAL STATES: ICD-10-CM

## 2025-03-11 DIAGNOSIS — Z79.899 ENCOUNTER FOR LONG-TERM CURRENT USE OF MEDICATION: ICD-10-CM

## 2025-03-11 DIAGNOSIS — Z94.0 KIDNEY REPLACED BY TRANSPLANT: ICD-10-CM

## 2025-03-11 LAB
ERYTHROCYTE [DISTWIDTH] IN BLOOD BY AUTOMATED COUNT: 13.9 % (ref 10–15)
HCT VFR BLD AUTO: 39.9 % (ref 40–53)
HGB BLD-MCNC: 12.7 G/DL (ref 13.3–17.7)
MCH RBC QN AUTO: 32 PG (ref 26.5–33)
MCHC RBC AUTO-ENTMCNC: 31.8 G/DL (ref 31.5–36.5)
MCV RBC AUTO: 101 FL (ref 78–100)
PLATELET # BLD AUTO: 189 10E3/UL (ref 150–450)
RBC # BLD AUTO: 3.97 10E6/UL (ref 4.4–5.9)
TACROLIMUS BLD-MCNC: 11.3 UG/L (ref 5–15)
TME LAST DOSE: NORMAL H
TME LAST DOSE: NORMAL H
WBC # BLD AUTO: 7.5 10E3/UL (ref 4–11)

## 2025-03-11 PROCEDURE — 36415 COLL VENOUS BLD VENIPUNCTURE: CPT

## 2025-03-11 PROCEDURE — 80048 BASIC METABOLIC PNL TOTAL CA: CPT

## 2025-03-11 PROCEDURE — 83735 ASSAY OF MAGNESIUM: CPT

## 2025-03-11 PROCEDURE — 85027 COMPLETE CBC AUTOMATED: CPT

## 2025-03-11 PROCEDURE — 84100 ASSAY OF PHOSPHORUS: CPT

## 2025-03-11 PROCEDURE — 80197 ASSAY OF TACROLIMUS: CPT

## 2025-03-12 ENCOUNTER — TELEPHONE (OUTPATIENT)
Dept: TRANSPLANT | Facility: CLINIC | Age: 75
End: 2025-03-12
Payer: COMMERCIAL

## 2025-03-12 DIAGNOSIS — Z94.0 STATUS POST KIDNEY TRANSPLANT: ICD-10-CM

## 2025-03-12 DIAGNOSIS — Z29.89 NEED FOR PNEUMOCYSTIS PROPHYLAXIS: Primary | ICD-10-CM

## 2025-03-12 LAB
ANION GAP SERPL CALCULATED.3IONS-SCNC: 9 MMOL/L (ref 7–15)
BUN SERPL-MCNC: 29.7 MG/DL (ref 8–23)
CALCIUM SERPL-MCNC: 9.9 MG/DL (ref 8.8–10.4)
CHLORIDE SERPL-SCNC: 106 MMOL/L (ref 98–107)
CREAT SERPL-MCNC: 1.21 MG/DL (ref 0.67–1.17)
EGFRCR SERPLBLD CKD-EPI 2021: 63 ML/MIN/1.73M2
GLUCOSE SERPL-MCNC: 257 MG/DL (ref 70–99)
HCO3 SERPL-SCNC: 23 MMOL/L (ref 22–29)
MAGNESIUM SERPL-MCNC: 1.4 MG/DL (ref 1.7–2.3)
PHOSPHATE SERPL-MCNC: 2 MG/DL (ref 2.5–4.5)
POTASSIUM SERPL-SCNC: 5.1 MMOL/L (ref 3.4–5.3)
SODIUM SERPL-SCNC: 138 MMOL/L (ref 135–145)

## 2025-03-12 RX ORDER — ALLOPURINOL 100 MG/1
100 TABLET ORAL DAILY
Status: CANCELLED | OUTPATIENT
Start: 2025-03-12

## 2025-03-12 RX ORDER — MYCOPHENOLATE MOFETIL 250 MG/1
750 CAPSULE ORAL 2 TIMES DAILY
Qty: 540 CAPSULE | Refills: 3 | Status: CANCELLED | OUTPATIENT
Start: 2025-03-12

## 2025-03-12 RX ORDER — TACROLIMUS 1 MG/1
3 CAPSULE ORAL 2 TIMES DAILY
Status: CANCELLED | OUTPATIENT
Start: 2025-03-12

## 2025-03-12 RX ORDER — SULFAMETHOXAZOLE AND TRIMETHOPRIM 400; 80 MG/1; MG/1
1 TABLET ORAL DAILY
Qty: 90 TABLET | Refills: 3 | Status: CANCELLED | OUTPATIENT
Start: 2025-03-12

## 2025-03-12 RX ORDER — TACROLIMUS 1 MG/1
2 CAPSULE ORAL 2 TIMES DAILY
Status: SHIPPED
Start: 2025-03-12

## 2025-03-12 RX ORDER — TAMSULOSIN HYDROCHLORIDE 0.4 MG/1
0.4 CAPSULE ORAL DAILY
Qty: 90 CAPSULE | Refills: 3 | Status: CANCELLED | OUTPATIENT
Start: 2025-03-12

## 2025-03-12 RX ORDER — TACROLIMUS 0.5 MG/1
0.5 CAPSULE ORAL 2 TIMES DAILY
Status: SHIPPED
Start: 2025-03-12

## 2025-03-12 NOTE — TELEPHONE ENCOUNTER
Spoke with Alexis who [facility] have [facility] mediations in order to fill. Followed with [facility]. Nunes [facility], [facility].     Spoke with PCP RN Romy Qiu (phone# [phone]) who confirmed medications have been signed off by PCP and have been shipped overnight to him. Alexis notified.    ISSUE:   Tacrolimus IR level 11.3 on 3/11, goal 8-10, dose 3 mg BID.    PLAN:   Call Patient and confirm this was an accurate 12-hour trough.   Verify Tacrolimus IR dose 3 mg BID.   Confirm no new medications or or missed doses.   Confirm no new illness / infection / diarrhea.   If accurate trough and accurate dose, decrease Tacrolimus IR dose to 2.5 mg BID     Is this more than a 50% increase or decrease in current IS dose: No  If YES, justification: N/A    Repeat labs in 1 weeks.  *If > 50% change in immunosuppression dose, repeat labs in 1 week.     OUTCOME:   Spoke with Patient, they confirm accurate trough level and current dose 3 mg BID.   Patient confirmed dose change to 2.5 mg BID.  Patient agreed to repeat labs in 1 weeks.   Orders sent to preferred pharmacy for dose change and lab for repeat labs.   Patient voiced understanding of plan.

## 2025-03-12 NOTE — TELEPHONE ENCOUNTER
Please call Peter;  He wanted to discuss with care team if he needs to refill his sulfamethoxazole-trimethoprim (BACTRIM) 400-80 MG tablet ?    Alexis stated he was not sure if he needs to continue taking Bactrim?  Wanted to discuss before he refills the bactrim.

## 2025-03-18 ENCOUNTER — LAB (OUTPATIENT)
Dept: LAB | Facility: CLINIC | Age: 75
End: 2025-03-18
Payer: COMMERCIAL

## 2025-03-18 DIAGNOSIS — Z20.828 CONTACT WITH AND (SUSPECTED) EXPOSURE TO OTHER VIRAL COMMUNICABLE DISEASES: ICD-10-CM

## 2025-03-18 DIAGNOSIS — Z94.0 KIDNEY REPLACED BY TRANSPLANT: ICD-10-CM

## 2025-03-18 DIAGNOSIS — Z79.899 ENCOUNTER FOR LONG-TERM CURRENT USE OF MEDICATION: ICD-10-CM

## 2025-03-18 DIAGNOSIS — Z98.890 OTHER SPECIFIED POSTPROCEDURAL STATES: ICD-10-CM

## 2025-03-18 DIAGNOSIS — Z94.0 STATUS POST KIDNEY TRANSPLANT: ICD-10-CM

## 2025-03-18 DIAGNOSIS — Z48.298 AFTERCARE FOLLOWING ORGAN TRANSPLANT: ICD-10-CM

## 2025-03-18 LAB
ALBUMIN MFR UR ELPH: 36.5 MG/DL
ANION GAP SERPL CALCULATED.3IONS-SCNC: 13 MMOL/L (ref 7–15)
BK VIRUS DNA IU/ML, INSTRUMENT (6800): 2640 IU/ML
BK VIRUS SPECIMEN TYPE: ABNORMAL
BKV DNA SPEC NAA+PROBE-LOG#: 3.4 {LOG_COPIES}/ML
BUN SERPL-MCNC: 33.6 MG/DL (ref 8–23)
CALCIUM SERPL-MCNC: 10 MG/DL (ref 8.8–10.4)
CHLORIDE SERPL-SCNC: 106 MMOL/L (ref 98–107)
CREAT SERPL-MCNC: 1.21 MG/DL (ref 0.67–1.17)
CREAT UR-MCNC: 108 MG/DL
EGFRCR SERPLBLD CKD-EPI 2021: 63 ML/MIN/1.73M2
ERYTHROCYTE [DISTWIDTH] IN BLOOD BY AUTOMATED COUNT: 13.3 % (ref 10–15)
GLUCOSE SERPL-MCNC: 204 MG/DL (ref 70–99)
HCO3 SERPL-SCNC: 19 MMOL/L (ref 22–29)
HCT VFR BLD AUTO: 40.2 % (ref 40–53)
HGB BLD-MCNC: 13 G/DL (ref 13.3–17.7)
MAGNESIUM SERPL-MCNC: 1.4 MG/DL (ref 1.7–2.3)
MCH RBC QN AUTO: 32.2 PG (ref 26.5–33)
MCHC RBC AUTO-ENTMCNC: 32.3 G/DL (ref 31.5–36.5)
MCV RBC AUTO: 100 FL (ref 78–100)
MYCOPHENOLATE SERPL LC/MS/MS-MCNC: 2.11 MG/L (ref 1–3.5)
MYCOPHENOLATE-G SERPL LC/MS/MS-MCNC: 63.7 MG/L (ref 30–95)
PHOSPHATE SERPL-MCNC: 2.2 MG/DL (ref 2.5–4.5)
PLATELET # BLD AUTO: 178 10E3/UL (ref 150–450)
POTASSIUM SERPL-SCNC: 4.3 MMOL/L (ref 3.4–5.3)
PROT/CREAT 24H UR: 0.34 MG/MG CR (ref 0–0.2)
RBC # BLD AUTO: 4.04 10E6/UL (ref 4.4–5.9)
SODIUM SERPL-SCNC: 138 MMOL/L (ref 135–145)
TACROLIMUS BLD-MCNC: 10 UG/L (ref 5–15)
TME LAST DOSE: NORMAL H
WBC # BLD AUTO: 6 10E3/UL (ref 4–11)

## 2025-03-18 PROCEDURE — 85027 COMPLETE CBC AUTOMATED: CPT

## 2025-03-18 PROCEDURE — 80180 DRUG SCRN QUAN MYCOPHENOLATE: CPT

## 2025-03-18 PROCEDURE — 87799 DETECT AGENT NOS DNA QUANT: CPT

## 2025-03-18 PROCEDURE — 84100 ASSAY OF PHOSPHORUS: CPT

## 2025-03-18 PROCEDURE — 87521 HEPATITIS C PROBE&RVRS TRNSC: CPT | Mod: 90

## 2025-03-18 PROCEDURE — 84156 ASSAY OF PROTEIN URINE: CPT

## 2025-03-18 PROCEDURE — 99000 SPECIMEN HANDLING OFFICE-LAB: CPT

## 2025-03-18 PROCEDURE — 80048 BASIC METABOLIC PNL TOTAL CA: CPT

## 2025-03-18 PROCEDURE — 87516 HEPATITIS B DNA AMP PROBE: CPT | Mod: 90

## 2025-03-18 PROCEDURE — 36415 COLL VENOUS BLD VENIPUNCTURE: CPT

## 2025-03-18 PROCEDURE — 83735 ASSAY OF MAGNESIUM: CPT

## 2025-03-18 PROCEDURE — 80197 ASSAY OF TACROLIMUS: CPT

## 2025-03-18 PROCEDURE — 87535 HIV-1 PROBE&REVERSE TRNSCRPJ: CPT | Mod: 90

## 2025-03-18 RX ORDER — TACROLIMUS 0.5 MG/1
0.5 CAPSULE ORAL 2 TIMES DAILY
Qty: 180 CAPSULE | Refills: 3 | Status: SHIPPED | OUTPATIENT
Start: 2025-03-18 | End: 2025-03-19

## 2025-03-18 RX ORDER — VALGANCICLOVIR 450 MG/1
450 TABLET, FILM COATED ORAL DAILY
Qty: 90 TABLET | Refills: 1 | Status: SHIPPED | OUTPATIENT
Start: 2025-03-18

## 2025-03-18 NOTE — TELEPHONE ENCOUNTER
[facility]END TO [facility]  [place] [facility] - [place], [place] - [address] Phone: [phone]   Fax: [phone]

## 2025-03-19 ENCOUNTER — TELEPHONE (OUTPATIENT)
Dept: TRANSPLANT | Facility: CLINIC | Age: 75
End: 2025-03-19
Payer: COMMERCIAL

## 2025-03-19 DIAGNOSIS — Z94.0 STATUS POST KIDNEY TRANSPLANT: ICD-10-CM

## 2025-03-19 RX ORDER — MYCOPHENOLATE MOFETIL 250 MG/1
250 CAPSULE ORAL 2 TIMES DAILY
Status: SHIPPED
Start: 2025-03-19

## 2025-03-19 RX ORDER — TACROLIMUS 0.5 MG/1
CAPSULE ORAL
Qty: 180 CAPSULE | Refills: 3 | Status: SHIPPED | OUTPATIENT
Start: 2025-03-19

## 2025-03-19 RX ORDER — TACROLIMUS 1 MG/1
2 CAPSULE ORAL 2 TIMES DAILY
Qty: 360 CAPSULE | Refills: 3 | Status: SHIPPED | OUTPATIENT
Start: 2025-03-19

## 2025-03-19 NOTE — TELEPHONE ENCOUNTER
Post Kidney and Pancreas Transplant Team Conference  Date: 3/19/2025  Transplant Coordinator: Morena     Attendees:  [x]  Dr. Valenzuela [x] Alicia Larson, RN [x] Fanny Ellison LPN     [x]  Dr. Hall [x] Debbie Reyna, RN [] Veronica Garrett LPN    [x] Dr. Miller [x] Morena Jacob RN    [x] Dr. Blackmon [x] Mendy Sanders, RN [] Carie Mcrae RN   [] Dr. Villaseñor [] Loreta Hanley, RN    [] Dr. Oshea [] Mariluz Lam, JANICE    [x]  Dr. Newell [] Geeta Simeon, RN    [] Dr. Marin [] Thomas Oliva RN    [] Letitia Garza, BREEZY [] Patricia Bobby RN    [x] Stephania Aaltorre NP [] Fawn Pichardo, RN        Verbal Plan Read Back:   Decrease mycophenolate to 250    Routed to RN Coordinator   Fanny Ellison LPN

## 2025-03-19 NOTE — TELEPHONE ENCOUNTER
ISSUE:  New BK     Current ISx:  mg bid, tac goal 8-10 (last level 10.0)     Per committee review, reduce MMF to 250 mg bid and keep tac level closer to 8.     Spoke with Alexis, voiced understanding. Will decrease tacro from 2.5 mg bid to 2 mg bid. Repeat labs on Thursday, check BK q2 week.

## 2025-03-20 LAB
HBV DNA SERPL QL NAA+PROBE: NORMAL
HCV RNA SERPL QL NAA+PROBE: NORMAL
HIV1+2 RNA SERPL QL NAA+PROBE: NORMAL

## 2025-03-24 ENCOUNTER — TELEPHONE (OUTPATIENT)
Dept: TRANSPLANT | Facility: CLINIC | Age: 75
End: 2025-03-24
Payer: COMMERCIAL

## 2025-03-24 DIAGNOSIS — D80.1 HYPOGAMMAGLOBULINEMIA: ICD-10-CM

## 2025-03-24 DIAGNOSIS — E11.69 TYPE 2 DIABETES MELLITUS WITH OTHER SPECIFIED COMPLICATION, WITH LONG-TERM CURRENT USE OF INSULIN (H): Primary | ICD-10-CM

## 2025-03-24 DIAGNOSIS — Z79.4 TYPE 2 DIABETES MELLITUS WITH OTHER SPECIFIED COMPLICATION, WITH LONG-TERM CURRENT USE OF INSULIN (H): Primary | ICD-10-CM

## 2025-03-24 DIAGNOSIS — Z94.0 KIDNEY REPLACED BY TRANSPLANT: ICD-10-CM

## 2025-03-24 DIAGNOSIS — B34.8 BK VIREMIA: ICD-10-CM

## 2025-03-24 NOTE — TELEPHONE ENCOUNTER
ISSUE:  IgG 381   Tac 12.5 (goal 8-10) on 3/21, dose 2 mg bid.     Alexis states this tacrolimus level from 3/21 was a ~10 hour trough. Will stay on same dose and repeat tac level on Wednesday.     For low IgG and new BK, give 2 doses of IVIG per Dr. Keen. Therapy plan updated, sent to [facility] to schedule. Alexis voiced understanding of plan.     Says he will use [facility] moving forward as [facility] says [facility] will no [facility] to [facility] at [facility] cost. Alexis will call when he has ~7 days of medications left and scripts will be sent.

## 2025-03-25 ENCOUNTER — TELEPHONE (OUTPATIENT)
Dept: NEPHROLOGY | Facility: CLINIC | Age: 75
End: 2025-03-25

## 2025-03-25 DIAGNOSIS — B34.8 BK VIREMIA: ICD-10-CM

## 2025-03-25 PROBLEM — D80.1 HYPOGAMMAGLOBULINEMIA: Status: ACTIVE | Noted: 2025-03-25

## 2025-03-25 RX ORDER — DIPHENHYDRAMINE HCL 25 MG
25-50 CAPSULE ORAL ONCE
Start: 2025-03-25

## 2025-03-25 RX ORDER — HEPARIN SODIUM,PORCINE 10 UNIT/ML
5-20 VIAL (ML) INTRAVENOUS DAILY PRN
OUTPATIENT
Start: 2025-03-25

## 2025-03-25 RX ORDER — HEPARIN SODIUM (PORCINE) LOCK FLUSH IV SOLN 100 UNIT/ML 100 UNIT/ML
5 SOLUTION INTRAVENOUS
OUTPATIENT
Start: 2025-03-25

## 2025-03-25 RX ORDER — ACETAMINOPHEN 325 MG/1
650 TABLET ORAL ONCE
Start: 2025-03-25

## 2025-03-25 RX ORDER — EPINEPHRINE 1 MG/ML
0.3 INJECTION, SOLUTION, CONCENTRATE INTRAVENOUS EVERY 5 MIN PRN
OUTPATIENT
Start: 2025-03-25

## 2025-03-25 RX ORDER — ALBUTEROL SULFATE 90 UG/1
1-2 INHALANT RESPIRATORY (INHALATION)
Start: 2025-03-25

## 2025-03-25 RX ORDER — MEPERIDINE HYDROCHLORIDE 25 MG/ML
25 INJECTION INTRAMUSCULAR; INTRAVENOUS; SUBCUTANEOUS
OUTPATIENT
Start: 2025-03-25

## 2025-03-25 RX ORDER — FLASH GLUCOSE SENSOR
KIT MISCELLANEOUS
Qty: 2 EACH | Refills: 1 | Status: SHIPPED | OUTPATIENT
Start: 2025-03-25

## 2025-03-25 RX ORDER — DIPHENHYDRAMINE HYDROCHLORIDE 50 MG/ML
25 INJECTION, SOLUTION INTRAMUSCULAR; INTRAVENOUS
Start: 2025-03-25

## 2025-03-25 RX ORDER — ALBUTEROL SULFATE 0.83 MG/ML
2.5 SOLUTION RESPIRATORY (INHALATION)
OUTPATIENT
Start: 2025-03-25

## 2025-03-25 RX ORDER — METHYLPREDNISOLONE SODIUM SUCCINATE 40 MG/ML
40 INJECTION INTRAMUSCULAR; INTRAVENOUS
Start: 2025-03-25

## 2025-03-25 RX ORDER — DIPHENHYDRAMINE HYDROCHLORIDE 50 MG/ML
50 INJECTION, SOLUTION INTRAMUSCULAR; INTRAVENOUS
Start: 2025-03-25

## 2025-03-25 NOTE — PROGRESS NOTES
TRANSPLANT NEPHROLOGY CLINIC VISIT     Assessment & Plan   BK every 2 weeks.  Pending IVIG for low IgG level.  Increase metoprolol to 50 mg twice a day.  Increase NPH to 36 units before breakfast and 16 units before dinner. May benefit from basal bolus, using longer acting insulin.  Keep checking blood pressure at home and let us know if systolics are above 150 or below 110.  I will discuss with your PCP with the needs to be increased in gabapentin at night to improve your neuropathy and allow you to use compression stockings.  Use compression stockings daily, if tolerable.  Okay to use low-dose Lasix as needed for worsening leg swelling.  Please let us know if swelling significantly worsens.  High phosphorous diet. Won't add Vit D as calcium up-trending. If phosphorous down-trends below 1.5 mg/dL we'll restart supplement.  Repeat magnesium levels with next lab test now that he's off Lasix. Can increase to 400 mg bid if Mg at 1.5 or below.    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    # Immunosuppression: Tacrolimus immediate release (goal 8-10) and Mycophenolate mofetil (dose 250 mg every 12 hours)   - Induction with Recent Transplant:  Low Intensity Protocol   - Continue with intensive monitoring of immunosuppression for efficacy and toxicity.   - Historical Changes in Immunosuppression:  reduced mycophenolate due to BK viremia.   - Changes: No    # Infection Prevention:   Last CD4 Level: Not checked   Estimated Creatinine Clearance: 48.1 mL/min (A) (based on SCr of 1.36 mg/dL (H)).  - PJP: Sulfa/TMP (Bactrim) 400-80 mg every day for 12 months.  - CMV: Valganciclovir (Valcyte) 450 mg every day for 6 months.      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 Low IgG of ~ 300. Reduced mycophenolate to 250 mg twice a day and started IVIG 500 mg/kg x 2 (2 weeks apart). Check BK every 2 weeks.    # Hypertension: Inadequate control;  Goal BP: < 140/90 early post transplant.   - Metoprolol 25 mg twice a day.   - Changes: Yes - increase metoprolol to 50 mg twice a day.  I want changed to Coreg for now as new pills were just sent for metoprolol with enough until October.    # Diabetes: Borderline control (HbA1c 7-9%) Last HbA1c: 7.1% in Feb, 2025. Can repeat at 3 months post-transplant.    # Anemia in Chronic Renal Disease: Hgb: Trend up      BARBER: No   - Iron studies: Replete    # Mineral Bone Disorder:    - Secondary renal hyperparathyroidism; PTH level: Moderately elevated (301-600 pg/ml)        On treatment: None  - Vitamin D; level: Low normal        On supplement: No  - Calcium; level: Normal        On supplement: No  - Phosphorus; level: Normal        On supplement: No    # Electrolytes:   - Potassium; level: High normal        On supplement: No  - Magnesium; level: Low        On supplement: Yes magnesium oxide 400 mg every day  - Bicarbonate; level: Normal        On supplement: No    # Other Significant PMH:  - Bilateral leg swelling: Predominantly from venous insufficiency and may be some lymphedema after transplant.  Recommend using compression stockings.  Okay to use low-dose Lasix as needed if leg swelling worsens.  If significantly worse in the future then we will repeat an echocardiogram.  - Diabetic neuropathy: On gabapentin 200 mg at night.  Worse when using compression stockings. He may need a higher dose now that he has a kidney transplant with improved creatinine clearance. This may improve his neuropathy and allow him to use compression stockings.  -  Gout: on allopurinol 100 mg every day.  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 Saw Dr. Pham cancer specialist place out of [facility] Undergoing surveillance colonoscopy. Last colonoscopy in march 2024 with out any new lesions.   - CAD: cardiac catheterization 7/17/24: pLAD to mLAD 70% stenosis and mLAD 40% stenosis. He is now s/p PCI with EDWIN x 1 to LAD. LVEF 55-60% in May 2024.  - NSTEMI: in 2022. Stress test showed very small defect. No intervention pursued due to kidney function at that time. On statin, ASA 81, and Imdur.   - BPH: on tamsulosin 0.4 mg every day and finasteride 5 mg every day.     # Skin Cancer Risk: Discussed sun protection and recommend regular follow up with Dermatology.    # Transplant History:  Etiology of Kidney Failure: Diabetes mellitus type 2  Tx: DDKT  Transplant: 2/13/2025 (Kidney)  Significant transplant-related complications: None    Transplant Office Phone Number: [phone]    Assessment and plan was discussed with the patient and he voiced his understanding and agreement.    Return visit: Return for appointment scheduled for 4/21/2025.    Damir Miller MD    The longitudinal plan of care for the diagnosis(es)/condition(s) as documented were addressed during this visit. Due to the added complexity in care, I will continue to support Peter in the subsequent management and with ongoing continuity of care.      Chief Complaint   Mr. Carpio is a 74 year old here for kidney transplant and immunosuppression management.     History of Present Illness    Mr. Carpio reports feeling good overall.    Since last clinic visit he has not had any ER visits or hospitalizations.  He reports intermittent worsening bilateral leg swelling predominantly in the afternoon, without exertional shortness of breath, chest pain, orthopnea or PND.  Compression stockings did help, but worsened his neuropathy.  He used Lasix once a  day (unknown dose) for approximately 3 days was prescribed by his PCP.  Swelling improved and he has lost an additional 6 pounds.  Furosemide was stopped about a week ago.  He is on the same dose of gabapentin he was on prior to transplant.    Active/Exercise: Yes, walking at home.   Weight change: Yes, lost 6 pounds of fluids.  Nausea and vomiting: No  Diarrhea: No  Heartburn symptoms: No  Fever, sweats or chills: No  Night sweats: No  Urinary complaints: Yes, sometimes he has to void again shortly after voiding, but otherwise improved on tamsulosin and finasteride.    Glucose fasting 180-200, before lunch: 200s, pre-dinner: 200s.  Home BP:  120-140/80s in AM and 150-160/70s in PM.    Problem List   Patient Active Problem List   Diagnosis    Hypertension    Chronic kidney disease, stage 4, severely decreased GFR (H)    Diabetic nephropathy (H)    Gout    Secondary renal hyperparathyroidism    Coronary artery disease    Diabetes mellitus, type 2 (H)    Hypertrophy of prostate without urinary obstruction    Malignant neoplasm of ascending colon (H)    Cardiovascular disease    Status post coronary angiogram    Pre-transplant evaluation for kidney transplant    Anemia in chronic kidney disease    Chronic 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    Dependence on renal dialysis    ESRD (end stage renal disease) on dialysis (H)    Hyperlipidemia    NSTEMI (non-ST elevated myocardial infarction) (H)    Awaiting transplantation of kidney    Kidney replaced by transplant    Immunosuppressed status    Drug-induced hyperglycemia    Hypogammaglobulinemia    BK viremia       Allergies   No Known Allergies    Medications   Current Outpatient Medications   Medication Sig Dispense Refill    acetaminophen (TYLENOL) 325 MG tablet Take 3 tablets (975 mg) by mouth every 6 hours as needed for mild pain or fever.      allopurinol (ZYLOPRIM) 100 MG tablet Take 100 mg by mouth daily      aspirin 81 MG EC tablet Take 81 mg by mouth  daily.      atorvastatin (LIPITOR) 40 MG tablet Take 1 tablet (40 mg) by mouth daily. 90 tablet 3    Continuous Glucose Sensor (FREESTYLE ANTONIO 14 DAY SENSOR) MISC Change every 14 days. 2 each 1    finasteride (PROSCAR) 5 MG tablet Take 1 tablet (5 mg) by mouth daily. 90 tablet 3    fish oil-omega-3 fatty acids 1000 MG capsule Take 2 capsules (2 g) by mouth 2 times daily.      gabapentin (NEURONTIN) 100 MG capsule Take 200 mg by mouth at bedtime.      insulin aspart (NOVOLOG PEN) 100 UNIT/ML pen Inject 1-7 Units subcutaneously 3 times daily (before meals). Correction Scale -   Do Not give Correction Insulin if Pre-Meal BG less than 140. For Pre-Meal  - 189 give 1 unit. For Pre-Meal - 239 give 2 units. For Pre-Meal -289 give 3 units. For Pre-Meal -339 give 4 units. For Pre-Meal -389 give 5 units. For Pre-Meal -439 give 6 units. For Pre-Meal BG greater than or equal to 440 give 7 units To be given with prandial insulin, and based on pre-meal blood glucose. Administering insulin within 5 minutes of the start of the meal is ideal. Administer insulin no more than 30 minutes after the start of the meal, unless directed otherwise by provider. Notify provider if glucose greater than or equal to 350 mg/dL after administration of correction dose. 15 mL 1    insulin aspart (NOVOLOG PEN) 100 UNIT/ML pen Inject 1-5 Units subcutaneously at bedtime. Do Not give Bedtime Correction Insulin if BG less than 200. For -249 give 1 units. For -299 give 2 units. For -349 give 3 units. For -399 give 4 units. For BG greater than or equal to 400 give 5 units. Notify provider if glucose greater than or equal to 350 mg/dL after administration of correction dose. 15 mL 1    insulin NPH-Regular (HUMULIN 70/30;NOVOLIN 70/30) susp With prednisone 40 mg dose take 50 units prior to breakfast and 23 units prior to dinner. With prednisone 20 mg dose take 40 units prior to breakfast and 19  "units prior to dinner. With prednisone 15 mg dose take 38 units prior to breakfast and 16 units prior to dinner. With prednisone 10 mg dose take 35 units prior to breakfast and 15 units prior to dinner. With prednisone 5 mg dose take 35 units prior to breakfast and 15 units prior to dinner. 15 mL 0    magnesium oxide (MAG-OX) 400 MG tablet Take 1 tablet (400 mg) by mouth daily (with lunch). 90 tablet 3    metoprolol tartrate (LOPRESSOR) 25 MG tablet Take 1 tablet (25 mg) by mouth 2 times daily. 180 tablet 3    mycophenolate (GENERIC EQUIVALENT) 250 MG capsule Take 1 capsule (250 mg) by mouth 2 times daily.      pantoprazole (PROTONIX) 40 MG EC tablet Take 1 tablet (40 mg) by mouth daily. 30 tablet 0    sulfamethoxazole-trimethoprim (BACTRIM) 400-80 MG tablet Take 1 tablet by mouth daily. 90 tablet 3    tacrolimus (GENERIC EQUIVALENT) 1 MG capsule Take 2 capsules (2 mg) by mouth 2 times daily. 360 capsule 3    tamsulosin (FLOMAX) 0.4 MG capsule Take 1 capsule (0.4 mg) by mouth daily. 90 capsule 3    valGANciclovir (VALCYTE) 450 MG tablet Take 1 tablet (450 mg) by mouth daily. Stop on 8/13/2025 90 tablet 1    ondansetron (ZOFRAN ODT) 4 MG ODT tab Take 1 tablet (4 mg) by mouth every 6 hours as needed for nausea. (Patient not taking: Reported on 3/26/2025) 12 tablet 1    psyllium (METAMUCIL/KONSYL) 58.6 % powder Take 1 teaspoonful by mouth daily. (Patient not taking: Reported on 3/26/2025)      tacrolimus (GENERIC EQUIVALENT) 0.5 MG capsule Profile Rx: patient will contact pharmacy when needed (Patient not taking: Reported on 3/26/2025) 180 capsule 3     No current facility-administered medications for this visit.     There are no discontinued medications.    Physical Exam   Vital Signs: BP (!) 145/88 (BP Location: Right arm, Patient Position: Sitting, Cuff Size: Adult Regular)   Pulse 78   Temp 97.7  F (36.5  C) (Oral)   Ht 1.676 m (5' 6\")   Wt 82.6 kg (182 lb 3.2 oz)   SpO2 96%   BMI 29.41 kg/m      GENERAL " APPEARANCE: alert and no distress  HENT: mouth without ulcers or lesions  RESP: lungs clear to auscultation - no rales, rhonchi or wheezes  CV: regular rhythm, normal rate, no rub, no murmur  EDEMA: Trace LE edema bilaterally  ABDOMEN: soft, nondistended, nontender, bowel sounds normal  MS: extremities normal - no gross deformities noted, no evidence of inflammation in joints, no muscle tenderness  SKIN: no rash  TX KIDNEY: normal  DIALYSIS ACCESS: none    Data         Latest Ref Rng & Units 3/26/2025     9:12 AM 3/21/2025     7:13 AM 3/18/2025     9:02 AM   Renal   Sodium 135 - 145 mmol/L 139  139  138    K 3.4 - 5.3 mmol/L 4.5  5.0  4.3    Cl 98 - 107 mmol/L 106  104  106    Cl (external) 98 - 107 mmol/L 106  104  106    CO2 22 - 29 mmol/L 22  24  19    Urea Nitrogen 8.0 - 23.0 mg/dL 30.6  29.2  33.6    Creatinine 0.67 - 1.17 mg/dL 1.36  1.33  1.21    Glucose 70 - 99 mg/dL 270  208  204    Calcium 8.8 - 10.4 mg/dL 10.3  10.1  10.0    Magnesium 1.7 - 2.3 mg/dL 1.4   1.4          Latest Ref Rng & Units 3/26/2025     9:12 AM 3/18/2025     9:02 AM 3/14/2025     7:15 AM   Bone Health   Phosphorus 2.5 - 4.5 mg/dL 1.6  2.2  2.6          Latest Ref Rng & Units 3/26/2025     9:12 AM 3/21/2025     7:13 AM 3/18/2025     9:02 AM   Heme   WBC 4.0 - 11.0 10e3/uL 6.1  5.9  6.0    Hgb 13.3 - 17.7 g/dL 13.0  13.1  13.0    Plt 150 - 450 10e3/uL 186  174  178          Latest Ref Rng & Units 2/12/2025     8:47 PM 5/16/2024     1:26 PM   Liver   AP 40 - 150 U/L 59  58    TBili <=1.2 mg/dL 0.2  0.2    ALT 0 - 70 U/L 15  9    AST 0 - 45 U/L 17  8    Tot Protein 6.4 - 8.3 g/dL 6.5  7.0    Albumin 3.5 - 5.2 g/dL 3.9  4.1          Latest Ref Rng & Units 2/12/2025     8:46 PM 12/26/2024     1:20 PM   Pancreas   A1C <5.7 % 7.1  6.7          Latest Ref Rng & Units 2/27/2025     7:05 AM 2/19/2025     7:24 AM   Iron studies   Iron 61 - 157 ug/dL 62  91    Iron Sat Index 15 - 46 % 24  41    Ferritin 31 - 409 ng/mL 624  1,030          Latest Ref  Rng & Units 2/12/2025     8:47 PM 5/16/2024     1:26 PM   [facility]   EBV CAPSID ANTIBODY IGG No detectable antibody. Positive  Positive      Failed to redirect to the Timeline version of the REV SmartLink.  Recent Labs   Lab Test 03/14/25  0715 03/18/25  0902 03/21/25  0713   DOSTAC 3/13/2025 3/17/2025 3/20/2025   TACROL 13.5 10.0 12.5     Recent Labs   Lab Test 02/20/25  0754 02/27/25  0705 03/14/25  0715 03/18/25  0902   DOSMPA 2/19/2025   8:00 PM  --   --  3/17/2025   8:45 PM   MPACID 5.24* 2.04 1.71 2.11   MPAG 72.0 58.6 50.2 63.7

## 2025-03-25 NOTE — TELEPHONE ENCOUNTER
PA Initiation    Medication: FREESTYLE ANTONIO 14 DAY SENSOR [facility]  Insurance Company: [employer] - Phone [phone] Fax [phone]  Pharmacy Filling the Rx:    Filling Pharmacy Phone:    Filling Pharmacy Fax:    Start Date: 3/25/2025

## 2025-03-26 ENCOUNTER — OFFICE VISIT (OUTPATIENT)
Dept: TRANSPLANT | Facility: CLINIC | Age: 75
End: 2025-03-26
Attending: INTERNAL MEDICINE
Payer: COMMERCIAL

## 2025-03-26 ENCOUNTER — ANCILLARY PROCEDURE (OUTPATIENT)
Dept: GENERAL RADIOLOGY | Facility: CLINIC | Age: 75
End: 2025-03-26
Attending: INTERNAL MEDICINE
Payer: COMMERCIAL

## 2025-03-26 ENCOUNTER — LAB (OUTPATIENT)
Dept: LAB | Facility: CLINIC | Age: 75
End: 2025-03-26
Attending: INTERNAL MEDICINE
Payer: COMMERCIAL

## 2025-03-26 VITALS
BODY MASS INDEX: 29.28 KG/M2 | DIASTOLIC BLOOD PRESSURE: 88 MMHG | HEART RATE: 78 BPM | WEIGHT: 182.2 LBS | TEMPERATURE: 97.7 F | HEIGHT: 66 IN | SYSTOLIC BLOOD PRESSURE: 145 MMHG | OXYGEN SATURATION: 96 %

## 2025-03-26 DIAGNOSIS — N18.2 ANEMIA IN STAGE 2 CHRONIC KIDNEY DISEASE: Chronic | ICD-10-CM

## 2025-03-26 DIAGNOSIS — D63.1 ANEMIA IN STAGE 2 CHRONIC KIDNEY DISEASE: Chronic | ICD-10-CM

## 2025-03-26 DIAGNOSIS — E83.39 HYPOPHOSPHATEMIA: ICD-10-CM

## 2025-03-26 DIAGNOSIS — Z48.298 AFTERCARE FOLLOWING ORGAN TRANSPLANT: ICD-10-CM

## 2025-03-26 DIAGNOSIS — D84.9 IMMUNOSUPPRESSED STATUS: ICD-10-CM

## 2025-03-26 DIAGNOSIS — Z20.828 CONTACT WITH AND (SUSPECTED) EXPOSURE TO OTHER VIRAL COMMUNICABLE DISEASES: ICD-10-CM

## 2025-03-26 DIAGNOSIS — Z94.0 KIDNEY REPLACED BY TRANSPLANT: ICD-10-CM

## 2025-03-26 DIAGNOSIS — Z79.4 TYPE 2 DIABETES MELLITUS WITH CHRONIC KIDNEY DISEASE, WITH LONG-TERM CURRENT USE OF INSULIN, UNSPECIFIED CKD STAGE (H): ICD-10-CM

## 2025-03-26 DIAGNOSIS — Z94.0 HTN, KIDNEY TRANSPLANT RELATED: ICD-10-CM

## 2025-03-26 DIAGNOSIS — I15.1 HTN, KIDNEY TRANSPLANT RELATED: ICD-10-CM

## 2025-03-26 DIAGNOSIS — E83.42 HYPOMAGNESEMIA: ICD-10-CM

## 2025-03-26 DIAGNOSIS — Z94.0 KIDNEY REPLACED BY TRANSPLANT: Primary | ICD-10-CM

## 2025-03-26 DIAGNOSIS — N18.2 CKD (CHRONIC KIDNEY DISEASE) STAGE 2, GFR 60-89 ML/MIN: ICD-10-CM

## 2025-03-26 DIAGNOSIS — Z79.899 ENCOUNTER FOR LONG-TERM CURRENT USE OF MEDICATION: ICD-10-CM

## 2025-03-26 DIAGNOSIS — B34.8 BK VIREMIA: ICD-10-CM

## 2025-03-26 DIAGNOSIS — D80.1 HYPOGAMMAGLOBULINEMIA: ICD-10-CM

## 2025-03-26 DIAGNOSIS — E11.22 TYPE 2 DIABETES MELLITUS WITH CHRONIC KIDNEY DISEASE, WITH LONG-TERM CURRENT USE OF INSULIN, UNSPECIFIED CKD STAGE (H): ICD-10-CM

## 2025-03-26 DIAGNOSIS — Z98.890 OTHER SPECIFIED POSTPROCEDURAL STATES: ICD-10-CM

## 2025-03-26 DIAGNOSIS — N25.81 SECONDARY RENAL HYPERPARATHYROIDISM: ICD-10-CM

## 2025-03-26 PROBLEM — N18.6 ESRD (END STAGE RENAL DISEASE) ON DIALYSIS (H): Status: RESOLVED | Noted: 2024-02-20 | Resolved: 2025-03-26

## 2025-03-26 PROBLEM — T50.905A DRUG-INDUCED HYPERGLYCEMIA: Status: RESOLVED | Noted: 2025-02-17 | Resolved: 2025-03-26

## 2025-03-26 PROBLEM — Z99.2 ESRD (END STAGE RENAL DISEASE) ON DIALYSIS (H): Status: RESOLVED | Noted: 2024-02-20 | Resolved: 2025-03-26

## 2025-03-26 PROBLEM — R73.9 DRUG-INDUCED HYPERGLYCEMIA: Status: RESOLVED | Noted: 2025-02-17 | Resolved: 2025-03-26

## 2025-03-26 LAB
ANION GAP SERPL CALCULATED.3IONS-SCNC: 11 MMOL/L (ref 7–15)
BUN SERPL-MCNC: 30.6 MG/DL (ref 8–23)
CALCIUM SERPL-MCNC: 10.3 MG/DL (ref 8.8–10.4)
CHLORIDE SERPL-SCNC: 106 MMOL/L (ref 98–107)
CREAT SERPL-MCNC: 1.36 MG/DL (ref 0.67–1.17)
EGFRCR SERPLBLD CKD-EPI 2021: 55 ML/MIN/1.73M2
ERYTHROCYTE [DISTWIDTH] IN BLOOD BY AUTOMATED COUNT: 12.8 % (ref 10–15)
GLUCOSE SERPL-MCNC: 270 MG/DL (ref 70–99)
HCO3 SERPL-SCNC: 22 MMOL/L (ref 22–29)
HCT VFR BLD AUTO: 39.7 % (ref 40–53)
HGB BLD-MCNC: 13 G/DL (ref 13.3–17.7)
MAGNESIUM SERPL-MCNC: 1.4 MG/DL (ref 1.7–2.3)
MCH RBC QN AUTO: 31.3 PG (ref 26.5–33)
MCHC RBC AUTO-ENTMCNC: 32.7 G/DL (ref 31.5–36.5)
MCV RBC AUTO: 95 FL (ref 78–100)
PHOSPHATE SERPL-MCNC: 1.6 MG/DL (ref 2.5–4.5)
PLATELET # BLD AUTO: 186 10E3/UL (ref 150–450)
POTASSIUM SERPL-SCNC: 4.5 MMOL/L (ref 3.4–5.3)
RBC # BLD AUTO: 4.16 10E6/UL (ref 4.4–5.9)
SODIUM SERPL-SCNC: 139 MMOL/L (ref 135–145)
TACROLIMUS BLD-MCNC: 8.8 UG/L (ref 5–15)
TME LAST DOSE: NORMAL H
TME LAST DOSE: NORMAL H
WBC # BLD AUTO: 6.1 10E3/UL (ref 4–11)

## 2025-03-26 PROCEDURE — 99215 OFFICE O/P EST HI 40 MIN: CPT | Mod: 24 | Performed by: STUDENT IN AN ORGANIZED HEALTH CARE EDUCATION/TRAINING PROGRAM

## 2025-03-26 PROCEDURE — 83735 ASSAY OF MAGNESIUM: CPT | Performed by: PATHOLOGY

## 2025-03-26 PROCEDURE — 80048 BASIC METABOLIC PNL TOTAL CA: CPT | Performed by: PATHOLOGY

## 2025-03-26 PROCEDURE — 52310 CYSTOSCOPY AND TREATMENT: CPT | Performed by: NURSE PRACTITIONER

## 2025-03-26 PROCEDURE — 74018 RADEX ABDOMEN 1 VIEW: CPT | Performed by: RADIOLOGY

## 2025-03-26 PROCEDURE — G0463 HOSPITAL OUTPT CLINIC VISIT: HCPCS | Performed by: STUDENT IN AN ORGANIZED HEALTH CARE EDUCATION/TRAINING PROGRAM

## 2025-03-26 PROCEDURE — 3079F DIAST BP 80-89 MM HG: CPT | Performed by: STUDENT IN AN ORGANIZED HEALTH CARE EDUCATION/TRAINING PROGRAM

## 2025-03-26 PROCEDURE — 36415 COLL VENOUS BLD VENIPUNCTURE: CPT | Performed by: PATHOLOGY

## 2025-03-26 PROCEDURE — G2211 COMPLEX E/M VISIT ADD ON: HCPCS | Performed by: STUDENT IN AN ORGANIZED HEALTH CARE EDUCATION/TRAINING PROGRAM

## 2025-03-26 PROCEDURE — 250N000013 HC RX MED GY IP 250 OP 250 PS 637: Performed by: NURSE PRACTITIONER

## 2025-03-26 PROCEDURE — 84100 ASSAY OF PHOSPHORUS: CPT | Performed by: PATHOLOGY

## 2025-03-26 PROCEDURE — 99000 SPECIMEN HANDLING OFFICE-LAB: CPT | Performed by: PATHOLOGY

## 2025-03-26 PROCEDURE — 85027 COMPLETE CBC AUTOMATED: CPT | Performed by: PATHOLOGY

## 2025-03-26 PROCEDURE — 250N000009 HC RX 250: Performed by: NURSE PRACTITIONER

## 2025-03-26 PROCEDURE — 1126F AMNT PAIN NOTED NONE PRSNT: CPT | Performed by: STUDENT IN AN ORGANIZED HEALTH CARE EDUCATION/TRAINING PROGRAM

## 2025-03-26 PROCEDURE — 80197 ASSAY OF TACROLIMUS: CPT | Performed by: INTERNAL MEDICINE

## 2025-03-26 PROCEDURE — 3077F SYST BP >= 140 MM HG: CPT | Performed by: STUDENT IN AN ORGANIZED HEALTH CARE EDUCATION/TRAINING PROGRAM

## 2025-03-26 RX ORDER — LEVOFLOXACIN 250 MG/1
500 TABLET, FILM COATED ORAL ONCE
Status: COMPLETED | OUTPATIENT
Start: 2025-03-26 | End: 2025-03-26

## 2025-03-26 RX ORDER — LIDOCAINE HYDROCHLORIDE 20 MG/ML
JELLY TOPICAL ONCE
Status: COMPLETED | OUTPATIENT
Start: 2025-03-26 | End: 2025-03-26

## 2025-03-26 RX ADMIN — LIDOCAINE HYDROCHLORIDE: 20 JELLY TOPICAL at 12:37

## 2025-03-26 RX ADMIN — LEVOFLOXACIN 500 MG: 250 TABLET, FILM COATED ORAL at 12:37

## 2025-03-26 ASSESSMENT — PAIN SCALES - GENERAL: PAINLEVEL_OUTOF10: NO PAIN (0)

## 2025-03-26 NOTE — LETTER
3/26/2025      Peter Carpio  [address] [facility] [address]      Dear Colleague,    Thank you for referring your patient, Peter Carpio, to the [facility]. Please see a copy of my visit note below.    TRANSPLANT NEPHROLOGY CLINIC VISIT     Assessment & Plan  Increase metoprolol to 50 mg twice a day.  Increase NPH to 36 units before breakfast and 16 units before dinner. May benefit from basal bolus, using longer acting insulin.  Keep checking blood pressure at home and let us know if systolics are above 150 or below 110.  I will discuss with your PCP with the needs to be increased in gabapentin at night to improve your neuropathy and allow you to use compression stockings.  Use compression stockings daily, if tolerable.  Okay to use low-dose Lasix as needed for worsening leg swelling.  Please let us know if swelling significantly worsens.  High phosphorous diet. Won't add Vit D as calcium up-trending. If phosphorous down-trends below 1.5 mg/dL we'll restart supplement.  Repeat magnesium levels with next lab test now that he's off Lasix. Can increase to 400 mg bid if Mg at 1.5 or below.    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    # Immunosuppression: Tacrolimus immediate release (goal 8-10) and Mycophenolate mofetil (dose 250 mg every 12 hours)   - Induction with Recent Transplant:  Low Intensity Protocol   - Continue with intensive monitoring of immunosuppression for efficacy and toxicity.   - Historical Changes in Immunosuppression:  reduced mycophenolate due to BK viremia.   - Changes: No    # Infection Prevention:   Last CD4 Level: Not checked   Estimated Creatinine Clearance: 48.1 mL/min (A) (based on SCr of 1.36 mg/dL (H)).  - PJP: Sulfa/TMP (Bactrim)  400-80 mg every day for 12 months.  - CMV: Valganciclovir (Valcyte) 450 mg every day for 6 months.      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 Low IgG of ~ 300. Reduced mycophenolate to 250 mg twice a day and started IVIG 500 mg/kg x 2 (2 weeks apart). Check BK every 2 weeks.    # Hypertension: Inadequate control;  Goal BP: < 140/90 early post transplant.   - Metoprolol 25 mg twice a day.   - Changes: Yes - increase metoprolol to 50 mg twice a day.  I want changed to Coreg for now as new pills were just sent for metoprolol with enough until October.    # Diabetes: Borderline control (HbA1c 7-9%) Last HbA1c: 7.1% in Feb, 2025. Can repeat at 3 months post-transplant.    # Anemia in Chronic Renal Disease: Hgb: Trend up      BARBER: No   - Iron studies: Replete    # Mineral Bone Disorder:    - Secondary renal hyperparathyroidism; PTH level: Moderately elevated (301-600 pg/ml)        On treatment: None  - Vitamin D; level: Low normal        On supplement: No  - Calcium; level: Normal        On supplement: No  - Phosphorus; level: Normal        On supplement: No    # Electrolytes:   - Potassium; level: High normal        On supplement: No  - Magnesium; level: Low        On supplement: Yes magnesium oxide 400 mg every day  - Bicarbonate; level: Normal        On supplement: No    # Other Significant PMH:  - Bilateral leg swelling: Predominantly from venous insufficiency and may be some lymphedema after transplant.  Recommend using compression stockings.  Okay to use low-dose Lasix as needed if leg swelling worsens.  If significantly worse in the future then we will repeat an echocardiogram.  - Diabetic neuropathy: On gabapentin 200 mg at night.  Worse when using  compression stockings. He may need a higher dose now that he has a kidney transplant with improved creatinine clearance. This may improve his neuropathy and allow him to use compression stockings.  - Gout: on allopurinol 100 mg every day.  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 Saw Dr. Pham cancer specialist place out of [facility] Undergoing surveillance colonoscopy. Last colonoscopy in march 2024 with out any new lesions.   - CAD: cardiac catheterization 7/17/24: pLAD to mLAD 70% stenosis and mLAD 40% stenosis. He is now s/p PCI with EDWIN x 1 to LAD. LVEF 55-60% in May 2024.  - NSTEMI: in 2022. Stress test showed very small defect. No intervention pursued due to kidney function at that time. On statin, ASA 81, and Imdur.   - BPH: on tamsulosin 0.4 mg every day and finasteride 5 mg every day.     # Skin Cancer Risk: Discussed sun protection and recommend regular follow up with Dermatology.    # Transplant History:  Etiology of Kidney Failure: Diabetes mellitus type 2  Tx: DDKT  Transplant: 2/13/2025 (Kidney)  Significant transplant-related complications: None    Transplant Office Phone Number: [phone]    Assessment and plan was discussed with the patient and he voiced his understanding and agreement.    Return visit: Return for appointment scheduled for 4/21/2025.    Damir Miller MD    The longitudinal plan of care for the diagnosis(es)/condition(s) as documented were addressed during this visit. Due to the added complexity in care, I will continue to support Peter in the subsequent management and with ongoing continuity of care.      Chief Complaint  Mr. Carpio is a 74 year old here for kidney transplant and immunosuppression management.     History of Present Illness   Mr. Carpio reports feeling good overall.    Since last clinic visit he has not had any ER visits or hospitalizations.  He reports intermittent worsening bilateral  leg swelling predominantly in the afternoon, without exertional shortness of breath, chest pain, orthopnea or PND.  Compression stockings did help, but worsened his neuropathy.  He used Lasix once a day (unknown dose) for approximately 3 days was prescribed by his PCP.  Swelling improved and he has lost an additional 6 pounds.  Furosemide was stopped about a week ago.  He is on the same dose of gabapentin he was on prior to transplant.    Active/Exercise: Yes, walking at home.   Weight change: Yes, lost 6 pounds of fluids.  Nausea and vomiting: No  Diarrhea: No  Heartburn symptoms: No  Fever, sweats or chills: No  Night sweats: No  Urinary complaints: Yes, sometimes he has to void again shortly after voiding, but otherwise improved on tamsulosin and finasteride.    Glucose fasting 180-200, before lunch: 200s, pre-dinner: 200s.  Home BP:  120-140/80s in AM and 150-160/70s in PM.    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       allopurinol (ZYLOPRIM) 100 MG tablet Take 100 mg by mouth daily       aspirin 81 MG EC tablet Take 81 mg by mouth daily.       atorvastatin (LIPITOR) 40 MG tablet Take 1 tablet (40 mg) by mouth daily. 90 tablet 3     Continuous Glucose Sensor (FREESTYLE ANTONIO 14 DAY SENSOR) MISC Change every 14 days. 2 each 1     finasteride (PROSCAR) 5 MG tablet Take 1 tablet (5 mg) by mouth daily. 90 tablet 3     fish oil-omega-3 fatty acids 1000 MG capsule Take 2 capsules (2 g) by mouth 2 times daily.       gabapentin (NEURONTIN) 100 MG capsule Take 200 mg by mouth at bedtime.       insulin aspart (NOVOLOG PEN) 100 UNIT/ML pen Inject 1-7 Units subcutaneously 3 times daily (before meals). Correction Scale -   Do Not give Correction Insulin if Pre-Meal BG less than 140. For Pre-Meal  - 189 give 1 unit. For Pre-Meal - 239 give 2 units. For Pre-Meal -289 give 3 units. For Pre-Meal -339 give 4 units. For Pre-Meal -389 give 5 units. For Pre-Meal -439 give 6 units. For Pre-Meal BG greater than or equal to 440 give 7 units To be given with prandial insulin, and based on pre-meal blood glucose. Administering insulin within 5 minutes of the start of the meal is ideal. Administer insulin no more than 30 minutes after the start of the meal, unless directed otherwise by provider. Notify provider if glucose greater than or equal to 350 mg/dL after administration of correction dose. 15 mL 1     insulin aspart (NOVOLOG PEN) 100 UNIT/ML pen Inject 1-5 Units subcutaneously at bedtime. Do Not give Bedtime Correction Insulin if BG less than 200. For -249 give 1 units. For -299 give 2 units. For -349 give 3 units. For -399 give 4 units. For BG greater than or equal to 400 give 5 units. Notify provider if glucose greater than or equal to 350 mg/dL after administration of correction  dose. 15 mL 1     insulin NPH-Regular (HUMULIN 70/30;NOVOLIN 70/30) susp With prednisone 40 mg dose take 50 units prior to breakfast and 23 units prior to dinner. With prednisone 20 mg dose take 40 units prior to breakfast and 19 units prior to dinner. With prednisone 15 mg dose take 38 units prior to breakfast and 16 units prior to dinner. With prednisone 10 mg dose take 35 units prior to breakfast and 15 units prior to dinner. With prednisone 5 mg dose take 35 units prior to breakfast and 15 units prior to dinner. 15 mL 0     magnesium oxide (MAG-OX) 400 MG tablet Take 1 tablet (400 mg) by mouth daily (with lunch). 90 tablet 3     metoprolol tartrate (LOPRESSOR) 25 MG tablet Take 1 tablet (25 mg) by mouth 2 times daily. 180 tablet 3     mycophenolate (GENERIC EQUIVALENT) 250 MG capsule Take 1 capsule (250 mg) by mouth 2 times daily.       pantoprazole (PROTONIX) 40 MG EC tablet Take 1 tablet (40 mg) by mouth daily. 30 tablet 0     sulfamethoxazole-trimethoprim (BACTRIM) 400-80 MG tablet Take 1 tablet by mouth daily. 90 tablet 3     tacrolimus (GENERIC EQUIVALENT) 1 MG capsule Take 2 capsules (2 mg) by mouth 2 times daily. 360 capsule 3     tamsulosin (FLOMAX) 0.4 MG capsule Take 1 capsule (0.4 mg) by mouth daily. 90 capsule 3     valGANciclovir (VALCYTE) 450 MG tablet Take 1 tablet (450 mg) by mouth daily. Stop on 8/13/2025 90 tablet 1     ondansetron (ZOFRAN ODT) 4 MG ODT tab Take 1 tablet (4 mg) by mouth every 6 hours as needed for nausea. (Patient not taking: Reported on 3/26/2025) 12 tablet 1     psyllium (METAMUCIL/KONSYL) 58.6 % powder Take 1 teaspoonful by mouth daily. (Patient not taking: Reported on 3/26/2025)       tacrolimus (GENERIC EQUIVALENT) 0.5 MG capsule Profile Rx: patient will contact pharmacy when needed (Patient not taking: Reported on 3/26/2025) 180 capsule 3     No current facility-administered medications for this visit.     There are no discontinued medications.    Physical Exam  Vital  "Signs: BP (!) 145/88 (BP Location: Right arm, Patient Position: Sitting, Cuff Size: Adult Regular)   Pulse 78   Temp 97.7  F (36.5  C) (Oral)   Ht 1.676 m (5' 6\")   Wt 82.6 kg (182 lb 3.2 oz)   SpO2 96%   BMI 29.41 kg/m      GENERAL APPEARANCE: alert and no distress  HENT: mouth without ulcers or lesions  RESP: lungs clear to auscultation - no rales, rhonchi or wheezes  CV: regular rhythm, normal rate, no rub, no murmur  EDEMA: Trace LE edema bilaterally  ABDOMEN: soft, nondistended, nontender, bowel sounds normal  MS: extremities normal - no gross deformities noted, no evidence of inflammation in joints, no muscle tenderness  SKIN: no rash  TX KIDNEY: normal  DIALYSIS ACCESS: none    Data        Latest Ref Rng & Units 3/26/2025     9:12 AM 3/21/2025     7:13 AM 3/18/2025     9:02 AM   Renal   Sodium 135 - 145 mmol/L 139  139  138    K 3.4 - 5.3 mmol/L 4.5  5.0  4.3    Cl 98 - 107 mmol/L 106  104  106    Cl (external) 98 - 107 mmol/L 106  104  106    CO2 22 - 29 mmol/L 22  24  19    Urea Nitrogen 8.0 - 23.0 mg/dL 30.6  29.2  33.6    Creatinine 0.67 - 1.17 mg/dL 1.36  1.33  1.21    Glucose 70 - 99 mg/dL 270  208  204    Calcium 8.8 - 10.4 mg/dL 10.3  10.1  10.0    Magnesium 1.7 - 2.3 mg/dL 1.4   1.4          Latest Ref Rng & Units 3/26/2025     9:12 AM 3/18/2025     9:02 AM 3/14/2025     7:15 AM   Bone Health   Phosphorus 2.5 - 4.5 mg/dL 1.6  2.2  2.6          Latest Ref Rng & Units 3/26/2025     9:12 AM 3/21/2025     7:13 AM 3/18/2025     9:02 AM   Heme   WBC 4.0 - 11.0 10e3/uL 6.1  5.9  6.0    Hgb 13.3 - 17.7 g/dL 13.0  13.1  13.0    Plt 150 - 450 10e3/uL 186  174  178          Latest Ref Rng & Units 2/12/2025     8:47 PM 5/16/2024     1:26 PM   Liver   AP 40 - 150 U/L 59  58    TBili <=1.2 mg/dL 0.2  0.2    ALT 0 - 70 U/L 15  9    AST 0 - 45 U/L 17  8    Tot Protein 6.4 - 8.3 g/dL 6.5  7.0    Albumin 3.5 - 5.2 g/dL 3.9  4.1          Latest Ref Rng & Units 2/12/2025     8:46 PM 12/26/2024     1:20 PM "   Pancreas   A1C <5.7 % 7.1  6.7          Latest Ref Rng & Units 2/27/2025     7:05 AM 2/19/2025     7:24 AM   Iron studies   Iron 61 - 157 ug/dL 62  91    Iron Sat Index 15 - 46 % 24  41    Ferritin 31 - 409 ng/mL 624  1,030          Latest Ref Rng & Units 2/12/2025     8:47 PM 5/16/2024     1:26 PM   [facility] Virology   EBV CAPSID ANTIBODY IGG No detectable antibody. Positive  Positive      Failed to redirect to the Timeline version of the REVFS SmartLink.  Recent Labs   Lab Test 03/14/25  0715 03/18/25  0902 03/21/25  0713   DOSTAC 3/13/2025 3/17/2025 3/20/2025   TACROL 13.5 10.0 12.5     Recent Labs   Lab Test 02/20/25  0754 02/27/25  0705 03/14/25  0715 03/18/25  0902   DOSMPA 2/19/2025   8:00 PM  --   --  3/17/2025   8:45 PM   MPACID 5.24* 2.04 1.71 2.11   MPAG 72.0 58.6 50.2 63.7       Again, thank you for allowing me to participate in the care of your patient.        Sincerely,        Damir Miller MD    Electronically signed

## 2025-03-26 NOTE — NURSING NOTE
"Chief Complaint   Patient presents with    RECHECK     Follow up. PT reports having Bps consistently in the 150s. Also stated that his glucose is consistently in the 200s even after insulin admin.      Vitals:    03/26/25 1135 03/26/25 1138   BP: (!) 154/82 (!) 145/88   BP Location: Right arm Right arm   Patient Position: Sitting Sitting   Cuff Size: Adult Regular Adult Regular   Pulse: 78    Temp: 97.7  F (36.5  C)    TempSrc: Oral    SpO2: 96%    Weight: 82.6 kg (182 lb 3.2 oz)    Height: 1.676 m (5' 6\")        BP Readings from Last 3 Encounters:   03/26/25 (!) 145/88   03/03/25 (!) 154/81   02/27/25 (!) 147/77       BP (!) 145/88 (BP Location: Right arm, Patient Position: Sitting, Cuff Size: Adult Regular)   Pulse 78   Temp 97.7  F (36.5  C) (Oral)   Ht 1.676 m (5' 6\")   Wt 82.6 kg (182 lb 3.2 oz)   SpO2 96%   BMI 29.41 kg/m       Sunshine Hefreddie    "

## 2025-03-26 NOTE — TELEPHONE ENCOUNTER
Prior Authorization Approval    Medication: FREESTYLE ANTONIO 14 DAY SENSOR MISC  Authorization Effective Date: 2/23/2025  Authorization Expiration Date: 3/25/2026  Approved Dose/Quantity: 1yr  Reference #: [id number]   Insurance Company: [employer] - Phone [phone] Fax [phone]  Expected CoPay: $    CoPay Card Available:      Financial Assistance Needed: n  Which Pharmacy is filling the prescription:    Pharmacy Notified: y  Patient Notified: y

## 2025-03-26 NOTE — NURSING NOTE
Cystoscopy - Ureteral stent removal    Prepped patient for procedure with no complications.   2% lidocaine gel injected into urethra via urojet.   Assisted Stephania Alatorre with stent removal.  Administered 500mg Levaquin PO after procedure.  Patient was discharged in stable condition.     No Known Allergies      Didier Wylie RN on 3/26/2025 at 12:45 PM

## 2025-03-26 NOTE — PATIENT INSTRUCTIONS
Patient Recommendations:  - Increase metoprolol to 50 mg twice a day (2 tablets of 25 mg twice a day).  - Increase NPH to 36 before breakfast and 16 before dinner.  - Keep checking blood pressure at home. Let us know if below 110 or above 150 mm Hg.   - Recommend using compressions stockings.   - If neuropathy gets worse with compression stockings, then we can increase Gabapentin to 300 mg every day.   - Okay to use Furosemide as needed for 2-3 days if swelling gets worse.     Transplant Patient Information  Your Post Transplant Coordinator is: Morena Jacob  For non urgent items, we encourage you to contact your coordinator/care team online via TripTouch  You and your care team can also contact your transplant coordinator Monday - Friday, 8am - 5pm at [phone] (Option 2 to reach the coordinator or Option 4 to schedule an appointment).  After hours for urgent matters, please call [facility] at [phone].

## 2025-03-26 NOTE — LETTER
3/26/2025      Peter Carpio  [address] [facility] [address]      Dear Colleague,    Thank you for referring your patient, Peter Carpio, to the [facility]. Please see a copy of my visit note below.    See procedure note.       Again, thank you for allowing me to participate in the care of your patient.        Sincerely,        ALEKSANDR Connell CNP    Electronically signed

## 2025-03-28 NOTE — PROCEDURES
Transplant Surgery  Operative Note    Preop dx: Status post  Donor kidney transplant.  Post op dx: same   Procedure: Flexible cystoscopy and ureteral stent removal   Surgeon: Stephania Alatorre NP   Assistant: Didier Wylie RN  Anesthesia: local  EBL: 0   Specimens: none.  Findings: none abnormal. Stent inspected and noted to be intact.   Indication: The patient is status post kidney transplant and the ureteral stent is no longer needed.    Procedure: The patient was positioned supine on the table.  The groin was sterilely prepped and draped in the usual fashion. Time out was done. Urojet was applied to the urethra. A flexible cystoscope was inserted and advanced thru the urethra into the bladder. The stent was visualized and grasped. The cystoscope, grasper and stent were removed en-mass. The stent was visualized to be intact.  The bladder mucosa was normal in appearance. Antibiotics were administered. The patient tolerated the procedure well and was asked to void before leaving the clinic.

## 2025-04-01 ENCOUNTER — TELEPHONE (OUTPATIENT)
Dept: TRANSPLANT | Facility: CLINIC | Age: 75
End: 2025-04-01

## 2025-04-01 ENCOUNTER — VIRTUAL VISIT (OUTPATIENT)
Dept: PHARMACY | Facility: CLINIC | Age: 75
End: 2025-04-01
Payer: COMMERCIAL

## 2025-04-01 ENCOUNTER — LAB (OUTPATIENT)
Dept: LAB | Facility: CLINIC | Age: 75
End: 2025-04-01
Payer: COMMERCIAL

## 2025-04-01 DIAGNOSIS — Z98.890 OTHER SPECIFIED POSTPROCEDURAL STATES: ICD-10-CM

## 2025-04-01 DIAGNOSIS — E11.69 TYPE 2 DIABETES MELLITUS WITH OTHER SPECIFIED COMPLICATION, WITH LONG-TERM CURRENT USE OF INSULIN (H): Primary | ICD-10-CM

## 2025-04-01 DIAGNOSIS — Z79.4 TYPE 2 DIABETES MELLITUS WITH OTHER SPECIFIED COMPLICATION, WITH LONG-TERM CURRENT USE OF INSULIN (H): Primary | ICD-10-CM

## 2025-04-01 DIAGNOSIS — Z79.899 ENCOUNTER FOR LONG-TERM CURRENT USE OF MEDICATION: ICD-10-CM

## 2025-04-01 DIAGNOSIS — Z94.0 STATUS POST KIDNEY TRANSPLANT: ICD-10-CM

## 2025-04-01 DIAGNOSIS — I15.1 HTN, KIDNEY TRANSPLANT RELATED: ICD-10-CM

## 2025-04-01 DIAGNOSIS — Z94.0 KIDNEY REPLACED BY TRANSPLANT: ICD-10-CM

## 2025-04-01 DIAGNOSIS — Z94.0 HTN, KIDNEY TRANSPLANT RELATED: ICD-10-CM

## 2025-04-01 DIAGNOSIS — Z48.298 AFTERCARE FOLLOWING ORGAN TRANSPLANT: ICD-10-CM

## 2025-04-01 DIAGNOSIS — Z20.828 CONTACT WITH AND (SUSPECTED) EXPOSURE TO OTHER VIRAL COMMUNICABLE DISEASES: ICD-10-CM

## 2025-04-01 DIAGNOSIS — B34.8 BK VIREMIA: ICD-10-CM

## 2025-04-01 LAB
ANION GAP SERPL CALCULATED.3IONS-SCNC: 12 MMOL/L (ref 7–15)
BUN SERPL-MCNC: 33.1 MG/DL (ref 8–23)
CALCIUM SERPL-MCNC: 9.9 MG/DL (ref 8.8–10.4)
CHLORIDE SERPL-SCNC: 104 MMOL/L (ref 98–107)
CREAT SERPL-MCNC: 1.25 MG/DL (ref 0.67–1.17)
EGFRCR SERPLBLD CKD-EPI 2021: 60 ML/MIN/1.73M2
ERYTHROCYTE [DISTWIDTH] IN BLOOD BY AUTOMATED COUNT: 12.6 % (ref 10–15)
GLUCOSE SERPL-MCNC: 297 MG/DL (ref 70–99)
HCO3 SERPL-SCNC: 20 MMOL/L (ref 22–29)
HCT VFR BLD AUTO: 39.4 % (ref 40–53)
HGB BLD-MCNC: 12.7 G/DL (ref 13.3–17.7)
MAGNESIUM SERPL-MCNC: 1.5 MG/DL (ref 1.7–2.3)
MCH RBC QN AUTO: 31.5 PG (ref 26.5–33)
MCHC RBC AUTO-ENTMCNC: 32.2 G/DL (ref 31.5–36.5)
MCV RBC AUTO: 98 FL (ref 78–100)
PLATELET # BLD AUTO: 195 10E3/UL (ref 150–450)
POTASSIUM SERPL-SCNC: 5 MMOL/L (ref 3.4–5.3)
RBC # BLD AUTO: 4.03 10E6/UL (ref 4.4–5.9)
SODIUM SERPL-SCNC: 136 MMOL/L (ref 135–145)
TACROLIMUS BLD-MCNC: 9.5 UG/L (ref 5–15)
TME LAST DOSE: NORMAL H
TME LAST DOSE: NORMAL H
WBC # BLD AUTO: 6.2 10E3/UL (ref 4–11)

## 2025-04-01 PROCEDURE — 83735 ASSAY OF MAGNESIUM: CPT

## 2025-04-01 PROCEDURE — 36415 COLL VENOUS BLD VENIPUNCTURE: CPT

## 2025-04-01 PROCEDURE — 85027 COMPLETE CBC AUTOMATED: CPT

## 2025-04-01 PROCEDURE — 80048 BASIC METABOLIC PNL TOTAL CA: CPT

## 2025-04-01 PROCEDURE — 99606 MTMS BY PHARM EST 15 MIN: CPT | Mod: 93 | Performed by: PHARMACIST

## 2025-04-01 PROCEDURE — 80197 ASSAY OF TACROLIMUS: CPT

## 2025-04-01 RX ORDER — METOPROLOL TARTRATE 50 MG
50 TABLET ORAL 2 TIMES DAILY
Qty: 180 TABLET | Refills: 3 | Status: SHIPPED | OUTPATIENT
Start: 2025-04-01

## 2025-04-01 NOTE — PROGRESS NOTES
Medication Therapy Management (MTM) Encounter    ASSESSMENT:                            Medication Adherence/Access: No issues identified.    Diabetes   Discussed long term goals with patient, patient would like to get off his 2nd type of insulin as he was well controlled prior to transplant just on Novolog flexpen. His sugars are not at goal, only 43% in range. His PM dose of Flexpen is still much lower than what it was prior to transplant. We will try holding Novolog as I believe it has led to a few lows midday and before bed, and titrate his 70/30 flexpen.     Hypertension   Stable. BP at goal <140/90. Dr. Keen increased metoprolol to 50mg twice daily last visit, MTM sent updated prescription today reflecting this dose.     PLAN:                            Increase Novolin flexpen 70/30 every morning 40 units every morning and 22 units every evening.   If your overnight sugars are over 130 for 3 days can increase the evening Novolin flexpen 70/30 dose by 2 units.   Hold Novolog    Follow-up: 4/16 at 8 AM    SUBJECTIVE/OBJECTIVE:                          Peter Carpio is a 74 year old male seen for a follow-up visit.       Reason for visit: diabetes.    Allergies/ADRs: Reviewed in chart  Past Medical History: Reviewed in chart  Tobacco: He reports that he quit smoking about 22 years ago. His smoking use included cigarettes. He has never used smokeless tobacco.  Alcohol: not currently using    Medication Adherence/Access: no issues reported.    Diabetes   Novolin 70/30 36 units every morning and 16 units every evening eating dinner at 6PM  Novolog 4 units before lunch, 2 units before breakfast and dinner plus sliding scale.  Patient is not experiencing side effects.  Prior to transplant 70/30 32 units every morning and 35 units every evening.  Current diabetes symptoms: none  Diet/Exercise: Ensure protein.              Blood sugar monitoring: Continuous Glucose Monitor Christiano  Eye exam is up to  date    Hypertension   Metoprolol 50mg twice daily   Patient reports no current medication side effects  Patient self monitors blood pressure.  Home BP monitoring AM: 126/84, 133/83, 127/83, 126/79, 137/82 PM: 144/3, 136/86, 143/84, 147/81, 150/88 Pulse 64-72   BP Readings from Last 3 Encounters:   03/26/25 (!) 145/88   03/03/25 (!) 154/81   02/27/25 (!) 147/77      Today's Vitals: There were no vitals taken for this visit.  ----------------    I spent 15 minutes with this patient today. All changes were made via collaborative practice agreement with Dr. Keen.     A summary of these recommendations was sent via datango.    Esther BrarD  [facility] Pharmacist    Phone: [phone]     Telemedicine Visit Details  The patient's medications can be safely assessed via a telemedicine encounter.  Type of service:  Telephone visit  Originating Location (pt. Location): Home    Distant Location (provider location):  On-site  Start Time: 10:21 AM  End Time: 10:36 AM     Medication Therapy Recommendations  Diabetes mellitus, type 2 (H)   1 Current Medication: insulin NPH-Regular (HUMULIN 70/30;NOVOLIN 70/30) susp   Current Medication Sig: With prednisone 40 mg dose take 50 units prior to breakfast and 23 units prior to dinner. With prednisone 20 mg dose take 40 units prior to breakfast and 19 units prior to dinner. With prednisone 15 mg dose take 38 units prior to breakfast and 16 units prior to dinner. With prednisone 10 mg dose take 35 units prior to breakfast and 15 units prior to dinner. With prednisone 5 mg dose take 35 units prior to breakfast and 15 units prior to dinner.   Rationale: Dose too low - Dosage too low - Effectiveness   Recommendation: Increase Dose   Status: Accepted per CPA   Identified Date: 4/1/2025 Completed Date: 4/1/2025         2 Current Medication: insulin aspart (NOVOLOG PEN) 100 UNIT/ML pen   Current Medication Sig: Inject 1-7 Units subcutaneously 3 times daily (before meals). Correction Scale  -   Do Not give Correction Insulin if Pre-Meal BG less than 140. For Pre-Meal  - 189 give 1 unit. For Pre-Meal - 239 give 2 units. For Pre-Meal -289 give 3 units. For Pre-Meal -339 give 4 units. For Pre-Meal -389 give 5 units. For Pre-Meal -439 give 6 units. For Pre-Meal BG greater than or equal to 440 give 7 units To be given with prandial insulin, and based on pre-meal blood glucose. Administering insulin within 5 minutes of the start of the meal is ideal. Administer insulin no more than 30 minutes after the start of the meal, unless directed otherwise by provider. Notify provider if glucose greater than or equal to 350 mg/dL after administration of correction dose.   Rationale: Duplicate Therapy - Unnecessary medication therapy - Indication   Recommendation: Discontinue Medication   Status: Accepted per CPA   Identified Date: 4/1/2025 Completed Date: 4/1/2025

## 2025-04-01 NOTE — PROGRESS NOTES
Transplant Surgery Progress Note    Transplants:  2/13/2025 (Kidney)  S: overall doing well. Minimal pain. Appetite and energy slowly improving. No wound issues.      Transplant History:    Transplant Type:  DDKT  Donor Type: Donation after Circulatory Death   Transplant Date:  2/13/2025 (Kidney)   Ureteral Stent:  Yes   Crossmatch:  negative   DSA at Tx:  No  Baseline Cr: 1.1-1.3   DeNovo DSA: No    Acute Rejection Hx:  No    Present Maintenance Immunosuppression:  Tacrolimus and Mycophenolate mofetil    CMV IgG Ab Discordance:  No  EBV IgG Ab Discordance:  No    BK Viremia:  No  EBV Viremia:  No    Transplant Coordinator: Ruth Jacob     Transplant Office Phone Number: [phone]     Immunosuppressant Medications       Immunosuppressive Agents Disp Start End     mycophenolate (GENERIC EQUIVALENT) 250 MG capsule -- 3/19/2025 --    Sig - Route: Take 1 capsule (250 mg) by mouth 2 times daily. - Oral    Class: No Print Out    Notes to Pharmacy: TXP DT 2/13/2025 (Kidney) TXP Dischg DT 2/17/2025 DX Kidney replaced by transplant Z94.0 TX [facility] ([place], [place])     tacrolimus (GENERIC EQUIVALENT) 0.5 MG capsule 180 capsule 3/19/2025 --    Sig: Profile Rx: patient will contact pharmacy when needed    Patient not taking: Reported on 3/26/2025    Class: E-Prescribe    Notes to Pharmacy: TXP DT 2/13/2025 (Kidney) TXP Dischg DT 2/17/2025 DX Kidney replaced by transplant Z94.0 TX [facility] ([place], [place])     tacrolimus (GENERIC EQUIVALENT) 1 MG capsule 360 capsule 3/19/2025 --    Sig - Route: Take 2 capsules (2 mg) by mouth 2 times daily. - Oral    Class: E-Prescribe    Notes to Pharmacy: TXP DT 2/13/2025 (Kidney) TXP Dischg DT 2/17/2025 DX Kidney replaced by transplant Z94.0 [facility] ([place], [place])            Possible Immunosuppression-related side effects:   []              headache  []             vivid dreams  []             irritability  []             cognitive difficuties  []             fine tremor  []             nausea  []             diarrhea  []             neuropathy      []             edema  []             renal calcineurin toxicity  []             hyperkalemia  []             post-transplant diabetes  []             decreased appetite  []             increased appetite  []             other:  []             none    Prescription Medications as of 4/1/2025         Rx Number Disp Refills Start End Last Dispensed Date Next Fill Date Owning Pharmacy    acetaminophen (TYLENOL) 325 MG tablet  -- -- 2/17/2025 --       Sig: Take 3 tablets (975 mg) by mouth every 6 hours as needed for mild pain or fever.    Class: OTC    Route: Oral    allopurinol (ZYLOPRIM) 100 MG tablet  -- -- 3/22/2024 --       Sig: Take 100 mg by mouth daily    Class: Historical    Route: Oral    aspirin 81 MG EC tablet  -- --  --       Sig: Take 81 mg by mouth daily.    Class: Historical    Route: Oral    atorvastatin (LIPITOR) 40 MG tablet  90 tablet 3 3/3/2025 --   [facility] - [place], [place] - [address] [facility]    Sig: Take 1 tablet (40 mg) by mouth daily.    Class: E-Prescribe    Route: Oral    Continuous Glucose Sensor (FREESTYLE ANTONIO 3 PLUS SENSOR) MISC  6 each 1 3/28/2025 --   [facility] [place] - [address] [facility]    Sig: Change every 15 days.    Class: E-Prescribe    finasteride (PROSCAR) 5 MG tablet  90 tablet 3 11/19/2024 --   [facility] - [place], [place] - [address]    Sig: Take 1 tablet (5 mg) by mouth daily.    Class: E-Prescribe    Route: Oral    fish oil-omega-3 fatty acids 1000 MG capsule  -- -- 2/17/2025 --       Sig: Take 2 capsules (2 g) by mouth 2 times daily.    Class: OTC    Route: Oral    gabapentin (NEURONTIN) 100 MG capsule  -- -- 4/25/2023 --       Sig: Take 200 mg by mouth at bedtime.    Class: Historical    Route:  Oral    insulin aspart (NOVOLOG PEN) 100 UNIT/ML pen  15 mL 1 2/17/2025 --   [place], [place] - [address] [place] [address]    Sig: Inject 1-7 Units subcutaneously 3 times daily (before meals). Correction Scale -   Do Not give Correction Insulin if Pre-Meal BG less than 140. For Pre-Meal  - 189 give 1 unit. For Pre-Meal - 239 give 2 units. For Pre-Meal -289 give 3 units. For Pre-Meal -339 give 4 units. For Pre-Meal -389 give 5 units. For Pre-Meal -439 give 6 units. For Pre-Meal BG greater than or equal to 440 give 7 units To be given with prandial insulin, and based on pre-meal blood glucose. Administering insulin within 5 minutes of the start of the meal is ideal. Administer insulin no more than 30 minutes after the start of the meal, unless directed otherwise by provider. Notify provider if glucose greater than or equal to 350 mg/dL after administration of correction dose.    Class: E-Prescribe    Route: Subcutaneous    insulin aspart (NOVOLOG PEN) 100 UNIT/ML pen  15 mL 1 2/17/2025 --   [facility] - [place], [place] - [address] [place] [address]    Sig: Inject 1-5 Units subcutaneously at bedtime. Do Not give Bedtime Correction Insulin if BG less than 200. For -249 give 1 units. For -299 give 2 units. For -349 give 3 units. For -399 give 4 units. For BG greater than or equal to 400 give 5 units. Notify provider if glucose greater than or equal to 350 mg/dL after administration of correction dose.    Class: E-Prescribe    Route: Subcutaneous    insulin NPH-Regular (HUMULIN 70/30;NOVOLIN 70/30) susp  15 mL 0 2/17/2025 --   [place], [place] - [address] [place] [address]    Sig: With prednisone 40 mg dose take 50 units prior to breakfast and 23 units prior to dinner. With prednisone 20 mg dose take 40 units prior to breakfast and 19 units prior to dinner. With prednisone 15 mg dose take 38 units prior  to breakfast and 16 units prior to dinner. With prednisone 10 mg dose take 35 units prior to breakfast and 15 units prior to dinner. With prednisone 5 mg dose take 35 units prior to breakfast and 15 units prior to dinner.    Class: E-Prescribe    magnesium oxide (MAG-OX) 400 MG tablet  90 tablet 3 3/3/2025 --   [facility] [address]    Sig: Take 1 tablet (400 mg) by mouth daily (with lunch).    Class: E-Prescribe    Route: Oral    metoprolol tartrate (LOPRESSOR) 50 MG tablet  180 tablet 3 4/1/2025 --   [facility] [address] [facility] [place]    Sig: Take 1 tablet (50 mg) by mouth 2 times daily.    Class: E-Prescribe    Notes to Pharmacy: Put on hold    Route: Oral    mycophenolate (GENERIC EQUIVALENT) 250 MG capsule  -- -- 3/19/2025 --       Sig: Take 1 capsule (250 mg) by mouth 2 times daily.    Class: No Print Out    Notes to Pharmacy: TXP DT 2/13/2025 (Kidney) TXP Dischg DT 2/17/2025 DX Kidney replaced by transplant Z94.0 [facility] ([place], [place])    Route: Oral    ondansetron (ZOFRAN ODT) 4 MG ODT tab  12 tablet 1 2/17/2025 --   [facility] - [place], [place] - [address] [facility]    Sig: Take 1 tablet (4 mg) by mouth every 6 hours as needed for nausea.    Class: E-Prescribe    Route: Oral    pantoprazole (PROTONIX) 40 MG EC tablet  30 tablet 0 3/10/2025 --   [facility] [address]    Sig: Take 1 tablet (40 mg) by mouth daily.    Class: E-Prescribe    Route: Oral    psyllium (METAMUCIL/KONSYL) 58.6 % powder  -- --  --       Sig: Take 1 teaspoonful by mouth daily.    Class: Historical    Route: Oral    sulfamethoxazole-trimethoprim (BACTRIM) 400-80 MG tablet  90 tablet 3 3/3/2025 --   [facility] [address]    Sig: Take 1 tablet by mouth daily.    Class: E-Prescribe    Route: Oral    tacrolimus (GENERIC  EQUIVALENT) 0.5 MG capsule  180 capsule 3 3/19/2025 --   [address]    Sig: Profile Rx: patient will contact pharmacy when needed    Class: E-Prescribe    Notes to Pharmacy: TXP DT 2/13/2025 (Kidney) TXP Dischg DT 2/17/2025 DX Kidney replaced by transplant Z94.0 [facility] ([place], [place])    tacrolimus (GENERIC EQUIVALENT) 1 MG capsule  360 capsule 3 3/19/2025 --   [address]    Sig: Take 2 capsules (2 mg) by mouth 2 times daily.    Class: E-Prescribe    Notes to Pharmacy: TXP DT 2/13/2025 (Kidney) TXP Dischg DT 2/17/2025 DX Kidney replaced by transplant Z94.0 [facility] ([place], [place])    Route: Oral    tamsulosin (FLOMAX) 0.4 MG capsule  90 capsule 3 3/3/2025 --   [address]    Sig: Take 1 capsule (0.4 mg) by mouth daily.    Class: E-Prescribe    Route: Oral    valGANciclovir (VALCYTE) 450 MG tablet  90 tablet 1 3/18/2025 --   [address]    Sig: Take 1 tablet (450 mg) by mouth daily. Stop on 8/13/2025    Class: E-Prescribe    Route: Oral            O:      General Appearance: in no apparent distress.   Skin: Normal, no rashes or jaundice  Heart: regular rate and rhythm, normal S1 and S2  Lungs: easy respirations, no audible wheezing.  Abdomen: abdomen soft, rounded, nontender. Incision healing well, no erythema/induration/drainage. No hernia  Extremities: Tremor absent.   Edema: present bilaterally. 1+        Latest Ref Rng & Units 4/1/2025     7:06 AM 3/28/2025     7:34 AM 3/26/2025     9:12 AM 3/21/2025     7:13 AM 3/18/2025     9:02 AM   Transplant Immunosuppression Labs   Creat 0.67 - 1.17 mg/dL  1.35  1.36  1.33  1.21    Urea Nitrogen 8.0 - 23.0 mg/dL  33.3  30.6  29.2  33.6    WBC 4.0 - 11.0 10e3/uL 6.2  5.5  6.1  5.9   6.0        Chemistries:   Recent Labs   Lab Test 03/28/25  0734   BUN 33.3*   CR 1.35*   GFRESTIMATED 55*   *     Lab Results   Component Value Date    A1C 7.1 02/12/2025    CPEPT 17.4 12/26/2024     Recent Labs   Lab Test 02/12/25 2047   ALBUMIN 3.9   BILITOTAL 0.2   ALKPHOS 59   AST 17   ALT 15     Urine Studies:  Recent Labs   Lab Test 02/12/25  2132   COLOR Light Yellow   APPEARANCE Slightly Cloudy*   URINEGLC >=1000*   URINEBILI Negative   URINEKETONE Negative   SG 1.013   UBLD Trace*   URINEPH 6.5   PROTEIN 100*   NITRITE Negative   LEUKEST Large*   RBCU 8*   WBCU >182*     No lab results found.  Hematology:   Recent Labs   Lab Test 04/01/25  0706 03/28/25  0734 03/26/25  0912   HGB 12.7* 13.0* 13.0*    188 186   WBC 6.2 5.5 6.1     Coags:   Recent Labs   Lab Test 02/12/25 2047 07/17/24  0847   INR 0.81* 1.01     HLA antibodies:   SA1 HI RISK AYAAN   Date Value Ref Range Status   02/12/2025 None  Final     SA1 MOD RISK AYAAN   Date Value Ref Range Status   02/12/2025 None  Final     SA2 HI RISK AYAAN   Date Value Ref Range Status   02/12/2025 None  Final     SA2 MOD RISK AAYAN   Date Value Ref Range Status   02/12/2025 None  Final       Assessment: Peter Carpio is doing well s/p DDKT:  Issues we addressed during his visit include:    Recovery/frailty: not frail. Eating well, remains active. Advised he and his wife that it is very important that he continue as he is to avoid becoming frail or losing ground. If things are not going well he should alert us sooner rather than later so he does not backslide very far    Plan:    1. Graft function: good, stable  2. Immunosuppression Management: No change continue tac and cellcept  .  Complexity of management:Medium.  Contributing factors: anemia    Followup: 6 weeks    Total Time: 20 min,   Counselling Time: 15 min.      Wali Newell MD

## 2025-04-01 NOTE — TELEPHONE ENCOUNTER
ISSUE:  Trend up in BK     Current ISx: tac goal 8-10,  mg bid.   Scheduled for IVIG next week.     Damir Gay MD Poucher, Jessica, RN  Close to 2 weeks from change with decent up-trend in levels.  0% cPRA and no DSA so stop MMF, add prednisone 5 mg every day and run tac 6-8. Repeat BK in 2 weeks.     Alexis voiced understanding.

## 2025-04-01 NOTE — PATIENT INSTRUCTIONS
"Recommendations from today's MTM visit:                                                      Increase Novolin flexpen 70/30 every morning 40 units every morning and 22 units every evening.   If your overnight sugars are over 130 for 3 days can increase evening Novolin flexpen 70/30 by 2 units.   Hold Novolog    Follow-up: 4/16 at 8 AM     It was great speaking with you today.  I value your experience and would be very thankful for your time in providing feedback in our clinic survey. In the next few days, you may receive an email or text message from [employer] with a link to a survey related to your  clinical pharmacist.\"     To schedule another MTM appointment, please call the clinic directly or you may call the MTM scheduling line at [phone] or toll-free at [phone].     My Clinical Pharmacist's contact information:                                                      Please feel free to contact me with any questions or concerns you have.      Minh Burk, PharmD  MTM Pharmacist    Phone: [phone]     "

## 2025-04-03 LAB
DONOR IDENTIFICATION: NORMAL
DSA COMMENTS: NORMAL
DSA PRESENT: NO
DSA TEST METHOD: NORMAL
ORGAN: NORMAL
SA 1  COMMENTS: NORMAL
SA 1 CELL: NORMAL
SA 1 TEST METHOD: NORMAL
SA 2 CELL: NORMAL
SA 2 COMMENTS: NORMAL
SA 2 TEST METHOD: NORMAL
SA1 HI RISK ABY: NORMAL
SA1 MOD RISK ABY: NORMAL
SA2 HI RISK ABY: NORMAL
SA2 MOD RISK ABY: NORMAL
UNACCEPTABLE ANTIGENS: NORMAL
UNOS CPRA: 0

## 2025-04-03 RX ORDER — PREDNISONE 5 MG/1
5 TABLET ORAL DAILY
Qty: 90 TABLET | Refills: 0 | Status: SHIPPED | OUTPATIENT
Start: 2025-04-03

## 2025-04-03 RX ORDER — MYCOPHENOLATE MOFETIL 250 MG/1
250 CAPSULE ORAL 2 TIMES DAILY
Status: SHIPPED
Start: 2025-04-03

## 2025-04-07 ENCOUNTER — INFUSION THERAPY VISIT (OUTPATIENT)
Dept: INFUSION THERAPY | Facility: CLINIC | Age: 75
End: 2025-04-07
Attending: STUDENT IN AN ORGANIZED HEALTH CARE EDUCATION/TRAINING PROGRAM
Payer: COMMERCIAL

## 2025-04-07 VITALS
SYSTOLIC BLOOD PRESSURE: 165 MMHG | WEIGHT: 186.3 LBS | TEMPERATURE: 97.7 F | HEART RATE: 62 BPM | OXYGEN SATURATION: 98 % | BODY MASS INDEX: 30.07 KG/M2 | DIASTOLIC BLOOD PRESSURE: 96 MMHG | RESPIRATION RATE: 16 BRPM

## 2025-04-07 DIAGNOSIS — D80.1 HYPOGAMMAGLOBULINEMIA: ICD-10-CM

## 2025-04-07 DIAGNOSIS — Z94.0 KIDNEY REPLACED BY TRANSPLANT: Primary | ICD-10-CM

## 2025-04-07 DIAGNOSIS — B34.8 BK VIREMIA: ICD-10-CM

## 2025-04-07 PROCEDURE — 250N000011 HC RX IP 250 OP 636: Mod: JZ | Performed by: STUDENT IN AN ORGANIZED HEALTH CARE EDUCATION/TRAINING PROGRAM

## 2025-04-07 PROCEDURE — 96365 THER/PROPH/DIAG IV INF INIT: CPT

## 2025-04-07 PROCEDURE — 96366 THER/PROPH/DIAG IV INF ADDON: CPT

## 2025-04-07 PROCEDURE — 250N000013 HC RX MED GY IP 250 OP 250 PS 637: Performed by: STUDENT IN AN ORGANIZED HEALTH CARE EDUCATION/TRAINING PROGRAM

## 2025-04-07 RX ORDER — MEPERIDINE HYDROCHLORIDE 25 MG/ML
25 INJECTION INTRAMUSCULAR; INTRAVENOUS; SUBCUTANEOUS
OUTPATIENT
Start: 2025-04-21

## 2025-04-07 RX ORDER — ALBUTEROL SULFATE 0.83 MG/ML
2.5 SOLUTION RESPIRATORY (INHALATION)
OUTPATIENT
Start: 2025-04-21

## 2025-04-07 RX ORDER — DIPHENHYDRAMINE HCL 25 MG
25-50 CAPSULE ORAL ONCE
Status: COMPLETED | OUTPATIENT
Start: 2025-04-07 | End: 2025-04-07

## 2025-04-07 RX ORDER — DIPHENHYDRAMINE HYDROCHLORIDE 50 MG/ML
25 INJECTION, SOLUTION INTRAMUSCULAR; INTRAVENOUS
Start: 2025-04-21

## 2025-04-07 RX ORDER — DIPHENHYDRAMINE HYDROCHLORIDE 50 MG/ML
50 INJECTION, SOLUTION INTRAMUSCULAR; INTRAVENOUS
Start: 2025-04-21

## 2025-04-07 RX ORDER — HEPARIN SODIUM,PORCINE 10 UNIT/ML
5-20 VIAL (ML) INTRAVENOUS DAILY PRN
OUTPATIENT
Start: 2025-04-21

## 2025-04-07 RX ORDER — ACETAMINOPHEN 325 MG/1
650 TABLET ORAL ONCE
Status: COMPLETED | OUTPATIENT
Start: 2025-04-07 | End: 2025-04-07

## 2025-04-07 RX ORDER — HEPARIN SODIUM (PORCINE) LOCK FLUSH IV SOLN 100 UNIT/ML 100 UNIT/ML
5 SOLUTION INTRAVENOUS
OUTPATIENT
Start: 2025-04-21

## 2025-04-07 RX ORDER — ACETAMINOPHEN 325 MG/1
650 TABLET ORAL ONCE
Start: 2025-04-21

## 2025-04-07 RX ORDER — DIPHENHYDRAMINE HCL 25 MG
25-50 CAPSULE ORAL ONCE
Start: 2025-04-21

## 2025-04-07 RX ORDER — METHYLPREDNISOLONE SODIUM SUCCINATE 40 MG/ML
40 INJECTION INTRAMUSCULAR; INTRAVENOUS
Start: 2025-04-21

## 2025-04-07 RX ORDER — ALBUTEROL SULFATE 90 UG/1
1-2 INHALANT RESPIRATORY (INHALATION)
Start: 2025-04-21

## 2025-04-07 RX ORDER — EPINEPHRINE 1 MG/ML
0.3 INJECTION, SOLUTION INTRAMUSCULAR; SUBCUTANEOUS EVERY 5 MIN PRN
OUTPATIENT
Start: 2025-04-21

## 2025-04-07 RX ADMIN — DIPHENHYDRAMINE HYDROCHLORIDE 50 MG: 25 CAPSULE ORAL at 13:19

## 2025-04-07 RX ADMIN — HUMAN IMMUNOGLOBULIN G 30 G: 20 LIQUID INTRAVENOUS at 13:49

## 2025-04-07 RX ADMIN — ACETAMINOPHEN 650 MG: 325 TABLET ORAL at 13:20

## 2025-04-07 NOTE — PROGRESS NOTES
Nursing Note  Peter Carpio presents today to [facility] for:   Chief Complaint   Patient presents with    Infusion     IVIG       During today's [facility] appointment, orders from Dr. Leonila Miller were completed.  Frequency: first dose    Progress note:  Patient identification verified by name and date of birth.  Assessment completed.  Vitals recorded in Doc Flowsheets.  Patient was provided with education regarding medication/procedure and possible side effects.  Patient verbalized understanding.     present during visit today: Not Applicable.    Treatment Conditions: Patient denies fever, chills, signs of infection, recent illness, antibiotics use, productive cough or elevated temperature.    Premedications: administered per order.    Drug Waste Record: No    Infusion length and rate:  infusion starts at 42 ml/hr, then increased by 42 ml/hr every 15 minutes to final rate of 294 ml/hr.    Labs: were not ordered for this appointment. Will have transplant labs drawn tomorrow.    Vascular access: peripheral IV placed today.    Is the next appt scheduled? yes    Post Infusion Assessment:  Patient tolerated infusion without incident.     Discharge Plan:   Follow up plan of care with: ongoing infusions at [facility]. and ordering provider as scheduled.  Discharge instructions were reviewed with patient.  Patient/representative verbalized understanding of discharge instructions and all questions answered.  Patient discharged from [facility] in stable condition.    Sumi Hernández RN    Administrations This Visit       acetaminophen (TYLENOL) tablet 650 mg       Admin Date  04/07/2025 Action  $Given Dose  650 mg Route  Oral Documented By  Sumi Hernández RN              diphenhydrAMINE (BENADRYL) capsule 25-50 mg       Admin Date  04/07/2025 Action  $Given Dose  50 mg Route  Oral Documented  By  Sumi Hernández, RN              immune globulin (PRIVIGEN) - sucrose free 10 % injection 30 g       Admin Date  04/07/2025 Action  $New Bag Dose  30 g Route  Intravenous Documented By  Sumi Hernández, RN                    BP (!) 144/84   Pulse 67   Temp 97.4  F (36.3  C) (Oral)   Resp 16   Wt 84.5 kg (186 lb 4.8 oz)   BMI 30.07 kg/m

## 2025-04-08 ENCOUNTER — LAB (OUTPATIENT)
Dept: LAB | Facility: CLINIC | Age: 75
End: 2025-04-08
Payer: COMMERCIAL

## 2025-04-08 DIAGNOSIS — Z20.828 CONTACT WITH AND (SUSPECTED) EXPOSURE TO OTHER VIRAL COMMUNICABLE DISEASES: ICD-10-CM

## 2025-04-08 DIAGNOSIS — Z48.298 AFTERCARE FOLLOWING ORGAN TRANSPLANT: ICD-10-CM

## 2025-04-08 DIAGNOSIS — Z94.0 STATUS POST KIDNEY TRANSPLANT: ICD-10-CM

## 2025-04-08 DIAGNOSIS — Z79.899 ENCOUNTER FOR LONG-TERM CURRENT USE OF MEDICATION: ICD-10-CM

## 2025-04-08 DIAGNOSIS — Z98.890 OTHER SPECIFIED POSTPROCEDURAL STATES: ICD-10-CM

## 2025-04-08 DIAGNOSIS — Z94.0 KIDNEY REPLACED BY TRANSPLANT: ICD-10-CM

## 2025-04-08 LAB
ANION GAP SERPL CALCULATED.3IONS-SCNC: 11 MMOL/L (ref 7–15)
BUN SERPL-MCNC: 40.9 MG/DL (ref 8–23)
CALCIUM SERPL-MCNC: 10.3 MG/DL (ref 8.8–10.4)
CHLORIDE SERPL-SCNC: 107 MMOL/L (ref 98–107)
CREAT SERPL-MCNC: 1.54 MG/DL (ref 0.67–1.17)
EGFRCR SERPLBLD CKD-EPI 2021: 47 ML/MIN/1.73M2
ERYTHROCYTE [DISTWIDTH] IN BLOOD BY AUTOMATED COUNT: 12.5 % (ref 10–15)
GLUCOSE SERPL-MCNC: 168 MG/DL (ref 70–99)
HCO3 SERPL-SCNC: 22 MMOL/L (ref 22–29)
HCT VFR BLD AUTO: 39.2 % (ref 40–53)
HGB BLD-MCNC: 12.6 G/DL (ref 13.3–17.7)
MCH RBC QN AUTO: 30.9 PG (ref 26.5–33)
MCHC RBC AUTO-ENTMCNC: 32.1 G/DL (ref 31.5–36.5)
MCV RBC AUTO: 96 FL (ref 78–100)
PLATELET # BLD AUTO: 186 10E3/UL (ref 150–450)
POTASSIUM SERPL-SCNC: 4.7 MMOL/L (ref 3.4–5.3)
RBC # BLD AUTO: 4.08 10E6/UL (ref 4.4–5.9)
SODIUM SERPL-SCNC: 140 MMOL/L (ref 135–145)
TACROLIMUS BLD-MCNC: 7.9 UG/L (ref 5–15)
TME LAST DOSE: NORMAL H
TME LAST DOSE: NORMAL H
WBC # BLD AUTO: 5.6 10E3/UL (ref 4–11)

## 2025-04-08 PROCEDURE — 87799 DETECT AGENT NOS DNA QUANT: CPT

## 2025-04-08 PROCEDURE — 80197 ASSAY OF TACROLIMUS: CPT

## 2025-04-08 PROCEDURE — 36415 COLL VENOUS BLD VENIPUNCTURE: CPT

## 2025-04-08 PROCEDURE — 85027 COMPLETE CBC AUTOMATED: CPT

## 2025-04-08 PROCEDURE — 80048 BASIC METABOLIC PNL TOTAL CA: CPT

## 2025-04-09 ENCOUNTER — TELEPHONE (OUTPATIENT)
Dept: TRANSPLANT | Facility: CLINIC | Age: 75
End: 2025-04-09
Payer: COMMERCIAL

## 2025-04-09 DIAGNOSIS — I15.1 HTN, KIDNEY TRANSPLANT RELATED: ICD-10-CM

## 2025-04-09 DIAGNOSIS — Z94.0 HTN, KIDNEY TRANSPLANT RELATED: ICD-10-CM

## 2025-04-09 DIAGNOSIS — B34.8 BK VIREMIA: ICD-10-CM

## 2025-04-09 LAB
BK VIRUS DNA IU/ML, INSTRUMENT (6800): 4240 IU/ML
BK VIRUS SPECIMEN TYPE: ABNORMAL
BKV DNA SPEC NAA+PROBE-LOG#: 3.6 {LOG_COPIES}/ML

## 2025-04-09 NOTE — TELEPHONE ENCOUNTER
Post Kidney and Pancreas Transplant Team Conference  Date: 4/9/2025  Transplant Coordinator: Morena     Attendees:  [x]  Dr. Valenzuela [x] Alicia Larson, RN [x] Fanny Ellison LPN     [x]  Dr. Hall [x] Debbie Reyna, RN    [x] Dr. Keen [x] Morena Jacob, RN    [x] Dr. Blackmon [x] Mendy Sanders, RN [x] Carie Mcrae, JANICE   [x] Dr. Buck [] Loreta Hanley, RN    [] Dr. Oshea [] Mariluz Lam, RN [x] Minh Burk, PharmD   []  Dr. Newell [] Geeta Simeon, RN    [] Dr. Marin [] Thomas Oliva RN     [] Patricia Bobby RN    [x] Stephania Alatorre, BREEZY [] Fawn Pichardo RN        Verbal Plan Read Back:     Schedule an US and UA    Glucose levels have been elevated- follow up with endo    May need a biopsy next week    Routed to RN Coordinator   Fanny Ellison LPN

## 2025-04-10 ENCOUNTER — TELEPHONE (OUTPATIENT)
Dept: TRANSPLANT | Facility: CLINIC | Age: 75
End: 2025-04-10
Payer: COMMERCIAL

## 2025-04-10 DIAGNOSIS — R79.89 ELEVATED SERUM CREATININE: Primary | ICD-10-CM

## 2025-04-10 DIAGNOSIS — B34.8 BK VIREMIA: ICD-10-CM

## 2025-04-10 DIAGNOSIS — I15.1 HTN, KIDNEY TRANSPLANT RELATED: ICD-10-CM

## 2025-04-10 DIAGNOSIS — Z94.0 HTN, KIDNEY TRANSPLANT RELATED: ICD-10-CM

## 2025-04-10 NOTE — PROGRESS NOTES
Outcome for 04/10/25 2:47 PM: Data uploaded on [employer]  Clemencia Christianson MA  Outcome for 04/18/25 11:45 AM: Data obtained via [employer] website  Thomas Luis MA    Patient is showing 4/5 MNCM met. BP out range   Thomas Luis MA

## 2025-04-10 NOTE — TELEPHONE ENCOUNTER
ISSUE:  SCr elevated     Per committee review, collect UA and renal transplant US. Collect UA tomorrow, US scheduled.   Discussed need for possible bx if SCr remains elevated, Alexis voiced understanding.

## 2025-04-14 ENCOUNTER — TELEPHONE (OUTPATIENT)
Dept: TRANSPLANT | Facility: CLINIC | Age: 75
End: 2025-04-14

## 2025-04-14 ENCOUNTER — HOSPITAL ENCOUNTER (OUTPATIENT)
Dept: ULTRASOUND IMAGING | Facility: CLINIC | Age: 75
Discharge: HOME | End: 2025-04-14
Attending: STUDENT IN AN ORGANIZED HEALTH CARE EDUCATION/TRAINING PROGRAM | Admitting: STUDENT IN AN ORGANIZED HEALTH CARE EDUCATION/TRAINING PROGRAM
Payer: COMMERCIAL

## 2025-04-14 DIAGNOSIS — R79.89 ELEVATED SERUM CREATININE: ICD-10-CM

## 2025-04-14 DIAGNOSIS — Z94.0 HTN, KIDNEY TRANSPLANT RELATED: ICD-10-CM

## 2025-04-14 DIAGNOSIS — B34.8 BK VIREMIA: ICD-10-CM

## 2025-04-14 DIAGNOSIS — I15.1 HTN, KIDNEY TRANSPLANT RELATED: ICD-10-CM

## 2025-04-14 PROCEDURE — 76776 US EXAM K TRANSPL W/DOPPLER: CPT

## 2025-04-14 PROCEDURE — 76776 US EXAM K TRANSPL W/DOPPLER: CPT | Mod: 26 | Performed by: RADIOLOGY

## 2025-04-14 NOTE — TELEPHONE ENCOUNTER
ISSUE:  Tac 10.5 on 4/11    Alexis states this was 11 hour trough. MMF currently on hold for BK viremia. Will stay on same dose, repeat 12 hour trough on Tuesday.

## 2025-04-15 ENCOUNTER — LAB (OUTPATIENT)
Dept: LAB | Facility: CLINIC | Age: 75
End: 2025-04-15
Payer: COMMERCIAL

## 2025-04-15 DIAGNOSIS — Z20.828 CONTACT WITH AND (SUSPECTED) EXPOSURE TO OTHER VIRAL COMMUNICABLE DISEASES: ICD-10-CM

## 2025-04-15 DIAGNOSIS — Z94.0 KIDNEY REPLACED BY TRANSPLANT: ICD-10-CM

## 2025-04-15 DIAGNOSIS — Z48.298 AFTERCARE FOLLOWING ORGAN TRANSPLANT: ICD-10-CM

## 2025-04-15 DIAGNOSIS — Z79.899 ENCOUNTER FOR LONG-TERM CURRENT USE OF MEDICATION: ICD-10-CM

## 2025-04-15 DIAGNOSIS — Z98.890 OTHER SPECIFIED POSTPROCEDURAL STATES: ICD-10-CM

## 2025-04-15 LAB
ALBUMIN MFR UR ELPH: 31.4 MG/DL
ANION GAP SERPL CALCULATED.3IONS-SCNC: 9 MMOL/L (ref 7–15)
BUN SERPL-MCNC: 44.2 MG/DL (ref 8–23)
CALCIUM SERPL-MCNC: 10.4 MG/DL (ref 8.8–10.4)
CHLORIDE SERPL-SCNC: 106 MMOL/L (ref 98–107)
CREAT SERPL-MCNC: 1.59 MG/DL (ref 0.67–1.17)
CREAT UR-MCNC: 93.1 MG/DL
EGFRCR SERPLBLD CKD-EPI 2021: 45 ML/MIN/1.73M2
ERYTHROCYTE [DISTWIDTH] IN BLOOD BY AUTOMATED COUNT: 12.7 % (ref 10–15)
GLUCOSE SERPL-MCNC: 154 MG/DL (ref 70–99)
HCO3 SERPL-SCNC: 22 MMOL/L (ref 22–29)
HCT VFR BLD AUTO: 39.4 % (ref 40–53)
HGB BLD-MCNC: 12.9 G/DL (ref 13.3–17.7)
MCH RBC QN AUTO: 31.5 PG (ref 26.5–33)
MCHC RBC AUTO-ENTMCNC: 32.7 G/DL (ref 31.5–36.5)
MCV RBC AUTO: 96 FL (ref 78–100)
PLATELET # BLD AUTO: 195 10E3/UL (ref 150–450)
POTASSIUM SERPL-SCNC: 4.8 MMOL/L (ref 3.4–5.3)
PROT/CREAT 24H UR: 0.34 MG/MG CR (ref 0–0.2)
RBC # BLD AUTO: 4.1 10E6/UL (ref 4.4–5.9)
SODIUM SERPL-SCNC: 137 MMOL/L (ref 135–145)
TACROLIMUS BLD-MCNC: 12.1 UG/L (ref 5–15)
TME LAST DOSE: NORMAL H
TME LAST DOSE: NORMAL H
WBC # BLD AUTO: 7.7 10E3/UL (ref 4–11)

## 2025-04-15 PROCEDURE — 80180 DRUG SCRN QUAN MYCOPHENOLATE: CPT

## 2025-04-15 PROCEDURE — 85027 COMPLETE CBC AUTOMATED: CPT

## 2025-04-15 PROCEDURE — 80197 ASSAY OF TACROLIMUS: CPT

## 2025-04-15 PROCEDURE — 36415 COLL VENOUS BLD VENIPUNCTURE: CPT

## 2025-04-15 PROCEDURE — 87799 DETECT AGENT NOS DNA QUANT: CPT

## 2025-04-15 PROCEDURE — 84156 ASSAY OF PROTEIN URINE: CPT

## 2025-04-15 PROCEDURE — 80048 BASIC METABOLIC PNL TOTAL CA: CPT

## 2025-04-16 ENCOUNTER — TELEPHONE (OUTPATIENT)
Dept: TRANSPLANT | Facility: CLINIC | Age: 75
End: 2025-04-16
Payer: COMMERCIAL

## 2025-04-16 ENCOUNTER — VIRTUAL VISIT (OUTPATIENT)
Dept: PHARMACY | Facility: CLINIC | Age: 75
End: 2025-04-16
Payer: COMMERCIAL

## 2025-04-16 DIAGNOSIS — Z94.0 STATUS POST KIDNEY TRANSPLANT: ICD-10-CM

## 2025-04-16 DIAGNOSIS — Z94.0 HTN, KIDNEY TRANSPLANT RELATED: ICD-10-CM

## 2025-04-16 DIAGNOSIS — T50.905A DRUG-INDUCED HYPERGLYCEMIA: ICD-10-CM

## 2025-04-16 DIAGNOSIS — E11.69 TYPE 2 DIABETES MELLITUS WITH OTHER SPECIFIED COMPLICATION, WITH LONG-TERM CURRENT USE OF INSULIN (H): Primary | ICD-10-CM

## 2025-04-16 DIAGNOSIS — I15.1 HTN, KIDNEY TRANSPLANT RELATED: ICD-10-CM

## 2025-04-16 DIAGNOSIS — R73.9 DRUG-INDUCED HYPERGLYCEMIA: ICD-10-CM

## 2025-04-16 DIAGNOSIS — B34.8 BK VIREMIA: ICD-10-CM

## 2025-04-16 DIAGNOSIS — Z79.4 TYPE 2 DIABETES MELLITUS WITH OTHER SPECIFIED COMPLICATION, WITH LONG-TERM CURRENT USE OF INSULIN (H): Primary | ICD-10-CM

## 2025-04-16 LAB
BK VIRUS DNA IU/ML, INSTRUMENT (6800): ABNORMAL IU/ML
BK VIRUS SPECIMEN TYPE: ABNORMAL
BKV DNA SPEC NAA+PROBE-LOG#: 4 {LOG_COPIES}/ML
MYCOPHENOLATE SERPL LC/MS/MS-MCNC: <0.25 MG/L (ref 1–3.5)
MYCOPHENOLATE-G SERPL LC/MS/MS-MCNC: <6.5 MG/L (ref 30–95)
TME LAST DOSE: ABNORMAL H
TME LAST DOSE: ABNORMAL H

## 2025-04-16 PROCEDURE — 99606 MTMS BY PHARM EST 15 MIN: CPT | Mod: 93 | Performed by: PHARMACIST

## 2025-04-16 RX ORDER — VALGANCICLOVIR 450 MG/1
450 TABLET, FILM COATED ORAL DAILY
Qty: 90 TABLET | Refills: 1 | Status: SHIPPED | OUTPATIENT
Start: 2025-04-16

## 2025-04-16 RX ORDER — TACROLIMUS 0.5 MG/1
0.5 CAPSULE ORAL 2 TIMES DAILY
Qty: 180 CAPSULE | Refills: 3 | Status: SHIPPED | OUTPATIENT
Start: 2025-04-16

## 2025-04-16 RX ORDER — TACROLIMUS 1 MG/1
1 CAPSULE ORAL 2 TIMES DAILY
Qty: 180 CAPSULE | Refills: 3 | Status: SHIPPED | OUTPATIENT
Start: 2025-04-16

## 2025-04-16 RX ORDER — AMLODIPINE BESYLATE 2.5 MG/1
2.5 TABLET ORAL EVERY EVENING
Qty: 30 TABLET | Refills: 2 | Status: SHIPPED | OUTPATIENT
Start: 2025-04-16

## 2025-04-16 NOTE — TELEPHONE ENCOUNTER
ISSUE:   Tacrolimus IR level 12.1 on 4/15, goal ~8, dose 2 mg BID.    PLAN:   Call Patient and confirm this was an accurate 12-hour trough.   Verify Tacrolimus IR dose 2 mg BID.   Confirm no new medications or or missed doses.   Confirm no new illness / infection / diarrhea.   If accurate trough and accurate dose, decrease Tacrolimus IR dose to 1.5 mg BID     Is this more than a 50% increase or decrease in current IS dose: No  If YES, justification: N/A    Repeat labs in 1 weeks.  *If > 50% change in immunosuppression dose, repeat labs in 1 week.     OUTCOME:   Spoke with Patient, they confirm accurate trough level and current dose 2 mg BID.   Patient confirmed dose change to 1.5 mg BID.  Patient agreed to repeat labs in 1 weeks.   Orders sent to preferred pharmacy for dose change and lab for repeat labs.   Patient voiced understanding of plan.     Reviewed committee recommendations with Alexis, voiced understanding. Needs refills of medications, sent to pharmacy.

## 2025-04-16 NOTE — PATIENT INSTRUCTIONS
"Recommendations from today's MTM visit:                                                      Add Novolog 4 units plus sliding scale before lunchtime only.   Start Amlodipine 2.5mg every evening. (Can take 1/2 of your 5mg tablets)    Follow-up: 4/28     It was great speaking with you today.  I value your experience and would be very thankful for your time in providing feedback in our clinic survey. In the next few days, you may receive an email or text message from [employer] with a link to a survey related to your  clinical pharmacist.\"     To schedule another MTM appointment, please call the clinic directly or you may call the MTM scheduling line at [phone] or toll-free at [phone].     My Clinical Pharmacist's contact information:                                                      Please feel free to contact me with any questions or concerns you have.      Minh Burk, PharmD  MTM Pharmacist    Phone: [phone]     "

## 2025-04-16 NOTE — PROGRESS NOTES
Medication Therapy Management (MTM) Encounter    ASSESSMENT:                            Medication Adherence/Access: No issues identified.    Diabetes   Add Novolog back in at lunch, this should bring everything to goal. Midday the last few days has been the most elevated.     Hypertension   BP over goal <140/90, 2 months post kidney txp, will start Amlodipine 2.5 mg every evening.     PLAN:                            Add Novolog 4 units plus sliding scale before lunchtime only.   Start Amlodipine 2.5mg every evening. (Can take 1/2 of your 5mg tablets)    Follow-up: 4/28    SUBJECTIVE/OBJECTIVE:                          Peter Carpio is a 75 year old male seen for a follow-up visit.       Reason for visit: diabetes HTN.    Allergies/ADRs: Reviewed in chart  Past Medical History: Reviewed in chart  Tobacco: He reports that he quit smoking about 22 years ago. His smoking use included cigarettes. He has never used smokeless tobacco.  Alcohol: not currently using    Medication Adherence/Access: no issues reported.    Diabetes   Novolin 70/30 40 units every morning and 28 units every evening eating dinner at 6PM  Novolog 4 units before lunch, 2 units before breakfast and dinner plus sliding scale.  Patient is not experiencing side effects.  Prior to transplant 70/30 32 units every morning and 35 units every evening.  Current diabetes symptoms: none  Diet/Exercise: Ensure protein.       Last visit       Hypertension   Metoprolol 50mg twice daily   Patient reports no current medication side effects  Patient self monitors blood pressure.  Home BP monitoring 136-147, evening 150-158 pulse:60-75      Today's Vitals: There were no vitals taken for this visit.  ----------------    I spent 12 minutes with this patient today. All changes were made via collaborative practice agreement with Dr. Keen.     A summary of these recommendations was sent via [employer].    Minh Burk, PharmD  MTM Pharmacist    Phone: [phone]      Telemedicine Visit Details  The patient's medications can be safely assessed via a telemedicine encounter.  Type of service:  Telephone visit  Originating Location (pt. Location): Home    Distant Location (provider location):  Off-site  Start Time: 8 AM  End Time: 8:12 AM     Medication Therapy Recommendations  Diabetes mellitus, type 2 (H)   1 Current Medication: insulin aspart (NOVOLOG PEN) 100 UNIT/ML pen   Current Medication Sig: Inject 1-7 Units subcutaneously 3 times daily (before meals). Correction Scale -   Do Not give Correction Insulin if Pre-Meal BG less than 140. For Pre-Meal  - 189 give 1 unit. For Pre-Meal - 239 give 2 units. For Pre-Meal -289 give 3 units. For Pre-Meal -339 give 4 units. For Pre-Meal -389 give 5 units. For Pre-Meal -439 give 6 units. For Pre-Meal BG greater than or equal to 440 give 7 units To be given with prandial insulin, and based on pre-meal blood glucose. Administering insulin within 5 minutes of the start of the meal is ideal. Administer insulin no more than 30 minutes after the start of the meal, unless directed otherwise by provider. Notify provider if glucose greater than or equal to 350 mg/dL after administration of correction dose.   Rationale: Synergistic therapy - Needs additional medication therapy - Indication   Recommendation: Start Medication   Status: Accepted per CPA   Identified Date: 4/16/2025 Completed Date: 4/16/2025         HTN, kidney transplant related   1 Rationale: Synergistic therapy - Needs additional medication therapy - Indication   Recommendation: Start Medication - amLODIPine 2.5 MG tablet   Status: Accepted per CPA   Identified Date: 4/16/2025 Completed Date: 4/16/2025

## 2025-04-16 NOTE — TELEPHONE ENCOUNTER
Post Kidney and Pancreas Transplant Team Conference  Date: 4/16/2025  Transplant Coordinator: Morena     Attendees:  [x]  Dr. Valenzuela [x] Alicia Larson, RN [x] Fanny Ellison LPN     [x]  Dr. Hall [x] Debbie Reyna, RN    [] Dr. Keen [x] Morena Jacob, RN    [x] Dr. Blackmon [x] Mendy Sanders, RN [x] Carie Mcrae, JANICE   [] Dr. Buck [] Loreta Hanley, RN    [] Dr. Oshea [] Mariluz Lam, RN [x] Minh Burk, PharmD   [x]  Dr. Newell [] Geeta Simeon, RN    [] Dr. Marin [] Thomas Oliva RN     [] Patricia Bobby, JANICE    [x] Stephania Alatorre, BREEZY [] Fawn Pichardo RN        Verbal Plan Read Back:   Reduce tacro to goal ~8  If SCr remains elevated when tac at goal, schedule biopsy for next week  If bx negative, repeat US to check on hydro  If hydro still present, schedule renogram.     Routed to RN Coordinator   Fanny Ellison LPN

## 2025-04-17 ENCOUNTER — TELEPHONE (OUTPATIENT)
Dept: TRANSPLANT | Facility: CLINIC | Age: 75
End: 2025-04-17
Payer: COMMERCIAL

## 2025-04-17 DIAGNOSIS — B34.8 BK VIREMIA: ICD-10-CM

## 2025-04-17 DIAGNOSIS — Z94.0 HTN, KIDNEY TRANSPLANT RELATED: ICD-10-CM

## 2025-04-17 DIAGNOSIS — I15.1 HTN, KIDNEY TRANSPLANT RELATED: ICD-10-CM

## 2025-04-17 NOTE — TELEPHONE ENCOUNTER
ISSUE:  Increase in BK, 10,000 copies up from 4200.     MMF held on 4/2  On pred 5 mg daily, tac reduced yesterday to meet goal ~8  DSA pending.   Received 1st dose of IVIG on 4/7, 2nd dose on 4/23.    Sonia Zaragoza MD Poucher, Jessica, RN  Agree with tacrolimus goal closer to 8, adjust tacrolimus goal to 7-9 if neg DSA

## 2025-04-20 ENCOUNTER — HEALTH MAINTENANCE LETTER (OUTPATIENT)
Age: 75
End: 2025-04-20

## 2025-04-21 ENCOUNTER — OFFICE VISIT (OUTPATIENT)
Dept: TRANSPLANT | Facility: CLINIC | Age: 75
End: 2025-04-21
Attending: INTERNAL MEDICINE
Payer: COMMERCIAL

## 2025-04-21 ENCOUNTER — LAB (OUTPATIENT)
Dept: LAB | Facility: CLINIC | Age: 75
End: 2025-04-21
Attending: INTERNAL MEDICINE
Payer: COMMERCIAL

## 2025-04-21 VITALS
HEART RATE: 72 BPM | TEMPERATURE: 98.3 F | SYSTOLIC BLOOD PRESSURE: 147 MMHG | OXYGEN SATURATION: 98 % | DIASTOLIC BLOOD PRESSURE: 80 MMHG | WEIGHT: 184.5 LBS | BODY MASS INDEX: 29.65 KG/M2 | HEIGHT: 66 IN

## 2025-04-21 DIAGNOSIS — R31.29 OTHER MICROSCOPIC HEMATURIA: ICD-10-CM

## 2025-04-21 DIAGNOSIS — Z94.0 KIDNEY REPLACED BY TRANSPLANT: ICD-10-CM

## 2025-04-21 DIAGNOSIS — Z48.298 AFTERCARE FOLLOWING ORGAN TRANSPLANT: ICD-10-CM

## 2025-04-21 DIAGNOSIS — Z29.89 NEED FOR PNEUMOCYSTIS PROPHYLAXIS: ICD-10-CM

## 2025-04-21 DIAGNOSIS — D80.1 HYPOGAMMAGLOBULINEMIA: ICD-10-CM

## 2025-04-21 DIAGNOSIS — D84.9 IMMUNOSUPPRESSION: ICD-10-CM

## 2025-04-21 DIAGNOSIS — I15.1 HTN, KIDNEY TRANSPLANT RELATED: ICD-10-CM

## 2025-04-21 DIAGNOSIS — Z94.0 KIDNEY REPLACED BY TRANSPLANT: Primary | ICD-10-CM

## 2025-04-21 DIAGNOSIS — Z79.899 ENCOUNTER FOR LONG-TERM CURRENT USE OF MEDICATION: ICD-10-CM

## 2025-04-21 DIAGNOSIS — D63.1 ANEMIA IN STAGE 2 CHRONIC KIDNEY DISEASE: ICD-10-CM

## 2025-04-21 DIAGNOSIS — N18.31 STAGE 3A CHRONIC KIDNEY DISEASE (H): ICD-10-CM

## 2025-04-21 DIAGNOSIS — N25.81 SECONDARY RENAL HYPERPARATHYROIDISM: ICD-10-CM

## 2025-04-21 DIAGNOSIS — Z98.890 OTHER SPECIFIED POSTPROCEDURAL STATES: ICD-10-CM

## 2025-04-21 DIAGNOSIS — B34.8 BK VIREMIA: ICD-10-CM

## 2025-04-21 DIAGNOSIS — Z94.0 STATUS POST KIDNEY TRANSPLANT: ICD-10-CM

## 2025-04-21 DIAGNOSIS — Z94.0 HTN, KIDNEY TRANSPLANT RELATED: ICD-10-CM

## 2025-04-21 DIAGNOSIS — R31.21 ASYMPTOMATIC MICROSCOPIC HEMATURIA: ICD-10-CM

## 2025-04-21 DIAGNOSIS — N18.2 ANEMIA IN STAGE 2 CHRONIC KIDNEY DISEASE: ICD-10-CM

## 2025-04-21 DIAGNOSIS — Z20.828 CONTACT WITH AND (SUSPECTED) EXPOSURE TO OTHER VIRAL COMMUNICABLE DISEASES: ICD-10-CM

## 2025-04-21 LAB
ANION GAP SERPL CALCULATED.3IONS-SCNC: 7 MMOL/L (ref 7–15)
BK VIRUS DNA IU/ML, INSTRUMENT (6800): ABNORMAL IU/ML
BK VIRUS SPECIMEN TYPE: ABNORMAL
BKV DNA SPEC NAA+PROBE-LOG#: 4.3 {LOG_COPIES}/ML
BUN SERPL-MCNC: 39.3 MG/DL (ref 8–23)
CALCIUM SERPL-MCNC: 10.5 MG/DL (ref 8.8–10.4)
CHLORIDE SERPL-SCNC: 108 MMOL/L (ref 98–107)
CMV DNA SPEC NAA+PROBE-ACNC: NOT DETECTED IU/ML
CREAT SERPL-MCNC: 1.47 MG/DL (ref 0.67–1.17)
EGFRCR SERPLBLD CKD-EPI 2021: 49 ML/MIN/1.73M2
ERYTHROCYTE [DISTWIDTH] IN BLOOD BY AUTOMATED COUNT: 13.1 % (ref 10–15)
GLUCOSE SERPL-MCNC: 120 MG/DL (ref 70–99)
HCO3 SERPL-SCNC: 25 MMOL/L (ref 22–29)
HCT VFR BLD AUTO: 39.3 % (ref 40–53)
HGB BLD-MCNC: 12.8 G/DL (ref 13.3–17.7)
MAGNESIUM SERPL-MCNC: 1.9 MG/DL (ref 1.7–2.3)
MCH RBC QN AUTO: 31.3 PG (ref 26.5–33)
MCHC RBC AUTO-ENTMCNC: 32.6 G/DL (ref 31.5–36.5)
MCV RBC AUTO: 96 FL (ref 78–100)
PHOSPHATE SERPL-MCNC: 2 MG/DL (ref 2.5–4.5)
PLATELET # BLD AUTO: 180 10E3/UL (ref 150–450)
POTASSIUM SERPL-SCNC: 5.1 MMOL/L (ref 3.4–5.3)
RBC # BLD AUTO: 4.09 10E6/UL (ref 4.4–5.9)
SODIUM SERPL-SCNC: 140 MMOL/L (ref 135–145)
SPECIMEN TYPE: NORMAL
WBC # BLD AUTO: 9 10E3/UL (ref 4–11)

## 2025-04-21 PROCEDURE — 85027 COMPLETE CBC AUTOMATED: CPT | Performed by: PATHOLOGY

## 2025-04-21 PROCEDURE — G0463 HOSPITAL OUTPT CLINIC VISIT: HCPCS | Performed by: INTERNAL MEDICINE

## 2025-04-21 PROCEDURE — 3079F DIAST BP 80-89 MM HG: CPT | Performed by: INTERNAL MEDICINE

## 2025-04-21 PROCEDURE — G2211 COMPLEX E/M VISIT ADD ON: HCPCS | Performed by: INTERNAL MEDICINE

## 2025-04-21 PROCEDURE — 36415 COLL VENOUS BLD VENIPUNCTURE: CPT | Performed by: PATHOLOGY

## 2025-04-21 PROCEDURE — 84100 ASSAY OF PHOSPHORUS: CPT | Performed by: PATHOLOGY

## 2025-04-21 PROCEDURE — 1126F AMNT PAIN NOTED NONE PRSNT: CPT | Performed by: INTERNAL MEDICINE

## 2025-04-21 PROCEDURE — 99000 SPECIMEN HANDLING OFFICE-LAB: CPT | Performed by: PATHOLOGY

## 2025-04-21 PROCEDURE — 80048 BASIC METABOLIC PNL TOTAL CA: CPT | Performed by: PATHOLOGY

## 2025-04-21 PROCEDURE — 83735 ASSAY OF MAGNESIUM: CPT | Performed by: PATHOLOGY

## 2025-04-21 PROCEDURE — 87799 DETECT AGENT NOS DNA QUANT: CPT | Performed by: INTERNAL MEDICINE

## 2025-04-21 PROCEDURE — 99215 OFFICE O/P EST HI 40 MIN: CPT | Mod: 24 | Performed by: INTERNAL MEDICINE

## 2025-04-21 PROCEDURE — 3077F SYST BP >= 140 MM HG: CPT | Performed by: INTERNAL MEDICINE

## 2025-04-21 ASSESSMENT — PAIN SCALES - GENERAL: PAINLEVEL_OUTOF10: NO PAIN (0)

## 2025-04-21 NOTE — NURSING NOTE
"Chief Complaint   Patient presents with    RECHECK     Follow Up     BP (!) 147/80   Pulse 72   Temp 98.3  F (36.8  C) (Oral)   Ht 1.676 m (5' 6\")   Wt 83.7 kg (184 lb 8 oz)   SpO2 98%   BMI 29.78 kg/m    Roxann Conteh on 4/21/2025 at 10:33 AM    "

## 2025-04-21 NOTE — PROGRESS NOTES
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 Biopsy threshold Cr>=1.5   - Baseline Creatinine: ~ 1.1-1.3   - Proteinuria: 0.3 g/g    - DSA Hx: No DSA  repeat pending  - Last cPRA: 0%   - BK Viremia: Yes, moderately elevated (10-100K)   - Kidney Tx Biopsy Hx: No biopsy history.    # BK viremia:   - up-trending to 10 K international unit(s)/ml on most recent check   - low IgG, schedule IV ig 0.5g/kg q 2 weeks x2 (4/7, pending 2nd dose)   - lower IS as below, off MMF as of 4/2    # Hematuria:   - noted on recent UA, US no stones, stent removed 3/26   - repeat UA with micro, send urine BK pcr and adenovirus pcr    # Immunosuppression: Tacrolimus immediate release (goal 8-10) and Prednisone (dose 5 mg daily)   - Induction with Recent Transplant:  Low Intensity Protocol   - Continue with intensive monitoring of immunosuppression for efficacy and toxicity.   - Historical Changes in Immunosuppression:  off mycophenolate due to BK viremia as of 4/2.   - Changes: Yes - adjust IS if uptrend in BK and DSA neg    # Infection Prevention:   Last CD4 Level: Not checked   - PJP: Sulfa/TMP (Bactrim)   - CMV: Valganciclovir (Valcyte) 450 mg every day for 6 months then monitor CMV pcr monthly till 1 yr post Tx      - CMV IgG Ab High Risk Discordance (D+/R-) at time of transplant: Yes  Present CMV Serostatus: Negative  - EBV IgG Ab High Risk Discordance (D+/R-) at time of transplant: No  Present EBV Serostatus: Positive    # Hypertension: Borderline control;  Goal BP: < 140/90    - Metoprolol 25 mg twice a day.amlodipine 2.5 mg po qhs   - Changes: Yes - amlodipine 2.5 mg po every day recently added, monitor BP trends.    # Diabetes: Borderline control (HbA1c 7-9%) Last HbA1c: 7.1% in Feb, 2025. repeat at 3 months post-transplant.    # Anemia in Chronic Renal Disease: Hgb: Stable, near normal      BARBER: No   - Iron studies: Replete    # Mineral Bone Disorder:    - Secondary renal  hyperparathyroidism; PTH level: Moderately elevated (301-600 pg/ml)        On treatment: None, start sensipar 30 mg po every day if Ca>10.5 on future checks despite adequate hydration  - Vitamin D; level: Low normal        On supplement: No  - Calcium; level: High 10.5        On supplement: No  - Phosphorus; level: Normal        On supplement: No    # Electrolytes:   - Potassium; level: High normal        On supplement: No  - Magnesium; level: Low        On supplement: Yes   - Bicarbonate; level: Normal        On supplement: No    # Other Significant PMH:  - Diabetic neuropathy: On gabapentin 200 mg at night.  Worse when using compression stockings. He may need a higher dose now that he has a kidney transplant with improved creatinine clearance. This may improve his neuropathy and allow him to use compression stockings.  - Gout: on allopurinol 100 mg every day.  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 Saw Dr. Pham cancer specialist place out of [facility] Undergoing surveillance colonoscopy. Last colonoscopy in march 2024 with out any new lesions.   - CAD: cardiac catheterization 7/17/24: pLAD to mLAD 70% stenosis and mLAD 40% stenosis. He is now s/p PCI with EDWIN x 1 to LAD. LVEF 55-60% in May 2024.  - NSTEMI: in 2022. Stress test showed very small defect. No intervention pursued due to kidney function at that time. On statin, ASA 81, and Imdur.   - BPH: on tamsulosin 0.4 mg every day and finasteride 5 mg every day.     # Skin Cancer Risk: Discussed sun protection and recommend regular follow up with Dermatology.    # Transplant History:  Etiology of Kidney Failure: Diabetes mellitus type 2  Tx: DDKT  Transplant: 2/13/2025 (Kidney)  Significant transplant-related complications: None    Transplant Office Phone Number: [phone]    Assessment and plan was discussed with the patient and he voiced his understanding and agreement.    Return visit: Return  for 4 month post transplant visit.    Sonia Hall MD    The longitudinal plan of care for the diagnosis(es)/condition(s) as documented were addressed during this visit. Due to the added complexity in care, I will continue to support Peter in the subsequent management and with ongoing continuity of care.    Chief Complaint   Mr. Carpio is a 75 year old here for kidney transplant and immunosuppression management.     History of Present Illness   Mr. Carpio reports feeling good overall.    Tremors: mild  Weight change: no  Nausea and vomiting: No  Diarrhea: No  Heartburn symptoms: No  Fever, sweats or chills: No  Night sweats: No  Urinary complaints: improved on tamsulosin and finasteride.    Home BP:  120s-130s/70-80s in am 140s/80s, metoprolol 50/50, amlodipine 2.5.    Problem List   Patient Active Problem List   Diagnosis    HTN, kidney transplant related    Chronic kidney disease, stage 4, severely decreased GFR (H)    Diabetic nephropathy (H)    Gout    Secondary renal hyperparathyroidism    Coronary artery disease    Diabetes mellitus, type 2 (H)    Hypertrophy of prostate without urinary obstruction    Malignant neoplasm of ascending colon (H)    Cardiovascular disease    Status post coronary angiogram    Pre-transplant evaluation for kidney transplant    Anemia in chronic kidney disease    Chronic 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    Dependence on renal dialysis    Hyperlipidemia    NSTEMI (non-ST elevated myocardial infarction) (H)    Awaiting transplantation of kidney    Kidney replaced by transplant    Immunosuppressed status    Hypogammaglobulinemia    BK viremia    CKD (chronic kidney disease) stage 2, GFR 60-89 ml/min    Aftercare following organ transplant    Hypomagnesemia    Hypophosphatemia       Allergies   No Known Allergies    Medications   Current Outpatient Medications   Medication Sig Dispense Refill    acetaminophen (TYLENOL) 325 MG tablet Take 3 tablets (975 mg) by mouth  every 6 hours as needed for mild pain or fever.      allopurinol (ZYLOPRIM) 100 MG tablet Take 100 mg by mouth daily      amLODIPine (NORVASC) 2.5 MG tablet Take 1 tablet (2.5 mg) by mouth every evening. 30 tablet 2    aspirin 81 MG EC tablet Take 81 mg by mouth daily.      atorvastatin (LIPITOR) 40 MG tablet Take 1 tablet (40 mg) by mouth daily. 90 tablet 3    Continuous Glucose Sensor (FREESTYLE ANTONIO 3 PLUS SENSOR) MISC Change every 15 days. 6 each 1    finasteride (PROSCAR) 5 MG tablet Take 1 tablet (5 mg) by mouth daily. 90 tablet 3    fish oil-omega-3 fatty acids 1000 MG capsule Take 2 capsules (2 g) by mouth 2 times daily.      gabapentin (NEURONTIN) 100 MG capsule Take 200 mg by mouth at bedtime.      insulin aspart (NOVOLOG PEN) 100 UNIT/ML pen Inject 1-5 Units subcutaneously at bedtime. Do Not give Bedtime Correction Insulin if BG less than 200. For -249 give 1 units. For -299 give 2 units. For -349 give 3 units. For -399 give 4 units. For BG greater than or equal to 400 give 5 units. Notify provider if glucose greater than or equal to 350 mg/dL after administration of correction dose. 15 mL 1    insulin aspart (NOVOLOG PEN) 100 UNIT/ML pen Inject 1-7 Units subcutaneously 3 times daily (before meals). Correction Scale -   Do Not give Correction Insulin if Pre-Meal BG less than 140. For Pre-Meal  - 189 give 1 unit. For Pre-Meal - 239 give 2 units. For Pre-Meal -289 give 3 units. For Pre-Meal -339 give 4 units. For Pre-Meal -389 give 5 units. For Pre-Meal -439 give 6 units. For Pre-Meal BG greater than or equal to 440 give 7 units To be given with prandial insulin, and based on pre-meal blood glucose. Administering insulin within 5 minutes of the start of the meal is ideal. Administer insulin no more than 30 minutes after the start of the meal, unless directed otherwise by provider. Notify provider if glucose greater than or equal to 350 mg/dL after  "administration of correction dose. 15 mL 1    insulin NPH-Regular (HUMULIN 70/30;NOVOLIN 70/30) susp With prednisone 40 mg dose take 50 units prior to breakfast and 23 units prior to dinner. With prednisone 20 mg dose take 40 units prior to breakfast and 19 units prior to dinner. With prednisone 15 mg dose take 38 units prior to breakfast and 16 units prior to dinner. With prednisone 10 mg dose take 35 units prior to breakfast and 15 units prior to dinner. With prednisone 5 mg dose take 35 units prior to breakfast and 15 units prior to dinner. 15 mL 0    magnesium oxide (MAG-OX) 400 MG tablet Take 1 tablet (400 mg) by mouth daily (with lunch). 90 tablet 3    metoprolol tartrate (LOPRESSOR) 50 MG tablet Take 1 tablet (50 mg) by mouth 2 times daily. 180 tablet 3    mycophenolate (GENERIC EQUIVALENT) 250 MG capsule Take 1 capsule (250 mg) by mouth 2 times daily. Held on 4/2 for BK viremia (Patient not taking: Reported on 4/7/2025)      pantoprazole (PROTONIX) 40 MG EC tablet Take 1 tablet (40 mg) by mouth daily. 30 tablet 0    predniSONE (DELTASONE) 5 MG tablet Take 1 tablet (5 mg) by mouth daily. 90 tablet 0    sulfamethoxazole-trimethoprim (BACTRIM) 400-80 MG tablet Take 1 tablet by mouth daily. 90 tablet 3    tacrolimus (GENERIC EQUIVALENT) 0.5 MG capsule Take 1 capsule (0.5 mg) by mouth 2 times daily. Total dose = 1.5 mg twice daily 180 capsule 3    tacrolimus (GENERIC EQUIVALENT) 1 MG capsule Take 1 capsule (1 mg) by mouth 2 times daily. Total dose = 1.5 mg twice daily 180 capsule 3    tamsulosin (FLOMAX) 0.4 MG capsule Take 1 capsule (0.4 mg) by mouth daily. 90 capsule 3    valGANciclovir (VALCYTE) 450 MG tablet Take 1 tablet (450 mg) by mouth daily. Stop on 8/13/2025 90 tablet 1     No current facility-administered medications for this visit.     There are no discontinued medications.    Physical Exam   Vital Signs: BP (!) 147/80   Pulse 72   Temp 98.3  F (36.8  C) (Oral)   Ht 1.676 m (5' 6\")   Wt 83.7 kg " (184 lb 8 oz)   SpO2 98%   BMI 29.78 kg/m      GENERAL APPEARANCE: alert and no distress  HENT: mouth without ulcers or lesions  RESP: lungs clear to auscultation - no rales, rhonchi or wheezes  CV: regular rhythm, normal rate, no rub, no murmur  EDEMA: Trace LE edema bilaterally  ABDOMEN: soft, nondistended, nontender, bowel sounds normal  MS: extremities normal - no gross deformities noted, no evidence of inflammation in joints, no muscle tenderness  SKIN: no rash  TX KIDNEY: normal  DIALYSIS ACCESS: none    Data         Latest Ref Rng & Units 4/21/2025    10:01 AM 4/15/2025     7:26 AM 4/11/2025     7:13 AM   Renal   Sodium 135 - 145 mmol/L 140  137  137    K 3.4 - 5.3 mmol/L 5.1  4.8  4.7    Cl 98 - 107 mmol/L 108  106  106    Cl (external) 98 - 107 mmol/L 108  106  106    CO2 22 - 29 mmol/L 25  22  22    Urea Nitrogen 8.0 - 23.0 mg/dL 39.3  44.2  46.2    Creatinine 0.67 - 1.17 mg/dL 1.47  1.59  1.57    Glucose 70 - 99 mg/dL 120  154  162    Calcium 8.8 - 10.4 mg/dL 10.5  10.4  10.2    Magnesium 1.7 - 2.3 mg/dL 1.9            Latest Ref Rng & Units 4/21/2025    10:01 AM 3/26/2025     9:12 AM 3/18/2025     9:02 AM   Bone Health   Phosphorus 2.5 - 4.5 mg/dL 2.0  1.6  2.2          Latest Ref Rng & Units 4/21/2025    10:01 AM 4/15/2025     7:26 AM 4/11/2025     7:13 AM   Heme   WBC 4.0 - 11.0 10e3/uL 9.0  7.7  6.4    Hgb 13.3 - 17.7 g/dL 12.8  12.9  12.9    Plt 150 - 450 10e3/uL 180  195  197          Latest Ref Rng & Units 2/12/2025     8:47 PM 5/16/2024     1:26 PM   Liver   AP 40 - 150 U/L 59  58    TBili <=1.2 mg/dL 0.2  0.2    ALT 0 - 70 U/L 15  9    AST 0 - 45 U/L 17  8    Tot Protein 6.4 - 8.3 g/dL 6.5  7.0    Albumin 3.5 - 5.2 g/dL 3.9  4.1          Latest Ref Rng & Units 2/12/2025     8:46 PM 12/26/2024     1:20 PM   Pancreas   A1C <5.7 % 7.1  6.7          Latest Ref Rng & Units 2/27/2025     7:05 AM 2/19/2025     7:24 AM   Iron studies   Iron 61 - 157 ug/dL 62  91    Iron Sat Index 15 - 46 % 24  41     Ferritin 31 - 409 ng/mL 624  1,030          Latest Ref Rng & Units 2/12/2025     8:47 PM 5/16/2024     1:26 PM   [facility] Virology   EBV CAPSID ANTIBODY IGG No detectable antibody. Positive  Positive      Failed to redirect to the Timeline version of the REVFS SmartLink.  Recent Labs   Lab Test 04/08/25  0719 04/11/25  0713 04/15/25  0726   DOSTAC 4/7/2025 4/10/2025 4/14/2025   TACROL 7.9 10.5 12.1     Recent Labs   Lab Test 03/14/25  0715 03/18/25  0902 04/15/25  0726   DOSMPA  --  3/17/2025   8:45 PM 4/14/2025   8:00 PM   MPACID 1.71 2.11 <0.25*   MPAG 50.2 63.7 <6.5*

## 2025-04-21 NOTE — LETTER
4/21/2025      Peter Carpio  [address] [facility] [address]      Dear Colleague,    Thank you for referring your patient, Peter Carpio, to the [facility] TRANSPLANT CLINIC. Please see a copy of my visit note below.    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 Biopsy threshold Cr>=1.5   - Baseline Creatinine: ~ 1.1-1.3   - Proteinuria: 0.3 g/g    - DSA Hx: No DSA  repeat pending  - Last cPRA: 0%   - BK Viremia: Yes, moderately elevated (10-100K)   - Kidney Tx Biopsy Hx: No biopsy history.    # BK viremia:   - up-trending to 10 K international unit(s)/ml on most recent check   - low IgG, schedule IV ig 0.5g/kg q 2 weeks x2 (4/7, pending 2nd dose)   - lower IS as below, off MMF as of 4/2    # Hematuria:   - noted on recent UA, US no stones, stent removed 3/26   - repeat UA with micro, send urine BK pcr and adenovirus pcr    # Immunosuppression: Tacrolimus immediate release (goal 8-10) and Prednisone (dose 5 mg daily)   - Induction with Recent Transplant:  Low Intensity Protocol   - Continue with intensive monitoring of immunosuppression for efficacy and toxicity.   - Historical Changes in Immunosuppression:  off mycophenolate due to BK viremia as of 4/2.   - Changes: Yes - adjust IS if uptrend in BK and DSA neg    # Infection Prevention:   Last CD4 Level: Not checked   - PJP: Sulfa/TMP (Bactrim)   - CMV: Valganciclovir (Valcyte) 450 mg every day for 6 months then monitor CMV pcr monthly till 1 yr post Tx      - CMV IgG Ab High Risk Discordance (D+/R-) at time of transplant: Yes  Present CMV Serostatus: Negative  - EBV IgG Ab High Risk Discordance (D+/R-) at time of transplant: No  Present EBV Serostatus: Positive    # Hypertension: Borderline control;  Goal BP: < 140/90    - Metoprolol 25 mg twice a day.amlodipine 2.5 mg po qhs   - Changes: Yes - amlodipine 2.5 mg po every day recently added, monitor BP trends.    # Diabetes:  Borderline control (HbA1c 7-9%) Last HbA1c: 7.1% in Feb, 2025. repeat at 3 months post-transplant.    # Anemia in Chronic Renal Disease: Hgb: Stable, near normal      BARBER: No   - Iron studies: Replete    # Mineral Bone Disorder:    - Secondary renal hyperparathyroidism; PTH level: Moderately elevated (301-600 pg/ml)        On treatment: None, start sensipar 30 mg po every day if Ca>10.5 on future checks despite adequate hydration  - Vitamin D; level: Low normal        On supplement: No  - Calcium; level: High 10.5        On supplement: No  - Phosphorus; level: Normal        On supplement: No    # Electrolytes:   - Potassium; level: High normal        On supplement: No  - Magnesium; level: Low        On supplement: Yes   - Bicarbonate; level: Normal        On supplement: No    # Other Significant PMH:  - Diabetic neuropathy: On gabapentin 200 mg at night.  Worse when using compression stockings. He may need a higher dose now that he has a kidney transplant with improved creatinine clearance. This may improve his neuropathy and allow him to use compression stockings.  - Gout: on allopurinol 100 mg every day.  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 Saw Dr. Pham cancer specialist place out of [facility] Undergoing surveillance colonoscopy. Last colonoscopy in march 2024 with out any new lesions.   - CAD: cardiac catheterization 7/17/24: pLAD to mLAD 70% stenosis and mLAD 40% stenosis. He is now s/p PCI with EDWIN x 1 to LAD. LVEF 55-60% in May 2024.  - NSTEMI: in 2022. Stress test showed very small defect. No intervention pursued due to kidney function at that time. On statin, ASA 81, and Imdur.   - BPH: on tamsulosin 0.4 mg every day and finasteride 5 mg every day.     # Skin Cancer Risk: Discussed sun protection and recommend regular follow up with Dermatology.    # Transplant History:  Etiology of Kidney Failure: Diabetes mellitus type 2  Tx:  DDKT  Transplant: 2/13/2025 (Kidney)  Significant transplant-related complications: None    Transplant Office Phone Number: [phone]    Assessment and plan was discussed with the patient and he voiced his understanding and agreement.    Return visit: Return for 4 month post transplant visit.    Sonia Hall MD    The longitudinal plan of care for the diagnosis(es)/condition(s) as documented were addressed during this visit. Due to the added complexity in care, I will continue to support Peter in the subsequent management and with ongoing continuity of care.    Chief Complaint  Mr. Carpio is a 75 year old here for kidney transplant and immunosuppression management.     History of Present Illness  Mr. Carpio reports feeling good overall.    Tremors: mild  Weight change: no  Nausea and vomiting: No  Diarrhea: No  Heartburn symptoms: No  Fever, sweats or chills: No  Night sweats: No  Urinary complaints: improved on tamsulosin and finasteride.    Home BP:  120s-130s/70-80s in am 140s/80s, metoprolol 50/50, amlodipine 2.5.    Problem List  Patient Active Problem List   Diagnosis     HTN, kidney transplant related     Chronic kidney disease, stage 4, severely decreased GFR (H)     Diabetic nephropathy (H)     Gout     Secondary renal hyperparathyroidism     Coronary artery disease     Diabetes mellitus, type 2 (H)     Hypertrophy of prostate without urinary obstruction     Malignant neoplasm of ascending colon (H)     Cardiovascular disease     Status post coronary angiogram     Pre-transplant evaluation for kidney transplant     Anemia in chronic kidney disease     Chronic 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     Dependence on renal dialysis     Hyperlipidemia     NSTEMI (non-ST elevated myocardial infarction) (H)     Awaiting transplantation of kidney     Kidney replaced by transplant     Immunosuppressed status     Hypogammaglobulinemia     BK viremia     CKD (chronic kidney disease) stage 2, GFR 60-89  ml/min     Aftercare following organ transplant     Hypomagnesemia     Hypophosphatemia       Allergies  No Known Allergies    Medications  Current Outpatient Medications   Medication Sig Dispense Refill     acetaminophen (TYLENOL) 325 MG tablet Take 3 tablets (975 mg) by mouth every 6 hours as needed for mild pain or fever.       allopurinol (ZYLOPRIM) 100 MG tablet Take 100 mg by mouth daily       amLODIPine (NORVASC) 2.5 MG tablet Take 1 tablet (2.5 mg) by mouth every evening. 30 tablet 2     aspirin 81 MG EC tablet Take 81 mg by mouth daily.       atorvastatin (LIPITOR) 40 MG tablet Take 1 tablet (40 mg) by mouth daily. 90 tablet 3     Continuous Glucose Sensor (FREESTYLE ANTONIO 3 PLUS SENSOR) MISC Change every 15 days. 6 each 1     finasteride (PROSCAR) 5 MG tablet Take 1 tablet (5 mg) by mouth daily. 90 tablet 3     fish oil-omega-3 fatty acids 1000 MG capsule Take 2 capsules (2 g) by mouth 2 times daily.       gabapentin (NEURONTIN) 100 MG capsule Take 200 mg by mouth at bedtime.       insulin aspart (NOVOLOG PEN) 100 UNIT/ML pen Inject 1-5 Units subcutaneously at bedtime. Do Not give Bedtime Correction Insulin if BG less than 200. For -249 give 1 units. For -299 give 2 units. For -349 give 3 units. For -399 give 4 units. For BG greater than or equal to 400 give 5 units. Notify provider if glucose greater than or equal to 350 mg/dL after administration of correction dose. 15 mL 1     insulin aspart (NOVOLOG PEN) 100 UNIT/ML pen Inject 1-7 Units subcutaneously 3 times daily (before meals). Correction Scale -   Do Not give Correction Insulin if Pre-Meal BG less than 140. For Pre-Meal  - 189 give 1 unit. For Pre-Meal - 239 give 2 units. For Pre-Meal -289 give 3 units. For Pre-Meal -339 give 4 units. For Pre-Meal -389 give 5 units. For Pre-Meal -439 give 6 units. For Pre-Meal BG greater than or equal to 440 give 7 units To be given with prandial insulin,  and based on pre-meal blood glucose. Administering insulin within 5 minutes of the start of the meal is ideal. Administer insulin no more than 30 minutes after the start of the meal, unless directed otherwise by provider. Notify provider if glucose greater than or equal to 350 mg/dL after administration of correction dose. 15 mL 1     insulin NPH-Regular (HUMULIN 70/30;NOVOLIN 70/30) susp With prednisone 40 mg dose take 50 units prior to breakfast and 23 units prior to dinner. With prednisone 20 mg dose take 40 units prior to breakfast and 19 units prior to dinner. With prednisone 15 mg dose take 38 units prior to breakfast and 16 units prior to dinner. With prednisone 10 mg dose take 35 units prior to breakfast and 15 units prior to dinner. With prednisone 5 mg dose take 35 units prior to breakfast and 15 units prior to dinner. 15 mL 0     magnesium oxide (MAG-OX) 400 MG tablet Take 1 tablet (400 mg) by mouth daily (with lunch). 90 tablet 3     metoprolol tartrate (LOPRESSOR) 50 MG tablet Take 1 tablet (50 mg) by mouth 2 times daily. 180 tablet 3     mycophenolate (GENERIC EQUIVALENT) 250 MG capsule Take 1 capsule (250 mg) by mouth 2 times daily. Held on 4/2 for BK viremia (Patient not taking: Reported on 4/7/2025)       pantoprazole (PROTONIX) 40 MG EC tablet Take 1 tablet (40 mg) by mouth daily. 30 tablet 0     predniSONE (DELTASONE) 5 MG tablet Take 1 tablet (5 mg) by mouth daily. 90 tablet 0     sulfamethoxazole-trimethoprim (BACTRIM) 400-80 MG tablet Take 1 tablet by mouth daily. 90 tablet 3     tacrolimus (GENERIC EQUIVALENT) 0.5 MG capsule Take 1 capsule (0.5 mg) by mouth 2 times daily. Total dose = 1.5 mg twice daily 180 capsule 3     tacrolimus (GENERIC EQUIVALENT) 1 MG capsule Take 1 capsule (1 mg) by mouth 2 times daily. Total dose = 1.5 mg twice daily 180 capsule 3     tamsulosin (FLOMAX) 0.4 MG capsule Take 1 capsule (0.4 mg) by mouth daily. 90 capsule 3     valGANciclovir (VALCYTE) 450 MG tablet Take  "1 tablet (450 mg) by mouth daily. Stop on 8/13/2025 90 tablet 1     No current facility-administered medications for this visit.     There are no discontinued medications.    Physical Exam  Vital Signs: BP (!) 147/80   Pulse 72   Temp 98.3  F (36.8  C) (Oral)   Ht 1.676 m (5' 6\")   Wt 83.7 kg (184 lb 8 oz)   SpO2 98%   BMI 29.78 kg/m      GENERAL APPEARANCE: alert and no distress  HENT: mouth without ulcers or lesions  RESP: lungs clear to auscultation - no rales, rhonchi or wheezes  CV: regular rhythm, normal rate, no rub, no murmur  EDEMA: Trace LE edema bilaterally  ABDOMEN: soft, nondistended, nontender, bowel sounds normal  MS: extremities normal - no gross deformities noted, no evidence of inflammation in joints, no muscle tenderness  SKIN: no rash  TX KIDNEY: normal  DIALYSIS ACCESS: none    Data        Latest Ref Rng & Units 4/21/2025    10:01 AM 4/15/2025     7:26 AM 4/11/2025     7:13 AM   Renal   Sodium 135 - 145 mmol/L 140  137  137    K 3.4 - 5.3 mmol/L 5.1  4.8  4.7    Cl 98 - 107 mmol/L 108  106  106    Cl (external) 98 - 107 mmol/L 108  106  106    CO2 22 - 29 mmol/L 25  22  22    Urea Nitrogen 8.0 - 23.0 mg/dL 39.3  44.2  46.2    Creatinine 0.67 - 1.17 mg/dL 1.47  1.59  1.57    Glucose 70 - 99 mg/dL 120  154  162    Calcium 8.8 - 10.4 mg/dL 10.5  10.4  10.2    Magnesium 1.7 - 2.3 mg/dL 1.9            Latest Ref Rng & Units 4/21/2025    10:01 AM 3/26/2025     9:12 AM 3/18/2025     9:02 AM   Bone Health   Phosphorus 2.5 - 4.5 mg/dL 2.0  1.6  2.2          Latest Ref Rng & Units 4/21/2025    10:01 AM 4/15/2025     7:26 AM 4/11/2025     7:13 AM   Heme   WBC 4.0 - 11.0 10e3/uL 9.0  7.7  6.4    Hgb 13.3 - 17.7 g/dL 12.8  12.9  12.9    Plt 150 - 450 10e3/uL 180  195  197          Latest Ref Rng & Units 2/12/2025     8:47 PM 5/16/2024     1:26 PM   Liver   AP 40 - 150 U/L 59  58    TBili <=1.2 mg/dL 0.2  0.2    ALT 0 - 70 U/L 15  9    AST 0 - 45 U/L 17  8    Tot Protein 6.4 - 8.3 g/dL 6.5  7.0  "   Albumin 3.5 - 5.2 g/dL 3.9  4.1          Latest Ref Rng & Units 2/12/2025     8:46 PM 12/26/2024     1:20 PM   Pancreas   A1C <5.7 % 7.1  6.7          Latest Ref Rng & Units 2/27/2025     7:05 AM 2/19/2025     7:24 AM   Iron studies   Iron 61 - 157 ug/dL 62  91    Iron Sat Index 15 - 46 % 24  41    Ferritin 31 - 409 ng/mL 624  1,030          Latest Ref Rng & Units 2/12/2025     8:47 PM 5/16/2024     1:26 PM   [facility] Txp Virology   EBV CAPSID ANTIBODY IGG No detectable antibody. Positive  Positive      Failed to redirect to the Timeline version of the REVFS SmartLink.  Recent Labs   Lab Test 04/08/25  0719 04/11/25  0713 04/15/25  0726   DOSTAC 4/7/2025 4/10/2025 4/14/2025   TACROL 7.9 10.5 12.1     Recent Labs   Lab Test 03/14/25  0715 03/18/25  0902 04/15/25  0726   DOSMPA  --  3/17/2025   8:45 PM 4/14/2025   8:00 PM   MPACID 1.71 2.11 <0.25*   MPAG 50.2 63.7 <6.5*         Again, thank you for allowing me to participate in the care of your patient.        Sincerely,        Sonia Hall MD    Electronically signed

## 2025-04-21 NOTE — LETTER
"2025      Peter Carpio  [address] [facility] [address]      Dear Colleague,    Thank you for referring your patient, Peter Carpio, to the [facility] TRANSPLANT CLINIC. Please see a copy of my visit note below.    Post Transplant Patient Social Work Assessment -Outpatient    Patient Name: Peter \"Alexis\"HALI Carpio  : 1950  Age: 75 year old  MRN: [id number]  Date of transplant: 25    Patient known to this writer from follow up in the kidney transplant program. Met with patient and his wife, Jo, in-clinic today to update his assessment.      Presenting Information   Living Situation: Patient reported that he lives in a home (owns) in [place], [place] with his spouse, Jo.   Functional Status: Ambulatory and independent with his ADL's and IADL's. Does not use any DME. Drives self.   Cultural/Language/Spiritual Considerations: None reported or indicated.     Support System  Primary Support Person: Identified his wife, Jo, as his primary support.   Other Support: Identified his son, Naldo (46 yrs old, lives in [place]) and his daughter, Juana (48 yrs old, lives in [place], [place]) as secondary supports. Also identified his friend Lonny Dhillon ([place], [place]) and Raghav ([place], [place]) as supports in his life.     Health Care Directive  Decision Maker: Self.   Alternate Decision Maker: Spouse, Jo.   Health Care Directive: None on file. Provided education.     Mental Health/Coping:   History of Mental Health: Denied mental health concerns. Denied SI today or within past two weeks. Denied prior hospitalizations, self-injurious behaviors, and previous suicide attempts. Does not see a therapist or take medications for mental health concerns.   History of Chemical Health: Denied chemical health concerns. Endorsed occasional alcohol use (reported having 2 glasses of wine last night). Denied tobacco/nicotine or marijuana use. Denied history of PAYTON treatment.   Current Status: " Overall, reported that he has been doing well. Denied any concerns since transplant. Denied compliance concerns. Manages medications and appointments independently.   Coping: Appropriate.   Services Needed/Recommended: None reported or indicated at this time.     Financial   Income: Retired/Social Security. 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 His spouse, Jo, is also retired.   Impact of transplant on income: N/A  Insurance and medication coverage: [employer] Advantage.   Financial Concerns: None reported or indicated at this time.   Resources Needed: None reported or indicated at this time.     Education provided by SW: Social Work role in the outpatient setting and post-transplant expectations. As well as ESRD Medicare information.     Assessment and recommendations and plan: Kidney/Pancreas/Auto-Islet SOT SW Team to continue to follow 1 year inpatient for any hospital readmissions and indefinitely for outpatient psychosocial needs. SW provided patient with SW's contact information for any questions/concerns.    GIOVANNA Lara, ALYSSA  [facility]  Outpatient Kidney/Pancreas/Auto Islet Transplant  Phone: [phone]       Again, thank you for allowing me to participate in the care of your patient.        Sincerely,        ALYSSA Barron    Electronically signed

## 2025-04-21 NOTE — PATIENT INSTRUCTIONS
Give a urine sample next time you go for labs    Continue to monitor blood pressure twice daily for next week, goal BP<140/90    Transplant Patient Information  Your Post Transplant Coordinator is: Morena Jacob  You and your care team can contact your transplant coordinator Monday - Friday, 8am - 5pm at [phone] (Option 2 to reach the coordinator or Option 4 to schedule an appointment).  You can also reach your care team online via [employer].  After hours for urgent matters, please call [facility] at [phone].

## 2025-04-22 ENCOUNTER — VIRTUAL VISIT (OUTPATIENT)
Dept: ENDOCRINOLOGY | Facility: CLINIC | Age: 75
End: 2025-04-22
Attending: INTERNAL MEDICINE
Payer: COMMERCIAL

## 2025-04-22 ENCOUNTER — PRE VISIT (OUTPATIENT)
Dept: ENDOCRINOLOGY | Facility: CLINIC | Age: 75
End: 2025-04-22

## 2025-04-22 DIAGNOSIS — Z76.82 ORGAN TRANSPLANT CANDIDATE: ICD-10-CM

## 2025-04-22 DIAGNOSIS — Z94.0 HISTORY OF KIDNEY TRANSPLANT: ICD-10-CM

## 2025-04-22 DIAGNOSIS — D35.01 ADENOMA OF RIGHT ADRENAL GLAND: ICD-10-CM

## 2025-04-22 PROCEDURE — 98007 SYNCH AUDIO-VIDEO EST HI 40: CPT | Performed by: INTERNAL MEDICINE

## 2025-04-22 NOTE — PROGRESS NOTES
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    Incidentally noted to have 1 cm right adrenal adenoma on CT abdomen 8/7/2024.  No previous biochemical workup  Underwent renal transplant February 2025  Longstanding history of type 2 diabetes  For hypertension on amlodipine and metoprolol  Denies history of any episodes of sweating, headache, palpitations    Currently on prednisone 5 mg daily    Past Medical History:   Diagnosis Date    CAD (coronary artery disease)     Diabetes mellitus, type 2 (H)     ESRD (end stage renal disease) on dialysis (H)     Essential hypertension     Hyperlipidemia LDL goal <70     NSTEMI (non-ST elevated myocardial infarction) (H)      Patient Active Problem List   Diagnosis    HTN, kidney transplant related    Chronic kidney disease, stage 4, severely decreased GFR (H)    Diabetic nephropathy (H)    Gout    Secondary renal hyperparathyroidism    Coronary artery disease    Diabetes mellitus, type 2 (H)    Hypertrophy of prostate without urinary obstruction    Malignant neoplasm of ascending colon (H)    Cardiovascular disease    Status post coronary angiogram    Pre-transplant evaluation for kidney transplant    Anemia in chronic kidney disease    Chronic 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    Dependence on renal dialysis    Hyperlipidemia    NSTEMI (non-ST elevated myocardial infarction) (H)    Awaiting transplantation of kidney    Kidney replaced by transplant    Immunosuppressed status    Hypogammaglobulinemia    BK viremia    CKD (chronic kidney disease) stage 2,  GFR 60-89 ml/min    Aftercare following organ transplant    Hypomagnesemia    Hypophosphatemia        Allergies  No Known Allergies    Medications  Current Outpatient Medications   Medication Sig Dispense Refill    acetaminophen (TYLENOL) 325 MG tablet Take 3 tablets (975 mg) by mouth every 6 hours as needed for mild pain or fever.      allopurinol (ZYLOPRIM) 100 MG tablet Take 100 mg by mouth daily      amLODIPine (NORVASC) 2.5 MG tablet Take 1 tablet (2.5 mg) by mouth every evening. 30 tablet 2    aspirin 81 MG EC tablet Take 81 mg by mouth daily.      atorvastatin (LIPITOR) 40 MG tablet Take 1 tablet (40 mg) by mouth daily. 90 tablet 3    Continuous Glucose Sensor (FREESTYLE ANTONIO 3 PLUS SENSOR) MISC Change every 15 days. 6 each 1    finasteride (PROSCAR) 5 MG tablet Take 1 tablet (5 mg) by mouth daily. 90 tablet 3    fish oil-omega-3 fatty acids 1000 MG capsule Take 2 capsules (2 g) by mouth 2 times daily.      gabapentin (NEURONTIN) 100 MG capsule Take 200 mg by mouth at bedtime.      insulin aspart (NOVOLOG PEN) 100 UNIT/ML pen Inject 1-5 Units subcutaneously at bedtime. Do Not give Bedtime Correction Insulin if BG less than 200. For -249 give 1 units. For -299 give 2 units. For -349 give 3 units. For -399 give 4 units. For BG greater than or equal to 400 give 5 units. Notify provider if glucose greater than or equal to 350 mg/dL after administration of correction dose. 15 mL 1    insulin aspart (NOVOLOG PEN) 100 UNIT/ML pen Inject 1-7 Units subcutaneously 3 times daily (before meals). Correction Scale -   Do Not give Correction Insulin if Pre-Meal BG less than 140. For Pre-Meal  - 189 give 1 unit. For Pre-Meal - 239 give 2 units. For Pre-Meal -289 give 3 units. For Pre-Meal -339 give 4 units. For Pre-Meal -389 give 5 units. For Pre-Meal -439 give 6 units. For Pre-Meal BG greater than or equal to 440 give 7 units To be given with prandial insulin, and  based on pre-meal blood glucose. Administering insulin within 5 minutes of the start of the meal is ideal. Administer insulin no more than 30 minutes after the start of the meal, unless directed otherwise by provider. Notify provider if glucose greater than or equal to 350 mg/dL after administration of correction dose. 15 mL 1    insulin NPH-Regular (HUMULIN 70/30;NOVOLIN 70/30) susp With prednisone 40 mg dose take 50 units prior to breakfast and 23 units prior to dinner. With prednisone 20 mg dose take 40 units prior to breakfast and 19 units prior to dinner. With prednisone 15 mg dose take 38 units prior to breakfast and 16 units prior to dinner. With prednisone 10 mg dose take 35 units prior to breakfast and 15 units prior to dinner. With prednisone 5 mg dose take 35 units prior to breakfast and 15 units prior to dinner. 15 mL 0    magnesium oxide (MAG-OX) 400 MG tablet Take 1 tablet (400 mg) by mouth daily (with lunch). 90 tablet 3    metoprolol tartrate (LOPRESSOR) 50 MG tablet Take 1 tablet (50 mg) by mouth 2 times daily. 180 tablet 3    mycophenolate (GENERIC EQUIVALENT) 250 MG capsule Take 1 capsule (250 mg) by mouth 2 times daily. Held on 4/2 for BK viremia (Patient not taking: Reported on 4/7/2025)      pantoprazole (PROTONIX) 40 MG EC tablet Take 1 tablet (40 mg) by mouth daily. 30 tablet 0    predniSONE (DELTASONE) 5 MG tablet Take 1 tablet (5 mg) by mouth daily. 90 tablet 0    sulfamethoxazole-trimethoprim (BACTRIM) 400-80 MG tablet Take 1 tablet by mouth daily. 90 tablet 3    tacrolimus (GENERIC EQUIVALENT) 0.5 MG capsule Take 1 capsule (0.5 mg) by mouth 2 times daily. Total dose = 1.5 mg twice daily 180 capsule 3    tacrolimus (GENERIC EQUIVALENT) 1 MG capsule Take 1 capsule (1 mg) by mouth 2 times daily. Total dose = 1.5 mg twice daily 180 capsule 3    tamsulosin (FLOMAX) 0.4 MG capsule Take 1 capsule (0.4 mg) by mouth daily. 90 capsule 3    valGANciclovir (VALCYTE) 450 MG tablet Take 1 tablet (450  mg) by mouth daily. Stop on 2025 90 tablet 1     Family History  family history is not on file.  Social History     Socioeconomic History    Marital status:      Spouse name: Not on file    Number of children: Not on file    Years of education: Not on file    Highest education level: Not on file   Occupational History    Not on file   Tobacco Use    Smoking status: Former     Current packs/day: 0.00     Types: Cigarettes     Quit date:      Years since quittin.3    Smokeless tobacco: Never   Substance and Sexual Activity    Alcohol use: Not Currently     Comment: none since transplant    Drug use: Never    Sexual activity: Not on file   Other Topics Concern    Not on file   Social History Narrative    Not on file     Social Drivers of Health     Financial Resource Strain: Low Risk  (2025)    Financial Resource Strain     Within the past 12 months, have you or your family members you live with been unable to get utilities (heat, electricity) when it was really needed?: No   Food Insecurity: Low Risk  (2025)    Food Insecurity     Within the past 12 months, did you worry that your food would run out before you got money to buy more?: No     Within the past 12 months, did the food you bought just not last and you didn t have money to get more?: No   Transportation Needs: Low Risk  (2025)    Transportation Needs     Within the past 12 months, has lack of transportation kept you from medical appointments, getting your medicines, non-medical meetings or appointments, work, or from getting things that you need?: No   Physical Activity: Not on file   Stress: Not on file   Social Connections: Unknown (2022)    Received from [facility], [facility]    Social Connections     Frequency of Communication with Friends and Family: Not on file   Interpersonal Safety: Low Risk  (2025)    Interpersonal Safety     Do you feel  physically and emotionally safe where you currently live?: Yes     Within the past 12 months, have you been hit, slapped, kicked or otherwise physically hurt by someone?: No     Within the past 12 months, have you been humiliated or emotionally abused in other ways by your partner or ex-partner?: No   Housing Stability: Low Risk  (2/12/2025)    Housing Stability     Do you have housing? : Yes     Are you worried about losing your housing?: No       Physical Exam  There were no vitals taken for this visit.  There is no height or weight on file to calculate BMI.    Constitutional: no distress, comfortable, pleasant    Psychological: appropriate mood     RESULTS  Previous nephrology and cardiology notes reviewed  CT abdomen images reviewed      ASSESSMENT AND PLAN:     Incidentally noted right adrenal adenoma 1 cm  Chronic kidney disease s/p renal transplant February 2025  Type 2 diabetes-not addressed today, follows with Fawn Wheeler PA-C    Adenoma has benign imaging appearance, on my review HU < 10  Currently on chronic prednisone in the posttransplant setting  History of hypertension, screen with aldosterone and renin activity  Could consider follow-up adrenal CT imaging in 6 months (1 year from the last CT)    Return to clinic in 6 months    FARHAN Cash    Note: Chart documentation done in part with Dragon Voice Recognition software. Although reviewed after completion, some word and grammatical errors may remain.  Please consider this when interpreting information in this chart     Patient will have difficulty joining the video visit through [email or web address].  Was later able to join using [employer]  Video visit start time 2:20 PM, video visit end time 2:45 PM  Patient location Home  Provider location off-site

## 2025-04-22 NOTE — NURSING NOTE
Current patient location: [address] [place] [id number]    Is the patient currently in the state of [place]? YES    Visit mode: VIDEO    If the visit is dropped, the patient can be reconnected by:VIDEO VISIT: Text to cell phone:   Telephone Information:   Mobile [phone]       Will anyone else be joining the visit? NO  (If patient encounters technical issues they should call [phone])    Are changes needed to the allergy or medication list? No and Pt stated no med changes    Are refills needed on medications prescribed by this physician? NO    Rooming Documentation:  Not applicable    Reason for visit: Consult    Connie HEAD

## 2025-04-22 NOTE — LETTER
4/22/2025       RE: Peter Carpio  [address] [facility] [address]     Dear Colleague,    Thank you for referring your patient, Peter Carpio, to the [facility] [place] at [facility]. Please see a copy of my visit note below.    Outcome for 04/10/25 2:47 PM: Data uploaded on [employer]  Clemencia Christianson MA  Outcome for 04/18/25 11:45 AM: Data obtained via [employer] website  Thomas Luis MA    Patient is showing 4/5 [place]CM met. 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    Incidentally noted to have 1 cm right adrenal adenoma on CT abdomen 8/7/2024.  No previous biochemical workup  Underwent renal transplant February 2025  Longstanding history of type 2 diabetes  For hypertension on amlodipine and metoprolol  Denies history of any episodes of sweating, headache, palpitations    Currently on prednisone 5 mg daily    Past Medical History:   Diagnosis Date     CAD (coronary artery disease)      Diabetes mellitus, type 2 (H)      ESRD (end stage renal disease) on dialysis (H)      Essential hypertension      Hyperlipidemia LDL goal <70      NSTEMI (non-ST elevated myocardial infarction) (H)      Patient Active Problem List   Diagnosis     HTN, kidney transplant related     Chronic kidney disease, stage 4, severely decreased GFR (H)     Diabetic nephropathy (H)     Gout     Secondary renal hyperparathyroidism     Coronary artery disease     Diabetes mellitus, type 2 (H)      Hypertrophy of prostate without urinary obstruction     Malignant neoplasm of ascending colon (H)     Cardiovascular disease     Status post coronary angiogram     Pre-transplant evaluation for kidney transplant     Anemia in chronic kidney disease     Chronic 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     Dependence on renal dialysis     Hyperlipidemia     NSTEMI (non-ST elevated myocardial infarction) (H)     Awaiting transplantation of kidney     Kidney replaced by transplant     Immunosuppressed status     Hypogammaglobulinemia     BK viremia     CKD (chronic kidney disease) stage 2, GFR 60-89 ml/min     Aftercare following organ transplant     Hypomagnesemia     Hypophosphatemia        Allergies  No Known Allergies    Medications  Current Outpatient Medications   Medication Sig Dispense Refill     acetaminophen (TYLENOL) 325 MG tablet Take 3 tablets (975 mg) by mouth every 6 hours as needed for mild pain or fever.       allopurinol (ZYLOPRIM) 100 MG tablet Take 100 mg by mouth daily       amLODIPine (NORVASC) 2.5 MG tablet Take 1 tablet (2.5 mg) by mouth every evening. 30 tablet 2     aspirin 81 MG EC tablet Take 81 mg by mouth daily.       atorvastatin (LIPITOR) 40 MG tablet Take 1 tablet (40 mg) by mouth daily. 90 tablet 3     Continuous Glucose Sensor (FREESTYLE ANTONIO 3 PLUS SENSOR) MISC Change every 15 days. 6 each 1     finasteride (PROSCAR) 5 MG tablet Take 1 tablet (5 mg) by mouth daily. 90 tablet 3     fish oil-omega-3 fatty acids 1000 MG capsule Take 2 capsules (2 g) by mouth 2 times daily.       gabapentin (NEURONTIN) 100 MG capsule Take 200 mg by mouth at bedtime.       insulin aspart (NOVOLOG PEN) 100 UNIT/ML pen Inject 1-5 Units subcutaneously at bedtime. Do Not give Bedtime Correction Insulin if BG less than 200. For -249 give 1 units. For -299 give 2 units. For -349 give 3 units. For -399 give 4 units. For BG greater than or equal to 400 give 5 units. Notify provider if  glucose greater than or equal to 350 mg/dL after administration of correction dose. 15 mL 1     insulin aspart (NOVOLOG PEN) 100 UNIT/ML pen Inject 1-7 Units subcutaneously 3 times daily (before meals). Correction Scale -   Do Not give Correction Insulin if Pre-Meal BG less than 140. For Pre-Meal  - 189 give 1 unit. For Pre-Meal - 239 give 2 units. For Pre-Meal -289 give 3 units. For Pre-Meal -339 give 4 units. For Pre-Meal -389 give 5 units. For Pre-Meal -439 give 6 units. For Pre-Meal BG greater than or equal to 440 give 7 units To be given with prandial insulin, and based on pre-meal blood glucose. Administering insulin within 5 minutes of the start of the meal is ideal. Administer insulin no more than 30 minutes after the start of the meal, unless directed otherwise by provider. Notify provider if glucose greater than or equal to 350 mg/dL after administration of correction dose. 15 mL 1     insulin NPH-Regular (HUMULIN 70/30;NOVOLIN 70/30) susp With prednisone 40 mg dose take 50 units prior to breakfast and 23 units prior to dinner. With prednisone 20 mg dose take 40 units prior to breakfast and 19 units prior to dinner. With prednisone 15 mg dose take 38 units prior to breakfast and 16 units prior to dinner. With prednisone 10 mg dose take 35 units prior to breakfast and 15 units prior to dinner. With prednisone 5 mg dose take 35 units prior to breakfast and 15 units prior to dinner. 15 mL 0     magnesium oxide (MAG-OX) 400 MG tablet Take 1 tablet (400 mg) by mouth daily (with lunch). 90 tablet 3     metoprolol tartrate (LOPRESSOR) 50 MG tablet Take 1 tablet (50 mg) by mouth 2 times daily. 180 tablet 3     mycophenolate (GENERIC EQUIVALENT) 250 MG capsule Take 1 capsule (250 mg) by mouth 2 times daily. Held on 4/2 for BK viremia (Patient not taking: Reported on 4/7/2025)       pantoprazole (PROTONIX) 40 MG EC tablet Take 1 tablet (40 mg) by mouth daily. 30 tablet 0      predniSONE (DELTASONE) 5 MG tablet Take 1 tablet (5 mg) by mouth daily. 90 tablet 0     sulfamethoxazole-trimethoprim (BACTRIM) 400-80 MG tablet Take 1 tablet by mouth daily. 90 tablet 3     tacrolimus (GENERIC EQUIVALENT) 0.5 MG capsule Take 1 capsule (0.5 mg) by mouth 2 times daily. Total dose = 1.5 mg twice daily 180 capsule 3     tacrolimus (GENERIC EQUIVALENT) 1 MG capsule Take 1 capsule (1 mg) by mouth 2 times daily. Total dose = 1.5 mg twice daily 180 capsule 3     tamsulosin (FLOMAX) 0.4 MG capsule Take 1 capsule (0.4 mg) by mouth daily. 90 capsule 3     valGANciclovir (VALCYTE) 450 MG tablet Take 1 tablet (450 mg) by mouth daily. Stop on 2025 90 tablet 1     Family History  family history is not on file.  Social History     Socioeconomic History     Marital status:      Spouse name: Not on file     Number of children: Not on file     Years of education: Not on file     Highest education level: Not on file   Occupational History     Not on file   Tobacco Use     Smoking status: Former     Current packs/day: 0.00     Types: Cigarettes     Quit date:      Years since quittin.3     Smokeless tobacco: Never   Substance and Sexual Activity     Alcohol use: Not Currently     Comment: none since transplant     Drug use: Never     Sexual activity: Not on file   Other Topics Concern     Not on file   Social History Narrative     Not on file     Social Drivers of Health     Financial Resource Strain: Low Risk  (2025)    Financial Resource Strain      Within the past 12 months, have you or your family members you live with been unable to get utilities (heat, electricity) when it was really needed?: No   Food Insecurity: Low Risk  (2025)    Food Insecurity      Within the past 12 months, did you worry that your food would run out before you got money to buy more?: No      Within the past 12 months, did the food you bought just not last and you didn t have money to get more?: No    Transportation Needs: Low Risk  (2/12/2025)    Transportation Needs      Within the past 12 months, has lack of transportation kept you from medical appointments, getting your medicines, non-medical meetings or appointments, work, or from getting things that you need?: No   Physical Activity: Not on file   Stress: Not on file   Social Connections: Unknown (1/1/2022)    [facility], [facility]    Social Connections      Frequency of Communication with Friends and Family: Not on file   Interpersonal Safety: Low Risk  (2/13/2025)    Interpersonal Safety      Do you feel physically and emotionally safe where you currently live?: Yes      Within the past 12 months, have you been hit, slapped, kicked or otherwise physically hurt by someone?: No      Within the past 12 months, have you been humiliated or emotionally abused in other ways by your partner or ex-partner?: No   Housing Stability: Low Risk  (2/12/2025)    Housing Stability      Do you have housing? : Yes      Are you worried about losing your housing?: No       Physical Exam  There were no vitals taken for this visit.  There is no height or weight on file to calculate BMI.    Constitutional: no distress, comfortable, pleasant    Psychological: appropriate mood     RESULTS  Previous nephrology and cardiology notes reviewed  CT abdomen images reviewed      ASSESSMENT AND PLAN:     Incidentally noted right adrenal adenoma 1 cm  Chronic kidney disease s/p renal transplant February 2025  Type 2 diabetes-not addressed today, follows with Fawn Wheeler PA-C    Adenoma has benign imaging appearance, on my review HU < 10  Currently on chronic prednisone in the posttransplant setting  History of hypertension, screen with aldosterone and renin activity  Could consider follow-up adrenal CT imaging in 6 months (1 year from the last CT)    Return to clinic in 6 months    FARHAN Cash    Note:  Chart documentation done in part with Dragon Voice Recognition software. Although reviewed after completion, some word and grammatical errors may remain.  Please consider this when interpreting information in this chart     Patient will have difficulty joining the video visit through [employer].  Was later able to join using [employer]  Video visit start time 2:20 PM, video visit end time 2:45 PM  Patient location Home  Provider location off-site      Again, thank you for allowing me to participate in the care of your patient.      Sincerely,    Marshall Blanchard MD

## 2025-04-23 ENCOUNTER — TELEPHONE (OUTPATIENT)
Dept: TRANSPLANT | Facility: CLINIC | Age: 75
End: 2025-04-23
Payer: COMMERCIAL

## 2025-04-23 ENCOUNTER — INFUSION THERAPY VISIT (OUTPATIENT)
Dept: INFUSION THERAPY | Facility: CLINIC | Age: 75
End: 2025-04-23
Attending: STUDENT IN AN ORGANIZED HEALTH CARE EDUCATION/TRAINING PROGRAM
Payer: COMMERCIAL

## 2025-04-23 VITALS
TEMPERATURE: 98 F | HEART RATE: 61 BPM | BODY MASS INDEX: 30.09 KG/M2 | WEIGHT: 186.4 LBS | DIASTOLIC BLOOD PRESSURE: 82 MMHG | SYSTOLIC BLOOD PRESSURE: 134 MMHG | RESPIRATION RATE: 16 BRPM

## 2025-04-23 DIAGNOSIS — Z94.0 HTN, KIDNEY TRANSPLANT RELATED: ICD-10-CM

## 2025-04-23 DIAGNOSIS — B34.8 BK VIREMIA: ICD-10-CM

## 2025-04-23 DIAGNOSIS — Z94.0 KIDNEY REPLACED BY TRANSPLANT: Primary | ICD-10-CM

## 2025-04-23 DIAGNOSIS — I15.1 HTN, KIDNEY TRANSPLANT RELATED: ICD-10-CM

## 2025-04-23 DIAGNOSIS — D80.1 HYPOGAMMAGLOBULINEMIA: ICD-10-CM

## 2025-04-23 PROCEDURE — 250N000011 HC RX IP 250 OP 636: Mod: JZ | Performed by: STUDENT IN AN ORGANIZED HEALTH CARE EDUCATION/TRAINING PROGRAM

## 2025-04-23 PROCEDURE — 96366 THER/PROPH/DIAG IV INF ADDON: CPT

## 2025-04-23 PROCEDURE — 250N000013 HC RX MED GY IP 250 OP 250 PS 637: Performed by: STUDENT IN AN ORGANIZED HEALTH CARE EDUCATION/TRAINING PROGRAM

## 2025-04-23 PROCEDURE — 96365 THER/PROPH/DIAG IV INF INIT: CPT

## 2025-04-23 RX ORDER — ACETAMINOPHEN 325 MG/1
650 TABLET ORAL ONCE
Start: 2025-05-05

## 2025-04-23 RX ORDER — DIPHENHYDRAMINE HCL 25 MG
25-50 CAPSULE ORAL ONCE
Status: COMPLETED | OUTPATIENT
Start: 2025-04-23 | End: 2025-04-23

## 2025-04-23 RX ORDER — ACETAMINOPHEN 325 MG/1
650 TABLET ORAL ONCE
Status: COMPLETED | OUTPATIENT
Start: 2025-04-23 | End: 2025-04-23

## 2025-04-23 RX ORDER — HEPARIN SODIUM,PORCINE 10 UNIT/ML
5-20 VIAL (ML) INTRAVENOUS DAILY PRN
OUTPATIENT
Start: 2025-05-05

## 2025-04-23 RX ORDER — ALBUTEROL SULFATE 0.83 MG/ML
2.5 SOLUTION RESPIRATORY (INHALATION)
OUTPATIENT
Start: 2025-05-05

## 2025-04-23 RX ORDER — DIPHENHYDRAMINE HYDROCHLORIDE 50 MG/ML
25 INJECTION, SOLUTION INTRAMUSCULAR; INTRAVENOUS
Start: 2025-05-05

## 2025-04-23 RX ORDER — DIPHENHYDRAMINE HCL 25 MG
25-50 CAPSULE ORAL ONCE
Start: 2025-05-05

## 2025-04-23 RX ORDER — EPINEPHRINE 1 MG/ML
0.3 INJECTION, SOLUTION INTRAMUSCULAR; SUBCUTANEOUS EVERY 5 MIN PRN
OUTPATIENT
Start: 2025-05-05

## 2025-04-23 RX ORDER — HEPARIN SODIUM (PORCINE) LOCK FLUSH IV SOLN 100 UNIT/ML 100 UNIT/ML
5 SOLUTION INTRAVENOUS
OUTPATIENT
Start: 2025-05-05

## 2025-04-23 RX ORDER — DIPHENHYDRAMINE HYDROCHLORIDE 50 MG/ML
50 INJECTION, SOLUTION INTRAMUSCULAR; INTRAVENOUS
Start: 2025-05-05

## 2025-04-23 RX ORDER — ALBUTEROL SULFATE 90 UG/1
1-2 INHALANT RESPIRATORY (INHALATION)
Start: 2025-05-05

## 2025-04-23 RX ORDER — MEPERIDINE HYDROCHLORIDE 25 MG/ML
25 INJECTION INTRAMUSCULAR; INTRAVENOUS; SUBCUTANEOUS
OUTPATIENT
Start: 2025-05-05

## 2025-04-23 RX ORDER — METHYLPREDNISOLONE SODIUM SUCCINATE 40 MG/ML
40 INJECTION INTRAMUSCULAR; INTRAVENOUS
Start: 2025-05-05

## 2025-04-23 RX ADMIN — DIPHENHYDRAMINE HYDROCHLORIDE 50 MG: 25 CAPSULE ORAL at 12:18

## 2025-04-23 RX ADMIN — HUMAN IMMUNOGLOBULIN G 30 G: 20 LIQUID INTRAVENOUS at 12:30

## 2025-04-23 RX ADMIN — ACETAMINOPHEN 650 MG: 325 TABLET ORAL at 12:18

## 2025-04-23 NOTE — TELEPHONE ENCOUNTER
Post Kidney and Pancreas Transplant Team Conference  Date: 4/23/2025  Transplant Coordinator: Morena     Attendees:  [x]  Dr. Valenzuela [] Alicia Larson, RN [x] Fanny Ellison LPN     [x]  Dr. Hall [x] Debbie Reyna, RN    [] Dr. Keen [x] Morena Jacob, RN    [x] Dr. Blackmon [x] Mendy Sanders, RN [x] Carie Mcrae RN   [] Dr. Buck [] Loreta Hanley, RN    [x] Dr. Oshea [] Mariluz Lam, RN [x] Minh Burk, PharmD   []  Dr. Newell [] Geeta Simeon, RN    [] Dr. Marin [] Thomas Oliva RN     [] Patricia Bobby RN    [x] Stephania Alatorre, BREEZY [] Fawn Pichardo RN        Verbal Plan Read Back:   Watch for lab results- if DSA is negative- switch to cyclosporine- Target 125-150  Start with 150.    Routed to RN Coordinator   Fanny Ellison LPN

## 2025-04-23 NOTE — PROGRESS NOTES
Infusion Nursing Note:  Peter Carpio presents today for No chief complaint on file.      Patient seen by provider today: No   present during visit today: No    Note: During [facility]'s [facility] appointment, orders from Leonila Miller were completed.  Patient identification verified by name and date of birth.  Assessment completed.  Vitals recorded in Doc Flowsheets.  Patient was provided with education regarding medication/procedure and possible side effects.  Patient verbalized understanding.    Frequency:  2 doses 2 weeks apart. Today is day 2.  Labs: none due   Premedications: Oral Tylenol 650 mg and oral Benadryl 50 mg  Infusion Rate/Length: IVIG started at 43 ml/hr, increased by 43 ml/hr every 15 minutes as tolerated to max rate of 296 ml/hr. Total infusion time 2 hours.   .    Administrations This Visit       acetaminophen (TYLENOL) tablet 650 mg       Admin Date  04/23/2025 Action  $Given Dose  650 mg Route  Oral Documented By  Germaine Ortega, RN              diphenhydrAMINE (BENADRYL) capsule 25-50 mg       Admin Date  04/23/2025 Action  $Given Dose  50 mg Route  Oral Documented By  Germaine Ortega RN              immune globulin (PRIVIGEN) - sucrose free 10 % injection 30 g       Admin Date  04/23/2025 Action  $New Bag Dose  30 g Route  Intravenous Documented By  Germaine Ortega RN                      Intravenous Access:  Labs drawn without difficulty.  Peripheral IV placed.    Treatment Conditions:  Patient denies recent major or local infection that has not been addressed by a provider, recent surgery or elevated temperature, signs or symptoms of clot or recent clot, recent changes in color or consistency of urination. NO.      Post Infusion Assessment:  Patient tolerated infusion without incident.  Site patent and intact, free from redness, edema or discomfort.  No evidence of extravasations.  Access discontinued per protocol.       Discharge Plan:    Discharge instructions reviewed with: Patient.  AVS to patient via MYCHART.   PRN  for next appointment.   Patient discharged in stable condition accompanied by: self.  Departure Mode: Ambulatory    /82   Pulse 61   Temp 98  F (36.7  C) (Oral)   Resp 16   Wt 84.6 kg (186 lb 6.4 oz)   BMI 30.09 kg/m    Germaine Ortega RN on 4/23/2025 at 11:59 AM  .

## 2025-04-23 NOTE — PROGRESS NOTES
"Post Transplant Patient Social Work Assessment -Outpatient    Patient Name: Peter \"Alexis\" HALI Carpio  : 1950  Age: 75 year old  MRN: [id number]  Date of transplant: 25    Patient known to this writer from follow up in the kidney transplant program. Met with patient and his wife, Jo, in-clinic today to update his assessment.      Presenting Information   Living Situation: Patient reported that he lives in a home (owns) in [place], [place] with his spouse, Jo.   Functional Status: Ambulatory and independent with his ADL's and IADL's. Does not use any DME. Drives self.   Cultural/Language/Spiritual Considerations: None reported or indicated.     Support System  Primary Support Person: Identified his wife, Jo, as his primary support.   Other Support: Identified his son, Naldo (46 yrs old, lives in [place]) and his daughter, Juana (48 yrs old, lives in [place], [place]) as secondary supports. Also identified his friend Lonny Dhillon ([place], [place]) and Raghav ([place], [place]) as supports in his life.     Health Care Directive  Decision Maker: Self.   Alternate Decision Maker: Spouse, Jo.   Health Care Directive: None on file. Provided education.     Mental Health/Coping:   History of Mental Health: Denied mental health concerns. Denied SI today or within past two weeks. Denied prior hospitalizations, self-injurious behaviors, and previous suicide attempts. Does not see a therapist or take medications for mental health concerns.   History of Chemical Health: Denied chemical health concerns. Endorsed occasional alcohol use (reported having 2 glasses of wine last night). Denied tobacco/nicotine or marijuana use. Denied history of PAYTON treatment.   Current Status: Overall, reported that he has been doing well. Denied any concerns since transplant. Denied compliance concerns. Manages medications and appointments independently.   Coping: Appropriate.   Services Needed/Recommended: None reported or indicated at this " time.     Financial   Income: Retired/Social Security. 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 His spouse, Jo, is also retired.   Impact of transplant on income: N/A  Insurance and medication coverage: [employer] Advantage.   Financial Concerns: None reported or indicated at this time.   Resources Needed: None reported or indicated at this time.     Education provided by SW: Social Work role in the outpatient setting and post-transplant expectations. As well as ESRD Medicare information.     Assessment and recommendations and plan: Kidney/Pancreas/Auto-Islet SOT SW Team to continue to follow 1 year inpatient for any hospital readmissions and indefinitely for outpatient psychosocial needs. SW provided patient with SW's contact information for any questions/concerns.    GIOVANNA Lara, [facility]  Outpatient Kidney/Pancreas/Auto Islet Transplant  Phone: [phone]

## 2025-04-24 LAB
DONOR IDENTIFICATION: NORMAL
DSA COMMENTS: NORMAL
DSA PRESENT: NO
DSA TEST METHOD: NORMAL
FLOWPRA1 CELL: NORMAL
FLOWPRA1 COMMENTS: NORMAL
FLOWPRA1 RESULT: NORMAL
FLOWPRA1 TEST METHOD: NORMAL
FLOWPRA2 CELL: NORMAL
FLOWPRA2 COMMENTS: NORMAL
FLOWPRA2 RESULT: NORMAL
FLOWPRA2 TEST METHOD: NORMAL
ORGAN: NORMAL
SA 1  COMMENTS: NORMAL
SA 1 CELL: NORMAL
SA 1 TEST METHOD: NORMAL
SA 2 CELL: NORMAL
SA 2 COMMENTS: NORMAL
SA 2 TEST METHOD: NORMAL
SA1 HI RISK ABY: NORMAL
SA1 MOD RISK ABY: NORMAL
SA2 HI RISK ABY: NORMAL
SA2 MOD RISK ABY: NORMAL
UNACCEPTABLE ANTIGENS: NORMAL
UNOS CPRA: 0

## 2025-04-28 PROBLEM — D35.00 ADRENAL ADENOMA: Status: ACTIVE | Noted: 2025-04-28

## 2025-04-29 ENCOUNTER — LAB (OUTPATIENT)
Dept: LAB | Facility: CLINIC | Age: 75
End: 2025-04-29
Payer: COMMERCIAL

## 2025-04-29 DIAGNOSIS — Z94.0 KIDNEY REPLACED BY TRANSPLANT: ICD-10-CM

## 2025-04-29 DIAGNOSIS — R31.21 ASYMPTOMATIC MICROSCOPIC HEMATURIA: ICD-10-CM

## 2025-04-29 DIAGNOSIS — Z98.890 OTHER SPECIFIED POSTPROCEDURAL STATES: ICD-10-CM

## 2025-04-29 DIAGNOSIS — Z79.899 ENCOUNTER FOR LONG-TERM CURRENT USE OF MEDICATION: ICD-10-CM

## 2025-04-29 DIAGNOSIS — Z94.0 KIDNEY TRANSPLANTED: ICD-10-CM

## 2025-04-29 DIAGNOSIS — Z20.828 CONTACT WITH AND (SUSPECTED) EXPOSURE TO OTHER VIRAL COMMUNICABLE DISEASES: ICD-10-CM

## 2025-04-29 DIAGNOSIS — Z48.298 AFTERCARE FOLLOWING ORGAN TRANSPLANT: ICD-10-CM

## 2025-04-29 LAB
ALBUMIN UR-MCNC: ABNORMAL MG/DL
ANION GAP SERPL CALCULATED.3IONS-SCNC: 8 MMOL/L (ref 7–15)
APPEARANCE UR: CLEAR
BACTERIA #/AREA URNS HPF: ABNORMAL /HPF
BILIRUB UR QL STRIP: NEGATIVE
BUN SERPL-MCNC: 32 MG/DL (ref 8–23)
CALCIUM SERPL-MCNC: 10.1 MG/DL (ref 8.8–10.4)
CHLORIDE SERPL-SCNC: 103 MMOL/L (ref 98–107)
COLOR UR AUTO: YELLOW
CREAT SERPL-MCNC: 1.34 MG/DL (ref 0.67–1.17)
CYCLOSPORINE BLD LC/MS/MS-MCNC: <25 UG/L (ref 50–400)
EGFRCR SERPLBLD CKD-EPI 2021: 55 ML/MIN/1.73M2
ERYTHROCYTE [DISTWIDTH] IN BLOOD BY AUTOMATED COUNT: 13 % (ref 10–15)
GLUCOSE SERPL-MCNC: 178 MG/DL (ref 70–99)
GLUCOSE UR STRIP-MCNC: 250 MG/DL
HCO3 SERPL-SCNC: 25 MMOL/L (ref 22–29)
HCT VFR BLD AUTO: 39.3 % (ref 40–53)
HGB BLD-MCNC: 12.8 G/DL (ref 13.3–17.7)
HGB UR QL STRIP: NEGATIVE
KETONES UR STRIP-MCNC: NEGATIVE MG/DL
LEUKOCYTE ESTERASE UR QL STRIP: ABNORMAL
MCH RBC QN AUTO: 31.1 PG (ref 26.5–33)
MCHC RBC AUTO-ENTMCNC: 32.6 G/DL (ref 31.5–36.5)
MCV RBC AUTO: 95 FL (ref 78–100)
NITRATE UR QL: NEGATIVE
PH UR STRIP: 6 [PH] (ref 5–7)
PLATELET # BLD AUTO: 162 10E3/UL (ref 150–450)
POTASSIUM SERPL-SCNC: 5 MMOL/L (ref 3.4–5.3)
RBC # BLD AUTO: 4.12 10E6/UL (ref 4.4–5.9)
RBC #/AREA URNS AUTO: ABNORMAL /HPF
SODIUM SERPL-SCNC: 136 MMOL/L (ref 135–145)
SP GR UR STRIP: 1.01 (ref 1–1.03)
SQUAMOUS #/AREA URNS AUTO: ABNORMAL /LPF
TACROLIMUS BLD-MCNC: 8.9 UG/L (ref 5–15)
TME LAST DOSE: ABNORMAL H
TME LAST DOSE: ABNORMAL H
TME LAST DOSE: NORMAL H
TME LAST DOSE: NORMAL H
UROBILINOGEN UR STRIP-ACNC: 0.2 E.U./DL
WBC # BLD AUTO: 6.7 10E3/UL (ref 4–11)
WBC #/AREA URNS AUTO: ABNORMAL /HPF

## 2025-04-29 PROCEDURE — 80158 DRUG ASSAY CYCLOSPORINE: CPT

## 2025-04-29 PROCEDURE — 80197 ASSAY OF TACROLIMUS: CPT

## 2025-04-29 PROCEDURE — 81001 URINALYSIS AUTO W/SCOPE: CPT

## 2025-04-29 PROCEDURE — 85027 COMPLETE CBC AUTOMATED: CPT

## 2025-04-29 PROCEDURE — 36415 COLL VENOUS BLD VENIPUNCTURE: CPT

## 2025-04-29 PROCEDURE — 80048 BASIC METABOLIC PNL TOTAL CA: CPT

## 2025-05-06 ENCOUNTER — LAB (OUTPATIENT)
Dept: LAB | Facility: CLINIC | Age: 75
End: 2025-05-06
Payer: COMMERCIAL

## 2025-05-06 DIAGNOSIS — Z98.890 OTHER SPECIFIED POSTPROCEDURAL STATES: ICD-10-CM

## 2025-05-06 DIAGNOSIS — Z48.298 AFTERCARE FOLLOWING ORGAN TRANSPLANT: ICD-10-CM

## 2025-05-06 DIAGNOSIS — Z94.0 KIDNEY REPLACED BY TRANSPLANT: ICD-10-CM

## 2025-05-06 DIAGNOSIS — Z94.0 STATUS POST KIDNEY TRANSPLANT: ICD-10-CM

## 2025-05-06 DIAGNOSIS — Z20.828 CONTACT WITH AND (SUSPECTED) EXPOSURE TO OTHER VIRAL COMMUNICABLE DISEASES: ICD-10-CM

## 2025-05-06 DIAGNOSIS — Z79.899 ENCOUNTER FOR LONG-TERM CURRENT USE OF MEDICATION: ICD-10-CM

## 2025-05-06 DIAGNOSIS — Z94.0 KIDNEY TRANSPLANTED: ICD-10-CM

## 2025-05-06 LAB
ANION GAP SERPL CALCULATED.3IONS-SCNC: 10 MMOL/L (ref 7–15)
BUN SERPL-MCNC: 34.2 MG/DL (ref 8–23)
CALCIUM SERPL-MCNC: 10 MG/DL (ref 8.8–10.4)
CHLORIDE SERPL-SCNC: 107 MMOL/L (ref 98–107)
CREAT SERPL-MCNC: 1.21 MG/DL (ref 0.67–1.17)
CYCLOSPORINE BLD LC/MS/MS-MCNC: 191 UG/L (ref 50–400)
EGFRCR SERPLBLD CKD-EPI 2021: 62 ML/MIN/1.73M2
ERYTHROCYTE [DISTWIDTH] IN BLOOD BY AUTOMATED COUNT: 13.4 % (ref 10–15)
GLUCOSE SERPL-MCNC: 149 MG/DL (ref 70–99)
HCO3 SERPL-SCNC: 20 MMOL/L (ref 22–29)
HCT VFR BLD AUTO: 37.3 % (ref 40–53)
HGB BLD-MCNC: 12 G/DL (ref 13.3–17.7)
MCH RBC QN AUTO: 30.8 PG (ref 26.5–33)
MCHC RBC AUTO-ENTMCNC: 32.2 G/DL (ref 31.5–36.5)
MCV RBC AUTO: 96 FL (ref 78–100)
PLATELET # BLD AUTO: 143 10E3/UL (ref 150–450)
POTASSIUM SERPL-SCNC: 4.5 MMOL/L (ref 3.4–5.3)
RBC # BLD AUTO: 3.9 10E6/UL (ref 4.4–5.9)
SODIUM SERPL-SCNC: 137 MMOL/L (ref 135–145)
TACROLIMUS BLD-MCNC: <1 UG/L (ref 5–15)
TME LAST DOSE: ABNORMAL H
TME LAST DOSE: ABNORMAL H
TME LAST DOSE: NORMAL H
TME LAST DOSE: NORMAL H
WBC # BLD AUTO: 5.9 10E3/UL (ref 4–11)

## 2025-05-06 PROCEDURE — 85027 COMPLETE CBC AUTOMATED: CPT

## 2025-05-06 PROCEDURE — 87799 DETECT AGENT NOS DNA QUANT: CPT

## 2025-05-06 PROCEDURE — 80197 ASSAY OF TACROLIMUS: CPT

## 2025-05-06 PROCEDURE — 80158 DRUG ASSAY CYCLOSPORINE: CPT

## 2025-05-06 PROCEDURE — 36415 COLL VENOUS BLD VENIPUNCTURE: CPT

## 2025-05-06 PROCEDURE — 80048 BASIC METABOLIC PNL TOTAL CA: CPT

## 2025-05-07 ENCOUNTER — TELEPHONE (OUTPATIENT)
Dept: TRANSPLANT | Facility: CLINIC | Age: 75
End: 2025-05-07
Payer: COMMERCIAL

## 2025-05-07 ENCOUNTER — RESULTS FOLLOW-UP (OUTPATIENT)
Dept: TRANSPLANT | Facility: CLINIC | Age: 75
End: 2025-05-07

## 2025-05-07 DIAGNOSIS — Z94.0 KIDNEY TRANSPLANTED: ICD-10-CM

## 2025-05-07 DIAGNOSIS — I15.1 HTN, KIDNEY TRANSPLANT RELATED: ICD-10-CM

## 2025-05-07 DIAGNOSIS — B34.8 BK VIREMIA: ICD-10-CM

## 2025-05-07 DIAGNOSIS — Z94.0 HTN, KIDNEY TRANSPLANT RELATED: ICD-10-CM

## 2025-05-07 LAB
BK VIRUS DNA IU/ML, INSTRUMENT (6800): 7980 IU/ML (ref ?–1)
BK VIRUS SPECIMEN TYPE: ABNORMAL
BKV DNA SPEC NAA+PROBE-LOG#: 3.9 {LOG_COPIES}/ML

## 2025-05-07 RX ORDER — CYCLOSPORINE 25 MG/1
125 CAPSULE, LIQUID FILLED ORAL 2 TIMES DAILY
Qty: 300 CAPSULE | Refills: 11 | Status: SHIPPED | OUTPATIENT
Start: 2025-05-07

## 2025-05-07 NOTE — TELEPHONE ENCOUNTER
ISSUE:   Cyclosporine level 191 on 05/06/25, goal 150, dose 150 mg BID.    PLAN:   Call Patient and confirm this was an accurate 12-hour trough.   Verify Cyclosporine dose 150 mg BID.   Confirm no new medications or illness.   Confirm no missed doses.     If accurate trough and accurate dose, decrease Cyclosporine dose to 125 mg BID  *Recommended dose rounded from calculated dose 117.8 mg  BID.      Repeat labs in 1 weeks.   For any dose change <50%, recheck labs per guideline or within 1 month.  For any dose change of more than 50%, recheck drug level based on timing to reach steady state:   Immediate release tacrolimus: 2-3 days  Extended-release tacrolimus: 7 days  Cyclosporine: 2 days  Sirolimus: 12 days  Everolimus: 8 days      OUTCOME:   Spoke with Patient, they confirm accurate trough level and current dose 150 mg BID.   Patient confirmed dose change to 125 mg BID.  Patient agreed to repeat labs in 1 weeks.   Orders sent to preferred pharmacy for dose change and lab for repeat labs.   Patient voiced understanding of plan.

## 2025-05-13 ENCOUNTER — LAB (OUTPATIENT)
Dept: LAB | Facility: CLINIC | Age: 75
End: 2025-05-13
Payer: COMMERCIAL

## 2025-05-13 DIAGNOSIS — Z20.828 CONTACT WITH AND (SUSPECTED) EXPOSURE TO OTHER VIRAL COMMUNICABLE DISEASES: ICD-10-CM

## 2025-05-13 DIAGNOSIS — Z94.0 KIDNEY TRANSPLANTED: ICD-10-CM

## 2025-05-13 DIAGNOSIS — Z98.890 OTHER SPECIFIED POSTPROCEDURAL STATES: ICD-10-CM

## 2025-05-13 DIAGNOSIS — Z48.298 AFTERCARE FOLLOWING ORGAN TRANSPLANT: ICD-10-CM

## 2025-05-13 DIAGNOSIS — Z79.899 ENCOUNTER FOR LONG-TERM CURRENT USE OF MEDICATION: ICD-10-CM

## 2025-05-13 DIAGNOSIS — Z94.0 KIDNEY REPLACED BY TRANSPLANT: ICD-10-CM

## 2025-05-13 LAB
CYCLOSPORINE BLD LC/MS/MS-MCNC: 202 UG/L (ref 50–400)
ERYTHROCYTE [DISTWIDTH] IN BLOOD BY AUTOMATED COUNT: 13.6 % (ref 10–15)
HCT VFR BLD AUTO: 39.6 % (ref 40–53)
HGB BLD-MCNC: 12.6 G/DL (ref 13.3–17.7)
MCH RBC QN AUTO: 30.8 PG (ref 26.5–33)
MCHC RBC AUTO-ENTMCNC: 31.8 G/DL (ref 31.5–36.5)
MCV RBC AUTO: 97 FL (ref 78–100)
PLATELET # BLD AUTO: 182 10E3/UL (ref 150–450)
RBC # BLD AUTO: 4.09 10E6/UL (ref 4.4–5.9)
TME LAST DOSE: NORMAL H
TME LAST DOSE: NORMAL H
WBC # BLD AUTO: 6.1 10E3/UL (ref 4–11)

## 2025-05-13 PROCEDURE — 85027 COMPLETE CBC AUTOMATED: CPT

## 2025-05-13 PROCEDURE — 87799 DETECT AGENT NOS DNA QUANT: CPT

## 2025-05-13 PROCEDURE — 36415 COLL VENOUS BLD VENIPUNCTURE: CPT

## 2025-05-13 PROCEDURE — 80048 BASIC METABOLIC PNL TOTAL CA: CPT

## 2025-05-13 PROCEDURE — 84100 ASSAY OF PHOSPHORUS: CPT

## 2025-05-13 PROCEDURE — 80158 DRUG ASSAY CYCLOSPORINE: CPT

## 2025-05-13 PROCEDURE — 83735 ASSAY OF MAGNESIUM: CPT

## 2025-05-14 ENCOUNTER — TELEPHONE (OUTPATIENT)
Dept: TRANSPLANT | Facility: CLINIC | Age: 75
End: 2025-05-14
Payer: COMMERCIAL

## 2025-05-14 ENCOUNTER — RESULTS FOLLOW-UP (OUTPATIENT)
Dept: TRANSPLANT | Facility: CLINIC | Age: 75
End: 2025-05-14

## 2025-05-14 ENCOUNTER — VIRTUAL VISIT (OUTPATIENT)
Dept: PHARMACY | Facility: CLINIC | Age: 75
End: 2025-05-14
Payer: COMMERCIAL

## 2025-05-14 DIAGNOSIS — I15.1 HTN, KIDNEY TRANSPLANT RELATED: ICD-10-CM

## 2025-05-14 DIAGNOSIS — Z79.4 TYPE 2 DIABETES MELLITUS WITH OTHER SPECIFIED COMPLICATION, WITH LONG-TERM CURRENT USE OF INSULIN (H): Primary | ICD-10-CM

## 2025-05-14 DIAGNOSIS — Z94.0 KIDNEY TRANSPLANTED: ICD-10-CM

## 2025-05-14 DIAGNOSIS — B34.8 BK VIREMIA: ICD-10-CM

## 2025-05-14 DIAGNOSIS — E11.69 TYPE 2 DIABETES MELLITUS WITH OTHER SPECIFIED COMPLICATION, WITH LONG-TERM CURRENT USE OF INSULIN (H): Primary | ICD-10-CM

## 2025-05-14 DIAGNOSIS — Z94.0 KIDNEY REPLACED BY TRANSPLANT: ICD-10-CM

## 2025-05-14 DIAGNOSIS — Z94.0 HTN, KIDNEY TRANSPLANT RELATED: ICD-10-CM

## 2025-05-14 DIAGNOSIS — Z48.298 AFTERCARE FOLLOWING ORGAN TRANSPLANT: Primary | ICD-10-CM

## 2025-05-14 LAB
ANION GAP SERPL CALCULATED.3IONS-SCNC: 14 MMOL/L (ref 7–15)
BK VIRUS DNA IU/ML, INSTRUMENT (6800): 6550 IU/ML (ref ?–1)
BK VIRUS SPECIMEN TYPE: ABNORMAL
BKV DNA SPEC NAA+PROBE-LOG#: 3.8 {LOG_COPIES}/ML
BUN SERPL-MCNC: 34.5 MG/DL (ref 8–23)
CALCIUM SERPL-MCNC: 10.4 MG/DL (ref 8.8–10.4)
CHLORIDE SERPL-SCNC: 108 MMOL/L (ref 98–107)
CREAT SERPL-MCNC: 1.28 MG/DL (ref 0.67–1.17)
EGFRCR SERPLBLD CKD-EPI 2021: 58 ML/MIN/1.73M2
GLUCOSE SERPL-MCNC: 108 MG/DL (ref 70–99)
HCO3 SERPL-SCNC: 18 MMOL/L (ref 22–29)
MAGNESIUM SERPL-MCNC: 2.1 MG/DL (ref 1.7–2.3)
PHOSPHATE SERPL-MCNC: 2.5 MG/DL (ref 2.5–4.5)
POTASSIUM SERPL-SCNC: 4.8 MMOL/L (ref 3.4–5.3)
SODIUM SERPL-SCNC: 140 MMOL/L (ref 135–145)

## 2025-05-14 PROCEDURE — 99606 MTMS BY PHARM EST 15 MIN: CPT | Mod: 93 | Performed by: PHARMACIST

## 2025-05-14 RX ORDER — SODIUM BICARBONATE 650 MG/1
650 TABLET ORAL 2 TIMES DAILY
Qty: 180 TABLET | Refills: 3 | Status: SHIPPED | OUTPATIENT
Start: 2025-05-14

## 2025-05-14 RX ORDER — CYCLOSPORINE 25 MG/1
100 CAPSULE, LIQUID FILLED ORAL 2 TIMES DAILY
Qty: 720 CAPSULE | Refills: 3 | Status: SHIPPED | OUTPATIENT
Start: 2025-05-14

## 2025-05-14 NOTE — TELEPHONE ENCOUNTER
ISSUE:   Cyclosporine level 202 on 05/13/25, goal 150, dose 125 mg BID.    PLAN:   Call Patient and confirm this was an accurate 12-hour trough.   Verify Cyclosporine dose 125 mg BID.   Confirm no new medications or illness.   Confirm no missed doses.     If accurate trough and accurate dose, decrease Cyclosporine dose to 100 mg BID  *Recommended dose rounded from calculated dose 92.82 mg  BID.      Repeat labs in 2 weeks.   For any dose change <50%, recheck labs per guideline or within 1 month.  For any dose change of more than 50%, recheck drug level based on timing to reach steady state:   Immediate release tacrolimus: 2-3 days  Extended-release tacrolimus: 7 days  Cyclosporine: 2 days  Sirolimus: 12 days  Everolimus: 8 days      OUTCOME:   Spoke with Patient, they confirm accurate trough level and current dose 125 mg BID.   Patient confirmed dose change to 100 mg BID.  Patient agreed to repeat labs in 2 weeks.   Orders sent to preferred pharmacy for dose change and lab for repeat labs.   Patient voiced understanding of plan.

## 2025-05-14 NOTE — TELEPHONE ENCOUNTER
Post Kidney and Pancreas Transplant Team Conference  Date: 5/14/2025  Transplant Coordinator: Morena     Attendees:  [x]  Dr. Valenzuela [x] Alicia Larson, RN [x] Fanny Ellison LPN     [x]  Dr. Hall [] Debbie Reyna, RN    [x] Dr. Keen [x] Morena Jacob, RN    [x] Dr. Blackmon [x] Mendy Sanders, RN [] Carie Mcrae RN   [] Dr. Buck [] Loreta Hanley, RN    [] Dr. Oshea [] Mariluz Lam, RN [x] Minh Burk, PharmD   []  Dr. Newell [] Geeta Simeon, RN    [] Dr. Marin [] Thomas Oliva RN     [] Patricia Bobby RN    [x] Stephania Alatorre, BREEZY [] Fawn Pichardo RN        Verbal Plan Read Back:   DSA  May need to go back on mycophenolate    Routed to RN Coordinator   Fanny Ellison LPN   Neurology Consult    Patient is a 63y old  Female who presents with a chief complaint of Right sided weakness (21 Mar 2019 14:46)      HPI:  63 y F with pmh of thyroid cancer s/p thyroidectomy,, stage4 uterine cancer s/p chemo and surgery was sent to ED from her Oncologist's office for the progressive weakness in her right UE and lower extremities x 5 days. She denies any chest pain, nausea vomiting, neck rigidity or back pain, vertigo. She has no difficulty in swallowing.     In ED patient had CT scan that showed new supra tentorial lesions who were suspicious for the mets. (20 Mar 2019 20:25)      PAST MEDICAL & SURGICAL HISTORY:  Thyroid cancer  Peripheral neuropathy secondary to chemotherapy  Pleural effusion:   Uterine cancer  History of total abdominal hysterectomy: 2018  S/P thoracentesis:   H/O lymph node excision: LEFT NECK   Intermittent Left facial ? trigeminal neuralgia pain for ~ 6 months      FAMILY HISTORY:  CVA (cerebral vascular accident) (Father)  CAD (coronary artery disease) (Mother)      Social History: (-) x 3    Allergies    No Known Allergies    Intolerances        MEDICATIONS  (STANDING):  cholecalciferol 1000 Unit(s) Oral daily  cyanocobalamin 1000 MICROGram(s) Oral daily  dexamethasone  Injectable 4 milliGRAM(s) IV Push every 6 hours  levETIRAcetam 1000 milliGRAM(s) Oral two times a day  levothyroxine 125 MICROGram(s) Oral daily  pantoprazole    Tablet 40 milliGRAM(s) Oral before breakfast    MEDICATIONS  (PRN):      Review of systems:    Constitutional: No fever, weight loss or fatigue    Eyes: No eye pain or discharge  ENMT:  No difficulty hearing; No sinus or throat pain  Neck: No pain or stiffness  Respiratory: No cough, wheezing, chills or hemoptysis  Cardiovascular: No chest pain, palpitations, shortness of breath, dyspnea on exertion  Gastrointestinal: No abdominal pain, nausea, vomiting or hematemesis; No diarrhea or constipation.   Genitourinary: No dysuria, frequency, hematuria or incontinence  Neurological: As per HPI  Skin: No rashes or lesions   Endocrine: No heat or cold intolerance; No hair loss  Musculoskeletal: No joint pain or swelling  Psychiatric: No depression, anxiety, mood swings  Heme/Lymph: No easy bruising or bleeding gums    Vital Signs Last 24 Hrs  T(C): 36.7 (21 Mar 2019 19:59), Max: 36.7 (21 Mar 2019 19:59)  T(F): 98 (21 Mar 2019 19:59), Max: 98 (21 Mar 2019 19:59)  HR: 69 (21 Mar 2019 21:27) (67 - 83)  BP: 110/55 (21 Mar 2019 21:27) (89/51 - 122/65)  BP(mean): --  RR: 18 (21 Mar 2019 21:27) (16 - 19)  SpO2: 96% (21 Mar 2019 11:19) (96% - 96%)    Neurologic Examination:  General:  Appearance is consistent with chronologic age.   General: The patient is oriented to person, place, time and date.  Recent and remote memory intact.  Fund of knowledge is intact and normal.  Language with normal repetition, comprehension and naming.  Nondysarthric.    Cranial nerves: intact VA, VFF.  EOMI w/o nystagmus, skew or reported double vision.  PERRL.  No ptosis/weakness of eyelid closure.  Facial sensation is normal with normal bite.  Subtle facial asymmetry at baseline though has symmetric smile.  Hearing grossly intact b/l.  Palate elevates midline.  Tongue midline.  Motor examination:   Normal tone, bulk and range of motion.  No tenderness, twitching, tremors or involuntary movements.  Formal Muscle Strength Testing: (MRC grade R/L) 4/5 UE; 4/5 LE. Right pronator drift.  Sensory examination:   Intact to light touch, temperature and vibration in all extremities.  Cerebellum:   FTN/HKS essentially intact - no ataxia out of proportion to weakness.    Labs:   CBC Full  -  ( 21 Mar 2019 11:35 )  WBC Count : 8.13 K/uL  Hemoglobin : 15.2 g/dL  Hematocrit : 46.0 %  Platelet Count - Automated : 277 K/uL  Mean Cell Volume : 90.2 fL  Mean Cell Hemoglobin : 29.8 pg  Mean Cell Hemoglobin Concentration : 33.0 g/dL  Auto Neutrophil # : x  Auto Lymphocyte # : x  Auto Monocyte # : x  Auto Eosinophil # : x  Auto Basophil # : x  Auto Neutrophil % : x  Auto Lymphocyte % : x  Auto Monocyte % : x  Auto Eosinophil % : x  Auto Basophil % : x    03-    137  |  98  |  14  < from: CT Head No Cont (19 @ 15:41) >    1. New bilateral supratentorial lesions with surrounding edema suspicious   for metastatic disease. This can be further evaluated MRI of the brain   with and without contrast.    2. Localized mass effect without midline shift.    < end of copied text >  ----------------------------<  145<H>  4.6   |  22  |  0.7    Ca    10.0      21 Mar 2019 11:35    TPro  7.8  /  Alb  4.6  /  TBili  0.7  /  DBili  x   /  AST  39  /  ALT  26  /  AlkPhos  76  03-20    LIVER FUNCTIONS - ( 20 Mar 2019 12:50 )  Alb: 4.6 g/dL / Pro: 7.8 g/dL / ALK PHOS: 76 U/L / ALT: 26 U/L / AST: 39 U/L / GGT: x           PT/INR - ( 20 Mar 2019 12:50 )   PT: 10.60 sec;   INR: 0.92 ratio         PTT - ( 20 Mar 2019 12:50 )  PTT:26.6 sec  Urinalysis Basic - ( 20 Mar 2019 14:30 )    Color: Yellow / Appearance: Cloudy / S.015 / pH: x  Gluc: x / Ketone: Negative  / Bili: Negative / Urobili: 0.2 mg/dL   Blood: x / Protein: Negative mg/dL / Nitrite: Negative   Leuk Esterase: Small / RBC: x / WBC 3-5 /HPF   Sq Epi: x / Non Sq Epi: Many /HPF / Bacteria: Few /HPF        Assessment:  This is a 63y Female with h/o uterine and thyroid cancer presenting with several days of right sided weakness.  Neuroimaging shows multiple brain lesions suspicious for metastatic disease.  No seizures reported but has been started on keppra prophylactically.  Decadron given for brain edema.  She also reports about 6 months of intermittent lancinating left facial pain which may be trigeminal  in origin.    Plan:   #1.  Defer to neurosurgery, heme-onc, and radiosurgery for management of brain mets.  #2.  Agree with keppra 1000 mg bid as fairly high risk for seizures.   Keppra may also help with the intermittent left facial pain.  #3.  Physical therapy and falls precautions.    19 @ 22:37

## 2025-05-14 NOTE — PATIENT INSTRUCTIONS
"Recommendations from today's MTM visit:                                                      Decrease Flexpen 70/30 to 38 units every morning and 26 units every evening before meals    Follow-up: 1 month  It was great speaking with you today.  I value your experience and would be very thankful for your time in providing feedback in our clinic survey. In the next few days, you may receive an email or text message from [facility] [employer] with a link to a survey related to your  clinical pharmacist.\"     To schedule another MTM appointment, please call the clinic directly or you may call the MTM scheduling line at [phone] or toll-free at [phone].     My Clinical Pharmacist's contact information:                                                      Please feel free to contact me with any questions or concerns you have.      Minh Burk, PharmD  [facility] Pharmacist    Phone: [phone]     "

## 2025-05-14 NOTE — PROGRESS NOTES
Medication Therapy Management (MTM) Encounter    ASSESSMENT:                            Medication Adherence/Access: No issues identified.    Diabetes   Sugars running low during the day, sometimes overnight.  Patient recently reduced his evening dose back to 26 units appropriately.  Will also slightly reduce his morning dose to see if we can avoid the lows during the day.  He has not been using his midday NovoLog and I think that is appropriate.    Hypertension   BP near goal of 130/80, no changes today    PLAN:                            Decrease Flexpen 70/30 to 38 units every morning and 26 units every evening before meals    Follow-up: 1 month    SUBJECTIVE/OBJECTIVE:                          Peter Carpio is a 75 year old male seen for a follow-up visit.       Reason for visit: Diabetes.    Allergies/ADRs: Reviewed in chart  Past Medical History: Reviewed in chart  Tobacco: He reports that he quit smoking about 22 years ago. His smoking use included cigarettes. He has never used smokeless tobacco.  Alcohol: not currently using    Medication Adherence/Access: no issues reported.    Diabetes   Novolin 70/30 40 units every morning and 26 units every evening eating dinner at 6PM  Novolog very seldom midday  Patient is not experiencing side effects.  Current diabetes symptoms: none  Diet/Exercise: Ensure protein.       Last visit:     Eye exam is up to date    Hypertension   Metoprolol 50mg twice daily   Amlodipine 2.5mg at bedtime  Patient reports no current medication side effects, wearing compression stockings currently. Patient self monitors blood pressure.  Home BP monitoring mornin/76     Today's Vitals: There were no vitals taken for this visit.  ----------------    I spent 12 minutes with this patient today. All changes were made via collaborative practice agreement with Dr. Blackmon.     A summary of these recommendations was sent via [employer].    Minh Burk, PharmD  [facility] Pharmacist    Phone:  [phone]     Telemedicine Visit Details  The patient's medications can be safely assessed via a telemedicine encounter.  Type of service:  Telephone visit  Originating Location (pt. Location): Home    Distant Location (provider location):  Off-site  Start Time: 9:45 AM  End Time: 9:57 AM     Medication Therapy Recommendations  Diabetes mellitus, type 2 (H)   1 Current Medication: insulin NPH-Regular (HUMULIN 70/30;NOVOLIN 70/30) susp   Current Medication Sig: With prednisone 40 mg dose take 50 units prior to breakfast and 23 units prior to dinner. With prednisone 20 mg dose take 40 units prior to breakfast and 19 units prior to dinner. With prednisone 15 mg dose take 38 units prior to breakfast and 16 units prior to dinner. With prednisone 10 mg dose take 35 units prior to breakfast and 15 units prior to dinner. With prednisone 5 mg dose take 35 units prior to breakfast and 15 units prior to dinner.   Rationale: Dose too high - Dosage too high - Safety   Recommendation: Decrease Dose   Status: Accepted per CPA   Identified Date: 5/14/2025 Completed Date: 5/14/2025

## 2025-05-20 ENCOUNTER — LAB (OUTPATIENT)
Dept: LAB | Facility: CLINIC | Age: 75
End: 2025-05-20
Payer: COMMERCIAL

## 2025-05-20 ENCOUNTER — RESULTS FOLLOW-UP (OUTPATIENT)
Dept: TRANSPLANT | Facility: CLINIC | Age: 75
End: 2025-05-20

## 2025-05-20 DIAGNOSIS — I15.1 HTN, KIDNEY TRANSPLANT RELATED: ICD-10-CM

## 2025-05-20 DIAGNOSIS — Z79.899 ENCOUNTER FOR LONG-TERM CURRENT USE OF MEDICATION: ICD-10-CM

## 2025-05-20 DIAGNOSIS — Z94.0 KIDNEY TRANSPLANTED: ICD-10-CM

## 2025-05-20 DIAGNOSIS — Z94.0 KIDNEY REPLACED BY TRANSPLANT: ICD-10-CM

## 2025-05-20 DIAGNOSIS — Z48.298 AFTERCARE FOLLOWING ORGAN TRANSPLANT: ICD-10-CM

## 2025-05-20 DIAGNOSIS — B34.8 BK VIREMIA: ICD-10-CM

## 2025-05-20 DIAGNOSIS — Z94.0 STATUS POST KIDNEY TRANSPLANT: ICD-10-CM

## 2025-05-20 DIAGNOSIS — Z20.828 CONTACT WITH AND (SUSPECTED) EXPOSURE TO OTHER VIRAL COMMUNICABLE DISEASES: ICD-10-CM

## 2025-05-20 DIAGNOSIS — Z98.890 OTHER SPECIFIED POSTPROCEDURAL STATES: ICD-10-CM

## 2025-05-20 DIAGNOSIS — Z94.0 HTN, KIDNEY TRANSPLANT RELATED: ICD-10-CM

## 2025-05-20 LAB
ANION GAP SERPL CALCULATED.3IONS-SCNC: 11 MMOL/L (ref 7–15)
BUN SERPL-MCNC: 35.1 MG/DL (ref 8–23)
CALCIUM SERPL-MCNC: 10.4 MG/DL (ref 8.8–10.4)
CHLORIDE SERPL-SCNC: 109 MMOL/L (ref 98–107)
CMV DNA SPEC NAA+PROBE-ACNC: NOT DETECTED IU/ML
CREAT SERPL-MCNC: 1.34 MG/DL (ref 0.67–1.17)
CYCLOSPORINE BLD LC/MS/MS-MCNC: 160 UG/L (ref 50–400)
EGFRCR SERPLBLD CKD-EPI 2021: 55 ML/MIN/1.73M2
ERYTHROCYTE [DISTWIDTH] IN BLOOD BY AUTOMATED COUNT: 13.8 % (ref 10–15)
GLUCOSE SERPL-MCNC: 171 MG/DL (ref 70–99)
HCO3 SERPL-SCNC: 22 MMOL/L (ref 22–29)
HCT VFR BLD AUTO: 40.3 % (ref 40–53)
HGB BLD-MCNC: 12.8 G/DL (ref 13.3–17.7)
MAGNESIUM SERPL-MCNC: 2 MG/DL (ref 1.7–2.3)
MCH RBC QN AUTO: 30.9 PG (ref 26.5–33)
MCHC RBC AUTO-ENTMCNC: 31.8 G/DL (ref 31.5–36.5)
MCV RBC AUTO: 97 FL (ref 78–100)
PHOSPHATE SERPL-MCNC: 2.7 MG/DL (ref 2.5–4.5)
PLATELET # BLD AUTO: 197 10E3/UL (ref 150–450)
POTASSIUM SERPL-SCNC: 4.6 MMOL/L (ref 3.4–5.3)
RBC # BLD AUTO: 4.14 10E6/UL (ref 4.4–5.9)
SODIUM SERPL-SCNC: 142 MMOL/L (ref 135–145)
SPECIMEN TYPE: NORMAL
TME LAST DOSE: NORMAL H
TME LAST DOSE: NORMAL H
WBC # BLD AUTO: 6 10E3/UL (ref 4–11)

## 2025-05-20 PROCEDURE — 83735 ASSAY OF MAGNESIUM: CPT

## 2025-05-20 PROCEDURE — 80158 DRUG ASSAY CYCLOSPORINE: CPT

## 2025-05-20 PROCEDURE — 36415 COLL VENOUS BLD VENIPUNCTURE: CPT

## 2025-05-20 PROCEDURE — 87799 DETECT AGENT NOS DNA QUANT: CPT

## 2025-05-20 PROCEDURE — 85027 COMPLETE CBC AUTOMATED: CPT

## 2025-05-20 PROCEDURE — 80048 BASIC METABOLIC PNL TOTAL CA: CPT

## 2025-05-20 PROCEDURE — 84100 ASSAY OF PHOSPHORUS: CPT

## 2025-05-21 ENCOUNTER — MYC REFILL (OUTPATIENT)
Dept: PHARMACY | Facility: CLINIC | Age: 75
End: 2025-05-21
Payer: COMMERCIAL

## 2025-05-21 ENCOUNTER — MYC REFILL (OUTPATIENT)
Dept: TRANSPLANT | Facility: CLINIC | Age: 75
End: 2025-05-21
Payer: COMMERCIAL

## 2025-05-21 DIAGNOSIS — Z94.0 STATUS POST KIDNEY TRANSPLANT: ICD-10-CM

## 2025-05-21 DIAGNOSIS — B34.8 BK VIREMIA: ICD-10-CM

## 2025-05-21 DIAGNOSIS — I15.1 HTN, KIDNEY TRANSPLANT RELATED: ICD-10-CM

## 2025-05-21 DIAGNOSIS — Z94.0 HTN, KIDNEY TRANSPLANT RELATED: ICD-10-CM

## 2025-05-21 LAB
BK VIRUS DNA IU/ML, INSTRUMENT (6800): 4410 IU/ML (ref ?–1)
BK VIRUS SPECIMEN TYPE: ABNORMAL
BKV DNA SPEC NAA+PROBE-LOG#: 3.6 {LOG_COPIES}/ML

## 2025-05-22 RX ORDER — ATORVASTATIN CALCIUM 40 MG/1
40 TABLET, FILM COATED ORAL DAILY
Qty: 90 TABLET | Refills: 3 | Status: SHIPPED | OUTPATIENT
Start: 2025-05-22

## 2025-05-22 RX ORDER — METOPROLOL TARTRATE 50 MG
50 TABLET ORAL 2 TIMES DAILY
Qty: 180 TABLET | Refills: 3 | Status: SHIPPED | OUTPATIENT
Start: 2025-05-22

## 2025-05-27 ENCOUNTER — RESULTS FOLLOW-UP (OUTPATIENT)
Dept: TRANSPLANT | Facility: CLINIC | Age: 75
End: 2025-05-27

## 2025-05-27 ENCOUNTER — LAB (OUTPATIENT)
Dept: LAB | Facility: CLINIC | Age: 75
End: 2025-05-27
Payer: COMMERCIAL

## 2025-05-27 DIAGNOSIS — Z94.0 KIDNEY TRANSPLANTED: ICD-10-CM

## 2025-05-27 DIAGNOSIS — I15.1 HTN, KIDNEY TRANSPLANT RELATED: ICD-10-CM

## 2025-05-27 DIAGNOSIS — Z94.0 KIDNEY REPLACED BY TRANSPLANT: ICD-10-CM

## 2025-05-27 DIAGNOSIS — Z20.828 CONTACT WITH AND (SUSPECTED) EXPOSURE TO OTHER VIRAL COMMUNICABLE DISEASES: ICD-10-CM

## 2025-05-27 DIAGNOSIS — Z98.890 OTHER SPECIFIED POSTPROCEDURAL STATES: ICD-10-CM

## 2025-05-27 DIAGNOSIS — D35.01 ADENOMA OF RIGHT ADRENAL GLAND: ICD-10-CM

## 2025-05-27 DIAGNOSIS — Z79.899 ENCOUNTER FOR LONG-TERM CURRENT USE OF MEDICATION: ICD-10-CM

## 2025-05-27 DIAGNOSIS — B34.8 BK VIREMIA: ICD-10-CM

## 2025-05-27 DIAGNOSIS — Z94.0 HTN, KIDNEY TRANSPLANT RELATED: ICD-10-CM

## 2025-05-27 DIAGNOSIS — Z48.298 AFTERCARE FOLLOWING ORGAN TRANSPLANT: ICD-10-CM

## 2025-05-27 LAB
ANION GAP SERPL CALCULATED.3IONS-SCNC: 10 MMOL/L (ref 7–15)
BUN SERPL-MCNC: 26.5 MG/DL (ref 8–23)
CALCIUM SERPL-MCNC: 10.3 MG/DL (ref 8.8–10.4)
CHLORIDE SERPL-SCNC: 108 MMOL/L (ref 98–107)
CREAT SERPL-MCNC: 1.17 MG/DL (ref 0.67–1.17)
CYCLOSPORINE BLD LC/MS/MS-MCNC: 150 UG/L (ref 50–400)
EGFRCR SERPLBLD CKD-EPI 2021: 65 ML/MIN/1.73M2
ERYTHROCYTE [DISTWIDTH] IN BLOOD BY AUTOMATED COUNT: 13.7 % (ref 10–15)
GLUCOSE SERPL-MCNC: 115 MG/DL (ref 70–99)
HCO3 SERPL-SCNC: 23 MMOL/L (ref 22–29)
HCT VFR BLD AUTO: 39.9 % (ref 40–53)
HGB BLD-MCNC: 12.6 G/DL (ref 13.3–17.7)
MCH RBC QN AUTO: 30.7 PG (ref 26.5–33)
MCHC RBC AUTO-ENTMCNC: 31.6 G/DL (ref 31.5–36.5)
MCV RBC AUTO: 97 FL (ref 78–100)
PLATELET # BLD AUTO: 180 10E3/UL (ref 150–450)
POTASSIUM SERPL-SCNC: 4.5 MMOL/L (ref 3.4–5.3)
RBC # BLD AUTO: 4.11 10E6/UL (ref 4.4–5.9)
SODIUM SERPL-SCNC: 141 MMOL/L (ref 135–145)
TME LAST DOSE: NORMAL H
TME LAST DOSE: NORMAL H
WBC # BLD AUTO: 5.7 10E3/UL (ref 4–11)

## 2025-05-27 PROCEDURE — 80158 DRUG ASSAY CYCLOSPORINE: CPT

## 2025-05-27 PROCEDURE — 82088 ASSAY OF ALDOSTERONE: CPT

## 2025-05-27 PROCEDURE — 84244 ASSAY OF RENIN: CPT | Mod: 90

## 2025-05-27 PROCEDURE — 85027 COMPLETE CBC AUTOMATED: CPT

## 2025-05-27 PROCEDURE — 80048 BASIC METABOLIC PNL TOTAL CA: CPT

## 2025-05-27 PROCEDURE — 99000 SPECIMEN HANDLING OFFICE-LAB: CPT

## 2025-05-27 PROCEDURE — 36415 COLL VENOUS BLD VENIPUNCTURE: CPT

## 2025-05-28 LAB — ALDOST SERPL-MCNC: 18.7 NG/DL (ref 0–31)

## 2025-05-29 LAB — RENIN PLAS-CCNC: 1.4 NG/ML/HR

## 2025-06-01 ENCOUNTER — HEALTH MAINTENANCE LETTER (OUTPATIENT)
Age: 75
End: 2025-06-01

## 2025-06-03 ENCOUNTER — LAB (OUTPATIENT)
Dept: LAB | Facility: CLINIC | Age: 75
End: 2025-06-03
Payer: COMMERCIAL

## 2025-06-03 ENCOUNTER — RESULTS FOLLOW-UP (OUTPATIENT)
Dept: TRANSPLANT | Facility: CLINIC | Age: 75
End: 2025-06-03

## 2025-06-03 DIAGNOSIS — Z94.0 HTN, KIDNEY TRANSPLANT RELATED: ICD-10-CM

## 2025-06-03 DIAGNOSIS — Z94.0 KIDNEY TRANSPLANTED: ICD-10-CM

## 2025-06-03 DIAGNOSIS — Z94.0 STATUS POST KIDNEY TRANSPLANT: ICD-10-CM

## 2025-06-03 DIAGNOSIS — B34.8 BK VIREMIA: ICD-10-CM

## 2025-06-03 DIAGNOSIS — Z79.899 ENCOUNTER FOR LONG-TERM CURRENT USE OF MEDICATION: ICD-10-CM

## 2025-06-03 DIAGNOSIS — Z98.890 OTHER SPECIFIED POSTPROCEDURAL STATES: ICD-10-CM

## 2025-06-03 DIAGNOSIS — Z20.828 CONTACT WITH AND (SUSPECTED) EXPOSURE TO OTHER VIRAL COMMUNICABLE DISEASES: ICD-10-CM

## 2025-06-03 DIAGNOSIS — Z48.298 AFTERCARE FOLLOWING ORGAN TRANSPLANT: ICD-10-CM

## 2025-06-03 DIAGNOSIS — Z94.0 KIDNEY REPLACED BY TRANSPLANT: ICD-10-CM

## 2025-06-03 DIAGNOSIS — I15.1 HTN, KIDNEY TRANSPLANT RELATED: ICD-10-CM

## 2025-06-03 LAB
ANION GAP SERPL CALCULATED.3IONS-SCNC: 11 MMOL/L (ref 7–15)
BUN SERPL-MCNC: 31.6 MG/DL (ref 8–23)
CALCIUM SERPL-MCNC: 9.9 MG/DL (ref 8.8–10.4)
CHLORIDE SERPL-SCNC: 108 MMOL/L (ref 98–107)
CREAT SERPL-MCNC: 1.22 MG/DL (ref 0.67–1.17)
CYCLOSPORINE BLD LC/MS/MS-MCNC: 168 UG/L (ref 50–400)
EGFRCR SERPLBLD CKD-EPI 2021: 62 ML/MIN/1.73M2
ERYTHROCYTE [DISTWIDTH] IN BLOOD BY AUTOMATED COUNT: 13.7 % (ref 10–15)
GLUCOSE SERPL-MCNC: 139 MG/DL (ref 70–99)
HCO3 SERPL-SCNC: 23 MMOL/L (ref 22–29)
HCT VFR BLD AUTO: 40.4 % (ref 40–53)
HGB BLD-MCNC: 12.7 G/DL (ref 13.3–17.7)
MCH RBC QN AUTO: 30.5 PG (ref 26.5–33)
MCHC RBC AUTO-ENTMCNC: 31.4 G/DL (ref 31.5–36.5)
MCV RBC AUTO: 97 FL (ref 78–100)
PLATELET # BLD AUTO: 162 10E3/UL (ref 150–450)
POTASSIUM SERPL-SCNC: 4.2 MMOL/L (ref 3.4–5.3)
RBC # BLD AUTO: 4.16 10E6/UL (ref 4.4–5.9)
SODIUM SERPL-SCNC: 142 MMOL/L (ref 135–145)
TME LAST DOSE: NORMAL H
TME LAST DOSE: NORMAL H
WBC # BLD AUTO: 6.6 10E3/UL (ref 4–11)

## 2025-06-03 PROCEDURE — 80048 BASIC METABOLIC PNL TOTAL CA: CPT

## 2025-06-03 PROCEDURE — 87799 DETECT AGENT NOS DNA QUANT: CPT

## 2025-06-03 PROCEDURE — 85027 COMPLETE CBC AUTOMATED: CPT

## 2025-06-03 PROCEDURE — 36415 COLL VENOUS BLD VENIPUNCTURE: CPT

## 2025-06-03 PROCEDURE — 80158 DRUG ASSAY CYCLOSPORINE: CPT

## 2025-06-04 ENCOUNTER — TELEPHONE (OUTPATIENT)
Dept: TRANSPLANT | Facility: CLINIC | Age: 75
End: 2025-06-04
Payer: COMMERCIAL

## 2025-06-04 DIAGNOSIS — B34.8 BK VIREMIA: ICD-10-CM

## 2025-06-04 DIAGNOSIS — I15.1 HTN, KIDNEY TRANSPLANT RELATED: ICD-10-CM

## 2025-06-04 DIAGNOSIS — Z94.0 HTN, KIDNEY TRANSPLANT RELATED: ICD-10-CM

## 2025-06-04 DIAGNOSIS — Z94.0 STATUS POST KIDNEY TRANSPLANT: ICD-10-CM

## 2025-06-04 LAB
BK VIRUS DNA IU/ML, INSTRUMENT (6800): 845 IU/ML (ref ?–1)
BK VIRUS SPECIMEN TYPE: ABNORMAL
BKV DNA SPEC NAA+PROBE-LOG#: 2.9 {LOG_COPIES}/ML

## 2025-06-04 NOTE — TELEPHONE ENCOUNTER
Post Kidney and Pancreas Transplant Team Conference  Date: 6/4/2025  Transplant Coordinator: Morena     Attendees:  []  Dr. Valenzuela [] Alicia Larson, JANICE [x] Fanny Ellison LPN     []  Dr. Hall [] Debbie Reyna, JANICE    [x] Dr. Keen [x] Morena Jacob RN    [] Dr. Blackmon [] Mendy Sanders, JANICE [x] Carie Mcrae RN   [] Dr. Buck [x] Loreta Hanley, RN    [x] Dr. Oshea [] Mariluz Lam RN [] Minh Burk, PharmD   []  Dr. Newell [x] Geeta Simeon, RN    [] Dr. Marin [x] Thomas Oliva RN     [x] Patricia Bobby RN    [] Stephania Alatorre NP [x] Fawn Pichardo RN        Verbal Plan Read Back:   Restart mycophenolate 250 mg  Stay on pred 5 mg daily    Routed to RN Coordinator   Fanny Ellison LPN

## 2025-06-05 RX ORDER — MYCOPHENOLATE MOFETIL 250 MG/1
250 CAPSULE ORAL 2 TIMES DAILY
Qty: 60 CAPSULE | Refills: 11 | Status: SHIPPED | OUTPATIENT
Start: 2025-06-05

## 2025-06-10 ENCOUNTER — LAB (OUTPATIENT)
Dept: LAB | Facility: CLINIC | Age: 75
End: 2025-06-10
Payer: COMMERCIAL

## 2025-06-10 DIAGNOSIS — Z20.828 CONTACT WITH AND (SUSPECTED) EXPOSURE TO OTHER VIRAL COMMUNICABLE DISEASES: ICD-10-CM

## 2025-06-10 DIAGNOSIS — Z48.298 AFTERCARE FOLLOWING ORGAN TRANSPLANT: ICD-10-CM

## 2025-06-10 DIAGNOSIS — Z98.890 OTHER SPECIFIED POSTPROCEDURAL STATES: ICD-10-CM

## 2025-06-10 DIAGNOSIS — Z79.899 ENCOUNTER FOR LONG-TERM CURRENT USE OF MEDICATION: ICD-10-CM

## 2025-06-10 DIAGNOSIS — Z94.0 KIDNEY REPLACED BY TRANSPLANT: ICD-10-CM

## 2025-06-10 DIAGNOSIS — M10.9 GOUT, UNSPECIFIED CAUSE, UNSPECIFIED CHRONICITY, UNSPECIFIED SITE: ICD-10-CM

## 2025-06-10 LAB
ALBUMIN MFR UR ELPH: 40.2 MG/DL
ANION GAP SERPL CALCULATED.3IONS-SCNC: 9 MMOL/L (ref 7–15)
BUN SERPL-MCNC: 32.2 MG/DL (ref 8–23)
CALCIUM SERPL-MCNC: 10.5 MG/DL (ref 8.8–10.4)
CHLORIDE SERPL-SCNC: 105 MMOL/L (ref 98–107)
CREAT SERPL-MCNC: 1.17 MG/DL (ref 0.67–1.17)
CREAT UR-MCNC: 73.2 MG/DL
EGFRCR SERPLBLD CKD-EPI 2021: 65 ML/MIN/1.73M2
ERYTHROCYTE [DISTWIDTH] IN BLOOD BY AUTOMATED COUNT: 13.7 % (ref 10–15)
GLUCOSE SERPL-MCNC: 193 MG/DL (ref 70–99)
HCO3 SERPL-SCNC: 24 MMOL/L (ref 22–29)
HCT VFR BLD AUTO: 41.9 % (ref 40–53)
HGB BLD-MCNC: 13.5 G/DL (ref 13.3–17.7)
MCH RBC QN AUTO: 31.2 PG (ref 26.5–33)
MCHC RBC AUTO-ENTMCNC: 32.2 G/DL (ref 31.5–36.5)
MCV RBC AUTO: 97 FL (ref 78–100)
PLATELET # BLD AUTO: 163 10E3/UL (ref 150–450)
POTASSIUM SERPL-SCNC: 4.7 MMOL/L (ref 3.4–5.3)
PROT/CREAT 24H UR: 0.55 MG/MG CR (ref 0–0.2)
PTH-INTACT SERPL-MCNC: 83 PG/ML (ref 15–65)
RBC # BLD AUTO: 4.33 10E6/UL (ref 4.4–5.9)
SODIUM SERPL-SCNC: 138 MMOL/L (ref 135–145)
WBC # BLD AUTO: 6.6 10E3/UL (ref 4–11)

## 2025-06-10 PROCEDURE — 84550 ASSAY OF BLOOD/URIC ACID: CPT

## 2025-06-10 PROCEDURE — 36415 COLL VENOUS BLD VENIPUNCTURE: CPT

## 2025-06-10 PROCEDURE — 84156 ASSAY OF PROTEIN URINE: CPT

## 2025-06-10 PROCEDURE — 85027 COMPLETE CBC AUTOMATED: CPT

## 2025-06-10 PROCEDURE — 83970 ASSAY OF PARATHORMONE: CPT

## 2025-06-10 PROCEDURE — 87799 DETECT AGENT NOS DNA QUANT: CPT

## 2025-06-10 PROCEDURE — 80048 BASIC METABOLIC PNL TOTAL CA: CPT

## 2025-06-11 LAB
BK VIRUS DNA IU/ML, INSTRUMENT (6800): 591 IU/ML (ref ?–1)
BK VIRUS SPECIMEN TYPE: ABNORMAL
BKV DNA SPEC NAA+PROBE-LOG#: 2.8 {LOG_COPIES}/ML
CMV DNA SPEC NAA+PROBE-ACNC: NOT DETECTED IU/ML
SPECIMEN TYPE: NORMAL

## 2025-06-12 ENCOUNTER — VIRTUAL VISIT (OUTPATIENT)
Dept: PHARMACY | Facility: CLINIC | Age: 75
End: 2025-06-12
Payer: COMMERCIAL

## 2025-06-12 DIAGNOSIS — Z94.0 HTN, KIDNEY TRANSPLANT RELATED: ICD-10-CM

## 2025-06-12 DIAGNOSIS — E11.69 TYPE 2 DIABETES MELLITUS WITH OTHER SPECIFIED COMPLICATION, WITH LONG-TERM CURRENT USE OF INSULIN (H): ICD-10-CM

## 2025-06-12 DIAGNOSIS — Z79.4 TYPE 2 DIABETES MELLITUS WITH OTHER SPECIFIED COMPLICATION, WITH LONG-TERM CURRENT USE OF INSULIN (H): ICD-10-CM

## 2025-06-12 DIAGNOSIS — M10.9 GOUT, UNSPECIFIED CAUSE, UNSPECIFIED CHRONICITY, UNSPECIFIED SITE: ICD-10-CM

## 2025-06-12 DIAGNOSIS — K21.9 GASTROESOPHAGEAL REFLUX DISEASE, UNSPECIFIED WHETHER ESOPHAGITIS PRESENT: ICD-10-CM

## 2025-06-12 DIAGNOSIS — Z94.0 STATUS POST KIDNEY TRANSPLANT: Primary | ICD-10-CM

## 2025-06-12 DIAGNOSIS — E78.5 DYSLIPIDEMIA: ICD-10-CM

## 2025-06-12 DIAGNOSIS — Z78.9 TAKES DIETARY SUPPLEMENTS: ICD-10-CM

## 2025-06-12 DIAGNOSIS — I15.1 HTN, KIDNEY TRANSPLANT RELATED: ICD-10-CM

## 2025-06-12 LAB — URATE SERPL-MCNC: 5 MG/DL (ref 3.4–7)

## 2025-06-12 RX ORDER — AMLODIPINE BESYLATE 5 MG/1
5 TABLET ORAL EVERY EVENING
Qty: 90 TABLET | Refills: 3 | Status: SHIPPED | OUTPATIENT
Start: 2025-06-12

## 2025-06-12 NOTE — PROGRESS NOTES
Medication Therapy Management (MTM) Encounter    ASSESSMENT:                            Medication Adherence/Access: No issues identified.    Kidney Transplant:    Patient has completed 6 months of CMV prophylaxis, will discuss holding valcyte with team. Due for updated pneumonia vaccine as he is 6 months post transplant.      Supplements:   Can hold magnesium, levels well within normal.     Diabetes   Patient 70% in range, with minimal lows. No changes today.     Hypertension   BPs elevated will titrate amlodipine dose.  Follow-up with nephrology next week.      Hyperlipidemia   Stable.     GERD    Stable.     Gout  No recent uric acid checks, patient is 6 months post transplant will draw an uric acid level. .     PLAN:                          Txp team...  Patient completed 6 months of Valcyte, can we discontinue?    Pt to...  Hold magnesium.   Increase Amlodipine to 5mg every evening  Will check uric acid today. (Gout)  Due for Prevnar 20 vaccine.     Follow-up: as needed.    SUBJECTIVE/OBJECTIVE:                          Peter Carpio is a 75 year old male seen for a follow-up visit.       Reason for visit: 6 months post txp.    Allergies/ADRs: Reviewed in chart  Past Medical History: Reviewed in chart  Tobacco: He reports that he quit smoking about 22 years ago. His smoking use included cigarettes. He has never used smokeless tobacco.  Alcohol: not currently using    Medication Adherence/Access: no issues reported.    Kidney Transplant:    Cyclosporine 100mg twice daily  Mycophenolate Mofetil 250mg twice daily   Prednisone 5mg every morning.  Pt reports no issues.   Transplant date: 2/13/25  Vascular Prophylaxis: Aspirin 81mg daily. Denies gastrointestinal bleed sx.   CMV prophylaxis: Valcyte 450 mg every day  Donor (+), Recipient (-), treat 6 months post tx   PJP prophylaxis: Bactrim SS daily  Tx Coordinator: Matti Funes MD: Dr. Blackmon, Using Med Card: Yes  Immunizations: annual flu shot 2024;  Pneumovax 23:  2016; Prevnar 13: 2015; TDaP:  2020; Shingrix: x2, HBV: immunity per last titers, COVID: Primary x 4, Bivalent x 1, and 23-24 x1 24-25 x1  , RSV:   Estimated Creatinine Clearance: 55.6 mL/min (based on SCr of 1.17 mg/dL).     Supplements:   Mag Oxide 400mg daily ( 2 hours from MMF)  Sodium bicarbonate 650mg twice daily     Diabetes   Novolin 70/30 38 units every morning and 26 units every evening eating dinner at 6PM  Novolog prn highs  Patient is not experiencing side effects.  Current diabetes symptoms: none  Diet/Exercise: Ensure protein.       Last visit     Eye exam is up to date    Hypertension   Metoprolol 50mg twice daily   Amlodipine 2.5mg at bedtime  Patient reports no current medication side effects, wearing compression stockings currently. Patient self monitors blood pressure.  Home BP monitoring mornin, 146, 152, 149, 147, 147 / 80s  , 154, 158, 149/ 83-86  Pulse: 52, 64, 60, 63, 59  BP Readings from Last 3 Encounters:   25 134/82   25 (!) 147/80   25 (!) 165/96      Hyperlipidemia   Atorvastatin 40mg daily  Fish oil 2g twice daily  Patient reports no significant myalgias or other side effects.  The ASCVD Risk score (Damari DEVRIES, et al., 2019) failed to calculate for the following reasons:    Risk score cannot be calculated because patient has a medical history suggesting prior/existing ASCVD     GERD    Pantoprazole 40 mg stopped on   Patient reports no current symptoms.   Patient feels that current regimen is effective.     Gout  allopurinol 100 mg daily  Patient reports no current pain concerns.   Patient is experiencing the following medication side effects: none.     Today's Vitals: There were no vitals taken for this visit.  ----------------    I spent 25 minutes with this patient today. All changes were made via collaborative practice agreement with Dr. Keen.     A summary of these recommendations was sent via [employer].    Peter Wm,  PharmD  [facility] Pharmacist    Phone: [phone]     Telemedicine Visit Details  The patient's medications can be safely assessed via a telemedicine encounter.  Type of service:  Telephone visit  Originating Location (pt. Location): Home    Distant Location (provider location):  Off-site  Start Time: 9:09 AM  End Time: 9:34 AM     Medication Therapy Recommendations  Aftercare following organ transplant   1 Current Medication: valGANciclovir (VALCYTE) 450 MG tablet   Current Medication Sig: Take 1 tablet (450 mg) by mouth daily. Stop on 8/13/2025   Rationale: No medical indication at this time - Unnecessary medication therapy - Indication   Recommendation: Discontinue Medication   Status: Contact Provider - Awaiting Response   Identified Date: 6/12/2025         2 Rationale: Preventive therapy - Needs additional medication therapy - Indication   Recommendation: Order Vaccine - Prevnar 20 0.5 ML Sury   Status: Accepted - no CPA Needed   Identified Date: 6/12/2025 Completed Date: 6/12/2025         HTN, kidney transplant related   1 Current Medication: amLODIPine (NORVASC) 2.5 MG tablet (Discontinued)   Current Medication Sig: Take 1 tablet (2.5 mg) by mouth every evening.   Rationale: Dose too low - Dosage too low - Effectiveness   Recommendation: Increase Dose   Status: Accepted per CPA   Identified Date: 6/12/2025 Completed Date: 6/12/2025         Takes dietary supplements   1 Current Medication: magnesium oxide (MAG-OX) 400 MG tablet (Discontinued)   Current Medication Sig: Take 1 tablet (400 mg) by mouth daily (with lunch).   Rationale: No medical indication at this time - Unnecessary medication therapy - Indication   Recommendation: Discontinue Medication   Status: Accepted per CPA   Identified Date: 6/12/2025 Completed Date: 6/12/2025

## 2025-06-12 NOTE — PATIENT INSTRUCTIONS
"Recommendations from today's MTM visit:                                                      Hold magnesium.   Increase Amlodipine to 5mg every evening  Will check uric acid today. (Gout)  Due for Prevnar 20 vaccine.     Follow-up: as needed.     It was great speaking with you today.  I value your experience and would be very thankful for your time in providing feedback in our clinic survey. In the next few days, you may receive an email or text message from [employer] [employer] with a link to a survey related to your  clinical pharmacist.\"     To schedule another MTM appointment, please call the clinic directly or you may call the MTM scheduling line at [phone] or toll-free at [phone].     My Clinical Pharmacist's contact information:                                                      Please feel free to contact me with any questions or concerns you have.      Minh Burk, PharmD  [facility] Pharmacist    Phone: [phone]     "

## 2025-06-16 DIAGNOSIS — Z94.0 STATUS POST KIDNEY TRANSPLANT: ICD-10-CM

## 2025-06-16 DIAGNOSIS — Z94.0 HTN, KIDNEY TRANSPLANT RELATED: ICD-10-CM

## 2025-06-16 DIAGNOSIS — B34.8 BK VIREMIA: ICD-10-CM

## 2025-06-16 DIAGNOSIS — I15.1 HTN, KIDNEY TRANSPLANT RELATED: ICD-10-CM

## 2025-06-17 ENCOUNTER — MYC REFILL (OUTPATIENT)
Dept: TRANSPLANT | Facility: CLINIC | Age: 75
End: 2025-06-17
Payer: COMMERCIAL

## 2025-06-17 DIAGNOSIS — Z94.0 HTN, KIDNEY TRANSPLANT RELATED: ICD-10-CM

## 2025-06-17 DIAGNOSIS — I15.1 HTN, KIDNEY TRANSPLANT RELATED: ICD-10-CM

## 2025-06-17 DIAGNOSIS — B34.8 BK VIREMIA: ICD-10-CM

## 2025-06-17 RX ORDER — SULFAMETHOXAZOLE AND TRIMETHOPRIM 400; 80 MG/1; MG/1
1 TABLET ORAL DAILY
Qty: 90 TABLET | Refills: 3 | Status: SHIPPED | OUTPATIENT
Start: 2025-06-17

## 2025-06-18 RX ORDER — PREDNISONE 5 MG/1
5 TABLET ORAL DAILY
Qty: 90 TABLET | Refills: 0 | Status: SHIPPED | OUTPATIENT
Start: 2025-06-18

## 2025-06-24 NOTE — PROGRESS NOTES
TRANSPLANT NEPHROLOGY CLINIC VISIT   Assessment & Plan   Start lasix 20 mg once daily given ongoing increase swelling. (It may also help with BP and calcium). If creatinine up-trends fast, he may need every other day dose.  If calcium getting close to 11 mg/dL, then we will start consider sensipar 30 mg once daily.  If BKV from today remain low with today's test, then we will increase mycophenolate to 500 mg twice per day   Repeat UA today or with next blood test.    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    # BK Viremia: Trend down, peaked BK PCR at ~ 20K in April 2024, down-trending since stopping mycophenolate and changing to cyclosporine in April. Back on mycophenolate 250 mg twice a day for ~ 2-3 weeks.  IgG level is 381 in March, s/p 2 doses of 0.5 g/kg IVIG on 4/7 & 4/23.   - Will continue to follow BK PCR every 2 weeks, if level continues to be low today we will increase mycophenolate 500 mg twice a day.             # Hematuria: Persistent hematuria prior to transplant as well as after transplant except most recent UA. Had past hx of smoking. Imaging, showed no stones. Repeat UA today or with next labs. May need urology evaluation if persistent.    # Immunosuppression: Tacrolimus immediate release (goal 6-8), Mycophenolate mofetil (dose 250 mg every 12 hours), and Prednisone (dose 5 mg daily)   - Induction with Recent Transplant:  Low Intensity Protocol   - Continue with intensive monitoring of immunosuppression for efficacy and toxicity.   - Historical Changes in Immunosuppression: off mycophenolate due to BK viremia as of 4/2. And later re-started with low dose.   - Changes: Yes - If BKV from today remained low , will increase MMF to 500 mg twice per day     # Infection Prevention:   Last CD4 Level: Not checked   - PJP:  Sulfa/TMP (Bactrim)   - CMV: Valganciclovir (Valcyte) 450 mg every day for 6 months then monitor CMV pcr monthly till 1 yr post Tx      - CMV IgG Ab High Risk Discordance (D+/R-) at time of transplant: Yes  Present CMV Serostatus: Negative  - EBV IgG Ab High Risk Discordance (D+/R-) at time of transplant: No  Present EBV Serostatus: Positive    # Hypertension: Inadequate control, Home -152/ 70- low 80,  Goal BP: < 140/90    - Metoprolol  50 mg bid and amlodipine 5 mg    - Changes: Yes -  start furosemide 20 mg every day.    # Diabetes: Borderline control (HbA1c 7-9%) Last HbA1c: 7.1% in Feb, 2025. repeat at 3 months post-transplant.    # Anemia in Chronic Renal Disease: Hgb: Stable, near normal      BARBER: No   - Iron studies: Replete    # Mineral Bone Disorder:    - Secondary renal hyperparathyroidism; PTH level: Minimally elevated ( pg/ml)        On treatment: None  - Vitamin D; level: Low normal        On supplement: No  - Calcium; level: High       On supplement: No, will continue monitoring  - Phosphorus; level: Low        On supplement: No    # Electrolytes:   - Potassium; level: High normal        On supplement: No  - Magnesium; level: Low        On supplement: Yes   - Bicarbonate; level: Normal        On supplement: No    # Other Significant PMH:  - Diabetic neuropathy: On gabapentin 200 mg at night.  Worse when using compression stockings. He may need a higher dose now that he has a kidney transplant with improved creatinine clearance. This may improve his neuropathy and allow him to use compression stockings.  - Gout: on allopurinol 100 mg every day.  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 Saw Dr. Pham cancer specialist place out of [facility] Undergoing surveillance colonoscopy. Last colonoscopy in march 2024 with out any new lesions.   - CAD: cardiac catheterization 7/17/24: pLAD to mLAD 70% stenosis and mLAD 40% stenosis. He is  now s/p PCI with EDWIN x 1 to LAD. LVEF 55-60% in May 2024.  - NSTEMI: in 2022. Stress test showed very small defect. No intervention pursued due to kidney function at that time. On statin, ASA 81, and Imdur.   - BPH: on tamsulosin 0.4 mg every day and finasteride 5 mg every day.     # Skin Cancer Risk: Discussed sun protection and recommend regular follow up with Dermatology.    # Transplant History:  Etiology of Kidney Failure: Diabetes mellitus type 2  Tx: DDKT  Transplant: 2/13/2025 (Kidney)  Significant transplant-related complications: [facility]    Transplant Office Phone Number: [phone]    Assessment and plan was discussed with the patient and he voiced his understanding and agreement.    Return visit: Return for appointment scheduled for 8/21/2025.    Dr. David Acharya.     Attestation:  This patient has been seen and evaluated by me, Damir Miller MD.  I have reviewed the note and agree with plan of care as documented by the fellow.     The longitudinal plan of care for the diagnosis(es)/condition(s) as documented were addressed during this visit. Due to the added complexity in care, I will continue to support Peter in the subsequent management and with ongoing continuity of care.    Chief Complaint   Mr. Carpio is a 75 year old here for kidney transplant and immunosuppression management.     History of Present Illness   Mr. Carpio reports feeling well.    Since last clinic visit, there hasn't been any hospitalizations. However, he noted puffiness on his legs and hands. He also noted his weight progressively increasing, gained up to 16 lbs post transplant.     He used to be on furosemide, stopped in late April for trending up creatinine. He reports increased tendency of brusing when ever he bumps or hit with something mostly in the upper hands. He has hair falls.     Active/Exercise: No  Chest pain or shortness of breath: No  Lower extremity swelling: Yes  Weight change: Yes, gained around  16 lbs since transplant  Nausea and vomiting: No  Diarrhea: No  Heartburn symptoms: No  Fever, sweats or chills: No  Night sweats: No  Urinary complaints: No    Home BP: SBP in 144-152     Problem List   Patient Active Problem List   Diagnosis    HTN, kidney transplant related    Chronic kidney disease, stage 4, severely decreased GFR (H)    Diabetic nephropathy (H)    Gout    Secondary renal hyperparathyroidism    Coronary artery disease    Diabetes mellitus, type 2 (H)    Hypertrophy of prostate without urinary obstruction    Malignant neoplasm of ascending colon (H)    Cardiovascular disease    Status post coronary angiogram    Pre-transplant evaluation for kidney transplant    Anemia in chronic kidney disease    Chronic 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 30 tablet 1    acetaminophen (TYLENOL) 325 MG tablet Take 3 tablets (975 mg) by mouth every 6 hours as needed for mild pain or fever.      allopurinol (ZYLOPRIM) 100 MG tablet Take 100 mg by mouth daily      amLODIPine (NORVASC) 5 MG tablet Take 1 tablet (5 mg) by mouth every evening. 90 tablet 3    aspirin 81 MG EC tablet Take 81 mg by mouth daily.      atorvastatin (LIPITOR) 40 MG tablet Take 1 tablet (40 mg) by mouth daily. 90 tablet 3    Continuous Glucose Sensor (FREESTYLE ANTONIO 3 PLUS SENSOR) MISC Change every 15 days. 6 each 1    cycloSPORINE modified (GENERIC  EQUIVALENT) 25 MG capsule Take 4 capsules (100 mg) by mouth 2 times daily. 720 capsule 3    finasteride (PROSCAR) 5 MG tablet Take 1 tablet (5 mg) by mouth daily. 90 tablet 3    fish oil-omega-3 fatty acids 1000 MG capsule Take 2 capsules (2 g) by mouth 2 times daily.      gabapentin (NEURONTIN) 100 MG capsule Take 200 mg by mouth at bedtime.      insulin aspart (NOVOLOG PEN) 100 UNIT/ML pen Inject 1-5 Units subcutaneously at bedtime. Do Not give Bedtime Correction Insulin if BG less than 200. For -249 give 1 units. For -299 give 2 units. For -349 give 3 units. For -399 give 4 units. For BG greater than or equal to 400 give 5 units. Notify provider if glucose greater than or equal to 350 mg/dL after administration of correction dose. 15 mL 1    insulin aspart (NOVOLOG PEN) 100 UNIT/ML pen Inject 1-7 Units subcutaneously 3 times daily (before meals). Correction Scale -   Do Not give Correction Insulin if Pre-Meal BG less than 140. For Pre-Meal  - 189 give 1 unit. For Pre-Meal - 239 give 2 units. For Pre-Meal -289 give 3 units. For Pre-Meal -339 give 4 units. For Pre-Meal -389 give 5 units. For Pre-Meal -439 give 6 units. For Pre-Meal BG greater than or equal to 440 give 7 units To be given with prandial insulin, and based on pre-meal blood glucose. Administering insulin within 5 minutes of the start of the meal is ideal. Administer insulin no more than 30 minutes after the start of the meal, unless directed otherwise by provider. Notify provider if glucose greater than or equal to 350 mg/dL after administration of correction dose. 15 mL 1    insulin NPH-Regular (HUMULIN 70/30;NOVOLIN 70/30) susp With prednisone 40 mg dose take 50 units prior to breakfast and 23 units prior to dinner. With prednisone 20 mg dose take 40 units prior to breakfast and 19 units prior to dinner. With prednisone 15 mg dose take 38 units prior to breakfast and 16 units prior to  dinner. With prednisone 10 mg dose take 35 units prior to breakfast and 15 units prior to dinner. With prednisone 5 mg dose take 35 units prior to breakfast and 15 units prior to dinner. (Patient taking differently: Inject 38 Units subcutaneously daily (with breakfast). And 26 units with supper.) 15 mL 0    metoprolol tartrate (LOPRESSOR) 50 MG tablet Take 1 tablet (50 mg) by mouth 2 times daily. 180 tablet 3    mycophenolate (GENERIC EQUIVALENT) 250 MG capsule Take 1 capsule (250 mg) by mouth 2 times daily. 60 capsule 11    predniSONE (DELTASONE) 5 MG tablet Take 1 tablet (5 mg) by mouth daily. 90 tablet 0    sodium bicarbonate 650 MG tablet Take 1 tablet (650 mg) by mouth 2 times daily. 180 tablet 3    sulfamethoxazole-trimethoprim (BACTRIM) 400-80 MG tablet Take 1 tablet by mouth daily. 90 tablet 3    tamsulosin (FLOMAX) 0.4 MG capsule Take 1 capsule (0.4 mg) by mouth daily. 90 capsule 3    valGANciclovir (VALCYTE) 450 MG tablet Take 1 tablet (450 mg) by mouth daily. Stop on 8/13/2025 90 tablet 1     No current facility-administered medications for this visit.     There are no discontinued medications.    Physical Exam   Vital Signs: BP (!) 152/80   Pulse 68   Temp 98.1  F (36.7  C) (Oral)   Wt 84.8 kg (187 lb)   SpO2 97%   BMI 30.18 kg/m      GENERAL APPEARANCE: alert and no distress  HENT: mouth without ulcers or lesions  RESP: lungs clear to auscultation - no rales, rhonchi or wheezes  CV: regular rhythm, normal rate, no rub, no murmur  EDEMA: Trace LE edema bilaterally  ABDOMEN: soft, nondistended, nontender, bowel sounds normal  MS: extremities normal - no gross deformities noted, no evidence of inflammation in joints, no muscle tenderness  SKIN: no rash  TX KIDNEY: normal  DIALYSIS ACCESS: none    Data         Latest Ref Rng & Units 6/25/2025     8:36 AM 6/10/2025     7:20 AM 6/3/2025     7:25 AM   Renal   Sodium 135 - 145 mmol/L 139  138  142    K 3.4 - 5.3 mmol/L 4.3  4.7  4.2    Cl 98 - 107 mmol/L  106  105  108    Cl (external) 98 - 107 mmol/L 106  105  108    CO2 22 - 29 mmol/L 22  24  23    Urea Nitrogen 8.0 - 23.0 mg/dL 37.2  32.2  31.6    Creatinine 0.67 - 1.17 mg/dL 1.12  1.17  1.22    Glucose 70 - 99 mg/dL 196  193  139    Calcium 8.8 - 10.4 mg/dL 10.7  10.5  9.9    Magnesium 1.7 - 2.3 mg/dL 1.9            Latest Ref Rng & Units 6/25/2025     8:36 AM 6/10/2025     7:20 AM 5/20/2025     7:21 AM   Bone Health   Phosphorus 2.5 - 4.5 mg/dL 2.3   2.7    Parathyroid Hormone Intact 15 - 65 pg/mL  83           Latest Ref Rng & Units 6/25/2025     8:36 AM 6/10/2025     7:20 AM 6/3/2025     7:25 AM   Heme   WBC 4.0 - 11.0 10e3/uL 6.4  6.6  6.6    Hgb 13.3 - 17.7 g/dL 13.4  13.5  12.7    Plt 150 - 450 10e3/uL 158  163  162          Latest Ref Rng & Units 2/12/2025     8:47 PM 5/16/2024     1:26 PM   Liver   AP 40 - 150 U/L 59  58    TBili <=1.2 mg/dL 0.2  0.2    ALT 0 - 70 U/L 15  9    AST 0 - 45 U/L 17  8    Tot Protein 6.4 - 8.3 g/dL 6.5  7.0    Albumin 3.5 - 5.2 g/dL 3.9  4.1          Latest Ref Rng & Units 6/25/2025     8:36 AM 2/12/2025     8:46 PM 12/26/2024     1:20 PM   Pancreas   A1C <5.7 % 6.9  7.1  6.7          Latest Ref Rng & Units 2/27/2025     7:05 AM 2/19/2025     7:24 AM   Iron studies   Iron 61 - 157 ug/dL 62  91    Iron Sat Index 15 - 46 % 24  41    Ferritin 31 - 409 ng/mL 624  1,030          Latest Ref Rng & Units 2/12/2025     8:47 PM 5/16/2024     1:26 PM   [facility] Txp Virology   EBV CAPSID ANTIBODY IGG No detectable antibody. Positive  Positive      Failed to redirect to the Timeline version of the REVFS SmartLink.  Recent Labs   Lab Test 04/15/25  0726 04/29/25  0712 05/06/25  0722   DOSTAC 4/14/2025 4/28/2025 5/5/2025   TACROL 12.1 8.9 <1.0*     Recent Labs   Lab Test 03/14/25  0715 03/18/25  0902 04/15/25  0726   DOSMPA  --  3/17/2025   8:45 PM 4/14/2025   8:00 PM   MPACID 1.71 2.11 <0.25*   MPAG 50.2 63.7 <6.5*

## 2025-06-25 ENCOUNTER — OFFICE VISIT (OUTPATIENT)
Dept: TRANSPLANT | Facility: CLINIC | Age: 75
End: 2025-06-25
Attending: INTERNAL MEDICINE
Payer: COMMERCIAL

## 2025-06-25 ENCOUNTER — LAB (OUTPATIENT)
Dept: LAB | Facility: CLINIC | Age: 75
End: 2025-06-25
Attending: INTERNAL MEDICINE
Payer: COMMERCIAL

## 2025-06-25 VITALS
OXYGEN SATURATION: 97 % | BODY MASS INDEX: 30.18 KG/M2 | HEART RATE: 68 BPM | DIASTOLIC BLOOD PRESSURE: 80 MMHG | WEIGHT: 187 LBS | TEMPERATURE: 98.1 F | SYSTOLIC BLOOD PRESSURE: 152 MMHG

## 2025-06-25 DIAGNOSIS — Z20.828 CONTACT WITH AND (SUSPECTED) EXPOSURE TO OTHER VIRAL COMMUNICABLE DISEASES: ICD-10-CM

## 2025-06-25 DIAGNOSIS — N18.2 CKD (CHRONIC KIDNEY DISEASE) STAGE 2, GFR 60-89 ML/MIN: ICD-10-CM

## 2025-06-25 DIAGNOSIS — D84.9 IMMUNOSUPPRESSED STATUS: ICD-10-CM

## 2025-06-25 DIAGNOSIS — Z98.890 OTHER SPECIFIED POSTPROCEDURAL STATES: ICD-10-CM

## 2025-06-25 DIAGNOSIS — Z79.899 ENCOUNTER FOR LONG-TERM CURRENT USE OF MEDICATION: ICD-10-CM

## 2025-06-25 DIAGNOSIS — B34.8 BK VIREMIA: ICD-10-CM

## 2025-06-25 DIAGNOSIS — D63.1 ANEMIA IN STAGE 2 CHRONIC KIDNEY DISEASE: Chronic | ICD-10-CM

## 2025-06-25 DIAGNOSIS — E11.22 TYPE 2 DIABETES MELLITUS WITH CHRONIC KIDNEY DISEASE, WITH LONG-TERM CURRENT USE OF INSULIN, UNSPECIFIED CKD STAGE (H): ICD-10-CM

## 2025-06-25 DIAGNOSIS — Z94.0 KIDNEY TRANSPLANTED: Primary | ICD-10-CM

## 2025-06-25 DIAGNOSIS — E83.42 HYPOMAGNESEMIA: ICD-10-CM

## 2025-06-25 DIAGNOSIS — Z48.298 AFTERCARE FOLLOWING ORGAN TRANSPLANT: ICD-10-CM

## 2025-06-25 DIAGNOSIS — Z79.4 TYPE 2 DIABETES MELLITUS WITH CHRONIC KIDNEY DISEASE, WITH LONG-TERM CURRENT USE OF INSULIN, UNSPECIFIED CKD STAGE (H): ICD-10-CM

## 2025-06-25 DIAGNOSIS — Z94.0 KIDNEY REPLACED BY TRANSPLANT: ICD-10-CM

## 2025-06-25 DIAGNOSIS — I15.1 HTN, KIDNEY TRANSPLANT RELATED: ICD-10-CM

## 2025-06-25 DIAGNOSIS — E83.39 HYPOPHOSPHATEMIA: ICD-10-CM

## 2025-06-25 DIAGNOSIS — N18.2 ANEMIA IN STAGE 2 CHRONIC KIDNEY DISEASE: Chronic | ICD-10-CM

## 2025-06-25 DIAGNOSIS — Z94.0 KIDNEY TRANSPLANTED: ICD-10-CM

## 2025-06-25 DIAGNOSIS — Z94.0 HTN, KIDNEY TRANSPLANT RELATED: ICD-10-CM

## 2025-06-25 PROBLEM — N18.4 CHRONIC KIDNEY DISEASE, STAGE 4, SEVERELY DECREASED GFR (H): Status: RESOLVED | Noted: 2024-02-12 | Resolved: 2025-06-25

## 2025-06-25 PROBLEM — Z76.82 AWAITING TRANSPLANTATION OF KIDNEY: Status: RESOLVED | Noted: 2025-02-12 | Resolved: 2025-06-25

## 2025-06-25 LAB
ANION GAP SERPL CALCULATED.3IONS-SCNC: 11 MMOL/L (ref 7–15)
BUN SERPL-MCNC: 37.2 MG/DL (ref 8–23)
CALCIUM SERPL-MCNC: 10.7 MG/DL (ref 8.8–10.4)
CHLORIDE SERPL-SCNC: 106 MMOL/L (ref 98–107)
CREAT SERPL-MCNC: 1.12 MG/DL (ref 0.67–1.17)
EGFRCR SERPLBLD CKD-EPI 2021: 69 ML/MIN/1.73M2
ERYTHROCYTE [DISTWIDTH] IN BLOOD BY AUTOMATED COUNT: 13.6 % (ref 10–15)
EST. AVERAGE GLUCOSE BLD GHB EST-MCNC: 151 MG/DL
GLUCOSE SERPL-MCNC: 196 MG/DL (ref 70–99)
HBA1C MFR BLD: 6.9 %
HCO3 SERPL-SCNC: 22 MMOL/L (ref 22–29)
HCT VFR BLD AUTO: 42 % (ref 40–53)
HGB BLD-MCNC: 13.4 G/DL (ref 13.3–17.7)
MAGNESIUM SERPL-MCNC: 1.9 MG/DL (ref 1.7–2.3)
MCH RBC QN AUTO: 30.9 PG (ref 26.5–33)
MCHC RBC AUTO-ENTMCNC: 31.9 G/DL (ref 31.5–36.5)
MCV RBC AUTO: 97 FL (ref 78–100)
PHOSPHATE SERPL-MCNC: 2.3 MG/DL (ref 2.5–4.5)
PLATELET # BLD AUTO: 158 10E3/UL (ref 150–450)
POTASSIUM SERPL-SCNC: 4.3 MMOL/L (ref 3.4–5.3)
RBC # BLD AUTO: 4.34 10E6/UL (ref 4.4–5.9)
SODIUM SERPL-SCNC: 139 MMOL/L (ref 135–145)
WBC # BLD AUTO: 6.4 10E3/UL (ref 4–11)

## 2025-06-25 PROCEDURE — 83735 ASSAY OF MAGNESIUM: CPT | Performed by: PATHOLOGY

## 2025-06-25 PROCEDURE — 36415 COLL VENOUS BLD VENIPUNCTURE: CPT | Performed by: PATHOLOGY

## 2025-06-25 PROCEDURE — 84100 ASSAY OF PHOSPHORUS: CPT | Performed by: PATHOLOGY

## 2025-06-25 PROCEDURE — 87799 DETECT AGENT NOS DNA QUANT: CPT | Performed by: INTERNAL MEDICINE

## 2025-06-25 PROCEDURE — 83036 HEMOGLOBIN GLYCOSYLATED A1C: CPT | Performed by: STUDENT IN AN ORGANIZED HEALTH CARE EDUCATION/TRAINING PROGRAM

## 2025-06-25 PROCEDURE — 99000 SPECIMEN HANDLING OFFICE-LAB: CPT | Performed by: PATHOLOGY

## 2025-06-25 PROCEDURE — G0463 HOSPITAL OUTPT CLINIC VISIT: HCPCS | Performed by: STUDENT IN AN ORGANIZED HEALTH CARE EDUCATION/TRAINING PROGRAM

## 2025-06-25 PROCEDURE — 85027 COMPLETE CBC AUTOMATED: CPT | Performed by: PATHOLOGY

## 2025-06-25 PROCEDURE — 80048 BASIC METABOLIC PNL TOTAL CA: CPT | Performed by: PATHOLOGY

## 2025-06-25 RX ORDER — FUROSEMIDE 20 MG/1
20 TABLET ORAL DAILY
Qty: 30 TABLET | Refills: 1 | Status: SHIPPED | OUTPATIENT
Start: 2025-06-25 | End: 2025-08-24

## 2025-06-25 ASSESSMENT — PAIN SCALES - GENERAL: PAINLEVEL_OUTOF10: NO PAIN (0)

## 2025-06-25 NOTE — PATIENT INSTRUCTIONS
Patient Recommendations:  - We are waiting for the BK level, if low we will increase mycophenolate back to 500 mg twice a day.  - Re-start furosemide 20 mg every day for 1-2 weeks.     Transplant Patient Information  Your Post Transplant Coordinator is: Morena Jacob  For non urgent items, we encourage you to contact your coordinator/care team online via [employer]  You and your care team can also contact your transplant coordinator Monday - Friday, 8am - 5pm at [phone] (Option 2 to reach the coordinator or Option 4 to schedule an appointment).  After hours for urgent matters, please call [facility] at [phone].

## 2025-06-25 NOTE — NURSING NOTE
Chief Complaint   Patient presents with    RECHECK     Post txp follow up       BP Readings from Last 3 Encounters:   06/25/25 (!) 152/80   04/23/25 134/82   04/21/25 (!) 147/80       BP (!) 152/80   Pulse 68   Temp 98.1  F (36.7  C) (Oral)   Wt 84.8 kg (187 lb)   SpO2 97%   BMI 30.18 kg/m       Connie Nathan

## 2025-06-25 NOTE — LETTER
6/25/2025      Peter Carpio  [address] [address]      Dear Colleague,    Thank you for referring your patient, Peter Carpio, to the [facility] TRANSPL[facility]. Please see a copy of my visit note below.    TRANSPLANT NEPHROLOGY CLINIC VISIT   Assessment & Plan  Start lasix 20 mg once daily given ongoing increase swelling. (It may also help with BP and calcium). If creatinine up-trends fast, he may need every other day dose.  If calcium getting close to 11 mg/dL, then we will start consider sensipar 30 mg once daily.  If BKV from today remain low with today's test, then we will increase mycophenolate to 500 mg twice per day   Repeat UA today or with next blood test.    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    # BK Viremia: Trend down, peaked BK PCR at ~ 20K in April 2024, down-trending since stopping mycophenolate and changing to cyclosporine in April. Back on mycophenolate 250 mg twice a day for ~ 2-3 weeks.  IgG level is 381 in March, s/p 2 doses of 0.5 g/kg IVIG on 4/7 & 4/23.   - Will continue to follow BK PCR every 2 weeks, if level continues to be low today we will increase mycophenolate 500 mg twice a day.             # Hematuria: Persistent hematuria prior to transplant as well as after transplant except most recent UA. Had past hx of smoking. Imaging, showed no stones. Repeat UA today or with next labs. May need urology evaluation if persistent.    # Immunosuppression: Tacrolimus immediate release (goal 6-8), Mycophenolate mofetil (dose 250 mg every 12 hours), and Prednisone (dose 5 mg daily)   - Induction with Recent Transplant:  Low Intensity Protocol   - Continue with intensive monitoring of immunosuppression for efficacy and toxicity.   - Historical Changes in  Immunosuppression: off mycophenolate due to BK viremia as of 4/2. And later re-started with low dose.   - Changes: Yes - If BKV from today remained low , will increase MMF to 500 mg twice per day     # Infection Prevention:   Last CD4 Level: Not checked   - PJP: Sulfa/TMP (Bactrim)   - CMV: Valganciclovir (Valcyte) 450 mg every day for 6 months then monitor CMV pcr monthly till 1 yr post Tx      - CMV IgG Ab High Risk Discordance (D+/R-) at time of transplant: Yes  Present CMV Serostatus: Negative  - EBV IgG Ab High Risk Discordance (D+/R-) at time of transplant: No  Present EBV Serostatus: Positive    # Hypertension: Inadequate control, Home -152/ 70- low 80,  Goal BP: < 140/90    - Metoprolol  50 mg bid and amlodipine 5 mg    - Changes: Yes -  start furosemide 20 mg every day.    # Diabetes: Borderline control (HbA1c 7-9%) Last HbA1c: 7.1% in Feb, 2025. repeat at 3 months post-transplant.    # Anemia in Chronic Renal Disease: Hgb: Stable, near normal      BARBER: No   - Iron studies: Replete    # Mineral Bone Disorder:    - Secondary renal hyperparathyroidism; PTH level: Minimally elevated ( pg/ml)        On treatment: None  - Vitamin D; level: Low normal        On supplement: No  - Calcium; level: High       On supplement: No, will continue monitoring  - Phosphorus; level: Low        On supplement: No    # Electrolytes:   - Potassium; level: High normal        On supplement: No  - Magnesium; level: Low        On supplement: Yes   - Bicarbonate; level: Normal        On supplement: No    # Other Significant PMH:  - Diabetic neuropathy: On gabapentin 200 mg at night.  Worse when using compression stockings. He may need a higher dose now that he has a kidney transplant with improved creatinine clearance. This may improve his neuropathy and allow him to use compression stockings.  - Gout: on allopurinol 100 mg every day.  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 Saw Dr. Pham cancer specialist place out [facility] Undergoing surveillance colonoscopy. Last colonoscopy in march 2024 with out any new lesions.   - CAD: cardiac catheterization 7/17/24: pLAD to mLAD 70% stenosis and mLAD 40% stenosis. He is now s/p PCI with EDWIN x 1 to LAD. LVEF 55-60% in May 2024.  - NSTEMI: in 2022. Stress test showed very small defect. No intervention pursued due to kidney function at that time. On statin, ASA 81, and Imdur.   - BPH: on tamsulosin 0.4 mg every day and finasteride 5 mg every day.     # Skin Cancer Risk: Discussed sun protection and recommend regular follow up with Dermatology.    # Transplant History:  Etiology of Kidney Failure: Diabetes mellitus type 2  Tx: DDKT  Transplant: 2/13/2025 (Kidney)  Significant transplant-related complications: [facility]    Transplant Office Phone Number: [phone]    Assessment and plan was discussed with the patient and he voiced his understanding and agreement.    Return visit: Return for appointment scheduled for 8/21/2025.    Dr. David Acharya.     Attestation:  This patient has been seen and evaluated by me, Damir Miller MD.  I have reviewed the note and agree with plan of care as documented by the fellow.     The longitudinal plan of care for the diagnosis(es)/condition(s) as documented were addressed during this visit. Due to the added complexity in care, I will continue to support Peter in the subsequent management and with ongoing continuity of care.    Chief Complaint  Mr. Carpio is a 75 year old here for kidney transplant and immunosuppression management.     History of Present Illness  Mr. Caripo reports feeling well.    Since last clinic visit, there hasn't been any hospitalizations. However, he noted puffiness on his legs and hands. He also noted his weight progressively increasing, gained up to 16 lbs post transplant.     He used to be on furosemide, stopped in late April for trending up  creatinine. He reports increased tendency of brusing when ever he bumps or hit with something mostly in the upper hands. He has hair falls.     Active/Exercise: No  Chest pain or shortness of breath: No  Lower extremity swelling: Yes  Weight change: Yes, gained around 16 lbs since transplant  Nausea and vomiting: No  Diarrhea: No  Heartburn symptoms: No  Fever, sweats or chills: No  Night sweats: No  Urinary complaints: No    Home BP: SBP in 144-152     Problem List  Patient Active Problem List   Diagnosis     HTN, kidney transplant related     Chronic kidney disease, stage 4, severely decreased GFR (H)     Diabetic nephropathy (H)     Gout     Secondary renal hyperparathyroidism     Coronary artery disease     Diabetes mellitus, type 2 (H)     Hypertrophy of prostate without urinary obstruction     Malignant neoplasm of ascending colon (H)     Cardiovascular disease     Status post coronary angiogram     Pre-transplant evaluation for kidney transplant     Anemia in chronic kidney disease     Chronic 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     Dependence on renal dialysis     Hyperlipidemia     NSTEMI (non-ST elevated myocardial infarction) (H)     Awaiting transplantation of kidney     Kidney replaced by transplant     Immunosuppressed status     Hypogammaglobulinemia     BK viremia     CKD (chronic kidney disease) stage 2, GFR 60-89 ml/min     Aftercare following organ transplant     Hypomagnesemia     Hypophosphatemia     Adrenal adenoma       Allergies  No Known Allergies    Medications  Current Outpatient Medications   Medication Sig Dispense Refill     furosemide (LASIX) 20 MG tablet Take 1 tablet (20 mg) by mouth daily. 30 tablet 1     acetaminophen (TYLENOL) 325 MG tablet Take 3 tablets (975 mg) by mouth every 6 hours as needed for mild pain or fever.       allopurinol (ZYLOPRIM) 100 MG tablet Take 100 mg by mouth daily       amLODIPine (NORVASC) 5 MG tablet Take 1 tablet (5 mg) by mouth every evening.  90 tablet 3     aspirin 81 MG EC tablet Take 81 mg by mouth daily.       atorvastatin (LIPITOR) 40 MG tablet Take 1 tablet (40 mg) by mouth daily. 90 tablet 3     Continuous Glucose Sensor (FREESTYLE ANTONIO 3 PLUS SENSOR) MISC Change every 15 days. 6 each 1     cycloSPORINE modified (GENERIC EQUIVALENT) 25 MG capsule Take 4 capsules (100 mg) by mouth 2 times daily. 720 capsule 3     finasteride (PROSCAR) 5 MG tablet Take 1 tablet (5 mg) by mouth daily. 90 tablet 3     fish oil-omega-3 fatty acids 1000 MG capsule Take 2 capsules (2 g) by mouth 2 times daily.       gabapentin (NEURONTIN) 100 MG capsule Take 200 mg by mouth at bedtime.       insulin aspart (NOVOLOG PEN) 100 UNIT/ML pen Inject 1-5 Units subcutaneously at bedtime. Do Not give Bedtime Correction Insulin if BG less than 200. For -249 give 1 units. For -299 give 2 units. For -349 give 3 units. For -399 give 4 units. For BG greater than or equal to 400 give 5 units. Notify provider if glucose greater than or equal to 350 mg/dL after administration of correction dose. 15 mL 1     insulin aspart (NOVOLOG PEN) 100 UNIT/ML pen Inject 1-7 Units subcutaneously 3 times daily (before meals). Correction Scale -   Do Not give Correction Insulin if Pre-Meal BG less than 140. For Pre-Meal  - 189 give 1 unit. For Pre-Meal - 239 give 2 units. For Pre-Meal -289 give 3 units. For Pre-Meal -339 give 4 units. For Pre-Meal -389 give 5 units. For Pre-Meal -439 give 6 units. For Pre-Meal BG greater than or equal to 440 give 7 units To be given with prandial insulin, and based on pre-meal blood glucose. Administering insulin within 5 minutes of the start of the meal is ideal. Administer insulin no more than 30 minutes after the start of the meal, unless directed otherwise by provider. Notify provider if glucose greater than or equal to 350 mg/dL after administration of correction dose. 15 mL 1     insulin NPH-Regular  (HUMULIN 70/30;NOVOLIN 70/30) susp With prednisone 40 mg dose take 50 units prior to breakfast and 23 units prior to dinner. With prednisone 20 mg dose take 40 units prior to breakfast and 19 units prior to dinner. With prednisone 15 mg dose take 38 units prior to breakfast and 16 units prior to dinner. With prednisone 10 mg dose take 35 units prior to breakfast and 15 units prior to dinner. With prednisone 5 mg dose take 35 units prior to breakfast and 15 units prior to dinner. (Patient taking differently: Inject 38 Units subcutaneously daily (with breakfast). And 26 units with supper.) 15 mL 0     metoprolol tartrate (LOPRESSOR) 50 MG tablet Take 1 tablet (50 mg) by mouth 2 times daily. 180 tablet 3     mycophenolate (GENERIC EQUIVALENT) 250 MG capsule Take 1 capsule (250 mg) by mouth 2 times daily. 60 capsule 11     predniSONE (DELTASONE) 5 MG tablet Take 1 tablet (5 mg) by mouth daily. 90 tablet 0     sodium bicarbonate 650 MG tablet Take 1 tablet (650 mg) by mouth 2 times daily. 180 tablet 3     sulfamethoxazole-trimethoprim (BACTRIM) 400-80 MG tablet Take 1 tablet by mouth daily. 90 tablet 3     tamsulosin (FLOMAX) 0.4 MG capsule Take 1 capsule (0.4 mg) by mouth daily. 90 capsule 3     valGANciclovir (VALCYTE) 450 MG tablet Take 1 tablet (450 mg) by mouth daily. Stop on 8/13/2025 90 tablet 1     No current facility-administered medications for this visit.     There are no discontinued medications.    Physical Exam  Vital Signs: BP (!) 152/80   Pulse 68   Temp 98.1  F (36.7  C) (Oral)   Wt 84.8 kg (187 lb)   SpO2 97%   BMI 30.18 kg/m      GENERAL APPEARANCE: alert and no distress  HENT: mouth without ulcers or lesions  RESP: lungs clear to auscultation - no rales, rhonchi or wheezes  CV: regular rhythm, normal rate, no rub, no murmur  EDEMA: Trace LE edema bilaterally  ABDOMEN: soft, nondistended, nontender, bowel sounds normal  MS: extremities normal - no gross deformities noted, no evidence of  inflammation in joints, no muscle tenderness  SKIN: no rash  TX KIDNEY: normal  DIALYSIS ACCESS: none    Data        Latest Ref Rng & Units 6/25/2025     8:36 AM 6/10/2025     7:20 AM 6/3/2025     7:25 AM   Renal   Sodium 135 - 145 mmol/L 139  138  142    K 3.4 - 5.3 mmol/L 4.3  4.7  4.2    Cl 98 - 107 mmol/L 106  105  108    Cl (external) 98 - 107 mmol/L 106  105  108    CO2 22 - 29 mmol/L 22  24  23    Urea Nitrogen 8.0 - 23.0 mg/dL 37.2  32.2  31.6    Creatinine 0.67 - 1.17 mg/dL 1.12  1.17  1.22    Glucose 70 - 99 mg/dL 196  193  139    Calcium 8.8 - 10.4 mg/dL 10.7  10.5  9.9    Magnesium 1.7 - 2.3 mg/dL 1.9            Latest Ref Rng & Units 6/25/2025     8:36 AM 6/10/2025     7:20 AM 5/20/2025     7:21 AM   Bone Health   Phosphorus 2.5 - 4.5 mg/dL 2.3   2.7    Parathyroid Hormone Intact 15 - 65 pg/mL  83           Latest Ref Rng & Units 6/25/2025     8:36 AM 6/10/2025     7:20 AM 6/3/2025     7:25 AM   Heme   WBC 4.0 - 11.0 10e3/uL 6.4  6.6  6.6    Hgb 13.3 - 17.7 g/dL 13.4  13.5  12.7    Plt 150 - 450 10e3/uL 158  163  162          Latest Ref Rng & Units 2/12/2025     8:47 PM 5/16/2024     1:26 PM   Liver   AP 40 - 150 U/L 59  58    TBili <=1.2 mg/dL 0.2  0.2    ALT 0 - 70 U/L 15  9    AST 0 - 45 U/L 17  8    Tot Protein 6.4 - 8.3 g/dL 6.5  7.0    Albumin 3.5 - 5.2 g/dL 3.9  4.1          Latest Ref Rng & Units 6/25/2025     8:36 AM 2/12/2025     8:46 PM 12/26/2024     1:20 PM   Pancreas   A1C <5.7 % 6.9  7.1  6.7          Latest Ref Rng & Units 2/27/2025     7:05 AM 2/19/2025     7:24 AM   Iron studies   Iron 61 - 157 ug/dL 62  91    Iron Sat Index 15 - 46 % 24  41    Ferritin 31 - 409 ng/mL 624  1,030          Latest Ref Rng & Units 2/12/2025     8:47 PM 5/16/2024     1:26 PM   [facility]   EBV CAPSID ANTIBODY IGG No detectable antibody. Positive  Positive      Failed to redirect to the Timeline version of the REVFS SmartLink.  Recent Labs   Lab Test 04/15/25  0726 04/29/25  0712 05/06/25  0722    DOSTAC 4/14/2025 4/28/2025 5/5/2025   TACROL 12.1 8.9 <1.0*     Recent Labs   Lab Test 03/14/25  0715 03/18/25  0902 04/15/25  0726   DOSMPA  --  3/17/2025   8:45 PM 4/14/2025   8:00 PM   MPACID 1.71 2.11 <0.25*   MPAG 50.2 63.7 <6.5*       Again, thank you for allowing me to participate in the care of your patient.        Sincerely,        Damir Miller MD    Electronically signed

## 2025-06-26 ENCOUNTER — OFFICE VISIT (OUTPATIENT)
Dept: ENDOCRINOLOGY | Facility: CLINIC | Age: 75
End: 2025-06-26
Payer: COMMERCIAL

## 2025-06-26 VITALS
RESPIRATION RATE: 16 BRPM | DIASTOLIC BLOOD PRESSURE: 80 MMHG | WEIGHT: 189 LBS | SYSTOLIC BLOOD PRESSURE: 146 MMHG | HEART RATE: 63 BPM | BODY MASS INDEX: 30.51 KG/M2 | OXYGEN SATURATION: 63 %

## 2025-06-26 DIAGNOSIS — Z94.0 HISTORY OF KIDNEY TRANSPLANT: ICD-10-CM

## 2025-06-26 DIAGNOSIS — E11.21 TYPE 2 DIABETES MELLITUS WITH DIABETIC NEPHROPATHY, WITH LONG-TERM CURRENT USE OF INSULIN (H): Primary | ICD-10-CM

## 2025-06-26 DIAGNOSIS — Z79.4 TYPE 2 DIABETES MELLITUS WITH DIABETIC NEPHROPATHY, WITH LONG-TERM CURRENT USE OF INSULIN (H): Primary | ICD-10-CM

## 2025-06-26 LAB
BK VIRUS DNA IU/ML, INSTRUMENT (6800): 757 IU/ML (ref ?–1)
BK VIRUS SPECIMEN TYPE: ABNORMAL
BKV DNA SPEC NAA+PROBE-LOG#: 2.9 {LOG_COPIES}/ML

## 2025-06-26 NOTE — LETTER
6/26/2025      Peter Carpio  [address] [address]      Dear Colleague,    Thank you for referring your patient, Peter Carpio, to the [facility]. Please see a copy of my visit note below.          Assessment/Plan :   Type 2 DM. Peter is doing well. He continues to follow-up closely with the transplant team and he thinks things are going well. He feels good and he has not had any problems with significant glucose fluctuation. We reviewed his recent sensor report and his blood sugars look good. I do not see any reason to make adjustments to his current insulin dosing. We will follow-up in 6 mos.       I have independently reviewed and interpreted labs, imaging as indicated.      Chief complaint:  Peter is a 75 year old male who returns for follow-up of type 2 diabetes.    I have reviewed Care Everywhere including [facility], [facility], [facility], [facility], [facility], [facility] , [facility], [place] lab reports, imaging reports and provider notes as indicated.      HISTORY OF PRESENT ILLNESS  Alexis is doing great. He continues to take 38 unit(s) of Humulin 70/30 Mix in the morning and 28 unit(s) in the evening. His blood sugars seem to be very stable and he is feeling great. He continues to work closely with the transplant team, as well. He understands the importance of keeping his blood sugars under tight control and he has been working to keep his numbers with target range. He does have some insulin aspart at home to use as needed, but he hasn't needed it.    Alexis has not had any recent episodes of severe hyperglycemia and/or hypoglycemia. He continues to use the FreeStyle Christiano 3 plus sensor to monitor his blood sugars closely. He has it set to alert at 65 mg/dl and he always keeps some candy on hand to correct, as needed. He has not had any problems with insulin injections or site reactions. He also has not had any issues with blurred  vision or an increase in numbness/tingling in his feet.      Alexis was diagnosed with diabetes about 10 yrs ago. He is not sure of the exact date. He knows that he was started on insulin around the time of diagnosis and he has been on insulin ever since. He currently takes 32 unit(s) of Humalin Mix 70/30 in the morning and 34 unit(s) in the evening. He has been on this dosing for some time and he feels like it works well. His diabetes is complicated by hyperlipidemia, CAD, history of NSTEMI, history of malignant neoplasm of the colon, HTN, and recent renal transplant due to ESRD.      Endocrine relevant labs are as follows:   Latest Reference Range & Units 06/25/25 08:36   Hemoglobin A1C <5.7 % 6.9 (H)   (H): Data is abnormally high   Latest Reference Range & Units 02/12/25 20:46   Hemoglobin A1C <5.7 % 7.1 (H)   (H): Data is abnormally high    REVIEW OF SYSTEMS    10 system ROS otherwise as per the HPI or negative    Past Medical History  Past Medical History:   Diagnosis Date     CAD (coronary artery disease)      Diabetes mellitus, type 2 (H)      ESRD (end stage renal disease) on dialysis (H)      Essential hypertension      Hyperlipidemia LDL goal <70      NSTEMI (non-ST elevated myocardial infarction) (H)        Medications  Current Outpatient Medications   Medication Sig Dispense Refill     acetaminophen (TYLENOL) 325 MG tablet Take 3 tablets (975 mg) by mouth every 6 hours as needed for mild pain or fever.       allopurinol (ZYLOPRIM) 100 MG tablet Take 100 mg by mouth daily       amLODIPine (NORVASC) 5 MG tablet Take 1 tablet (5 mg) by mouth every evening. 90 tablet 3     aspirin 81 MG EC tablet Take 81 mg by mouth daily.       atorvastatin (LIPITOR) 40 MG tablet Take 1 tablet (40 mg) by mouth daily. 90 tablet 3     Continuous Glucose Sensor (FREESTYLE ANTONIO 3 PLUS SENSOR) MISC Change every 15 days. 6 each 1     cycloSPORINE modified (GENERIC EQUIVALENT) 25 MG capsule Take 4 capsules (100 mg) by mouth 2 times  daily. 720 capsule 3     finasteride (PROSCAR) 5 MG tablet Take 1 tablet (5 mg) by mouth daily. 90 tablet 3     fish oil-omega-3 fatty acids 1000 MG capsule Take 2 capsules (2 g) by mouth 2 times daily.       furosemide (LASIX) 20 MG tablet Take 1 tablet (20 mg) by mouth daily. 30 tablet 1     gabapentin (NEURONTIN) 100 MG capsule Take 200 mg by mouth at bedtime.       insulin aspart (NOVOLOG PEN) 100 UNIT/ML pen Inject 1-5 Units subcutaneously at bedtime. Do Not give Bedtime Correction Insulin if BG less than 200. For -249 give 1 units. For -299 give 2 units. For -349 give 3 units. For -399 give 4 units. For BG greater than or equal to 400 give 5 units. Notify provider if glucose greater than or equal to 350 mg/dL after administration of correction dose. 15 mL 1     insulin aspart (NOVOLOG PEN) 100 UNIT/ML pen Inject 1-7 Units subcutaneously 3 times daily (before meals). Correction Scale -   Do Not give Correction Insulin if Pre-Meal BG less than 140. For Pre-Meal  - 189 give 1 unit. For Pre-Meal - 239 give 2 units. For Pre-Meal -289 give 3 units. For Pre-Meal -339 give 4 units. For Pre-Meal -389 give 5 units. For Pre-Meal -439 give 6 units. For Pre-Meal BG greater than or equal to 440 give 7 units To be given with prandial insulin, and based on pre-meal blood glucose. Administering insulin within 5 minutes of the start of the meal is ideal. Administer insulin no more than 30 minutes after the start of the meal, unless directed otherwise by provider. Notify provider if glucose greater than or equal to 350 mg/dL after administration of correction dose. 15 mL 1     insulin NPH-Regular (HUMULIN 70/30;NOVOLIN 70/30) susp With prednisone 40 mg dose take 50 units prior to breakfast and 23 units prior to dinner. With prednisone 20 mg dose take 40 units prior to breakfast and 19 units prior to dinner. With prednisone 15 mg dose take 38 units prior to breakfast and  16 units prior to dinner. With prednisone 10 mg dose take 35 units prior to breakfast and 15 units prior to dinner. With prednisone 5 mg dose take 35 units prior to breakfast and 15 units prior to dinner. (Patient taking differently: Inject 38 Units subcutaneously daily (with breakfast). And 26 units with supper.) 15 mL 0     metoprolol tartrate (LOPRESSOR) 50 MG tablet Take 1 tablet (50 mg) by mouth 2 times daily. 180 tablet 3     mycophenolate (GENERIC EQUIVALENT) 250 MG capsule Take 1 capsule (250 mg) by mouth 2 times daily. 60 capsule 11     predniSONE (DELTASONE) 5 MG tablet Take 1 tablet (5 mg) by mouth daily. 90 tablet 0     sodium bicarbonate 650 MG tablet Take 1 tablet (650 mg) by mouth 2 times daily. 180 tablet 3     sulfamethoxazole-trimethoprim (BACTRIM) 400-80 MG tablet Take 1 tablet by mouth daily. 90 tablet 3     tamsulosin (FLOMAX) 0.4 MG capsule Take 1 capsule (0.4 mg) by mouth daily. 90 capsule 3     valGANciclovir (VALCYTE) 450 MG tablet Take 1 tablet (450 mg) by mouth daily. Stop on 2025 90 tablet 1       Allergies  No Known Allergies      Family History  family history is not on file.    Social History  Social History     Tobacco Use     Smoking status: Former     Current packs/day: 0.00     Types: Cigarettes     Quit date:      Years since quittin.4     Smokeless tobacco: Never   Substance Use Topics     Alcohol use: Not Currently     Comment: none since transplant     Drug use: Never       Physical Exam  BP (!) 146/80   Pulse 63   Resp 16   Wt 85.7 kg (189 lb)   SpO2 (!) 63%   BMI 30.51 kg/m    Body mass index is 30.51 kg/m .  GENERAL :  In no apparent distress  SKIN: Normal color, normal temperature, texture.  No hirsutism, alopecia or purple striae.     EYES: PERRL, EOMI, No scleral icterus,  No proptosis, conjunctival redness, stare, retraction  RESP: Lungs clear to auscultation bilaterally  CARDIAC: Regular rate and rhythm, normal S1 S2, without murmurs, rubs or gallops    NEURO: awake, alert, responds appropriately to questions.  Cranial nerves intact.   Moves all extremities; Gait normal.  No tremor of the outstretched hand.   EXTREMITIES: No clubbing, cyanosis or edema.    DATA REVIEW  FreeStyle LibreView  Time in target range 73%  High 24%, Very High 3%  Current Ave  mg/dl         Again, thank you for allowing me to participate in the care of your patient.        Sincerely,        Fawn Wheeler PA-C    Electronically signed

## 2025-06-26 NOTE — NURSING NOTE
Peter Carpio's goals for this visit include:   Chief Complaint   Patient presents with    Follow Up    Diabetes       He requests these members of his care team be copied on today's visit information: yes    PCP: Yared Greene    Referring Provider:  Referred Self, MD  No address on file    BP (!) 146/80   Pulse 63   Resp 16   Wt 85.7 kg (189 lb)   SpO2 (!) 63%   BMI 30.51 kg/m      Do you need any medication refills at today's visit? None    Shaw Lucas, EMT

## 2025-06-26 NOTE — PROGRESS NOTES
Assessment/Plan :   Type 2 DM. Peter is doing well. He continues to follow-up closely with the transplant team and he thinks things are going well. He feels good and he has not had any problems with significant glucose fluctuation. We reviewed his recent sensor report and his blood sugars look good. I do not see any reason to make adjustments to his current insulin dosing. We will follow-up in 6 mos.       I have independently reviewed and interpreted labs, imaging as indicated.      Chief complaint:  Peter is a 75 year old male who returns for follow-up of type 2 diabetes.    I have reviewed Care Everywhere including [facility], [facility], [facility], [facility] [place], [facility], [facility] , [facility], [place] lab reports, imaging reports and provider notes as indicated.      HISTORY OF PRESENT ILLNESS  Alexis is doing great. He continues to take 38 unit(s) of Humulin 70/30 Mix in the morning and 28 unit(s) in the evening. His blood sugars seem to be very stable and he is feeling great. He continues to work closely with the transplant team, as well. He understands the importance of keeping his blood sugars under tight control and he has been working to keep his numbers with target range. He does have some insulin aspart at home to use as needed, but he hasn't needed it.    Alexis has not had any recent episodes of severe hyperglycemia and/or hypoglycemia. He continues to use the FreeStyle Christiano 3 plus sensor to monitor his blood sugars closely. He has it set to alert at 65 mg/dl and he always keeps some candy on hand to correct, as needed. He has not had any problems with insulin injections or site reactions. He also has not had any issues with blurred vision or an increase in numbness/tingling in his feet.      Alexis was diagnosed with diabetes about 10 yrs ago. He is not sure of the exact date. He knows that he was started on insulin around the time of diagnosis and he has been on insulin ever since. He  currently takes 32 unit(s) of Humalin Mix 70/30 in the morning and 34 unit(s) in the evening. He has been on this dosing for some time and he feels like it works well. His diabetes is complicated by hyperlipidemia, CAD, history of NSTEMI, history of malignant neoplasm of the colon, HTN, and recent renal transplant due to ESRD.      Endocrine relevant labs are as follows:   Latest Reference Range & Units 06/25/25 08:36   Hemoglobin A1C <5.7 % 6.9 (H)   (H): Data is abnormally high   Latest Reference Range & Units 02/12/25 20:46   Hemoglobin A1C <5.7 % 7.1 (H)   (H): Data is abnormally high    REVIEW OF SYSTEMS    10 system ROS otherwise as per the HPI or negative    Past Medical History  Past Medical History:   Diagnosis Date    CAD (coronary artery disease)     Diabetes mellitus, type 2 (H)     ESRD (end stage renal disease) on dialysis (H)     Essential hypertension     Hyperlipidemia LDL goal <70     NSTEMI (non-ST elevated myocardial infarction) (H)        Medications  Current Outpatient Medications   Medication Sig Dispense Refill    acetaminophen (TYLENOL) 325 MG tablet Take 3 tablets (975 mg) by mouth every 6 hours as needed for mild pain or fever.      allopurinol (ZYLOPRIM) 100 MG tablet Take 100 mg by mouth daily      amLODIPine (NORVASC) 5 MG tablet Take 1 tablet (5 mg) by mouth every evening. 90 tablet 3    aspirin 81 MG EC tablet Take 81 mg by mouth daily.      atorvastatin (LIPITOR) 40 MG tablet Take 1 tablet (40 mg) by mouth daily. 90 tablet 3    Continuous Glucose Sensor (FREESTYLE ANTONIO 3 PLUS SENSOR) MISC Change every 15 days. 6 each 1    cycloSPORINE modified (GENERIC EQUIVALENT) 25 MG capsule Take 4 capsules (100 mg) by mouth 2 times daily. 720 capsule 3    finasteride (PROSCAR) 5 MG tablet Take 1 tablet (5 mg) by mouth daily. 90 tablet 3    fish oil-omega-3 fatty acids 1000 MG capsule Take 2 capsules (2 g) by mouth 2 times daily.      furosemide (LASIX) 20 MG tablet Take 1 tablet (20 mg) by mouth  daily. 30 tablet 1    gabapentin (NEURONTIN) 100 MG capsule Take 200 mg by mouth at bedtime.      insulin aspart (NOVOLOG PEN) 100 UNIT/ML pen Inject 1-5 Units subcutaneously at bedtime. Do Not give Bedtime Correction Insulin if BG less than 200. For -249 give 1 units. For -299 give 2 units. For -349 give 3 units. For -399 give 4 units. For BG greater than or equal to 400 give 5 units. Notify provider if glucose greater than or equal to 350 mg/dL after administration of correction dose. 15 mL 1    insulin aspart (NOVOLOG PEN) 100 UNIT/ML pen Inject 1-7 Units subcutaneously 3 times daily (before meals). Correction Scale -   Do Not give Correction Insulin if Pre-Meal BG less than 140. For Pre-Meal  - 189 give 1 unit. For Pre-Meal - 239 give 2 units. For Pre-Meal -289 give 3 units. For Pre-Meal -339 give 4 units. For Pre-Meal -389 give 5 units. For Pre-Meal -439 give 6 units. For Pre-Meal BG greater than or equal to 440 give 7 units To be given with prandial insulin, and based on pre-meal blood glucose. Administering insulin within 5 minutes of the start of the meal is ideal. Administer insulin no more than 30 minutes after the start of the meal, unless directed otherwise by provider. Notify provider if glucose greater than or equal to 350 mg/dL after administration of correction dose. 15 mL 1    insulin NPH-Regular (HUMULIN 70/30;NOVOLIN 70/30) susp With prednisone 40 mg dose take 50 units prior to breakfast and 23 units prior to dinner. With prednisone 20 mg dose take 40 units prior to breakfast and 19 units prior to dinner. With prednisone 15 mg dose take 38 units prior to breakfast and 16 units prior to dinner. With prednisone 10 mg dose take 35 units prior to breakfast and 15 units prior to dinner. With prednisone 5 mg dose take 35 units prior to breakfast and 15 units prior to dinner. (Patient taking differently: Inject 38 Units subcutaneously daily  (with breakfast). And 26 units with supper.) 15 mL 0    metoprolol tartrate (LOPRESSOR) 50 MG tablet Take 1 tablet (50 mg) by mouth 2 times daily. 180 tablet 3    mycophenolate (GENERIC EQUIVALENT) 250 MG capsule Take 1 capsule (250 mg) by mouth 2 times daily. 60 capsule 11    predniSONE (DELTASONE) 5 MG tablet Take 1 tablet (5 mg) by mouth daily. 90 tablet 0    sodium bicarbonate 650 MG tablet Take 1 tablet (650 mg) by mouth 2 times daily. 180 tablet 3    sulfamethoxazole-trimethoprim (BACTRIM) 400-80 MG tablet Take 1 tablet by mouth daily. 90 tablet 3    tamsulosin (FLOMAX) 0.4 MG capsule Take 1 capsule (0.4 mg) by mouth daily. 90 capsule 3    valGANciclovir (VALCYTE) 450 MG tablet Take 1 tablet (450 mg) by mouth daily. Stop on 2025 90 tablet 1       Allergies  No Known Allergies      Family History  family history is not on file.    Social History  Social History     Tobacco Use    Smoking status: Former     Current packs/day: 0.00     Types: Cigarettes     Quit date:      Years since quittin.4    Smokeless tobacco: Never   Substance Use Topics    Alcohol use: Not Currently     Comment: none since transplant    Drug use: Never       Physical Exam  BP (!) 146/80   Pulse 63   Resp 16   Wt 85.7 kg (189 lb)   SpO2 (!) 63%   BMI 30.51 kg/m    Body mass index is 30.51 kg/m .  GENERAL :  In no apparent distress  SKIN: Normal color, normal temperature, texture.  No hirsutism, alopecia or purple striae.     EYES: PERRL, EOMI, No scleral icterus,  No proptosis, conjunctival redness, stare, retraction  RESP: Lungs clear to auscultation bilaterally  CARDIAC: Regular rate and rhythm, normal S1 S2, without murmurs, rubs or gallops   NEURO: awake, alert, responds appropriately to questions.  Cranial nerves intact.   Moves all extremities; Gait normal.  No tremor of the outstretched hand.   EXTREMITIES: No clubbing, cyanosis or edema.    DATA REVIEW  FreeStyle LibreView  Time in target range 73%  High 24%,  Very High 3%  Current Ave  mg/dl

## 2025-06-26 NOTE — PATIENT INSTRUCTIONS
Welcome to the [facility]ics     Our Endocrinology Clinics are here to provide you with a team-based, collaborative approach in the diagnosis and treatment of patients with diabetes and endocrine disorders. The team is made up of Physicians, Physician Assistants, Certified Diabetes Educators, Registered Nurses, Medical Assistants, Emergency Medical Technicians, and many others, all of whom have the unified goal of providing our patients with high quality care.     Please see below for some helpful tips to best navigate and use the [facility] Endocr[facility]ology clinic:     [place] Respect: At [facility], we are committed to a respectful and safe space for all patients, visitors, and staff.  We believe that mutual respect between patients and their care team is the foundation of quality care.  It is our expectation that you will be treated with respect by your care team.  In turn, we ask that all communication with the care team (written and verbal) be respectful and free from profanity, threatening, or abusive language.  Disrespectful communication undermines our therapeutic relationship with you and may result in us being unable to continue to provide your care.    Refills: A provider must see you at least annually to prescribe and refill medications. This is to ensure your safety as well as meet insurance and compliance regulations.    Scheduling: Many of our Providers offer both in-person or video visits. Please call to schedule any needed follow ups as soon as possible because our provider schedules fill up very quickly. Our care team has the right to require an in-person visit when they believe that it is medically necessary. Please remember that for any virtual visits, you must be in the [facility] at the time of the visit, otherwise we are unable to see you and you will need to be rescheduled.    Missed Appointments: If you need to cancel or miss your  scheduled appointment, please call the clinic at [phone] to reschedule.  Please note if you repeatedly miss appointments or repeatedly miss appointments without calling to inform us ahead of time (no-show), the clinic may elect to not allow you to reschedule without speaking to a manager, may require a Partnership In Care Agreement prior to rescheduling, or could result in you no longer being able to receive care from the clinic. Providing the clinic with timely notification if you have to miss an appointment, allows us to better serve the needs of all of our patients.    Primary Care Provider: Our Endocrinologists are Specialists in their field. We expect you to have a Primary Care Provider established to handle any needs outside of your diabetes and endocrine care.  We would be happy to assist you find a Primary Care Provider, if you do not have one.    [employer]: [employer] is a wonderful resource that allows you access to your Care Team via online or the reanna. Please ask a member of the team if you would like help creating an account. Please note that it may take up to 2 business days for a response. [employer] messages are not reviewed on weekends or after business hours.  Emergent or urgent care needs should never be communicated via [employer].  If you experience a medical emergency call 911 or go to the nearest emergency room.    Labs: It is recommended that you stay within the [facility] for labs but you are welcome to obtain ordered labs (with some exceptions) from any location of your choice as long as they are able to complete and process the needed labs. If you need us to fax orders to your preferred lab, please provide us the name and fax number of the lab you would like to go to so we can fax the orders. If your labs are drawn outside of the [facility], please have them fax the results to [phone] ([place]) or [phone] ([place]) or via [facility]. It is your  responsibility to ensure that outside lab results are sent to us.    We look forward to working with you. Please do not hesitate to reach out with any questions.    Thank you,    The Endocrine Team    [facility] Address:   [place] Address:     [place] [address]    Phone: [phone]  Fax: [phone]   [address] [place] [address]    Phone: [phone]  Fax: [phone]     [facility] Cost Estimate Phone Number: [phone]    General Lab and Imaging Scheduling Phone Number: [phone]      If you are interested in exploring research opportunities in the Division of Diabetes, Endocrinology and Metabolism and within the [facility] you are welcome to view the following QR codes by scanning them using your phone's camera to access a link to more information.  Participating in a research study is voluntary. Your decision whether or not to participate will not affect your current or future relations with the [facility] or [facility]. Current available studies are:     Type 1 Diabetes  Adults     Pediatrics     Type 2 Diabetes  Weight Loss - People Treated with Diet or Metformin Only     Pediatrics and Young Adults

## 2025-07-08 ENCOUNTER — LAB (OUTPATIENT)
Dept: LAB | Facility: CLINIC | Age: 75
End: 2025-07-08
Payer: COMMERCIAL

## 2025-07-08 DIAGNOSIS — Z79.899 ENCOUNTER FOR LONG-TERM CURRENT USE OF MEDICATION: ICD-10-CM

## 2025-07-08 DIAGNOSIS — Z20.828 CONTACT WITH AND (SUSPECTED) EXPOSURE TO OTHER VIRAL COMMUNICABLE DISEASES: ICD-10-CM

## 2025-07-08 DIAGNOSIS — Z98.890 OTHER SPECIFIED POSTPROCEDURAL STATES: ICD-10-CM

## 2025-07-08 DIAGNOSIS — Z94.0 KIDNEY REPLACED BY TRANSPLANT: ICD-10-CM

## 2025-07-08 DIAGNOSIS — Z94.0 STATUS POST KIDNEY TRANSPLANT: ICD-10-CM

## 2025-07-08 DIAGNOSIS — Z94.0 KIDNEY TRANSPLANTED: ICD-10-CM

## 2025-07-08 DIAGNOSIS — Z48.298 AFTERCARE FOLLOWING ORGAN TRANSPLANT: ICD-10-CM

## 2025-07-08 LAB
ALBUMIN UR-MCNC: 30 MG/DL
ANION GAP SERPL CALCULATED.3IONS-SCNC: 10 MMOL/L (ref 7–15)
APPEARANCE UR: CLEAR
BACTERIA #/AREA URNS HPF: ABNORMAL /HPF
BILIRUB UR QL STRIP: NEGATIVE
BUN SERPL-MCNC: 34.6 MG/DL (ref 8–23)
CALCIUM SERPL-MCNC: 10.4 MG/DL (ref 8.8–10.4)
CHLORIDE SERPL-SCNC: 108 MMOL/L (ref 98–107)
COLOR UR AUTO: YELLOW
CREAT SERPL-MCNC: 1.2 MG/DL (ref 0.67–1.17)
EGFRCR SERPLBLD CKD-EPI 2021: 63 ML/MIN/1.73M2
ERYTHROCYTE [DISTWIDTH] IN BLOOD BY AUTOMATED COUNT: 13.7 % (ref 10–15)
GLUCOSE SERPL-MCNC: 185 MG/DL (ref 70–99)
GLUCOSE UR STRIP-MCNC: 250 MG/DL
HCO3 SERPL-SCNC: 23 MMOL/L (ref 22–29)
HCT VFR BLD AUTO: 40.8 % (ref 40–53)
HGB BLD-MCNC: 13.1 G/DL (ref 13.3–17.7)
HGB UR QL STRIP: NEGATIVE
KETONES UR STRIP-MCNC: NEGATIVE MG/DL
LEUKOCYTE ESTERASE UR QL STRIP: ABNORMAL
MCH RBC QN AUTO: 30.7 PG (ref 26.5–33)
MCHC RBC AUTO-ENTMCNC: 32.1 G/DL (ref 31.5–36.5)
MCV RBC AUTO: 96 FL (ref 78–100)
NITRATE UR QL: NEGATIVE
PH UR STRIP: 6.5 [PH] (ref 5–7)
PLATELET # BLD AUTO: 167 10E3/UL (ref 150–450)
POTASSIUM SERPL-SCNC: 4.2 MMOL/L (ref 3.4–5.3)
RBC # BLD AUTO: 4.27 10E6/UL (ref 4.4–5.9)
RBC #/AREA URNS AUTO: ABNORMAL /HPF
SODIUM SERPL-SCNC: 141 MMOL/L (ref 135–145)
SP GR UR STRIP: 1.02 (ref 1–1.03)
SQUAMOUS #/AREA URNS AUTO: ABNORMAL /LPF
UROBILINOGEN UR STRIP-ACNC: 0.2 E.U./DL
WBC # BLD AUTO: 6.4 10E3/UL (ref 4–11)
WBC #/AREA URNS AUTO: ABNORMAL /HPF

## 2025-07-08 PROCEDURE — 80048 BASIC METABOLIC PNL TOTAL CA: CPT

## 2025-07-08 PROCEDURE — 80158 DRUG ASSAY CYCLOSPORINE: CPT

## 2025-07-08 PROCEDURE — 87799 DETECT AGENT NOS DNA QUANT: CPT

## 2025-07-08 PROCEDURE — 85027 COMPLETE CBC AUTOMATED: CPT

## 2025-07-08 PROCEDURE — 87086 URINE CULTURE/COLONY COUNT: CPT | Mod: GZ

## 2025-07-08 PROCEDURE — 36415 COLL VENOUS BLD VENIPUNCTURE: CPT

## 2025-07-08 PROCEDURE — 81001 URINALYSIS AUTO W/SCOPE: CPT

## 2025-07-09 LAB
BK VIRUS DNA IU/ML, INSTRUMENT (6800): 485 IU/ML (ref ?–1)
BK VIRUS SPECIMEN TYPE: ABNORMAL
BKV DNA SPEC NAA+PROBE-LOG#: 2.7 {LOG_COPIES}/ML

## 2025-07-10 LAB
BACTERIA UR CULT: NO GROWTH
CYCLOSPORINE BLD LC/MS/MS-MCNC: 121 UG/L (ref 50–400)
TME LAST DOSE: NORMAL H
TME LAST DOSE: NORMAL H

## 2025-07-16 ENCOUNTER — TELEPHONE (OUTPATIENT)
Dept: TRANSPLANT | Facility: CLINIC | Age: 75
End: 2025-07-16
Payer: COMMERCIAL

## 2025-07-16 DIAGNOSIS — Z94.0 STATUS POST KIDNEY TRANSPLANT: ICD-10-CM

## 2025-07-16 DIAGNOSIS — B34.8 BK VIREMIA: ICD-10-CM

## 2025-07-16 DIAGNOSIS — I15.1 HTN, KIDNEY TRANSPLANT RELATED: ICD-10-CM

## 2025-07-16 DIAGNOSIS — Z94.0 HTN, KIDNEY TRANSPLANT RELATED: ICD-10-CM

## 2025-07-16 RX ORDER — MYCOPHENOLATE MOFETIL 250 MG/1
500 CAPSULE ORAL 2 TIMES DAILY
Qty: 120 CAPSULE | Refills: 11 | Status: SHIPPED | OUTPATIENT
Start: 2025-07-16

## 2025-07-16 NOTE — TELEPHONE ENCOUNTER
Post Kidney and Pancreas Transplant Team Conference  Date: 7/16/2025  Transplant Coordinator: Morena     Attendees:  [x]  Dr. Valenzuela [] Alicia Larson, RN [x] Fanny Ellison LPN     []  Dr. Hall [x] Debbie Reyna, RN    [x] Dr. Keen [x] Morena Jacob, RN    [x] Dr. Blackmon [x] Mendy Sanders, RN [] Carie Mcrae RN   [] Dr. Buck [] Loreta Hanley, RN    [] Dr. Oshea [] Mariluz Lam, RN [x] Minh Burk, PharmD   []  Dr. Newell [] Geeta Simeon, JANICE    [x] Dr. Marin [] Thomas Oliva RN     [] Patricia Bobby RN    [x] Stephania Alatorre, BREEZY [] Fawn Pichardo RN        Verbal Plan Read Back:   Increase mycophenolate to 500 mg  Stay on Prednisone 5 mg every day until next BK check.    Routed to RN Coordinator   Fanny Ellison LPN

## 2025-07-22 ENCOUNTER — LAB (OUTPATIENT)
Dept: LAB | Facility: CLINIC | Age: 75
End: 2025-07-22
Payer: COMMERCIAL

## 2025-07-22 DIAGNOSIS — Z94.0 STATUS POST KIDNEY TRANSPLANT: ICD-10-CM

## 2025-07-22 DIAGNOSIS — Z20.828 CONTACT WITH AND (SUSPECTED) EXPOSURE TO OTHER VIRAL COMMUNICABLE DISEASES: ICD-10-CM

## 2025-07-22 DIAGNOSIS — Z98.890 OTHER SPECIFIED POSTPROCEDURAL STATES: ICD-10-CM

## 2025-07-22 DIAGNOSIS — Z48.298 AFTERCARE FOLLOWING ORGAN TRANSPLANT: ICD-10-CM

## 2025-07-22 DIAGNOSIS — Z94.0 KIDNEY REPLACED BY TRANSPLANT: ICD-10-CM

## 2025-07-22 DIAGNOSIS — Z79.899 ENCOUNTER FOR LONG-TERM CURRENT USE OF MEDICATION: ICD-10-CM

## 2025-07-22 LAB
ANION GAP SERPL CALCULATED.3IONS-SCNC: 10 MMOL/L (ref 7–15)
BUN SERPL-MCNC: 35.8 MG/DL (ref 8–23)
CALCIUM SERPL-MCNC: 10.7 MG/DL (ref 8.8–10.4)
CHLORIDE SERPL-SCNC: 106 MMOL/L (ref 98–107)
CMV DNA SPEC NAA+PROBE-ACNC: NOT DETECTED IU/ML
CREAT SERPL-MCNC: 1.11 MG/DL (ref 0.67–1.17)
CYCLOSPORINE BLD LC/MS/MS-MCNC: 153 UG/L (ref 50–400)
EGFRCR SERPLBLD CKD-EPI 2021: 69 ML/MIN/1.73M2
ERYTHROCYTE [DISTWIDTH] IN BLOOD BY AUTOMATED COUNT: 13.6 % (ref 10–15)
GLUCOSE SERPL-MCNC: 190 MG/DL (ref 70–99)
HCO3 SERPL-SCNC: 24 MMOL/L (ref 22–29)
HCT VFR BLD AUTO: 43.9 % (ref 40–53)
HGB BLD-MCNC: 14.2 G/DL (ref 13.3–17.7)
MAGNESIUM SERPL-MCNC: 2.1 MG/DL (ref 1.7–2.3)
MCH RBC QN AUTO: 30.9 PG (ref 26.5–33)
MCHC RBC AUTO-ENTMCNC: 32.3 G/DL (ref 31.5–36.5)
MCV RBC AUTO: 96 FL (ref 78–100)
PHOSPHATE SERPL-MCNC: 2.8 MG/DL (ref 2.5–4.5)
PLATELET # BLD AUTO: 174 10E3/UL (ref 150–450)
POTASSIUM SERPL-SCNC: 4.7 MMOL/L (ref 3.4–5.3)
RBC # BLD AUTO: 4.59 10E6/UL (ref 4.4–5.9)
SODIUM SERPL-SCNC: 140 MMOL/L (ref 135–145)
SPECIMEN TYPE: NORMAL
TME LAST DOSE: NORMAL H
TME LAST DOSE: NORMAL H
WBC # BLD AUTO: 6.8 10E3/UL (ref 4–11)

## 2025-07-22 PROCEDURE — 87799 DETECT AGENT NOS DNA QUANT: CPT

## 2025-07-22 PROCEDURE — 84100 ASSAY OF PHOSPHORUS: CPT

## 2025-07-22 PROCEDURE — 36415 COLL VENOUS BLD VENIPUNCTURE: CPT

## 2025-07-22 PROCEDURE — 80158 DRUG ASSAY CYCLOSPORINE: CPT

## 2025-07-22 PROCEDURE — 85027 COMPLETE CBC AUTOMATED: CPT

## 2025-07-22 PROCEDURE — 80048 BASIC METABOLIC PNL TOTAL CA: CPT

## 2025-07-22 PROCEDURE — 83735 ASSAY OF MAGNESIUM: CPT

## 2025-07-23 LAB
BK VIRUS DNA IU/ML, INSTRUMENT (6800): 543 IU/ML (ref ?–1)
BK VIRUS SPECIMEN TYPE: ABNORMAL
BKV DNA SPEC NAA+PROBE-LOG#: 2.7 {LOG_COPIES}/ML

## 2025-07-30 ENCOUNTER — TELEPHONE (OUTPATIENT)
Dept: TRANSPLANT | Facility: CLINIC | Age: 75
End: 2025-07-30
Payer: COMMERCIAL

## 2025-07-30 DIAGNOSIS — B34.8 BK VIREMIA: ICD-10-CM

## 2025-07-30 DIAGNOSIS — R73.9 DRUG-INDUCED HYPERGLYCEMIA: ICD-10-CM

## 2025-07-30 DIAGNOSIS — T50.905A DRUG-INDUCED HYPERGLYCEMIA: ICD-10-CM

## 2025-07-30 DIAGNOSIS — Z94.0 HTN, KIDNEY TRANSPLANT RELATED: ICD-10-CM

## 2025-07-30 DIAGNOSIS — I15.1 HTN, KIDNEY TRANSPLANT RELATED: ICD-10-CM

## 2025-07-30 NOTE — TELEPHONE ENCOUNTER
Patient Call: General  Route to LPN    Reason for call: Pt has been taking fish oil- wonders if it is a side affect skin is thinning and bruises easily     Call back needed? Yes    Return Call Needed  Same as documented in contacts section  When to return call?: Greater than one day: Route standard priority

## 2025-07-30 NOTE — TELEPHONE ENCOUNTER
Alexis noticing increased bleeding and bruising, especially on his arms. Is taking fish oil twice per day but can't remember who recommended this or why he's on it. Will plan to stop supplementation, sent to Dr. Keen for review.     Damir Gay MD Poucher, Jessica, RN  It has rarely been associated with bleeding. I'm assuming it was started for moderate hypertriglyceridemia (~ 400). Okay to stop. Repeat lipid panel and triglycerides with next blood test.

## 2025-08-05 ENCOUNTER — LAB (OUTPATIENT)
Dept: LAB | Facility: CLINIC | Age: 75
End: 2025-08-05
Payer: COMMERCIAL

## 2025-08-05 DIAGNOSIS — Z94.0 STATUS POST KIDNEY TRANSPLANT: ICD-10-CM

## 2025-08-05 DIAGNOSIS — Z20.828 CONTACT WITH AND (SUSPECTED) EXPOSURE TO OTHER VIRAL COMMUNICABLE DISEASES: ICD-10-CM

## 2025-08-05 DIAGNOSIS — Z48.298 AFTERCARE FOLLOWING ORGAN TRANSPLANT: ICD-10-CM

## 2025-08-05 DIAGNOSIS — Z94.0 KIDNEY REPLACED BY TRANSPLANT: ICD-10-CM

## 2025-08-05 DIAGNOSIS — Z98.890 OTHER SPECIFIED POSTPROCEDURAL STATES: ICD-10-CM

## 2025-08-05 DIAGNOSIS — Z79.899 ENCOUNTER FOR LONG-TERM CURRENT USE OF MEDICATION: ICD-10-CM

## 2025-08-05 LAB
ANION GAP SERPL CALCULATED.3IONS-SCNC: 11 MMOL/L (ref 7–15)
BUN SERPL-MCNC: 33.6 MG/DL (ref 8–23)
CALCIUM SERPL-MCNC: 10.7 MG/DL (ref 8.8–10.4)
CHLORIDE SERPL-SCNC: 105 MMOL/L (ref 98–107)
CREAT SERPL-MCNC: 1.22 MG/DL (ref 0.67–1.17)
CYCLOSPORINE BLD LC/MS/MS-MCNC: 118 UG/L (ref 50–400)
EGFRCR SERPLBLD CKD-EPI 2021: 62 ML/MIN/1.73M2
ERYTHROCYTE [DISTWIDTH] IN BLOOD BY AUTOMATED COUNT: 13.2 % (ref 10–15)
GLUCOSE SERPL-MCNC: 155 MG/DL (ref 70–99)
HCO3 SERPL-SCNC: 24 MMOL/L (ref 22–29)
HCT VFR BLD AUTO: 42.4 % (ref 40–53)
HGB BLD-MCNC: 13.6 G/DL (ref 13.3–17.7)
MCH RBC QN AUTO: 31 PG (ref 26.5–33)
MCHC RBC AUTO-ENTMCNC: 32.1 G/DL (ref 31.5–36.5)
MCV RBC AUTO: 97 FL (ref 78–100)
PLATELET # BLD AUTO: 172 10E3/UL (ref 150–450)
POTASSIUM SERPL-SCNC: 4.9 MMOL/L (ref 3.4–5.3)
RBC # BLD AUTO: 4.39 10E6/UL (ref 4.4–5.9)
SODIUM SERPL-SCNC: 140 MMOL/L (ref 135–145)
TME LAST DOSE: NORMAL H
TME LAST DOSE: NORMAL H
WBC # BLD AUTO: 6 10E3/UL (ref 4–11)

## 2025-08-05 PROCEDURE — 80048 BASIC METABOLIC PNL TOTAL CA: CPT

## 2025-08-05 PROCEDURE — 36415 COLL VENOUS BLD VENIPUNCTURE: CPT

## 2025-08-05 PROCEDURE — 87799 DETECT AGENT NOS DNA QUANT: CPT

## 2025-08-05 PROCEDURE — 85027 COMPLETE CBC AUTOMATED: CPT

## 2025-08-05 PROCEDURE — 80158 DRUG ASSAY CYCLOSPORINE: CPT

## 2025-08-06 LAB
BK VIRUS DNA IU/ML, INSTRUMENT (6800): 525 IU/ML (ref ?–1)
BK VIRUS SPECIMEN TYPE: ABNORMAL
BKV DNA SPEC NAA+PROBE-LOG#: 2.7 {LOG_COPIES}/ML

## 2025-08-13 ENCOUNTER — MYC MEDICAL ADVICE (OUTPATIENT)
Dept: ENDOCRINOLOGY | Facility: CLINIC | Age: 75
End: 2025-08-13
Payer: COMMERCIAL

## 2025-08-13 DIAGNOSIS — R73.9 DRUG-INDUCED HYPERGLYCEMIA: ICD-10-CM

## 2025-08-13 DIAGNOSIS — T50.905A DRUG-INDUCED HYPERGLYCEMIA: ICD-10-CM

## 2025-08-21 ENCOUNTER — LAB (OUTPATIENT)
Dept: LAB | Facility: CLINIC | Age: 75
End: 2025-08-21
Attending: INTERNAL MEDICINE
Payer: COMMERCIAL

## 2025-08-21 ENCOUNTER — OFFICE VISIT (OUTPATIENT)
Dept: TRANSPLANT | Facility: CLINIC | Age: 75
End: 2025-08-21
Attending: INTERNAL MEDICINE
Payer: COMMERCIAL

## 2025-08-21 VITALS
DIASTOLIC BLOOD PRESSURE: 80 MMHG | OXYGEN SATURATION: 99 % | WEIGHT: 190.8 LBS | BODY MASS INDEX: 30.67 KG/M2 | HEIGHT: 66 IN | HEART RATE: 73 BPM | SYSTOLIC BLOOD PRESSURE: 149 MMHG

## 2025-08-21 DIAGNOSIS — E11.21 DIABETIC NEPHROPATHY ASSOCIATED WITH TYPE 2 DIABETES MELLITUS (H): ICD-10-CM

## 2025-08-21 DIAGNOSIS — E11.69 TYPE 2 DIABETES MELLITUS WITH OTHER SPECIFIED COMPLICATION, WITH LONG-TERM CURRENT USE OF INSULIN (H): ICD-10-CM

## 2025-08-21 DIAGNOSIS — D84.9 IMMUNOSUPPRESSED STATUS: ICD-10-CM

## 2025-08-21 DIAGNOSIS — N25.81 SECONDARY RENAL HYPERPARATHYROIDISM: ICD-10-CM

## 2025-08-21 DIAGNOSIS — Z98.890 OTHER SPECIFIED POSTPROCEDURAL STATES: ICD-10-CM

## 2025-08-21 DIAGNOSIS — Z29.89 NEED FOR PNEUMOCYSTIS PROPHYLAXIS: ICD-10-CM

## 2025-08-21 DIAGNOSIS — D80.1 HYPOGAMMAGLOBULINEMIA: ICD-10-CM

## 2025-08-21 DIAGNOSIS — C18.2 MALIGNANT NEOPLASM OF ASCENDING COLON (H): Primary | ICD-10-CM

## 2025-08-21 DIAGNOSIS — Z79.899 ENCOUNTER FOR LONG-TERM CURRENT USE OF MEDICATION: ICD-10-CM

## 2025-08-21 DIAGNOSIS — E83.42 HYPOMAGNESEMIA: ICD-10-CM

## 2025-08-21 DIAGNOSIS — I15.1 HTN, KIDNEY TRANSPLANT RELATED: ICD-10-CM

## 2025-08-21 DIAGNOSIS — I25.83 CORONARY ARTERY DISEASE DUE TO LIPID RICH PLAQUE: ICD-10-CM

## 2025-08-21 DIAGNOSIS — N40.0 HYPERTROPHY OF PROSTATE WITHOUT URINARY OBSTRUCTION: ICD-10-CM

## 2025-08-21 DIAGNOSIS — Z20.828 CONTACT WITH AND (SUSPECTED) EXPOSURE TO OTHER VIRAL COMMUNICABLE DISEASES: ICD-10-CM

## 2025-08-21 DIAGNOSIS — Z94.0 HTN, KIDNEY TRANSPLANT RELATED: ICD-10-CM

## 2025-08-21 DIAGNOSIS — Z48.298 AFTERCARE FOLLOWING ORGAN TRANSPLANT: ICD-10-CM

## 2025-08-21 DIAGNOSIS — Z94.0 STATUS POST KIDNEY TRANSPLANT: ICD-10-CM

## 2025-08-21 DIAGNOSIS — B34.8 BK VIREMIA: ICD-10-CM

## 2025-08-21 DIAGNOSIS — M10.9 GOUT, UNSPECIFIED CAUSE, UNSPECIFIED CHRONICITY, UNSPECIFIED SITE: ICD-10-CM

## 2025-08-21 DIAGNOSIS — M1A.30X0 CHRONIC GOUT DUE TO RENAL IMPAIRMENT WITHOUT TOPHUS, UNSPECIFIED SITE: ICD-10-CM

## 2025-08-21 DIAGNOSIS — Z94.0 KIDNEY REPLACED BY TRANSPLANT: ICD-10-CM

## 2025-08-21 DIAGNOSIS — I25.10 CORONARY ARTERY DISEASE DUE TO LIPID RICH PLAQUE: ICD-10-CM

## 2025-08-21 DIAGNOSIS — N18.2 CKD (CHRONIC KIDNEY DISEASE) STAGE 2, GFR 60-89 ML/MIN: ICD-10-CM

## 2025-08-21 DIAGNOSIS — E83.52 HYPERCALCEMIA: ICD-10-CM

## 2025-08-21 DIAGNOSIS — Z79.4 TYPE 2 DIABETES MELLITUS WITH OTHER SPECIFIED COMPLICATION, WITH LONG-TERM CURRENT USE OF INSULIN (H): ICD-10-CM

## 2025-08-21 PROBLEM — E83.39 HYPOPHOSPHATEMIA: Status: RESOLVED | Noted: 2025-03-26 | Resolved: 2025-08-21

## 2025-08-21 PROBLEM — Z99.2 DEPENDENCE ON RENAL DIALYSIS: Status: RESOLVED | Noted: 2024-10-02 | Resolved: 2025-08-21

## 2025-08-21 LAB
ALBUMIN MFR UR ELPH: 26 MG/DL
ALBUMIN SERPL BCG-MCNC: 4.3 G/DL (ref 3.5–5.2)
ALP SERPL-CCNC: 80 U/L (ref 40–150)
ALT SERPL W P-5'-P-CCNC: 64 U/L (ref 0–70)
ANION GAP SERPL CALCULATED.3IONS-SCNC: 13 MMOL/L (ref 7–15)
AST SERPL W P-5'-P-CCNC: 26 U/L (ref 0–45)
BILIRUB SERPL-MCNC: 0.4 MG/DL
BILIRUBIN DIRECT (ROCHE PRO & PURE): 0.22 MG/DL (ref 0–0.45)
BUN SERPL-MCNC: 39.5 MG/DL (ref 8–23)
CALCIUM SERPL-MCNC: 11 MG/DL (ref 8.8–10.4)
CHLORIDE SERPL-SCNC: 102 MMOL/L (ref 98–107)
CHOLEST SERPL-MCNC: 169 MG/DL
CMV DNA SPEC NAA+PROBE-ACNC: NOT DETECTED IU/ML
CREAT SERPL-MCNC: 1.31 MG/DL (ref 0.67–1.17)
CREAT UR-MCNC: 93.8 MG/DL
CYCLOSPORINE BLD LC/MS/MS-MCNC: 110 UG/L (ref 50–400)
EGFRCR SERPLBLD CKD-EPI 2021: 57 ML/MIN/1.73M2
ERYTHROCYTE [DISTWIDTH] IN BLOOD BY AUTOMATED COUNT: 12.8 % (ref 10–15)
EST. AVERAGE GLUCOSE BLD GHB EST-MCNC: 157 MG/DL
FASTING STATUS PATIENT QL REPORTED: ABNORMAL
FASTING STATUS PATIENT QL REPORTED: NO
GLUCOSE SERPL-MCNC: 214 MG/DL (ref 70–99)
HBA1C MFR BLD: 7.1 %
HCO3 SERPL-SCNC: 26 MMOL/L (ref 22–29)
HCT VFR BLD AUTO: 45 % (ref 40–53)
HDLC SERPL-MCNC: 75 MG/DL
HGB BLD-MCNC: 14.6 G/DL (ref 13.3–17.7)
LDLC SERPL CALC-MCNC: 59 MG/DL
MCH RBC QN AUTO: 30.9 PG (ref 26.5–33)
MCHC RBC AUTO-ENTMCNC: 32.4 G/DL (ref 31.5–36.5)
MCV RBC AUTO: 95.1 FL (ref 78–100)
NONHDLC SERPL-MCNC: 94 MG/DL
PLATELET # BLD AUTO: 176 10E3/UL (ref 150–450)
POTASSIUM SERPL-SCNC: 4.2 MMOL/L (ref 3.4–5.3)
PROT SERPL-MCNC: 6.8 G/DL (ref 6.4–8.3)
PROT/CREAT 24H UR: 0.28 MG/MG CR (ref 0–0.2)
RBC # BLD AUTO: 4.73 10E6/UL (ref 4.4–5.9)
SODIUM SERPL-SCNC: 141 MMOL/L (ref 135–145)
SPECIMEN TYPE: NORMAL
TME LAST DOSE: NORMAL H
TME LAST DOSE: NORMAL H
TRIGL SERPL-MCNC: 176 MG/DL
URATE SERPL-MCNC: 5.6 MG/DL (ref 3.4–7)
WBC # BLD AUTO: 7.62 10E3/UL (ref 4–11)

## 2025-08-21 PROCEDURE — 87799 DETECT AGENT NOS DNA QUANT: CPT | Performed by: INTERNAL MEDICINE

## 2025-08-21 PROCEDURE — 83036 HEMOGLOBIN GLYCOSYLATED A1C: CPT | Performed by: INTERNAL MEDICINE

## 2025-08-21 PROCEDURE — 80158 DRUG ASSAY CYCLOSPORINE: CPT | Performed by: INTERNAL MEDICINE

## 2025-08-21 PROCEDURE — 99000 SPECIMEN HANDLING OFFICE-LAB: CPT | Performed by: PATHOLOGY

## 2025-08-21 PROCEDURE — G0463 HOSPITAL OUTPT CLINIC VISIT: HCPCS | Performed by: INTERNAL MEDICINE

## 2025-08-21 RX ORDER — AMLODIPINE BESYLATE 5 MG/1
5 TABLET ORAL 2 TIMES DAILY
Qty: 90 TABLET | Refills: 3 | Status: SHIPPED | OUTPATIENT
Start: 2025-08-21

## 2025-08-21 ASSESSMENT — PAIN SCALES - GENERAL: PAINLEVEL_OUTOF10: NO PAIN (0)

## 2025-08-22 ENCOUNTER — TELEPHONE (OUTPATIENT)
Dept: NEPHROLOGY | Facility: CLINIC | Age: 75
End: 2025-08-22
Payer: COMMERCIAL

## 2025-08-22 DIAGNOSIS — Z94.0 KIDNEY TRANSPLANTED: ICD-10-CM

## 2025-08-22 DIAGNOSIS — I15.1 HTN, KIDNEY TRANSPLANT RELATED: ICD-10-CM

## 2025-08-22 DIAGNOSIS — Z94.0 HTN, KIDNEY TRANSPLANT RELATED: ICD-10-CM

## 2025-08-22 DIAGNOSIS — B34.8 BK VIREMIA: ICD-10-CM

## 2025-08-25 RX ORDER — FUROSEMIDE 20 MG/1
20 TABLET ORAL
Qty: 30 TABLET | Refills: 0 | Status: SHIPPED | OUTPATIENT
Start: 2025-08-25

## 2025-09-02 ENCOUNTER — LAB (OUTPATIENT)
Dept: LAB | Facility: CLINIC | Age: 75
End: 2025-09-02
Payer: COMMERCIAL

## 2025-09-02 DIAGNOSIS — Z94.0 KIDNEY REPLACED BY TRANSPLANT: ICD-10-CM

## 2025-09-02 DIAGNOSIS — Z20.828 CONTACT WITH AND (SUSPECTED) EXPOSURE TO OTHER VIRAL COMMUNICABLE DISEASES: ICD-10-CM

## 2025-09-02 DIAGNOSIS — Z48.298 AFTERCARE FOLLOWING ORGAN TRANSPLANT: ICD-10-CM

## 2025-09-02 DIAGNOSIS — Z94.0 STATUS POST KIDNEY TRANSPLANT: ICD-10-CM

## 2025-09-02 DIAGNOSIS — Z94.0 HTN, KIDNEY TRANSPLANT RELATED: ICD-10-CM

## 2025-09-02 DIAGNOSIS — B34.8 BK VIREMIA: ICD-10-CM

## 2025-09-02 DIAGNOSIS — Z98.890 OTHER SPECIFIED POSTPROCEDURAL STATES: ICD-10-CM

## 2025-09-02 DIAGNOSIS — Z79.4 TYPE 2 DIABETES MELLITUS WITH OTHER SPECIFIED COMPLICATION, WITH LONG-TERM CURRENT USE OF INSULIN (H): ICD-10-CM

## 2025-09-02 DIAGNOSIS — Z79.899 ENCOUNTER FOR LONG-TERM CURRENT USE OF MEDICATION: ICD-10-CM

## 2025-09-02 DIAGNOSIS — I15.1 HTN, KIDNEY TRANSPLANT RELATED: ICD-10-CM

## 2025-09-02 DIAGNOSIS — E11.69 TYPE 2 DIABETES MELLITUS WITH OTHER SPECIFIED COMPLICATION, WITH LONG-TERM CURRENT USE OF INSULIN (H): ICD-10-CM

## 2025-09-02 LAB
ANION GAP SERPL CALCULATED.3IONS-SCNC: 8 MMOL/L (ref 7–15)
BUN SERPL-MCNC: 31.4 MG/DL (ref 8–23)
CALCIUM SERPL-MCNC: 10.4 MG/DL (ref 8.8–10.4)
CHLORIDE SERPL-SCNC: 104 MMOL/L (ref 98–107)
CREAT SERPL-MCNC: 1.13 MG/DL (ref 0.67–1.17)
CYCLOSPORINE BLD LC/MS/MS-MCNC: 101 UG/L (ref 50–400)
EGFRCR SERPLBLD CKD-EPI 2021: 68 ML/MIN/1.73M2
ERYTHROCYTE [DISTWIDTH] IN BLOOD BY AUTOMATED COUNT: 12.5 % (ref 10–15)
GLUCOSE SERPL-MCNC: 153 MG/DL (ref 70–99)
HCO3 SERPL-SCNC: 26 MMOL/L (ref 22–29)
HCT VFR BLD AUTO: 42.2 % (ref 40–53)
HGB BLD-MCNC: 13.8 G/DL (ref 13.3–17.7)
MCH RBC QN AUTO: 30.6 PG (ref 26.5–33)
MCHC RBC AUTO-ENTMCNC: 32.7 G/DL (ref 31.5–36.5)
MCV RBC AUTO: 93.6 FL (ref 78–100)
PLATELET # BLD AUTO: 151 10E3/UL (ref 150–450)
POTASSIUM SERPL-SCNC: 4.6 MMOL/L (ref 3.4–5.3)
PTH-INTACT SERPL-MCNC: 70 PG/ML (ref 15–65)
RBC # BLD AUTO: 4.51 10E6/UL (ref 4.4–5.9)
SODIUM SERPL-SCNC: 138 MMOL/L (ref 135–145)
TME LAST DOSE: NORMAL H
TME LAST DOSE: NORMAL H
WBC # BLD AUTO: 5.67 10E3/UL (ref 4–11)

## 2025-09-02 PROCEDURE — 83970 ASSAY OF PARATHORMONE: CPT

## 2025-09-02 PROCEDURE — 87799 DETECT AGENT NOS DNA QUANT: CPT

## 2025-09-02 PROCEDURE — 36415 COLL VENOUS BLD VENIPUNCTURE: CPT

## 2025-09-02 PROCEDURE — 80048 BASIC METABOLIC PNL TOTAL CA: CPT

## 2025-09-02 PROCEDURE — 85027 COMPLETE CBC AUTOMATED: CPT

## 2025-09-02 PROCEDURE — 80158 DRUG ASSAY CYCLOSPORINE: CPT

## 2025-09-02 RX ORDER — TAMSULOSIN HYDROCHLORIDE 0.4 MG/1
0.4 CAPSULE ORAL DAILY
Qty: 90 CAPSULE | Refills: 3 | Status: SHIPPED | OUTPATIENT
Start: 2025-09-02

## 2025-09-02 RX ORDER — ALLOPURINOL 100 MG/1
100 TABLET ORAL DAILY
Qty: 90 TABLET | Refills: 1 | Status: SHIPPED | OUTPATIENT
Start: 2025-09-02

## 2025-09-03 DIAGNOSIS — Z94.0 KIDNEY REPLACED BY TRANSPLANT: Primary | ICD-10-CM

## 2025-09-03 DIAGNOSIS — B34.8 BK VIREMIA: ICD-10-CM

## 2025-09-03 DIAGNOSIS — Z94.0 HTN, KIDNEY TRANSPLANT RELATED: ICD-10-CM

## 2025-09-03 DIAGNOSIS — I15.1 HTN, KIDNEY TRANSPLANT RELATED: ICD-10-CM

## 2025-09-03 LAB
BK VIRUS DNA IU/ML, INSTRUMENT (6800): 270 IU/ML (ref ?–1)
BK VIRUS SPECIMEN TYPE: ABNORMAL
BKV DNA SPEC NAA+PROBE-LOG#: 2.4 {LOG_COPIES}/ML

## 2025-09-03 RX ORDER — HYDROCHLOROTHIAZIDE 12.5 MG/1
CAPSULE ORAL
Qty: 6 EACH | Refills: 1 | Status: SHIPPED | OUTPATIENT
Start: 2025-09-03

## 2025-09-04 ENCOUNTER — TELEPHONE (OUTPATIENT)
Dept: TRANSPLANT | Facility: CLINIC | Age: 75
End: 2025-09-04
Payer: COMMERCIAL

## 2025-09-04 DIAGNOSIS — Z94.0 HTN, KIDNEY TRANSPLANT RELATED: ICD-10-CM

## 2025-09-04 DIAGNOSIS — I15.1 HTN, KIDNEY TRANSPLANT RELATED: ICD-10-CM

## 2025-09-04 DIAGNOSIS — B34.8 BK VIREMIA: ICD-10-CM

## 2025-09-04 LAB
DONOR IDENTIFICATION: NORMAL
DSA COMMENTS: NORMAL
DSA PRESENT: NO
DSA TEST METHOD: NORMAL
ORGAN: NORMAL
SA 1  COMMENTS: NORMAL
SA 1 CELL: NORMAL
SA 1 TEST METHOD: NORMAL
SA 2 CELL: NORMAL
SA 2 COMMENTS: NORMAL
SA 2 TEST METHOD: NORMAL
SA1 HI RISK ABY: NORMAL
SA1 MOD RISK ABY: NORMAL
SA2 HI RISK ABY: NORMAL
SA2 MOD RISK ABY: NORMAL
UNACCEPTABLE ANTIGENS: NORMAL
UNOS CPRA: 0

## (undated) DEVICE — PLUG CATH AND DRAIN TUBE PROTECTOR DYND12200

## (undated) DEVICE — SU SILK 2-0 TIE 12X30" A305H

## (undated) DEVICE — LINEN TOWEL PACK X6 WHITE 5487

## (undated) DEVICE — CATH ANGIO INFINITI JL4 4FRX100CM 538420

## (undated) DEVICE — SU PROLENE 6-0 RB-2DA 30" 8711H

## (undated) DEVICE — CATH ANGIO INFINITI 3DRC 4FRX100CM 538476

## (undated) DEVICE — SU PROLENE 5-0 RB-1DA 36"  8556H

## (undated) DEVICE — SU SILK 3-0 SH CR 8X18" C013D

## (undated) DEVICE — KIT HAND CONTROL ACIST 014644 AR-P54

## (undated) DEVICE — KIT DVC ANGIO IBASIXCOMPAK 13INX20ML 3WAY IN4430

## (undated) DEVICE — SUCTION MANIFOLD NEPTUNE 2 SYS 4 PORT 0702-020-000

## (undated) DEVICE — NDL COUNTER 20CT 31142493

## (undated) DEVICE — CATH FOLEY 3WAY 16FR 30ML LATEX 0167SI16

## (undated) DEVICE — SU VICRYL+ 3-0 27IN SH UND VCP416H

## (undated) DEVICE — GLOVE BIOGEL PI MICRO INDICATOR UNDERGLOVE SZ 7.5 48975

## (undated) DEVICE — STENT CORONARY DES SYNERGY XD MR US 3.00X12MM H7493941812300: Type: IMPLANTABLE DEVICE | Status: NON-FUNCTIONAL

## (undated) DEVICE — Device

## (undated) DEVICE — SU SILK 1 TIE 6X30" A307H

## (undated) DEVICE — PACKING IODOFORM STRIP 1/4" 7831

## (undated) DEVICE — INSERT FOGARTY 33MM TRACTION HYDRAJAW HYDRA33

## (undated) DEVICE — RX SURGIFLO HEMOSTATIC MATRIX W/THROMBIN 8ML 2994

## (undated) DEVICE — GUIDEWIRE ASAHI SION BLUE 190CM J TIP AHW14R104J

## (undated) DEVICE — TUBING IRRIG CYSTO/BLADDER SET 81" LF 2C4040

## (undated) DEVICE — DRAPE FLUID WARMING 52 X 60" ORS-321

## (undated) DEVICE — PACK HEART LEFT CUSTOM

## (undated) DEVICE — LINEN TOWEL PACK X30 5481

## (undated) DEVICE — SOL NACL 0.9% IRRIG 1000ML BOTTLE 2F7124

## (undated) DEVICE — SOL NACL 0.9% INJ 1000ML BAG 2B1324X

## (undated) DEVICE — SU PDS II 4-0 SH 27" Z315H

## (undated) DEVICE — SURGICEL ABSORBABLE HEMOSTAT SNOW 4"X4" 2083

## (undated) DEVICE — SU SILK 0 TIE 6X30" A306H

## (undated) DEVICE — MANIFOLD KIT ANGIO AUTOMATED 014613

## (undated) DEVICE — TUBING PRESSURE 30"

## (undated) DEVICE — SU SILK 4-0 TIE 12X30" A303H

## (undated) DEVICE — SU PDS II 6-0 RB-2DA 30" Z149H

## (undated) DEVICE — CLIP APPLIER 09 3/8" SM LIGACLIP MCS20

## (undated) DEVICE — CATH GUIDING BLUE YELLOW PTFE XB3.5 6FRX100CM 67005400

## (undated) DEVICE — SU VICRYL+ 0 27 UR6 VLT VCP603H

## (undated) DEVICE — ESU ELEC BLADE HEX-LOCKING 2.5" E1450X

## (undated) DEVICE — DRSG GAUZE 4X4" 8044

## (undated) DEVICE — SU PDS II 0 TP-1 60" Z991G

## (undated) DEVICE — SU PDS II 5-0 RB-1 27" Z303H

## (undated) DEVICE — VALVE HEMOSTASIS .096" COPILOT MECH 1003331

## (undated) DEVICE — WIRE GUIDE 0.035"X150CM EMERALD J TIP 502521

## (undated) DEVICE — DRSG MEDIPORE 3 1/2X13 3/4" 3573

## (undated) DEVICE — DRAPE SHEET REV FOLD 3/4 9349

## (undated) DEVICE — INTRO SHEATH 6FRX25CM PINNACLE RSS606

## (undated) DEVICE — CLIP APPLIER 11" MED LIGACLIP MCM30

## (undated) DEVICE — PREP CHLORAPREP 26ML TINTED HI-LITE ORANGE 930815

## (undated) DEVICE — CLIP APPLIER 13" LG LIGACLIP MCL20

## (undated) DEVICE — DEVICE CATH STABILIZATION STATLOCK FOLEY 3-WAY FOL0105

## (undated) DEVICE — DRAPE STERI FLUOROSCOPE 35X43"1012 LATEX FREE

## (undated) DEVICE — INTRO SHEATH AVANTI 4FRX23CM 504604T

## (undated) DEVICE — SU SILK 3-0 TIE 12X30" A304H

## (undated) DEVICE — GLOVE BIOGEL PI MICRO INDICATOR UNDERGLOVE SZ 8.0 48980

## (undated) DEVICE — DRAPE IOBAN INCISE 23X17" 6650EZ

## (undated) RX ORDER — HEPARIN SODIUM 1000 [USP'U]/ML
INJECTION, SOLUTION INTRAVENOUS; SUBCUTANEOUS
Status: DISPENSED
Start: 2025-02-13

## (undated) RX ORDER — VERAPAMIL HYDROCHLORIDE 2.5 MG/ML
INJECTION INTRAVENOUS
Status: DISPENSED
Start: 2025-02-13

## (undated) RX ORDER — POTASSIUM CHLORIDE 750 MG/1
TABLET, EXTENDED RELEASE ORAL
Status: DISPENSED
Start: 2024-07-17

## (undated) RX ORDER — NITROGLYCERIN 5 MG/ML
VIAL (ML) INTRAVENOUS
Status: DISPENSED
Start: 2024-07-17

## (undated) RX ORDER — FENTANYL CITRATE 50 UG/ML
INJECTION, SOLUTION INTRAMUSCULAR; INTRAVENOUS
Status: DISPENSED
Start: 2024-07-17

## (undated) RX ORDER — NOREPINEPHRINE BITARTRATE 0.06 MG/ML
INJECTION, SOLUTION INTRAVENOUS
Status: DISPENSED
Start: 2024-07-17

## (undated) RX ORDER — PAPAVERINE HYDROCHLORIDE 30 MG/ML
INJECTION INTRAMUSCULAR; INTRAVENOUS
Status: DISPENSED
Start: 2025-02-13

## (undated) RX ORDER — ASPIRIN 81 MG/1
TABLET, CHEWABLE ORAL
Status: DISPENSED
Start: 2024-07-17

## (undated) RX ORDER — ACETAMINOPHEN 325 MG/1
TABLET ORAL
Status: DISPENSED
Start: 2025-02-13

## (undated) RX ORDER — SODIUM CHLORIDE 9 MG/ML
INJECTION, SOLUTION INTRAVENOUS
Status: DISPENSED
Start: 2024-07-17

## (undated) RX ORDER — ASPIRIN 325 MG
TABLET ORAL
Status: DISPENSED
Start: 2024-07-17